# Patient Record
Sex: FEMALE | Race: WHITE | NOT HISPANIC OR LATINO | Employment: FULL TIME | URBAN - METROPOLITAN AREA
[De-identification: names, ages, dates, MRNs, and addresses within clinical notes are randomized per-mention and may not be internally consistent; named-entity substitution may affect disease eponyms.]

---

## 2020-01-19 ENCOUNTER — HOSPITAL ENCOUNTER (INPATIENT)
Facility: HOSPITAL | Age: 70
LOS: 24 days | Discharge: NURSING, REHAB OR LONG-TERM CARE | DRG: 326 | End: 2020-02-12
Attending: SURGERY | Admitting: SURGERY
Payer: COMMERCIAL

## 2020-01-19 DIAGNOSIS — K44.9 DIAPHRAGMATIC HERNIA: ICD-10-CM

## 2020-01-19 DIAGNOSIS — Z01.811 PRE-OP CHEST EXAM: ICD-10-CM

## 2020-01-19 DIAGNOSIS — K44.9 DIAPHRAGMATIC HERNIA WITHOUT OBSTRUCTION AND WITHOUT GANGRENE: ICD-10-CM

## 2020-01-19 DIAGNOSIS — R05.9 COUGH: ICD-10-CM

## 2020-01-19 DIAGNOSIS — D72.829 LEUKOCYTOSIS, UNSPECIFIED TYPE: ICD-10-CM

## 2020-01-19 LAB
ALBUMIN SERPL BCP-MCNC: 2.9 G/DL (ref 3.5–5.2)
ALP SERPL-CCNC: 100 U/L (ref 55–135)
ALT SERPL W/O P-5'-P-CCNC: 50 U/L (ref 10–44)
ANION GAP SERPL CALC-SCNC: 7 MMOL/L (ref 8–16)
AST SERPL-CCNC: 36 U/L (ref 10–40)
BASOPHILS # BLD AUTO: 0.07 K/UL (ref 0–0.2)
BASOPHILS NFR BLD: 0.4 % (ref 0–1.9)
BILIRUB SERPL-MCNC: 0.9 MG/DL (ref 0.1–1)
BUN SERPL-MCNC: 12 MG/DL (ref 8–23)
CALCIUM SERPL-MCNC: 8.8 MG/DL (ref 8.7–10.5)
CHLORIDE SERPL-SCNC: 101 MMOL/L (ref 95–110)
CO2 SERPL-SCNC: 26 MMOL/L (ref 23–29)
CREAT SERPL-MCNC: 0.8 MG/DL (ref 0.5–1.4)
DIFFERENTIAL METHOD: ABNORMAL
EOSINOPHIL # BLD AUTO: 0.3 K/UL (ref 0–0.5)
EOSINOPHIL NFR BLD: 1.7 % (ref 0–8)
ERYTHROCYTE [DISTWIDTH] IN BLOOD BY AUTOMATED COUNT: 13.9 % (ref 11.5–14.5)
EST. GFR  (AFRICAN AMERICAN): >60 ML/MIN/1.73 M^2
EST. GFR  (NON AFRICAN AMERICAN): >60 ML/MIN/1.73 M^2
GLUCOSE SERPL-MCNC: 140 MG/DL (ref 70–110)
HCT VFR BLD AUTO: 37.5 % (ref 37–48.5)
HGB BLD-MCNC: 11.5 G/DL (ref 12–16)
IMM GRANULOCYTES # BLD AUTO: 0.24 K/UL (ref 0–0.04)
IMM GRANULOCYTES NFR BLD AUTO: 1.4 % (ref 0–0.5)
LACTATE SERPL-SCNC: 0.9 MMOL/L (ref 0.5–2.2)
LYMPHOCYTES # BLD AUTO: 1.5 K/UL (ref 1–4.8)
LYMPHOCYTES NFR BLD: 8.9 % (ref 18–48)
MAGNESIUM SERPL-MCNC: 1.7 MG/DL (ref 1.6–2.6)
MCH RBC QN AUTO: 27.6 PG (ref 27–31)
MCHC RBC AUTO-ENTMCNC: 30.7 G/DL (ref 32–36)
MCV RBC AUTO: 90 FL (ref 82–98)
MONOCYTES # BLD AUTO: 1.1 K/UL (ref 0.3–1)
MONOCYTES NFR BLD: 6.7 % (ref 4–15)
NEUTROPHILS # BLD AUTO: 13.5 K/UL (ref 1.8–7.7)
NEUTROPHILS NFR BLD: 80.9 % (ref 38–73)
NRBC BLD-RTO: 0 /100 WBC
PHOSPHATE SERPL-MCNC: 3.7 MG/DL (ref 2.7–4.5)
PLATELET # BLD AUTO: 348 K/UL (ref 150–350)
PMV BLD AUTO: 9.1 FL (ref 9.2–12.9)
POTASSIUM SERPL-SCNC: 4.2 MMOL/L (ref 3.5–5.1)
PROT SERPL-MCNC: 6 G/DL (ref 6–8.4)
RBC # BLD AUTO: 4.16 M/UL (ref 4–5.4)
SODIUM SERPL-SCNC: 134 MMOL/L (ref 136–145)
WBC # BLD AUTO: 16.69 K/UL (ref 3.9–12.7)

## 2020-01-19 PROCEDURE — 25000003 PHARM REV CODE 250: Performed by: STUDENT IN AN ORGANIZED HEALTH CARE EDUCATION/TRAINING PROGRAM

## 2020-01-19 PROCEDURE — 83735 ASSAY OF MAGNESIUM: CPT

## 2020-01-19 PROCEDURE — 83605 ASSAY OF LACTIC ACID: CPT

## 2020-01-19 PROCEDURE — 63600175 PHARM REV CODE 636 W HCPCS: Performed by: STUDENT IN AN ORGANIZED HEALTH CARE EDUCATION/TRAINING PROGRAM

## 2020-01-19 PROCEDURE — 80053 COMPREHEN METABOLIC PANEL: CPT

## 2020-01-19 PROCEDURE — 36415 COLL VENOUS BLD VENIPUNCTURE: CPT

## 2020-01-19 PROCEDURE — 85025 COMPLETE CBC W/AUTO DIFF WBC: CPT

## 2020-01-19 PROCEDURE — 11000001 HC ACUTE MED/SURG PRIVATE ROOM

## 2020-01-19 PROCEDURE — 84100 ASSAY OF PHOSPHORUS: CPT

## 2020-01-19 RX ORDER — OXYCODONE HYDROCHLORIDE 5 MG/1
5 TABLET ORAL EVERY 4 HOURS PRN
Status: DISCONTINUED | OUTPATIENT
Start: 2020-01-19 | End: 2020-01-30

## 2020-01-19 RX ORDER — AMLODIPINE BESYLATE 10 MG/1
10 TABLET ORAL DAILY
Status: DISCONTINUED | OUTPATIENT
Start: 2020-01-19 | End: 2020-01-22

## 2020-01-19 RX ORDER — ONDANSETRON 8 MG/1
8 TABLET, ORALLY DISINTEGRATING ORAL EVERY 8 HOURS PRN
Status: DISCONTINUED | OUTPATIENT
Start: 2020-01-19 | End: 2020-01-29

## 2020-01-19 RX ORDER — LIDOCAINE HYDROCHLORIDE 10 MG/ML
1 INJECTION, SOLUTION EPIDURAL; INFILTRATION; INTRACAUDAL; PERINEURAL ONCE
Status: CANCELLED | OUTPATIENT
Start: 2020-01-19 | End: 2020-01-19

## 2020-01-19 RX ORDER — TALC
6 POWDER (GRAM) TOPICAL NIGHTLY PRN
Status: CANCELLED | OUTPATIENT
Start: 2020-01-19

## 2020-01-19 RX ORDER — ACETAMINOPHEN 325 MG/1
650 TABLET ORAL EVERY 8 HOURS PRN
Status: CANCELLED | OUTPATIENT
Start: 2020-01-19

## 2020-01-19 RX ORDER — OXYCODONE HYDROCHLORIDE 10 MG/1
10 TABLET ORAL EVERY 4 HOURS PRN
Status: DISCONTINUED | OUTPATIENT
Start: 2020-01-19 | End: 2020-01-30

## 2020-01-19 RX ORDER — ACETAMINOPHEN 325 MG/1
650 TABLET ORAL EVERY 4 HOURS PRN
Status: DISCONTINUED | OUTPATIENT
Start: 2020-01-19 | End: 2020-01-20

## 2020-01-19 RX ORDER — TALC
6 POWDER (GRAM) TOPICAL NIGHTLY PRN
Status: DISCONTINUED | OUTPATIENT
Start: 2020-01-19 | End: 2020-02-12 | Stop reason: HOSPADM

## 2020-01-19 RX ORDER — ACETAMINOPHEN 325 MG/1
650 TABLET ORAL EVERY 4 HOURS PRN
Status: CANCELLED | OUTPATIENT
Start: 2020-01-19

## 2020-01-19 RX ORDER — OXYCODONE HYDROCHLORIDE 10 MG/1
10 TABLET ORAL EVERY 4 HOURS PRN
Status: CANCELLED | OUTPATIENT
Start: 2020-01-19

## 2020-01-19 RX ORDER — LIDOCAINE HYDROCHLORIDE 10 MG/ML
1 INJECTION, SOLUTION EPIDURAL; INFILTRATION; INTRACAUDAL; PERINEURAL ONCE
Status: DISCONTINUED | OUTPATIENT
Start: 2020-01-19 | End: 2020-01-21

## 2020-01-19 RX ORDER — OXYCODONE HYDROCHLORIDE 5 MG/1
5 TABLET ORAL EVERY 4 HOURS PRN
Status: CANCELLED | OUTPATIENT
Start: 2020-01-19

## 2020-01-19 RX ORDER — ONDANSETRON 8 MG/1
8 TABLET, ORALLY DISINTEGRATING ORAL EVERY 8 HOURS PRN
Status: CANCELLED | OUTPATIENT
Start: 2020-01-19

## 2020-01-19 RX ORDER — ACETAMINOPHEN 325 MG/1
650 TABLET ORAL EVERY 8 HOURS PRN
Status: DISCONTINUED | OUTPATIENT
Start: 2020-01-19 | End: 2020-01-20

## 2020-01-19 RX ORDER — SODIUM CHLORIDE 0.9 % (FLUSH) 0.9 %
10 SYRINGE (ML) INJECTION
Status: CANCELLED | OUTPATIENT
Start: 2020-01-19

## 2020-01-19 RX ORDER — SODIUM CHLORIDE 0.9 % (FLUSH) 0.9 %
10 SYRINGE (ML) INJECTION
Status: DISCONTINUED | OUTPATIENT
Start: 2020-01-19 | End: 2020-02-12 | Stop reason: HOSPADM

## 2020-01-19 RX ORDER — DEXTROSE MONOHYDRATE, SODIUM CHLORIDE, AND POTASSIUM CHLORIDE 50; 1.49; 9 G/1000ML; G/1000ML; G/1000ML
INJECTION, SOLUTION INTRAVENOUS CONTINUOUS
Status: DISCONTINUED | OUTPATIENT
Start: 2020-01-19 | End: 2020-01-21

## 2020-01-19 RX ADMIN — Medication 6 MG: at 08:01

## 2020-01-19 RX ADMIN — AMLODIPINE BESYLATE 10 MG: 10 TABLET ORAL at 11:01

## 2020-01-19 RX ADMIN — OXYCODONE HYDROCHLORIDE 10 MG: 10 TABLET ORAL at 08:01

## 2020-01-19 RX ADMIN — POTASSIUM CHLORIDE: 2 INJECTION, SOLUTION, CONCENTRATE INTRAVENOUS at 10:01

## 2020-01-20 LAB
ALBUMIN SERPL BCP-MCNC: 2.8 G/DL (ref 3.5–5.2)
ALP SERPL-CCNC: 100 U/L (ref 55–135)
ALT SERPL W/O P-5'-P-CCNC: 47 U/L (ref 10–44)
ANION GAP SERPL CALC-SCNC: 7 MMOL/L (ref 8–16)
AST SERPL-CCNC: 34 U/L (ref 10–40)
BASOPHILS # BLD AUTO: 0.1 K/UL (ref 0–0.2)
BASOPHILS NFR BLD: 0.6 % (ref 0–1.9)
BILIRUB SERPL-MCNC: 0.8 MG/DL (ref 0.1–1)
BUN SERPL-MCNC: 13 MG/DL (ref 8–23)
CALCIUM SERPL-MCNC: 8.7 MG/DL (ref 8.7–10.5)
CHLORIDE SERPL-SCNC: 104 MMOL/L (ref 95–110)
CO2 SERPL-SCNC: 26 MMOL/L (ref 23–29)
CREAT SERPL-MCNC: 0.8 MG/DL (ref 0.5–1.4)
DIFFERENTIAL METHOD: ABNORMAL
EOSINOPHIL # BLD AUTO: 0.3 K/UL (ref 0–0.5)
EOSINOPHIL NFR BLD: 2 % (ref 0–8)
ERYTHROCYTE [DISTWIDTH] IN BLOOD BY AUTOMATED COUNT: 14.1 % (ref 11.5–14.5)
EST. GFR  (AFRICAN AMERICAN): >60 ML/MIN/1.73 M^2
EST. GFR  (NON AFRICAN AMERICAN): >60 ML/MIN/1.73 M^2
GLUCOSE SERPL-MCNC: 138 MG/DL (ref 70–110)
HCT VFR BLD AUTO: 38 % (ref 37–48.5)
HGB BLD-MCNC: 11.4 G/DL (ref 12–16)
IMM GRANULOCYTES # BLD AUTO: 0.28 K/UL (ref 0–0.04)
IMM GRANULOCYTES NFR BLD AUTO: 1.6 % (ref 0–0.5)
LYMPHOCYTES # BLD AUTO: 1.9 K/UL (ref 1–4.8)
LYMPHOCYTES NFR BLD: 11 % (ref 18–48)
MAGNESIUM SERPL-MCNC: 1.8 MG/DL (ref 1.6–2.6)
MCH RBC QN AUTO: 27.3 PG (ref 27–31)
MCHC RBC AUTO-ENTMCNC: 30 G/DL (ref 32–36)
MCV RBC AUTO: 91 FL (ref 82–98)
MONOCYTES # BLD AUTO: 1.4 K/UL (ref 0.3–1)
MONOCYTES NFR BLD: 7.8 % (ref 4–15)
NEUTROPHILS # BLD AUTO: 13.4 K/UL (ref 1.8–7.7)
NEUTROPHILS NFR BLD: 77 % (ref 38–73)
NRBC BLD-RTO: 0 /100 WBC
PHOSPHATE SERPL-MCNC: 3.6 MG/DL (ref 2.7–4.5)
PLATELET # BLD AUTO: 362 K/UL (ref 150–350)
PMV BLD AUTO: 9.2 FL (ref 9.2–12.9)
POTASSIUM SERPL-SCNC: 4.5 MMOL/L (ref 3.5–5.1)
PROT SERPL-MCNC: 6 G/DL (ref 6–8.4)
RBC # BLD AUTO: 4.17 M/UL (ref 4–5.4)
SODIUM SERPL-SCNC: 137 MMOL/L (ref 136–145)
WBC # BLD AUTO: 17.42 K/UL (ref 3.9–12.7)

## 2020-01-20 PROCEDURE — 63600175 PHARM REV CODE 636 W HCPCS: Performed by: STUDENT IN AN ORGANIZED HEALTH CARE EDUCATION/TRAINING PROGRAM

## 2020-01-20 PROCEDURE — 94640 AIRWAY INHALATION TREATMENT: CPT

## 2020-01-20 PROCEDURE — 94761 N-INVAS EAR/PLS OXIMETRY MLT: CPT

## 2020-01-20 PROCEDURE — 94799 UNLISTED PULMONARY SVC/PX: CPT

## 2020-01-20 PROCEDURE — 94664 DEMO&/EVAL PT USE INHALER: CPT

## 2020-01-20 PROCEDURE — 27000221 HC OXYGEN, UP TO 24 HOURS

## 2020-01-20 PROCEDURE — 25000242 PHARM REV CODE 250 ALT 637 W/ HCPCS: Performed by: STUDENT IN AN ORGANIZED HEALTH CARE EDUCATION/TRAINING PROGRAM

## 2020-01-20 PROCEDURE — 25000003 PHARM REV CODE 250: Performed by: STUDENT IN AN ORGANIZED HEALTH CARE EDUCATION/TRAINING PROGRAM

## 2020-01-20 PROCEDURE — 99900035 HC TECH TIME PER 15 MIN (STAT)

## 2020-01-20 PROCEDURE — 84100 ASSAY OF PHOSPHORUS: CPT

## 2020-01-20 PROCEDURE — 97530 THERAPEUTIC ACTIVITIES: CPT

## 2020-01-20 PROCEDURE — 85025 COMPLETE CBC W/AUTO DIFF WBC: CPT

## 2020-01-20 PROCEDURE — 63600175 PHARM REV CODE 636 W HCPCS: Performed by: SURGERY

## 2020-01-20 PROCEDURE — 99233 PR SUBSEQUENT HOSPITAL CARE,LEVL III: ICD-10-PCS | Mod: ,,, | Performed by: SURGERY

## 2020-01-20 PROCEDURE — 11000001 HC ACUTE MED/SURG PRIVATE ROOM

## 2020-01-20 PROCEDURE — 27000646 HC AEROBIKA DEVICE

## 2020-01-20 PROCEDURE — 83735 ASSAY OF MAGNESIUM: CPT

## 2020-01-20 PROCEDURE — 99233 SBSQ HOSP IP/OBS HIGH 50: CPT | Mod: ,,, | Performed by: SURGERY

## 2020-01-20 PROCEDURE — 36415 COLL VENOUS BLD VENIPUNCTURE: CPT

## 2020-01-20 PROCEDURE — 97165 OT EVAL LOW COMPLEX 30 MIN: CPT

## 2020-01-20 PROCEDURE — 80053 COMPREHEN METABOLIC PANEL: CPT

## 2020-01-20 RX ORDER — PANTOPRAZOLE SODIUM 40 MG/1
40 TABLET, DELAYED RELEASE ORAL
Status: DISCONTINUED | OUTPATIENT
Start: 2020-01-20 | End: 2020-01-21 | Stop reason: SDUPTHER

## 2020-01-20 RX ORDER — LEVOFLOXACIN 750 MG/1
750 TABLET ORAL DAILY
Status: DISCONTINUED | OUTPATIENT
Start: 2020-01-20 | End: 2020-01-20

## 2020-01-20 RX ORDER — IPRATROPIUM BROMIDE AND ALBUTEROL SULFATE 2.5; .5 MG/3ML; MG/3ML
3 SOLUTION RESPIRATORY (INHALATION) EVERY 6 HOURS PRN
Status: ACTIVE | OUTPATIENT
Start: 2020-01-20 | End: 2020-01-23

## 2020-01-20 RX ORDER — ACETAMINOPHEN 325 MG/1
650 TABLET ORAL EVERY 8 HOURS PRN
Status: DISCONTINUED | OUTPATIENT
Start: 2020-01-20 | End: 2020-01-24

## 2020-01-20 RX ORDER — HYDROMORPHONE HYDROCHLORIDE 1 MG/ML
0.5 INJECTION, SOLUTION INTRAMUSCULAR; INTRAVENOUS; SUBCUTANEOUS ONCE
Status: COMPLETED | OUTPATIENT
Start: 2020-01-20 | End: 2020-01-20

## 2020-01-20 RX ORDER — IPRATROPIUM BROMIDE AND ALBUTEROL SULFATE 2.5; .5 MG/3ML; MG/3ML
3 SOLUTION RESPIRATORY (INHALATION) EVERY 4 HOURS
Status: DISCONTINUED | OUTPATIENT
Start: 2020-01-20 | End: 2020-02-12 | Stop reason: HOSPADM

## 2020-01-20 RX ORDER — LEVOFLOXACIN 5 MG/ML
750 INJECTION, SOLUTION INTRAVENOUS
Status: DISCONTINUED | OUTPATIENT
Start: 2020-01-21 | End: 2020-01-23

## 2020-01-20 RX ORDER — POLYETHYLENE GLYCOL 3350 17 G/17G
17 POWDER, FOR SOLUTION ORAL DAILY
Status: DISCONTINUED | OUTPATIENT
Start: 2020-01-20 | End: 2020-01-23

## 2020-01-20 RX ORDER — BENZONATATE 100 MG/1
100 CAPSULE ORAL 3 TIMES DAILY
Status: DISCONTINUED | OUTPATIENT
Start: 2020-01-20 | End: 2020-01-22

## 2020-01-20 RX ADMIN — IPRATROPIUM BROMIDE AND ALBUTEROL SULFATE 3 ML: .5; 3 SOLUTION RESPIRATORY (INHALATION) at 11:01

## 2020-01-20 RX ADMIN — PANTOPRAZOLE SODIUM 40 MG: 40 TABLET, DELAYED RELEASE ORAL at 05:01

## 2020-01-20 RX ADMIN — POLYETHYLENE GLYCOL 3350 17 G: 17 POWDER, FOR SOLUTION ORAL at 10:01

## 2020-01-20 RX ADMIN — AMLODIPINE BESYLATE 10 MG: 10 TABLET ORAL at 10:01

## 2020-01-20 RX ADMIN — IPRATROPIUM BROMIDE AND ALBUTEROL SULFATE 3 ML: .5; 3 SOLUTION RESPIRATORY (INHALATION) at 04:01

## 2020-01-20 RX ADMIN — OXYCODONE HYDROCHLORIDE 10 MG: 10 TABLET ORAL at 05:01

## 2020-01-20 RX ADMIN — BENZONATATE 100 MG: 100 CAPSULE ORAL at 09:01

## 2020-01-20 RX ADMIN — ENEMA 1 ENEMA: 19; 7 ENEMA RECTAL at 09:01

## 2020-01-20 RX ADMIN — POTASSIUM CHLORIDE: 2 INJECTION, SOLUTION, CONCENTRATE INTRAVENOUS at 09:01

## 2020-01-20 RX ADMIN — OXYCODONE HYDROCHLORIDE 10 MG: 10 TABLET ORAL at 10:01

## 2020-01-20 RX ADMIN — LEVOFLOXACIN 750 MG: 750 TABLET, FILM COATED ORAL at 10:01

## 2020-01-20 RX ADMIN — IPRATROPIUM BROMIDE AND ALBUTEROL SULFATE 3 ML: .5; 3 SOLUTION RESPIRATORY (INHALATION) at 01:01

## 2020-01-20 RX ADMIN — HYDROMORPHONE HYDROCHLORIDE 0.5 MG: 1 INJECTION, SOLUTION INTRAMUSCULAR; INTRAVENOUS; SUBCUTANEOUS at 12:01

## 2020-01-20 RX ADMIN — PANTOPRAZOLE SODIUM 40 MG: 40 TABLET, DELAYED RELEASE ORAL at 06:01

## 2020-01-20 RX ADMIN — OXYCODONE HYDROCHLORIDE 10 MG: 10 TABLET ORAL at 01:01

## 2020-01-20 RX ADMIN — IPRATROPIUM BROMIDE AND ALBUTEROL SULFATE 3 ML: .5; 3 SOLUTION RESPIRATORY (INHALATION) at 09:01

## 2020-01-20 RX ADMIN — OXYCODONE HYDROCHLORIDE 10 MG: 10 TABLET ORAL at 02:01

## 2020-01-20 RX ADMIN — IPRATROPIUM BROMIDE AND ALBUTEROL SULFATE 3 ML: .5; 3 SOLUTION RESPIRATORY (INHALATION) at 07:01

## 2020-01-20 RX ADMIN — BENZONATATE 100 MG: 100 CAPSULE ORAL at 02:01

## 2020-01-20 RX ADMIN — BENZONATATE 100 MG: 100 CAPSULE ORAL at 10:01

## 2020-01-20 NOTE — H&P
"GENERAL SURGERY  History and Physical      SUBJECTIVE:     HISTORY OF PRESENT ILLNESS:  Marisela Bunn is a 69 y.o. female with PMH CAD, HTN, TIA, GERD presents to the hospital as a direct admission for evaluation of a newly diagnosed diaphragmatic hernia. She initially presented to [facility] ED on 1/18/20 for a 1-month history of a cough, intermittent fevers and dyspnea.  She had been seen by 2 urgent care physicians in the last month for the cough, with 2 different antibiotic courses given without improvement in cough. She reports that on 1/15 after an particularly severe coughing episode she felt a "pop" on left side, with associated severe pain and  Subsequent bruising of the left flank. She reports constant severe pain since that time. She is on daily ASA 81mg,    On ED presentation, patient was hemodynamically stable, H/H 12.4/40.0, breathing on RA. Labs revealed leukocytosis (19.2), lactic acid 2.5 (repeat lactic acid1.6), BMP wnl. CXR revealed left lower lobe pneumonia with possible associated pleural effusion. CT abdomen/pelvis revealed a large left diaphragmatic hernia containing fat  and bowel loops with hernia of intra-abdomnial contents outside the thorax from 7th left ICS. Medium sized HH. CTA revealed large Bochdalek hernia through defect in left hemo-diaphragm, with associated widening of 7th intercostal space. She was started on IV hydration and antibiotics (levofloxacin, zosyn). She reports she has not passed a bowel movement of flatus in 2 days. Intermittent lower quadrant "spasms" while coughing, otherwise no abdominal pain. She reports no other symptoms.       PMH - HLD, HTN, TIA, COPD, asthma, diverticulitis, GERD, IBS, anxiety, depression, OA, fibromyalgia    PSH - laparoscopic cholecystectomy, EMILIANO with BSO, carpal tunnel, right foot surgery, knee replacement, angiography (2007)    Social - Never smoker, denies alcohol.     FH - father with aplastic anemia. Mother with DM2, lung cancer, " "HTN    Home medications - Albuterol 90 mcg prn, flexeril 10mg TID, cetirizine 10mg,  ASA 81mg daily, celebrex 200mg PO daily, zegerid 40g po daily, relafen 750mg BID, spirinolactone 25mg 12.5 ms daily, labetalol 100mg daily, norvasc 10mg daily, protonix 40mg BID, ditropan xl 10mg oral bedtime, prozac 40mg daily, lasix 20mg prn, mobic 15mg daily    REVIEW OF SYSTEMS:  Review of Systems   Constitutional: Negative.    HENT: Positive for congestion.    Eyes: Negative.    Respiratory: Positive for cough and shortness of breath.    Cardiovascular: Negative.  Negative for chest pain.   Gastrointestinal: Positive for abdominal pain (lower quadrant "spasms"), constipation and nausea. Negative for abdominal distention.   Endocrine: Negative.    Genitourinary: Negative.    Musculoskeletal: Negative.    Hematological: Bruises/bleeds easily.   Psychiatric/Behavioral: Negative.    All other systems reviewed and are negative.        OBJECTIVE:       PHYSICAL EXAM:  Physical Exam   Constitutional: She is oriented to person, place, and time.   Obese female lying in bed in NAD, breathing 2L NC   Cardiovascular: Normal rate and intact distal pulses.   Pulmonary/Chest: Effort normal. She has wheezes.   Coarse breath sounds bilaterally, poor effort   Abdominal: Soft. She exhibits no distension. There is no tenderness.   Soft, non-tender, non-distended. Large bruise on left quadrants that extends to left flank   Neurological: She is alert and oriented to person, place, and time.   Skin: Skin is warm and dry.       Labs pending    DIAGNOSTIC STUDIES:  DATE OF EXAM: 01/18/20   TIME OF EXAM:     Exam: CT OF THE ABDOMEN/PELVIS WITH IV CONTRAST     Clinical data: Left-sided rib/abdominal pain, large bruise/hematoma.     Limitations: Lack of oral contrast limits evaluation of the bowel loops.     Findings: Lung bases: Subsegmental atelectasis at the left lung base.     Liver: Unremarkable size and contour. Normal density. No evidence of "   mass. No   evidence of dilated ducts.     Gallbladder: Status post cholecystectomy     Spleen: Grossly unremarkable.     Pancreas/adrenal glands: Grossly unremarkable size, contour and   density.     Kidneys: In anatomic position. Grossly unremarkable renal size,   contour and   density. No renal or ureteral calculi. No evidence of a renal mass or   cyst.   Perinephric space is unremarkable.     Retroperitoneum: No enlarged retroperitoneal lymphadenopathy. The   aorta and IVC   appear unremarkable.     Peritoneal cavity: No evidence of free air or ascites.     Gastrointestinal tract: No obstruction. Mild to moderate large bowel   diverticulosis without evidence of acute diverticulitis.     Appendix: Not visualized.     Pelvis: Status post hysterectomy. Rest of the solid and hollow viscera   grossly   unremarkable.     Osseous structures: No acute or destructive bony process identified.   Multilevel degenerative disc disease in the lumbar spine.     Large left diaphragmatic hernia containing fat and bowel loops with   herniation   of the intra-abdominal content intrathoracic location and outside the   thorax   from the left seventh intercostal space. Medium-sized hiatal hernia.     IMPRESSION:   1. Large left diaphragmatic hernia containing fat and bowel loops with herniation of the intra-abdominal content intrathoracic location and outside the thorax from the left seventh intercostal space. Medium-sized hiatal   hernia.   2. Medium-sized hiatal hernia.   3. No evidence of bowel perforation, pneumoperitoneum or   hemoperitoneum.   4. Mild to moderate large bowel diverticulosis without evidence of   acute   diverticulitis.   5. No evidence of acute fracture.       CTA OF THE CHEST WITH CONTRAST (1/18/20)    Lungs: A moderate sized hiatal hernia is noted. Also, there is a large     Bochdalek hernia through the defect in the left hemidiaphragm.   Herniation of   portions of the distal transverse colon and spleen with  its associated   mesentery   is present in the left lower hemithorax. This hernia also extends   through the   seventh intercostal space with widening of the seventh intercostal   space.     There is volume loss of the left lung. Subsegmental atelectasis in the   lingula   and the superior segment of the left lower lobe is visualized.     Mild centrilobular emphysematous changes in the lung parenchyma. The   airway is   clear.     Soft Tissues: No mediastinal, axillary or supraclavicular adenopathy   is   identified.     Vascular: No filling defect within the pulmonary arteries to the   segmental   branch level. Unremarkable aorta. Grossly unremarkable sized heart.     Bony structures: No acute or destructive abnormality. Mild osteopenia.     Dextroscoliosis of the thoracic spine and multilevel degenerative   changes in the   thoracic spine, acromioclavicular and glenohumeral joint.     Upper Abdomen: Mild fatty infiltration of liver. Post cholecystectomy   status.   The rest of the visualized upper abdomen is unremarkable.     IMPRESSION:     No filling defect within the pulmonary arteries to the segmental   branch level.   No obvious evidence of pulmonary embolism.     There is a moderate sized hiatal hernia.     3. There is a large Bochdalek hernia through the defect in the left   hemidiaphragm. Herniation of the portion of the distal transverse   colon, spleen   with its associated mesentery in the left lower lobe. This hernia also   extends   through the seventh intercostal space with widening of the seventh   intercostal   space.     4. Volume loss of the left lung. Subsegmental atelectasis is present   in the   lingula and the superior segment of the left lower lobe.     5. Mild osteopenia. Degenerative changes in the thoracic spine and   acromioclavicular joint. No acute osseous abnormality.     6. Post cholecystectomy status. Mild fatty infiltration of liver.       ASSESSMENT:     Marisela Bunn is a 69 y.o.  female with PMH COPD (not on home oxygen), HTN, HLD (not on anticoagulation) received as direct admission for large Bochdalek hernia. Patient is symptomatic from hernia with constipation and dyspnea with overlying bruise on left flank. Currently with new oxygen requirement of 2L NC. She will be admitted to the general surgery service, under Dr. Higuera.     PLAN:  - Admit to the general surgery service  - NPO   - mIVF  - CBC, CMP, Mg, Phos, PT, PTT tonight  - Will speak with general surgery staff regarding nature and timing of diaphragmatic hernia repair     -Leela Jaramillo M.D  General Surgery PGY1

## 2020-01-20 NOTE — PLAN OF CARE
Patient lives alone approx 3 hours away in [place]. Independent with ADL's. No discharge needs noted.     01/20/20 1328   Discharge Assessment   Assessment Type Discharge Planning Assessment   Confirmed/corrected address and phone number on facesheet? Yes   Assessment information obtained from? Patient   Expected Length of Stay (days)   (4)   Communicated expected length of stay with patient/caregiver yes   Prior to hospitilization cognitive status: Alert/Oriented   Prior to hospitalization functional status: Independent   Current cognitive status: Alert/Oriented   Current Functional Status: Independent   Facility Arrived From: Home   Lives With alone   Able to Return to Prior Arrangements yes   Is patient able to care for self after discharge? Yes   Who are your caregiver(s) and their phone number(s)?   (Miguel Bunn (Son) [phone])   Patient's perception of discharge disposition home or selfcare   Readmission Within the Last 30 Days no previous admission in last 30 days   Patient currently being followed by outpatient case management? No   Patient currently receives any other outside agency services? No   Equipment Currently Used at Home rollator;bedside commode;wheelchair   Do you have any problems affording any of your prescribed medications? No   Is the patient taking medications as prescribed? yes   Does the patient have transportation home? Yes   Transportation Anticipated family or friend will provide   Does the patient receive services at the [facility]? No   Discharge Plan A Home   Discharge Plan B Home   DME Needed Upon Discharge  none   Patient/Family in Agreement with Plan yes

## 2020-01-20 NOTE — ASSESSMENT & PLAN NOTE
Marisela Bunn is a 69 y.o. female with PMH COPD (not on home oxygen), HTN, HLD (not on anticoagulation) received as direct admission for large diaphragmatic hernia. Patient is symptomatic from hernia with constipation and dyspnea with overlying bruise on left flank. Currently with new oxygen requirement of 2L NC.     - Full liquid diet, Boost  - Miralax daily and enema x1 for constipation  - Levaquin for community-acquired pneumonia in setting of elevated WBC and ongoing cough  - Aggressive pulm toilet with IS, Acapella, DuoNebs, and Tessalon   - PT/OT  - Daily labs  - Plan for OR on Thursday 1/23 for diaphragmatic hernia repair

## 2020-01-20 NOTE — PROGRESS NOTES
"[facility]  General Surgery  Progress Note    Subjective:     History of Present Illness:  Marisela Bunn is a 69 y.o. female with PMH CAD, HTN, TIA, GERD presents to the hospital as a direct admission for evaluation of a newly diagnosed diaphragmatic hernia. She initially presented to [place] ED on 1/18/20 for a 1-month history of a cough, intermittent fevers and dyspnea.  She had been seen by 2 urgent care physicians in the last month for the cough, with 2 different antibiotic courses given without improvement in cough. She reports that on 1/15 after an particularly severe coughing episode she felt a "pop" on left side, with associated severe pain and  Subsequent bruising of the left flank. She reports constant severe pain since that time. She is on daily ASA 81mg,     On ED presentation, patient was hemodynamically stable, H/H 12.4/40.0, breathing on RA. Labs revealed leukocytosis (19.2), lactic acid 2.5 (repeat lactic acid1.6), BMP wnl. CXR revealed left lower lobe pneumonia with possible associated pleural effusion. CT abdomen/pelvis revealed a large left diaphragmatic hernia containing fat  and bowel loops with hernia of intra-abdomnial contents outside the thorax from 7th left ICS. Medium sized HH. CTA revealed large Bochdalek hernia through defect in left hemo-diaphragm, with associated widening of 7th intercostal space. She was started on IV hydration and antibiotics (levofloxacin, zosyn). She reports she has not passed a bowel movement of flatus in 2 days. Intermittent lower quadrant "spasms" while coughing, otherwise no abdominal pain. She reports no other symptoms.    Post-Op Info:  * No surgery found *         Interval History: No acute events overnight. AF and VSS with O2 sats ranging 88-99% on 2L. She continues to have a cough.     Medications:  Continuous Infusions:   dextrose 5 % and 0.9 % NaCl with KCl 20 mEq 125 mL/hr at 01/19/20 6436     Scheduled Meds:   amLODIPine  10 " mg Oral Daily    lidocaine (PF) 10 mg/ml (1%)  1 mL Other Once    pantoprazole  40 mg Oral BID AC     PRN Meds:acetaminophen, albuterol-ipratropium, melatonin, ondansetron, oxyCODONE, oxyCODONE, sodium chloride 0.9%     Review of patient's allergies indicates:   Allergen Reactions    Erythromycin      Stomach pains    Rosuvastatin      Hives, muscle/joint pains    Zolpidem      Confusion      Objective:     Vital Signs (Most Recent):  Temp: 97.7 °F (36.5 °C) (01/20/20 0326)  Pulse: 80 (01/20/20 0413)  Resp: (!) 22 (01/20/20 0413)  BP: (!) 171/82 (01/20/20 0412)  SpO2: 99 % (01/20/20 0413) Vital Signs (24h Range):  Temp:  [96.3 °F (35.7 °C)-98.6 °F (37 °C)] 97.7 °F (36.5 °C)  Pulse:  [63-95] 80  Resp:  [12-22] 22  SpO2:  [88 %-99 %] 99 %  BP: (130-177)/(41-95) 171/82     Weight: 108.4 kg (239 lb)  Body mass index is 45.16 kg/m².    Intake/Output - Last 3 Shifts       01/18 0700 - 01/19 0659 01/19 0700 - 01/20 0659 01/20 0700 - 01/21 0659           Urine Occurrence  1 x           Physical Exam  Constitutional: She is oriented to person, place, and time.   Obese female lying in bed in NAD, breathing 2L NC   Cardiovascular: Normal rate and intact distal pulses.   Pulmonary/Chest: Effort normal. She has wheezes.   Coarse breath sounds bilaterally, poor effort   Abdominal: Soft. She exhibits no distension. There is no tenderness.   Soft, non-tender, non-distended. Large bruise on left quadrants that extends to left flank   Neurological: She is alert and oriented to person, place, and time.   Skin: Skin is warm and dry.     Significant Labs:  CBC:   Recent Labs   Lab 01/20/20  0359   WBC 17.42*   RBC 4.17   HGB 11.4*   HCT 38.0   *   MCV 91   MCH 27.3   MCHC 30.0*     CMP:   Recent Labs   Lab 01/20/20  0359   *   CALCIUM 8.7   ALBUMIN 2.8*   PROT 6.0      K 4.5   CO2 26      BUN 13   CREATININE 0.8   ALKPHOS 100   ALT 47*   AST 34   BILITOT 0.8       Significant Diagnostics:  I have reviewed  all pertinent imaging results/findings within the past 24 hours.    Assessment/Plan:     * Diaphragmatic hernia  Marisela Bunn is a 69 y.o. female with PMH COPD (not on home oxygen), HTN, HLD (not on anticoagulation) received as direct admission for large diaphragmatic hernia. Patient is symptomatic from hernia with constipation and dyspnea with overlying bruise on left flank. Currently with new oxygen requirement of 2L NC.     - Full liquid diet, Boost  - Miralax daily and enema x1 for constipation  - Levaquin for community-acquired pneumonia in setting of elevated WBC and ongoing cough  - Aggressive pulm toilet with IS, Acapella, DuoNebs, and Tessalon   - PT/OT  - Daily labs  - Plan for OR on Thursday 1/23 for diaphragmatic hernia repair        Onesimo Raza MD  General Surgery  [facility]

## 2020-01-20 NOTE — PLAN OF CARE
OT Acute eval complete. Pt agreeable to therapy but eval limited due to SOB and fatigue. Pt is CGA in bed mobility and Min A with no AE to t/f bed<>bedside chair due to SOB and decreased activity tolerance. Pt lives alone in trailer with ramp to enter. Pt is likely to be able to return home with family assistance but will re-evaluate post-surgery scheduled for 1/23/20.      Problem: Occupational Therapy Goal  Goal: Occupational Therapy Goal  Description  Goals to be met by: 2/20/20    Patient will increase functional independence with ADLs by performing:    UE Dressing with Modified [place].  LE Dressing with Modified [place].  Grooming while standing with Modified [place].  Toileting from bedside commode with Modified [place] for hygiene and clothing management.   Toilet transfer to bedside commode with Modified [place].     Outcome: Ongoing, Progressing

## 2020-01-20 NOTE — HPI
"Marisela Bunn is a 69 y.o. female with PMH CAD, HTN, TIA, GERD presents to the hospital as a direct admission for evaluation of a newly diagnosed diaphragmatic hernia. She initially presented to [place] ED on 1/18/20 for a 1-month history of a cough, intermittent fevers and dyspnea.  She had been seen by 2 urgent care physicians in the last month for the cough, with 2 different antibiotic courses given without improvement in cough. She reports that on 1/15 after an particularly severe coughing episode she felt a "pop" on left side, with associated severe pain and  Subsequent bruising of the left flank. She reports constant severe pain since that time. She is on daily ASA 81mg,     On ED presentation, patient was hemodynamically stable, H/H 12.4/40.0, breathing on RA. Labs revealed leukocytosis (19.2), lactic acid 2.5 (repeat lactic acid1.6), BMP wnl. CXR revealed left lower lobe pneumonia with possible associated pleural effusion. CT abdomen/pelvis revealed a large left diaphragmatic hernia containing fat  and bowel loops with hernia of intra-abdomnial contents outside the thorax from 7th left ICS. Medium sized HH. CTA revealed large Bochdalek hernia through defect in left hemo-diaphragm, with associated widening of 7th intercostal space. She was started on IV hydration and antibiotics (levofloxacin, zosyn). She reports she has not passed a bowel movement of flatus in 2 days. Intermittent lower quadrant "spasms" while coughing, otherwise no abdominal pain. She reports no other symptoms.  "

## 2020-01-20 NOTE — SUBJECTIVE & OBJECTIVE
Interval History: No acute events overnight. AF and VSS with O2 sats ranging 88-99% on 2L. She continues to have a cough.     Medications:  Continuous Infusions:   dextrose 5 % and 0.9 % NaCl with KCl 20 mEq 125 mL/hr at 01/19/20 2218     Scheduled Meds:   amLODIPine  10 mg Oral Daily    lidocaine (PF) 10 mg/ml (1%)  1 mL Other Once    pantoprazole  40 mg Oral BID AC     PRN Meds:acetaminophen, albuterol-ipratropium, melatonin, ondansetron, oxyCODONE, oxyCODONE, sodium chloride 0.9%     Review of patient's allergies indicates:   Allergen Reactions    Erythromycin      Stomach pains    Rosuvastatin      Hives, muscle/joint pains    Zolpidem      Confusion      Objective:     Vital Signs (Most Recent):  Temp: 97.7 °F (36.5 °C) (01/20/20 0326)  Pulse: 80 (01/20/20 0413)  Resp: (!) 22 (01/20/20 0413)  BP: (!) 171/82 (01/20/20 0412)  SpO2: 99 % (01/20/20 0413) Vital Signs (24h Range):  Temp:  [96.3 °F (35.7 °C)-98.6 °F (37 °C)] 97.7 °F (36.5 °C)  Pulse:  [63-95] 80  Resp:  [12-22] 22  SpO2:  [88 %-99 %] 99 %  BP: (130-177)/(41-95) 171/82     Weight: 108.4 kg (239 lb)  Body mass index is 45.16 kg/m².    Intake/Output - Last 3 Shifts       01/18 0700 - 01/19 0659 01/19 0700 - 01/20 0659 01/20 0700 - 01/21 0659           Urine Occurrence  1 x           Physical Exam  Constitutional: She is oriented to person, place, and time.   Obese female lying in bed in NAD, breathing 2L NC   Cardiovascular: Normal rate and intact distal pulses.   Pulmonary/Chest: Effort normal. She has wheezes.   Coarse breath sounds bilaterally, poor effort   Abdominal: Soft. She exhibits no distension. There is no tenderness.   Soft, non-tender, non-distended. Large bruise on left quadrants that extends to left flank   Neurological: She is alert and oriented to person, place, and time.   Skin: Skin is warm and dry.     Significant Labs:  CBC:   Recent Labs   Lab 01/20/20  0359   WBC 17.42*   RBC 4.17   HGB 11.4*   HCT 38.0   *   MCV 91    MCH 27.3   MCHC 30.0*     CMP:   Recent Labs   Lab 01/20/20  0359   *   CALCIUM 8.7   ALBUMIN 2.8*   PROT 6.0      K 4.5   CO2 26      BUN 13   CREATININE 0.8   ALKPHOS 100   ALT 47*   AST 34   BILITOT 0.8       Significant Diagnostics:  I have reviewed all pertinent imaging results/findings within the past 24 hours.

## 2020-01-20 NOTE — PLAN OF CARE
01/20/20 1432   Post-Acute Status   Post-Acute Authorization Placement;Other   Other Status No Post-Acute Service Needs     Patient expected to discharge home with no needs when medically ready. SW will continue to follow up.    Laverne Mar LMSW  [facility]   [id number]

## 2020-01-20 NOTE — PT/OT/SLP EVAL
Occupational Therapy   Evaluation    Name: Marisela Bunn  MRN: [id number]  Admitting Diagnosis:  Diaphragmatic hernia      Recommendations:     Discharge Recommendations: home  Discharge Equipment Recommendations:  none  Barriers to discharge:  None    Assessment:     Marisela Bunn is a 69 y.o. female with a medical diagnosis of Diaphragmatic hernia.  She presents with the following Performance deficits affecting function: weakness, impaired endurance, impaired self care skills, impaired functional mobilty, gait instability, impaired balance, decreased lower extremity function, pain, decreased safety awareness.      OT Acute eval complete. Pt agreeable to therapy but eval limited due to SOB and fatigue. Pt is CGA in bed mobility and Min A with no AE to t/f bed<>bedside chair due to SOB and decreased activity tolerance. Pt lives alone in trailer with ramp to enter. Pt is likely to be able to return home with family assistance but will re-evaluate post-surgery scheduled for 1/23/20.    Rehab Prognosis: Good; patient would benefit from acute skilled OT services to address these deficits and reach maximum level of function.       Plan:     Patient to be seen 3 x/week to address the above listed problems via self-care/home management, therapeutic activities, therapeutic exercises  · Plan of Care Expires: 02/20/20  · Plan of Care Reviewed with: patient    Subjective     Chief Complaint: SOB  Patient/Family Comments/goals: to get better    Occupational Profile:  Living Environment: Pt lives alone in trailer with ramp to enter. Walk-in shower with bench  Previous level of function: Mod I  Roles and Routines: drives  Equipment Used at Home:  rollator, bedside commode, wheelchair  Assistance upon Discharge: Family lives nearby and is able to assist up on d/c.    Pain/Comfort:  · Pain Rating 1: 0/10    Patients cultural, spiritual, Rastafarian conflicts given the current situation:      Objective:     Communicated with: RN prior  to session.  Patient found supine with oxygen, peripheral IV upon OT entry to room.    General Precautions: Standard, fall   Orthopedic Precautions:N/A   Braces:       Occupational Performance:    Bed Mobility:    · Patient completed Scooting/Bridging with contact guard assistance  · Patient completed Supine to Sit with contact guard assistance    Functional Mobility/Transfers:  · Patient completed Sit <> Stand Transfer with contact guard assistance  with  no assistive device   · Patient completed Bed <> Chair Transfer using Step Transfer technique with contact guard assistance with no assistive device  · Functional Mobility: OT Acute eval complete. Pt agreeable to therapy but eval limited due to SOB and fatigue. Pt is CGA in bed mobility and Min A with no AE to t/f bed<>bedside chair due to SOB and decreased activity tolerance. Pt lives alone in trailer with ramp to enter. Pt is likely to be able to return home with family assistance but will re-evaluate post-surgery scheduled for 1/23/20.    Activities of Daily Living:  · Min A to kathy gown    Cognitive/Visual Perceptual:  Cognitive/Psychosocial Skills:     -       Oriented to: Person, Place, Time and Situation   -       Safety awareness/insight to disability: intact     Physical Exam:  Upper Extremity Range of Motion:     -       Right Upper Extremity: WFL  -       Left Upper Extremity: WFL  Upper Extremity Strength:    -       Right Upper Extremity: WFL  -       Left Upper Extremity: WFL   Strength:    -       Right Upper Extremity: WFL  -       Left Upper Extremity: WFL    AMPAC 6 Click ADL:  AMPAC Total Score: 21    Treatment & Education:  - OT/POC-  - Importance of mobility to maximize recovery.  - safety w/ functional mobility; hand placement to ensure safe transfers to various surfaces in prep for ADLs  - Reviewed gait sequence and RW management via verbalization and demonstration   - Educated on energy conservation techniques  - encouraged to ambulate  within household environment at least every hour to hour 1/2    Education:    Patient left up in chair with all lines intact, call button in reach and RN notified    GOALS:   Multidisciplinary Problems     Occupational Therapy Goals        Problem: Occupational Therapy Goal    Goal Priority Disciplines Outcome Interventions   Occupational Therapy Goal     OT, PT/OT Ongoing, Progressing    Description:  Goals to be met by: 2/20/20    Patient will increase functional independence with ADLs by performing:    UE Dressing with Modified [place].  LE Dressing with Modified [place].  Grooming while standing with Modified [place].  Toileting from bedside commode with Modified [place] for hygiene and clothing management.   Toilet transfer to bedside commode with Modified [place].                      History:     History reviewed. No pertinent past medical history.    History reviewed. No pertinent surgical history.    Time Tracking:     OT Date of Treatment: 01/20/20  OT Start Time: 1112  OT Stop Time: 1134  OT Total Time (min): 22 min    Billable Minutes:Evaluation 10  Therapeutic Activity 12    Latisha Ledbetter OT  1/20/2020

## 2020-01-20 NOTE — PT/OT/SLP PROGRESS
Physical Therapy   Update    Mraisela Bunn   MRN: [id number]     Attempted to see patient 2x today for PT evaluation. Upon first attempt at 1130, she had just finished working with OT and now sitting up in chair, fatigued after session. Made plan with patient for PT to return at 1300 for assessment and assist back to bed. Upon my return at 1305, patient had just gotten back to bed with nsg, too fatigued to participate in another session. Will schedule patient for PT evaluation on Tuesday, pt verbalized understanding of plan. No billable units today.    Stewart Coon, PT  1/20/2020

## 2020-01-20 NOTE — RESPIRATORY THERAPY
Assisted patient to restroom at approximately 1645. After asking her several times if she would be okay getting back in bed, she assured me that she would.

## 2020-01-21 ENCOUNTER — ANESTHESIA EVENT (OUTPATIENT)
Dept: SURGERY | Facility: HOSPITAL | Age: 70
DRG: 326 | End: 2020-01-21
Payer: COMMERCIAL

## 2020-01-21 ENCOUNTER — ANESTHESIA (OUTPATIENT)
Dept: SURGERY | Facility: HOSPITAL | Age: 70
DRG: 326 | End: 2020-01-21
Payer: COMMERCIAL

## 2020-01-21 PROBLEM — J18.9 PNEUMONIA: Status: ACTIVE | Noted: 2020-01-21

## 2020-01-21 LAB
ABO + RH BLD: NORMAL
ALBUMIN SERPL BCP-MCNC: 2.1 G/DL (ref 3.5–5.2)
ALBUMIN SERPL BCP-MCNC: 2.8 G/DL (ref 3.5–5.2)
ALLENS TEST: ABNORMAL
ALP SERPL-CCNC: 102 U/L (ref 55–135)
ALP SERPL-CCNC: 134 U/L (ref 55–135)
ALT SERPL W/O P-5'-P-CCNC: 54 U/L (ref 10–44)
ALT SERPL W/O P-5'-P-CCNC: 76 U/L (ref 10–44)
ANION GAP SERPL CALC-SCNC: 10 MMOL/L (ref 8–16)
ANION GAP SERPL CALC-SCNC: 9 MMOL/L (ref 8–16)
APTT BLDCRRT: 24.5 SEC (ref 21–32)
AST SERPL-CCNC: 39 U/L (ref 10–40)
AST SERPL-CCNC: 58 U/L (ref 10–40)
BASOPHILS # BLD AUTO: 0.03 K/UL (ref 0–0.2)
BASOPHILS # BLD AUTO: 0.08 K/UL (ref 0–0.2)
BASOPHILS NFR BLD: 0.1 % (ref 0–1.9)
BASOPHILS NFR BLD: 0.4 % (ref 0–1.9)
BILIRUB SERPL-MCNC: 0.9 MG/DL (ref 0.1–1)
BILIRUB SERPL-MCNC: 1.2 MG/DL (ref 0.1–1)
BLD GP AB SCN CELLS X3 SERPL QL: NORMAL
BUN SERPL-MCNC: 12 MG/DL (ref 8–23)
BUN SERPL-MCNC: 13 MG/DL (ref 8–23)
CALCIUM SERPL-MCNC: 7.9 MG/DL (ref 8.7–10.5)
CALCIUM SERPL-MCNC: 8.9 MG/DL (ref 8.7–10.5)
CHLORIDE SERPL-SCNC: 102 MMOL/L (ref 95–110)
CHLORIDE SERPL-SCNC: 103 MMOL/L (ref 95–110)
CO2 SERPL-SCNC: 25 MMOL/L (ref 23–29)
CO2 SERPL-SCNC: 25 MMOL/L (ref 23–29)
CREAT SERPL-MCNC: 0.7 MG/DL (ref 0.5–1.4)
CREAT SERPL-MCNC: 0.8 MG/DL (ref 0.5–1.4)
DELSYS: ABNORMAL
DIFFERENTIAL METHOD: ABNORMAL
DIFFERENTIAL METHOD: ABNORMAL
EOSINOPHIL # BLD AUTO: 0 K/UL (ref 0–0.5)
EOSINOPHIL # BLD AUTO: 0.2 K/UL (ref 0–0.5)
EOSINOPHIL NFR BLD: 0 % (ref 0–8)
EOSINOPHIL NFR BLD: 0.7 % (ref 0–8)
ERYTHROCYTE [DISTWIDTH] IN BLOOD BY AUTOMATED COUNT: 13.9 % (ref 11.5–14.5)
ERYTHROCYTE [DISTWIDTH] IN BLOOD BY AUTOMATED COUNT: 14 % (ref 11.5–14.5)
ERYTHROCYTE [SEDIMENTATION RATE] IN BLOOD BY WESTERGREN METHOD: 16 MM/H
EST. GFR  (AFRICAN AMERICAN): >60 ML/MIN/1.73 M^2
EST. GFR  (AFRICAN AMERICAN): >60 ML/MIN/1.73 M^2
EST. GFR  (NON AFRICAN AMERICAN): >60 ML/MIN/1.73 M^2
EST. GFR  (NON AFRICAN AMERICAN): >60 ML/MIN/1.73 M^2
FIO2: 80
GLUCOSE SERPL-MCNC: 135 MG/DL (ref 70–110)
GLUCOSE SERPL-MCNC: 140 MG/DL (ref 70–110)
GLUCOSE SERPL-MCNC: 144 MG/DL (ref 70–110)
HCO3 UR-SCNC: 25.1 MMOL/L (ref 24–28)
HCO3 UR-SCNC: 25.3 MMOL/L (ref 24–28)
HCT VFR BLD AUTO: 31 % (ref 37–48.5)
HCT VFR BLD AUTO: 37.7 % (ref 37–48.5)
HCT VFR BLD CALC: 29 %PCV (ref 36–54)
HGB BLD-MCNC: 11.1 G/DL (ref 12–16)
HGB BLD-MCNC: 9.4 G/DL (ref 12–16)
IMM GRANULOCYTES # BLD AUTO: 0.29 K/UL (ref 0–0.04)
IMM GRANULOCYTES # BLD AUTO: 0.32 K/UL (ref 0–0.04)
IMM GRANULOCYTES NFR BLD AUTO: 1.4 % (ref 0–0.5)
IMM GRANULOCYTES NFR BLD AUTO: 1.5 % (ref 0–0.5)
INR PPP: 1.1 (ref 0.8–1.2)
LACTATE SERPL-SCNC: 0.9 MMOL/L (ref 0.5–2.2)
LYMPHOCYTES # BLD AUTO: 0.5 K/UL (ref 1–4.8)
LYMPHOCYTES # BLD AUTO: 1.6 K/UL (ref 1–4.8)
LYMPHOCYTES NFR BLD: 2.5 % (ref 18–48)
LYMPHOCYTES NFR BLD: 7.2 % (ref 18–48)
MAGNESIUM SERPL-MCNC: 1.7 MG/DL (ref 1.6–2.6)
MCH RBC QN AUTO: 27.2 PG (ref 27–31)
MCH RBC QN AUTO: 27.9 PG (ref 27–31)
MCHC RBC AUTO-ENTMCNC: 29.4 G/DL (ref 32–36)
MCHC RBC AUTO-ENTMCNC: 30.3 G/DL (ref 32–36)
MCV RBC AUTO: 92 FL (ref 82–98)
MCV RBC AUTO: 92 FL (ref 82–98)
MODE: ABNORMAL
MONOCYTES # BLD AUTO: 0.8 K/UL (ref 0.3–1)
MONOCYTES # BLD AUTO: 1.5 K/UL (ref 0.3–1)
MONOCYTES NFR BLD: 3.9 % (ref 4–15)
MONOCYTES NFR BLD: 6.7 % (ref 4–15)
NEUTROPHILS # BLD AUTO: 18.4 K/UL (ref 1.8–7.7)
NEUTROPHILS # BLD AUTO: 18.6 K/UL (ref 1.8–7.7)
NEUTROPHILS NFR BLD: 83.5 % (ref 38–73)
NEUTROPHILS NFR BLD: 92.1 % (ref 38–73)
NRBC BLD-RTO: 0 /100 WBC
NRBC BLD-RTO: 0 /100 WBC
PCO2 BLDA: 45.1 MMHG (ref 35–45)
PCO2 BLDA: 54.4 MMHG (ref 35–45)
PEEP: 8
PH SMN: 7.28 [PH] (ref 7.35–7.45)
PH SMN: 7.35 [PH] (ref 7.35–7.45)
PHOSPHATE SERPL-MCNC: 3.2 MG/DL (ref 2.7–4.5)
PLATELET # BLD AUTO: 305 K/UL (ref 150–350)
PLATELET # BLD AUTO: 349 K/UL (ref 150–350)
PMV BLD AUTO: 9 FL (ref 9.2–12.9)
PMV BLD AUTO: 9.1 FL (ref 9.2–12.9)
PO2 BLDA: 101 MMHG (ref 80–100)
PO2 BLDA: 92 MMHG (ref 80–100)
POC BE: -2 MMOL/L
POC BE: 0 MMOL/L
POC IONIZED CALCIUM: 1.06 MMOL/L (ref 1.06–1.42)
POC SATURATED O2: 96 % (ref 95–100)
POC SATURATED O2: 97 % (ref 95–100)
POC TCO2: 26 MMOL/L (ref 23–27)
POC TCO2: 27 MMOL/L (ref 23–27)
POCT GLUCOSE: 135 MG/DL (ref 70–110)
POTASSIUM BLD-SCNC: 4.6 MMOL/L (ref 3.5–5.1)
POTASSIUM SERPL-SCNC: 4 MMOL/L (ref 3.5–5.1)
POTASSIUM SERPL-SCNC: 4.7 MMOL/L (ref 3.5–5.1)
PROT SERPL-MCNC: 5.1 G/DL (ref 6–8.4)
PROT SERPL-MCNC: 6.4 G/DL (ref 6–8.4)
PROTHROMBIN TIME: 10.9 SEC (ref 9–12.5)
RBC # BLD AUTO: 3.37 M/UL (ref 4–5.4)
RBC # BLD AUTO: 4.08 M/UL (ref 4–5.4)
SAMPLE: ABNORMAL
SAMPLE: ABNORMAL
SITE: ABNORMAL
SODIUM BLD-SCNC: 136 MMOL/L (ref 136–145)
SODIUM SERPL-SCNC: 137 MMOL/L (ref 136–145)
SODIUM SERPL-SCNC: 137 MMOL/L (ref 136–145)
SP02: 96
VT: 375
WBC # BLD AUTO: 20.19 K/UL (ref 3.9–12.7)
WBC # BLD AUTO: 22.05 K/UL (ref 3.9–12.7)

## 2020-01-21 PROCEDURE — D9220A PRA ANESTHESIA: Mod: CRNA,,, | Performed by: NURSE ANESTHETIST, CERTIFIED REGISTERED

## 2020-01-21 PROCEDURE — 20000000 HC ICU ROOM

## 2020-01-21 PROCEDURE — 37000009 HC ANESTHESIA EA ADD 15 MINS: Performed by: SURGERY

## 2020-01-21 PROCEDURE — 25000242 PHARM REV CODE 250 ALT 637 W/ HCPCS: Performed by: STUDENT IN AN ORGANIZED HEALTH CARE EDUCATION/TRAINING PROGRAM

## 2020-01-21 PROCEDURE — 27200695 HC TUBE, ENDOBRONCHIAL: Performed by: ANESTHESIOLOGY

## 2020-01-21 PROCEDURE — 99900026 HC AIRWAY MAINTENANCE (STAT)

## 2020-01-21 PROCEDURE — 94799 UNLISTED PULMONARY SVC/PX: CPT

## 2020-01-21 PROCEDURE — 99222 1ST HOSP IP/OBS MODERATE 55: CPT | Mod: ,,, | Performed by: INTERNAL MEDICINE

## 2020-01-21 PROCEDURE — 27800505 HC CATH, RADIAL ARTERY KIT: Performed by: ANESTHESIOLOGY

## 2020-01-21 PROCEDURE — C1781 MESH (IMPLANTABLE): HCPCS | Performed by: SURGERY

## 2020-01-21 PROCEDURE — 93010 ELECTROCARDIOGRAM REPORT: CPT | Mod: ,,, | Performed by: INTERNAL MEDICINE

## 2020-01-21 PROCEDURE — A4216 STERILE WATER/SALINE, 10 ML: HCPCS | Performed by: NURSE ANESTHETIST, CERTIFIED REGISTERED

## 2020-01-21 PROCEDURE — 63600175 PHARM REV CODE 636 W HCPCS: Performed by: STUDENT IN AN ORGANIZED HEALTH CARE EDUCATION/TRAINING PROGRAM

## 2020-01-21 PROCEDURE — 85610 PROTHROMBIN TIME: CPT

## 2020-01-21 PROCEDURE — 63600175 PHARM REV CODE 636 W HCPCS: Performed by: NURSE ANESTHETIST, CERTIFIED REGISTERED

## 2020-01-21 PROCEDURE — 84100 ASSAY OF PHOSPHORUS: CPT

## 2020-01-21 PROCEDURE — 83605 ASSAY OF LACTIC ACID: CPT

## 2020-01-21 PROCEDURE — 82800 BLOOD PH: CPT

## 2020-01-21 PROCEDURE — 83735 ASSAY OF MAGNESIUM: CPT

## 2020-01-21 PROCEDURE — 36000706: Performed by: SURGERY

## 2020-01-21 PROCEDURE — 94664 DEMO&/EVAL PT USE INHALER: CPT

## 2020-01-21 PROCEDURE — 25000003 PHARM REV CODE 250: Performed by: STUDENT IN AN ORGANIZED HEALTH CARE EDUCATION/TRAINING PROGRAM

## 2020-01-21 PROCEDURE — 36620 INSERTION CATHETER ARTERY: CPT | Mod: 59,,, | Performed by: ANESTHESIOLOGY

## 2020-01-21 PROCEDURE — 27201423 OPTIME MED/SURG SUP & DEVICES STERILE SUPPLY: Performed by: SURGERY

## 2020-01-21 PROCEDURE — 25000003 PHARM REV CODE 250: Performed by: NURSE ANESTHETIST, CERTIFIED REGISTERED

## 2020-01-21 PROCEDURE — 36000707: Performed by: SURGERY

## 2020-01-21 PROCEDURE — 80053 COMPREHEN METABOLIC PANEL: CPT

## 2020-01-21 PROCEDURE — 27000221 HC OXYGEN, UP TO 24 HOURS

## 2020-01-21 PROCEDURE — 36415 COLL VENOUS BLD VENIPUNCTURE: CPT

## 2020-01-21 PROCEDURE — 27200708 HC INTUBATION/EXCHANGE WAND: Performed by: ANESTHESIOLOGY

## 2020-01-21 PROCEDURE — C1751 CATH, INF, PER/CENT/MIDLINE: HCPCS | Performed by: ANESTHESIOLOGY

## 2020-01-21 PROCEDURE — D9220A PRA ANESTHESIA: Mod: ANES,,, | Performed by: ANESTHESIOLOGY

## 2020-01-21 PROCEDURE — D9220A PRA ANESTHESIA: ICD-10-PCS | Mod: CRNA,,, | Performed by: NURSE ANESTHETIST, CERTIFIED REGISTERED

## 2020-01-21 PROCEDURE — 86850 RBC ANTIBODY SCREEN: CPT

## 2020-01-21 PROCEDURE — 25000003 PHARM REV CODE 250: Performed by: SURGERY

## 2020-01-21 PROCEDURE — 94640 AIRWAY INHALATION TREATMENT: CPT

## 2020-01-21 PROCEDURE — 85730 THROMBOPLASTIN TIME PARTIAL: CPT

## 2020-01-21 PROCEDURE — 27200677 HC TRANSDUCER MONITOR KIT SINGLE: Performed by: ANESTHESIOLOGY

## 2020-01-21 PROCEDURE — 43282 PR LAP, REPAIR PARAESOPHAGEAL HERNIA, INCL FUNDOPLASTY W/ MESH: ICD-10-PCS | Mod: 22,,, | Performed by: SURGERY

## 2020-01-21 PROCEDURE — 80053 COMPREHEN METABOLIC PANEL: CPT | Mod: 91

## 2020-01-21 PROCEDURE — 94761 N-INVAS EAR/PLS OXIMETRY MLT: CPT

## 2020-01-21 PROCEDURE — 99900035 HC TECH TIME PER 15 MIN (STAT)

## 2020-01-21 PROCEDURE — D9220A PRA ANESTHESIA: ICD-10-PCS | Mod: ANES,,, | Performed by: ANESTHESIOLOGY

## 2020-01-21 PROCEDURE — 82803 BLOOD GASES ANY COMBINATION: CPT

## 2020-01-21 PROCEDURE — 94002 VENT MGMT INPAT INIT DAY: CPT

## 2020-01-21 PROCEDURE — 43282 LAP PARAESOPH HER RPR W/MESH: CPT | Mod: 22,,, | Performed by: SURGERY

## 2020-01-21 PROCEDURE — 37000008 HC ANESTHESIA 1ST 15 MINUTES: Performed by: SURGERY

## 2020-01-21 PROCEDURE — 36620 PR INSERT CATH,ART,PERCUT,SHORTTERM: ICD-10-PCS | Mod: 59,,, | Performed by: ANESTHESIOLOGY

## 2020-01-21 PROCEDURE — 85025 COMPLETE CBC W/AUTO DIFF WBC: CPT | Mod: 91

## 2020-01-21 PROCEDURE — 99222 PR INITIAL HOSPITAL CARE,LEVL II: ICD-10-PCS | Mod: ,,, | Performed by: INTERNAL MEDICINE

## 2020-01-21 PROCEDURE — 93010 EKG 12-LEAD: ICD-10-PCS | Mod: ,,, | Performed by: INTERNAL MEDICINE

## 2020-01-21 PROCEDURE — 27100025 HC TUBING, SET FLUID WARMER: Performed by: ANESTHESIOLOGY

## 2020-01-21 PROCEDURE — 37799 UNLISTED PX VASCULAR SURGERY: CPT

## 2020-01-21 PROCEDURE — 93005 ELECTROCARDIOGRAM TRACING: CPT

## 2020-01-21 DEVICE — GRAFT DUAL MESH 15X19: Type: IMPLANTABLE DEVICE | Site: ABDOMEN | Status: FUNCTIONAL

## 2020-01-21 RX ORDER — POTASSIUM CHLORIDE 7.45 MG/ML
80 INJECTION INTRAVENOUS
Status: DISCONTINUED | OUTPATIENT
Start: 2020-01-21 | End: 2020-02-05

## 2020-01-21 RX ORDER — ESMOLOL HYDROCHLORIDE 10 MG/ML
INJECTION INTRAVENOUS
Status: DISCONTINUED | OUTPATIENT
Start: 2020-01-21 | End: 2020-01-21

## 2020-01-21 RX ORDER — FENTANYL CITRATE 50 UG/ML
INJECTION, SOLUTION INTRAMUSCULAR; INTRAVENOUS
Status: DISCONTINUED | OUTPATIENT
Start: 2020-01-21 | End: 2020-01-21

## 2020-01-21 RX ORDER — BACITRACIN 50000 [IU]/1
INJECTION, POWDER, FOR SOLUTION INTRAMUSCULAR
Status: DISCONTINUED | OUTPATIENT
Start: 2020-01-21 | End: 2020-01-21 | Stop reason: HOSPADM

## 2020-01-21 RX ORDER — POTASSIUM CHLORIDE 7.45 MG/ML
40 INJECTION INTRAVENOUS
Status: DISCONTINUED | OUTPATIENT
Start: 2020-01-21 | End: 2020-02-12 | Stop reason: HOSPADM

## 2020-01-21 RX ORDER — PROPOFOL 10 MG/ML
5 INJECTION, EMULSION INTRAVENOUS CONTINUOUS
Status: DISCONTINUED | OUTPATIENT
Start: 2020-01-21 | End: 2020-01-22

## 2020-01-21 RX ORDER — BUPIVACAINE HYDROCHLORIDE 2.5 MG/ML
INJECTION, SOLUTION EPIDURAL; INFILTRATION; INTRACAUDAL
Status: DISCONTINUED | OUTPATIENT
Start: 2020-01-21 | End: 2020-01-21 | Stop reason: HOSPADM

## 2020-01-21 RX ORDER — POTASSIUM CHLORIDE 7.45 MG/ML
60 INJECTION INTRAVENOUS
Status: DISCONTINUED | OUTPATIENT
Start: 2020-01-21 | End: 2020-02-05

## 2020-01-21 RX ORDER — LABETALOL 100 MG/1
100 TABLET, FILM COATED ORAL DAILY
Status: DISCONTINUED | OUTPATIENT
Start: 2020-01-21 | End: 2020-01-22

## 2020-01-21 RX ORDER — GLYCOPYRROLATE 0.2 MG/ML
INJECTION INTRAMUSCULAR; INTRAVENOUS
Status: DISCONTINUED | OUTPATIENT
Start: 2020-01-21 | End: 2020-01-21

## 2020-01-21 RX ORDER — MAGNESIUM SULFATE HEPTAHYDRATE 40 MG/ML
4 INJECTION, SOLUTION INTRAVENOUS
Status: DISCONTINUED | OUTPATIENT
Start: 2020-01-21 | End: 2020-02-05

## 2020-01-21 RX ORDER — EPHEDRINE SULFATE 50 MG/ML
INJECTION, SOLUTION INTRAVENOUS
Status: DISCONTINUED | OUTPATIENT
Start: 2020-01-21 | End: 2020-01-21

## 2020-01-21 RX ORDER — KETAMINE HCL IN 0.9 % NACL 50 MG/5 ML
SYRINGE (ML) INTRAVENOUS
Status: DISCONTINUED | OUTPATIENT
Start: 2020-01-21 | End: 2020-01-21

## 2020-01-21 RX ORDER — ONDANSETRON 2 MG/ML
INJECTION INTRAMUSCULAR; INTRAVENOUS
Status: DISCONTINUED | OUTPATIENT
Start: 2020-01-21 | End: 2020-01-21

## 2020-01-21 RX ORDER — LIDOCAINE HCL/PF 100 MG/5ML
SYRINGE (ML) INTRAVENOUS
Status: DISCONTINUED | OUTPATIENT
Start: 2020-01-21 | End: 2020-01-21

## 2020-01-21 RX ORDER — SODIUM CHLORIDE 0.9 % (FLUSH) 0.9 %
10 SYRINGE (ML) INJECTION
Status: DISCONTINUED | OUTPATIENT
Start: 2020-01-21 | End: 2020-02-05

## 2020-01-21 RX ORDER — ROCURONIUM BROMIDE 10 MG/ML
INJECTION, SOLUTION INTRAVENOUS
Status: DISCONTINUED | OUTPATIENT
Start: 2020-01-21 | End: 2020-01-21

## 2020-01-21 RX ORDER — VECURONIUM BROMIDE FOR INJECTION 1 MG/ML
INJECTION, POWDER, LYOPHILIZED, FOR SOLUTION INTRAVENOUS
Status: DISCONTINUED | OUTPATIENT
Start: 2020-01-21 | End: 2020-01-21

## 2020-01-21 RX ORDER — ENOXAPARIN SODIUM 100 MG/ML
40 INJECTION SUBCUTANEOUS 2 TIMES DAILY
Status: DISCONTINUED | OUTPATIENT
Start: 2020-01-21 | End: 2020-01-29

## 2020-01-21 RX ORDER — MAGNESIUM SULFATE HEPTAHYDRATE 40 MG/ML
2 INJECTION, SOLUTION INTRAVENOUS
Status: DISCONTINUED | OUTPATIENT
Start: 2020-01-21 | End: 2020-02-05

## 2020-01-21 RX ORDER — GLUCAGON 1 MG
1 KIT INJECTION
Status: DISCONTINUED | OUTPATIENT
Start: 2020-01-21 | End: 2020-02-12 | Stop reason: HOSPADM

## 2020-01-21 RX ORDER — MIDAZOLAM HYDROCHLORIDE 1 MG/ML
INJECTION, SOLUTION INTRAMUSCULAR; INTRAVENOUS
Status: DISCONTINUED | OUTPATIENT
Start: 2020-01-21 | End: 2020-01-21

## 2020-01-21 RX ORDER — PANTOPRAZOLE SODIUM 40 MG/10ML
40 INJECTION, POWDER, LYOPHILIZED, FOR SOLUTION INTRAVENOUS DAILY
Status: DISCONTINUED | OUTPATIENT
Start: 2020-01-22 | End: 2020-01-27

## 2020-01-21 RX ORDER — SODIUM CHLORIDE, SODIUM LACTATE, POTASSIUM CHLORIDE, CALCIUM CHLORIDE 600; 310; 30; 20 MG/100ML; MG/100ML; MG/100ML; MG/100ML
INJECTION, SOLUTION INTRAVENOUS CONTINUOUS
Status: DISCONTINUED | OUTPATIENT
Start: 2020-01-21 | End: 2020-01-23

## 2020-01-21 RX ORDER — DEXMEDETOMIDINE HYDROCHLORIDE 100 UG/ML
INJECTION, SOLUTION INTRAVENOUS
Status: DISCONTINUED | OUTPATIENT
Start: 2020-01-21 | End: 2020-01-21

## 2020-01-21 RX ORDER — EPINEPHRINE 1 MG/ML
INJECTION, SOLUTION INTRACARDIAC; INTRAMUSCULAR; INTRAVENOUS; SUBCUTANEOUS
Status: DISCONTINUED | OUTPATIENT
Start: 2020-01-21 | End: 2020-01-21

## 2020-01-21 RX ORDER — DEXAMETHASONE SODIUM PHOSPHATE 4 MG/ML
INJECTION, SOLUTION INTRA-ARTICULAR; INTRALESIONAL; INTRAMUSCULAR; INTRAVENOUS; SOFT TISSUE
Status: DISCONTINUED | OUTPATIENT
Start: 2020-01-21 | End: 2020-01-21

## 2020-01-21 RX ORDER — ACETAMINOPHEN 10 MG/ML
INJECTION, SOLUTION INTRAVENOUS
Status: DISCONTINUED | OUTPATIENT
Start: 2020-01-21 | End: 2020-01-21

## 2020-01-21 RX ORDER — PHENYLEPHRINE HYDROCHLORIDE 10 MG/ML
INJECTION INTRAVENOUS
Status: DISCONTINUED | OUTPATIENT
Start: 2020-01-21 | End: 2020-01-21

## 2020-01-21 RX ORDER — LIDOCAINE HYDROCHLORIDE AND EPINEPHRINE 10; 10 MG/ML; UG/ML
INJECTION, SOLUTION INFILTRATION; PERINEURAL
Status: DISCONTINUED | OUTPATIENT
Start: 2020-01-21 | End: 2020-01-21

## 2020-01-21 RX ORDER — PROPOFOL 10 MG/ML
VIAL (ML) INTRAVENOUS
Status: DISCONTINUED | OUTPATIENT
Start: 2020-01-21 | End: 2020-01-21

## 2020-01-21 RX ORDER — SUCCINYLCHOLINE CHLORIDE 20 MG/ML
INJECTION INTRAMUSCULAR; INTRAVENOUS
Status: DISCONTINUED | OUTPATIENT
Start: 2020-01-21 | End: 2020-01-21

## 2020-01-21 RX ORDER — INSULIN ASPART 100 [IU]/ML
0-5 INJECTION, SOLUTION INTRAVENOUS; SUBCUTANEOUS EVERY 6 HOURS PRN
Status: DISCONTINUED | OUTPATIENT
Start: 2020-01-21 | End: 2020-02-12 | Stop reason: HOSPADM

## 2020-01-21 RX ORDER — FENTANYL CITRATE-0.9 % NACL/PF 10 MCG/ML
25 PLASTIC BAG, INJECTION (ML) INTRAVENOUS CONTINUOUS
Status: DISCONTINUED | OUTPATIENT
Start: 2020-01-21 | End: 2020-01-22

## 2020-01-21 RX ADMIN — SODIUM CHLORIDE, SODIUM GLUCONATE, SODIUM ACETATE, POTASSIUM CHLORIDE, MAGNESIUM CHLORIDE, SODIUM PHOSPHATE, DIBASIC, AND POTASSIUM PHOSPHATE: .53; .5; .37; .037; .03; .012; .00082 INJECTION, SOLUTION INTRAVENOUS at 01:01

## 2020-01-21 RX ADMIN — EPHEDRINE SULFATE 10 MG: 50 INJECTION, SOLUTION INTRAMUSCULAR; INTRAVENOUS; SUBCUTANEOUS at 01:01

## 2020-01-21 RX ADMIN — ESMOLOL HYDROCHLORIDE 10 MG: 10 INJECTION INTRAVENOUS at 12:01

## 2020-01-21 RX ADMIN — DEXAMETHASONE SODIUM PHOSPHATE 8 MG: 4 INJECTION, SOLUTION INTRAMUSCULAR; INTRAVENOUS at 03:01

## 2020-01-21 RX ADMIN — EPINEPHRINE 5 MCG: 1 INJECTION, SOLUTION INTRAMUSCULAR; SUBCUTANEOUS at 01:01

## 2020-01-21 RX ADMIN — EPINEPHRINE 10 MCG: 1 INJECTION, SOLUTION INTRAMUSCULAR; SUBCUTANEOUS at 01:01

## 2020-01-21 RX ADMIN — SUCCINYLCHOLINE CHLORIDE 120 MG: 20 INJECTION, SOLUTION INTRAMUSCULAR; INTRAVENOUS at 12:01

## 2020-01-21 RX ADMIN — PROPOFOL 30 MG: 10 INJECTION, EMULSION INTRAVENOUS at 12:01

## 2020-01-21 RX ADMIN — VECURONIUM BROMIDE FOR INJECTION 4 MG: 1 INJECTION, POWDER, LYOPHILIZED, FOR SOLUTION INTRAVENOUS at 04:01

## 2020-01-21 RX ADMIN — PHENYLEPHRINE HYDROCHLORIDE 200 MCG: 10 INJECTION INTRAVENOUS at 12:01

## 2020-01-21 RX ADMIN — VECURONIUM BROMIDE FOR INJECTION 2 MG: 1 INJECTION, POWDER, LYOPHILIZED, FOR SOLUTION INTRAVENOUS at 05:01

## 2020-01-21 RX ADMIN — EPHEDRINE SULFATE 10 MG: 50 INJECTION, SOLUTION INTRAMUSCULAR; INTRAVENOUS; SUBCUTANEOUS at 02:01

## 2020-01-21 RX ADMIN — ROCURONIUM BROMIDE 20 MG: 10 INJECTION, SOLUTION INTRAVENOUS at 03:01

## 2020-01-21 RX ADMIN — FENTANYL CITRATE 50 MCG: 50 INJECTION, SOLUTION INTRAMUSCULAR; INTRAVENOUS at 01:01

## 2020-01-21 RX ADMIN — Medication 10 MG: at 03:01

## 2020-01-21 RX ADMIN — IPRATROPIUM BROMIDE AND ALBUTEROL SULFATE 3 ML: .5; 3 SOLUTION RESPIRATORY (INHALATION) at 07:01

## 2020-01-21 RX ADMIN — PANTOPRAZOLE SODIUM 40 MG: 40 TABLET, DELAYED RELEASE ORAL at 06:01

## 2020-01-21 RX ADMIN — Medication 10 MG: at 04:01

## 2020-01-21 RX ADMIN — PROPOFOL 25 MCG/KG/MIN: 10 INJECTION, EMULSION INTRAVENOUS at 10:01

## 2020-01-21 RX ADMIN — ROCURONIUM BROMIDE 20 MG: 10 INJECTION, SOLUTION INTRAVENOUS at 01:01

## 2020-01-21 RX ADMIN — SODIUM CHLORIDE, SODIUM GLUCONATE, SODIUM ACETATE, POTASSIUM CHLORIDE, MAGNESIUM CHLORIDE, SODIUM PHOSPHATE, DIBASIC, AND POTASSIUM PHOSPHATE: .53; .5; .37; .037; .03; .012; .00082 INJECTION, SOLUTION INTRAVENOUS at 12:01

## 2020-01-21 RX ADMIN — Medication 25 MCG/HR: at 06:01

## 2020-01-21 RX ADMIN — SODIUM CHLORIDE 0.4 MCG/KG/MIN: 9 INJECTION, SOLUTION INTRAVENOUS at 02:01

## 2020-01-21 RX ADMIN — AMLODIPINE BESYLATE 10 MG: 10 TABLET ORAL at 10:01

## 2020-01-21 RX ADMIN — EPHEDRINE SULFATE 25 MG: 50 INJECTION, SOLUTION INTRAMUSCULAR; INTRAVENOUS; SUBCUTANEOUS at 01:01

## 2020-01-21 RX ADMIN — ENOXAPARIN SODIUM 40 MG: 100 INJECTION SUBCUTANEOUS at 10:01

## 2020-01-21 RX ADMIN — PROPOFOL 50 MCG/KG/MIN: 10 INJECTION, EMULSION INTRAVENOUS at 05:01

## 2020-01-21 RX ADMIN — ROCURONIUM BROMIDE 20 MG: 10 INJECTION, SOLUTION INTRAVENOUS at 02:01

## 2020-01-21 RX ADMIN — LEVOFLOXACIN 750 MG: 750 INJECTION, SOLUTION INTRAVENOUS at 10:01

## 2020-01-21 RX ADMIN — EPHEDRINE SULFATE 5 MG: 50 INJECTION, SOLUTION INTRAMUSCULAR; INTRAVENOUS; SUBCUTANEOUS at 12:01

## 2020-01-21 RX ADMIN — DEXTROSE 2 G: 50 INJECTION, SOLUTION INTRAVENOUS at 12:01

## 2020-01-21 RX ADMIN — Medication 10 MG: at 12:01

## 2020-01-21 RX ADMIN — EPHEDRINE SULFATE 10 MG: 50 INJECTION, SOLUTION INTRAMUSCULAR; INTRAVENOUS; SUBCUTANEOUS at 12:01

## 2020-01-21 RX ADMIN — PHENYLEPHRINE HYDROCHLORIDE 400 MCG: 10 INJECTION INTRAVENOUS at 01:01

## 2020-01-21 RX ADMIN — OXYCODONE HYDROCHLORIDE 10 MG: 10 TABLET ORAL at 02:01

## 2020-01-21 RX ADMIN — GLYCOPYRROLATE 0.2 MCG: 0.2 INJECTION, SOLUTION INTRAMUSCULAR; INTRAVENOUS at 12:01

## 2020-01-21 RX ADMIN — ROCURONIUM BROMIDE 10 MG: 10 INJECTION, SOLUTION INTRAVENOUS at 03:01

## 2020-01-21 RX ADMIN — IPRATROPIUM BROMIDE AND ALBUTEROL SULFATE 3 ML: .5; 3 SOLUTION RESPIRATORY (INHALATION) at 11:01

## 2020-01-21 RX ADMIN — POLYETHYLENE GLYCOL 3350 17 G: 17 POWDER, FOR SOLUTION ORAL at 10:01

## 2020-01-21 RX ADMIN — DEXMEDETOMIDINE HYDROCHLORIDE 0.8 MCG/KG/HR: 100 INJECTION, SOLUTION, CONCENTRATE INTRAVENOUS at 12:01

## 2020-01-21 RX ADMIN — PHENYLEPHRINE HYDROCHLORIDE 200 MCG: 10 INJECTION INTRAVENOUS at 02:01

## 2020-01-21 RX ADMIN — ACETAMINOPHEN 1000 MG: 10 INJECTION, SOLUTION INTRAVENOUS at 04:01

## 2020-01-21 RX ADMIN — PROPOFOL 50 MG: 10 INJECTION, EMULSION INTRAVENOUS at 05:01

## 2020-01-21 RX ADMIN — Medication 20 MG: at 05:01

## 2020-01-21 RX ADMIN — BENZONATATE 100 MG: 100 CAPSULE ORAL at 10:01

## 2020-01-21 RX ADMIN — ONDANSETRON 4 MG: 2 INJECTION INTRAMUSCULAR; INTRAVENOUS at 04:01

## 2020-01-21 RX ADMIN — LABETALOL HYDROCHLORIDE 100 MG: 100 TABLET, FILM COATED ORAL at 10:01

## 2020-01-21 RX ADMIN — IPRATROPIUM BROMIDE AND ALBUTEROL SULFATE 3 ML: .5; 3 SOLUTION RESPIRATORY (INHALATION) at 09:01

## 2020-01-21 RX ADMIN — PHENYLEPHRINE HYDROCHLORIDE 200 MCG: 10 INJECTION INTRAVENOUS at 01:01

## 2020-01-21 RX ADMIN — DEXMEDETOMIDINE HYDROCHLORIDE 108 MCG: 100 INJECTION, SOLUTION, CONCENTRATE INTRAVENOUS at 12:01

## 2020-01-21 RX ADMIN — VECURONIUM BROMIDE FOR INJECTION 1 MG: 1 INJECTION, POWDER, LYOPHILIZED, FOR SOLUTION INTRAVENOUS at 05:01

## 2020-01-21 RX ADMIN — ROCURONIUM BROMIDE 50 MG: 10 INJECTION, SOLUTION INTRAVENOUS at 12:01

## 2020-01-21 RX ADMIN — ENOXAPARIN SODIUM 40 MG: 100 INJECTION SUBCUTANEOUS at 09:01

## 2020-01-21 RX ADMIN — VECURONIUM BROMIDE FOR INJECTION 3 MG: 1 INJECTION, POWDER, LYOPHILIZED, FOR SOLUTION INTRAVENOUS at 03:01

## 2020-01-21 RX ADMIN — MIDAZOLAM HYDROCHLORIDE 2 MG: 1 INJECTION, SOLUTION INTRAMUSCULAR; INTRAVENOUS at 12:01

## 2020-01-21 RX ADMIN — EPHEDRINE SULFATE 5 MG: 50 INJECTION, SOLUTION INTRAMUSCULAR; INTRAVENOUS; SUBCUTANEOUS at 01:01

## 2020-01-21 RX ADMIN — LIDOCAINE HYDROCHLORIDE 20 MG: 20 INJECTION, SOLUTION INTRAVENOUS at 12:01

## 2020-01-21 RX ADMIN — ROCURONIUM BROMIDE 30 MG: 10 INJECTION, SOLUTION INTRAVENOUS at 01:01

## 2020-01-21 RX ADMIN — SODIUM CHLORIDE, SODIUM LACTATE, POTASSIUM CHLORIDE, AND CALCIUM CHLORIDE: .6; .31; .03; .02 INJECTION, SOLUTION INTRAVENOUS at 06:01

## 2020-01-21 RX ADMIN — DEXTROSE 2 G: 50 INJECTION, SOLUTION INTRAVENOUS at 04:01

## 2020-01-21 RX ADMIN — EPHEDRINE SULFATE 10 MG: 50 INJECTION, SOLUTION INTRAMUSCULAR; INTRAVENOUS; SUBCUTANEOUS at 05:01

## 2020-01-21 RX ADMIN — IPRATROPIUM BROMIDE AND ALBUTEROL SULFATE 3 ML: .5; 3 SOLUTION RESPIRATORY (INHALATION) at 04:01

## 2020-01-21 NOTE — RESPIRATORY THERAPY
Rapid Response Respiratory Therapy Proactive Rounding Note      Time of visit: 1140    Code Status: Full Code   : 1950  Age: 69 y.o.  Weight:   Wt Readings from Last 1 Encounters:   20 108.4 kg (239 lb)     Sex: female  Race: White   Bed: 525/525 B:   MRN: [id number]    SITUATION     Evaluated patient for: called to bedside per Rapid RN stating patient was sating in the 70's per bedside nurses.    BACKGROUND     Patient has no past medical history on file.    ASSESSMENT/INTERVENTIONS     Upon arrival in room patient was on a NRB connected to bubble humidifier on 10L. Correctly placed the patient on NRB on 15L, upon entering the patient's sats were 80's. Once on the NRB 15L, patient sat's increased to 92%. Patient was on her way to the OR for a hernia repair. Anesthesia at bedside to take patient to OR. Patient had expiratory wheezes. Gave patient her scheduled breathing treatment to help with SOB and wheezing.    Pulse: 112  Respiratory rate: 26 Temperature: Temp: 97.3 °F (36.3 °C) BP: BP: 135/74 SpO2: 80%  Level of Consciousness: Level of Consciousness (AVPU): alert  Respiratory Effort: Respiratory Effort: Moderate, Labored Expansion/Accessory Muscle Usage: Expansion/Accessory Muscles/Retractions: abdominal muscle use, accessory muscle use  All Lung Field Breath Sounds: All Lung Fields Breath Sounds: Anterior:, Lateral:, coarse, wheezes, expiratory  Mobility at time of assessment: General Mobility: generalized weakness  O2 Device/Concentration: NRB/ 10L  Most recent blood gas: No results for input(s): PH, PCO2, PO2, HCO3, POCSATURATED, BE in the last 72 hours.  NIPPV: No Surgical airway: No  ETCO2 monitored:    Ambu at bedside: Ambu bag with the patient?: Yes, Adult Ambu    Current Respiratory Care Orders:   20 0800  Incentive spirometry Every 4 hours (5 of 22 released)    Release    20 0737   20 1200  ACAPELLA TREATMENT Q4H Every 4 hours (10 of 39 released)    Release     01/20/20 0810   01/20/20 1200  Inhalation Treatment Q4H Every 4 hours (10 of 39 released)    Release    01/20/20 0810   01/20/20 0000  Pulse Oximetry Q4H Every 4 hours (13 of 42 released)    Release    01/19/20 2022 01/19/20 2252  Oxygen Continuous Continuous     References: Oxygen Titration Protocol   Question Answer Comment   Device type: Low flow    Device: Nasal Cannula (1- 5 Liters)    LPM: 2    Titrate O2 per Oxygen Titration Protocol: Yes    To maintain SpO2 goal of: >= 90%    Notify MD of: Inability to achieve desired SpO2; Sudden change in patient status and requires 20% increase in FiO2; Patient requires >60% FiO2        01/19/20 2252   Unscheduled  Inhalation Treatment Q6H PRN Every 6 hours PRN (0 of 42837 released)    Release    01/20/20 0003       01/20/20 0815  albuterol-ipratropium 2.5 mg-0.5 mg/3 mL nebulizer solution 3 mL (albuterol-ipratropium (DUO-NEB) 0.5 - 3 mg (2.5 mg base)/ 3 mL nebulizer panel)    Discontinue    -- Dispensed NEBULIZATION Every 4 hours 01/20/20 0810   01/20/20 0102  albuterol-ipratropium 2.5 mg-0.5 mg/3 mL nebulizer solution 3 mL (albuterol-ipratropium (DUO-NEB) 0.5 - 3 mg (2.5 mg base)/ 3 mL nebulizer panel)    Discontinue    01/23 0101 Verified NEBULIZATION Every 6 hours PRN 01/20/20 0003       RECOMMENDATIONS     We recommend: after patient's breathing treatment was complete, patient place back on 15L NRB on O2 tank ready to be transferred to OR.      ESCALATION      Physician Escalation (Yes/No) Yes Care discussed with: Dr. Alanis  Discussed plan of care primary RT,Rhiannon    FOLLOW-UP     Please call back the Rapid Response RT, Ama Villarreal, RRT at x [phone] for any questions or concerns.

## 2020-01-21 NOTE — BRIEF OP NOTE
[facility]  Surgery Department  Brief Op Note    SUMMARY     Date of Procedure: 1/21/2020     Procedure: Procedure(s) (LRB):  REPAIR, HERNIA, DIAPHRAGMATIC, Laparoscopic (Left)     Surgeon(s) and Role:     * Lucho Garcia Jr., MD - Primary     * Hugh Alanis MD - Resident - Assisting        Pre-Operative Diagnosis: Diaphragmatic hernia [K44.9]    Post-Operative Diagnosis: Post-Op Diagnosis Codes:     * Diaphragmatic hernia [K44.9]    Anesthesia: General    Technical Procedures Used: Laparoscopic diaphragmatic hernia repair with goretex mesh    Description of the Findings of the Procedure: Large left diaphragmatic hernia containing colon and small bowel    Significant Surgical Tasks Conducted by the Assistant(s), if Applicable: N/a    Complications: No    Estimated Blood Loss (EBL): 100 mL           Implants:   Implant Name Type Inv. Item Serial No.  Lot No. LRB No. Used   GRAFT DUAL MESH 15X19 - [id number]  GRAFT DUAL MESH 15X19  [employer] [id number] Left 1       Specimens:   Specimen (12h ago, onward)    None                  Condition: Stable    Disposition: ICU - intubated and hemodynamically stable.    Attestation: I was present and scrubbed for the entire procedure.

## 2020-01-21 NOTE — TREATMENT PLAN
GENERAL SURGERY TREATMENT PLAN    Patient with worsening respiratory status and apparent enlargement of hernia on CXR  Decision made to proceed urgently with repair    Discussed details/risks of procedure with patient including need for mesh placement, possible bowel resection, possible thoracotomy, and possible prolonged time on ventilator post-operatively, amongst other risks    She acknowledged understanding and agrees to proceed      Hugh Alanis M.D.  [facility] PGY4

## 2020-01-21 NOTE — ANESTHESIA PROCEDURE NOTES
Intubation  Performed by: Guerrero Gann MD  Authorized by: Giulia Grover MD     Intubation:     Induction:  Intravenous    Intubated:  Preinduction-awake intubation    Mask Ventilation:  Easy with oral airway    Attempts:  1    Attempted By:  Resident anesthesiologist    Method of Intubation:  Video laryngoscopy and fiberoptic    Blade:  Mack 3    Laryngeal View Grade: Grade I - full view of chords      Difficult Airway Encountered?: No      Complications:  None    Airway Device:  Oral endotracheal tube    Airway Device Size:  39F    Style/Cuff Inflation:  Cuffed    Inflation Amount (mL):  10    Tube secured:  26    Secured at:  The lips    Placement Verified By:  Capnometry and Fiber optic visualization    Complicating Factors:  None    Findings Post-Intubation:  BS equal bilateral

## 2020-01-21 NOTE — ASSESSMENT & PLAN NOTE
She has been having progressive cough productive of brown sputum, shortness of breath, and fevers. She is afebrile on admission however her WBC continues to trend up. On CT chest, there is no definitive evidence of pneumonia.    Plan:  -Continue Levaquin for now  -Order placed for sputum culture

## 2020-01-21 NOTE — NURSING
DOS nurse @ bedside stating that the pt had sats <80 on 5L NC.  Charge nurse @ bedside.  Dr. Alanis and anesthesiology resident @ bedside to consent for surgery.  Rapid Response team @ bedside.  Pt placed on 15L nonrebreather mask; sats >90. Resp tx given by rapids RT. Pt transported to OR with portable cardiac monitor on to assess O2 sat. Will continue to monitor upon return.

## 2020-01-21 NOTE — HPI
"Marisela Bunn is a 69 y.o. female PMH CAD, HTN, TIA, GERD presents to the hospital as a direct admission for evaluation of a newly diagnosed diaphragmatic hernia. She initially presented to [place] ED on 1/18/20 for a 1-month history of a cough, intermittent fevers and dyspnea.  She had been seen by 2 urgent care physicians in the last month for the cough, with 2 different antibiotic courses given without improvement in cough. She reports that on 1/15 after an particularly severe coughing episode she felt a "pop" on left side, with associated severe pain and  Subsequent bruising of the left flank.     On ED presentation, patient was hemodynamically stable, H/H 12.4/40.0, breathing on RA. Labs revealed leukocytosis (19.2), lactic acid 2.5 (repeat lactic acid1.6), BMP wnl. CXR revealed left lower lobe pneumonia with possible associated pleural effusion. CT abdomen/pelvis revealed a large left diaphragmatic hernia containing fat  and bowel loops with hernia of intra-abdomnial contents outside the thorax from 7th left ICS. Medium sized HH. CTA revealed large Bochdalek hernia through defect in left hemo-diaphragm, with associated widening of 7th intercostal space. She was started on IV hydration and antibiotics (levofloxacin, zosyn). She reports she has not passed a bowel movement of flatus in 2 days. Intermittent lower quadrant "spasms" while coughing, otherwise no abdominal pain.     Pt started experiencing respiratory distress early on 1/21 and was taken urgently to the OR for diaphragmatic hernia repair. Laparoscopic diaphragmatic hernia repair with goretex mesh performed.     Pt arrived in SICU intubated, sedated, and mechanically ventilated. Pt on 0.5 of ludwig on arrival. Pt received no blood products during procedure, adequate urine output during case, 3700 ml IVF. Per anesthesia pt was an easy intubation.   "

## 2020-01-21 NOTE — ANESTHESIA PROCEDURE NOTES
Arterial    Diagnosis: Diaphragmatic hernia    Patient location during procedure: done in OR  Procedure start time: 1/21/2020 12:17 PM  Timeout: 1/21/2020 12:15 PM  Procedure end time: 1/21/2020 12:20 PM    Staffing  Authorizing Provider: Giulia Grover MD  Performing Provider: Desmond Verde MD    Anesthesiologist was present at the time of the procedure.    Preanesthetic Checklist  Completed: patient identified, site marked, surgical consent, pre-op evaluation, timeout performed, IV checked, risks and benefits discussed, monitors and equipment checked and anesthesia consent givenArterial  Skin Prep: chlorhexidine gluconate  Local Infiltration: lidocaine  Orientation: left  Location: radial  Catheter Size: 22 G  Catheter placement by Ultrasound guidance. Heme positive aspiration all ports.  Vessel Caliber: small, patent  Needle advanced into vessel with real time Ultrasound guidance.Insertion Attempts: 2  Assessment  Dressing: secured with tape and tegaderm  Patient: Tolerated well

## 2020-01-21 NOTE — ASSESSMENT & PLAN NOTE
Marisela Bunn is a 69 y.o. female with PMH COPD (not on home oxygen), HTN, HLD (not on anticoagulation) received as direct admission for large diaphragmatic hernia. Patient is symptomatic from hernia with constipation and dyspnea with overlying bruise on left flank. Currently with new oxygen requirement of 2L NC.     - Full liquid diet, Boost  - Miralax daily   - Levaquin for community-acquired pneumonia in setting of elevated WBC and ongoing cough  - Aggressive pulm toilet with IS, Ren Christiansen, and Tessalon   - Pulm consult today for any recs, pre-op eval   - PT/OT  - Daily labs  - Plan for OR on Thursday 1/23 for diaphragmatic hernia repair pending respiratory status    DISPO: pending surgery later this week, not ready for d/c

## 2020-01-21 NOTE — PT/OT/SLP PROGRESS
Physical Therapy      Patient Name:  Marisela Bunn   MRN:  [id number]    Attempted to see patient x 2.  Initial attempt, nursing requested hold secondary to need for breathing treatment.  On second attempt, initiated PT eval but patient with greatly increased SOB during movement towards sitting edge of bed and unable to progress further secondary to respiratory distress.  Nursing aware and will follow-up with patient tomorrow.    Reviewed patient's chart later in day and patient transferred to ICU secondary to respiratory distress and possibly underwent emergent procedure.  As a result, will discontinue PT orders.  Please reorder if a new PT need arises.      Wilson Johnson, PT

## 2020-01-21 NOTE — CONSULTS
[facility]  Pulmonology  Consult Note    Patient Name: Marisela Bunn  MRN: [id number]  Admission Date: 1/19/2020  Hospital Length of Stay: 2 days  Code Status: Full Code  Attending Physician: Lucho Garcia Jr.,*  Primary Care Provider: Primary Doctor No   Principal Problem: Diaphragmatic hernia    Inpatient consult to Pulmonology  Consult performed by: Javi Mccullough MD  Consult ordered by: Sanjeev Murcia MD        Subjective:     HPI:  Ms. Bunn is a 68 yo female with past medical history of CAD, HTN, TIA, GERD, acute asthma and acute bronchitis who presents as transfer on 1/19 from [facility] for diaphragmatic hernia. She originally presented to that hospital on 1/18 after having 1 month history of progressively worsening shortness of breath, fever, and cough. She has a chronic cough associated with seasonal allergies and postnasal drip however she says it got worse and is now productive of brown sputum. On 1/15 prior to presentation she felt a pop on her left side and developed severe pain and bruising over her left flank. Prior to today she was having constipation and decreased flatus but has since had an episode of diarrhea. She says she has been on antibiotics since December for suspected acute bronchitis. She denies history of COPD or history of smoking.     On presentation, she was afebrile, hemodynamically stable. Labs are significant for leukocytosis(19.2), elevated lactic acid 2.5, normal BMP. CXR obtained at OSH showed left lower lobe abnormality/possible infiltrate. CT A/P and CTA obtained show large Bochdalek hernia through defect in left hemidiaphragm containing fat and bowel loops. The herniation extends outside the thorax into the 7th left intercostal space. At the outside hospital she received antibiotics(levaquin and zosyn) and IV hydration [place] [facility] [place]. Pulmonology is being consulted for optimization prior to  surgery. Patient is currently on day 2 of [facility] for treatment of pneumonia and receiving scheduled duonebs, IS, and acapella.     History reviewed. No pertinent past medical history.    History reviewed. No pertinent surgical history.    Review of patient's allergies indicates:   Allergen Reactions    Erythromycin      Stomach pains    Rosuvastatin      Hives, muscle/joint pains    Zolpidem      Confusion        Family History     None        Tobacco Use    Smoking status: Not on file   Substance and Sexual Activity    Alcohol use: Not on file    Drug use: Not on file    Sexual activity: Not on file         Review of Systems   Constitutional: Positive for activity change, chills, fatigue and fever.   HENT: Negative for rhinorrhea and sore throat.    Respiratory: Positive for cough and shortness of breath. Negative for apnea, choking and wheezing.    Cardiovascular: Positive for chest pain. Negative for palpitations and leg swelling.   Gastrointestinal: Negative for abdominal distention, abdominal pain, constipation, diarrhea, nausea and vomiting.   Genitourinary: Negative for difficulty urinating, frequency, hematuria and urgency.   Musculoskeletal: Negative for arthralgias and myalgias.   Skin: Negative for color change and pallor.   Neurological: Negative for dizziness and numbness.   Psychiatric/Behavioral: Negative for agitation and confusion.     Objective:     Vital Signs (Most Recent):  Temp: 97.3 °F (36.3 °C) (01/21/20 0800)  Pulse: 110 (01/21/20 1145)  Resp: (!) 24 (01/21/20 1145)  BP: 135/74 (01/21/20 0800)  SpO2: (!) 94 % (01/21/20 1145) Vital Signs (24h Range):  Temp:  [96 °F (35.6 °C)-99 °F (37.2 °C)] 97.3 °F (36.3 °C)  Pulse:  [] 110  Resp:  [13-24] 24  SpO2:  [88 %-95 %] 94 %  BP: (111-135)/(59-74) 135/74     Weight: 108.4 kg (239 lb)  Body mass index is 45.16 kg/m².      Intake/Output Summary (Last 24 hours) at 1/21/2020 1151  Last data filed at 1/21/2020 0500  Gross per 24 hour    Intake 1355 ml   Output --   Net 1355 ml       Physical Exam   Constitutional: She is oriented to person, place, and time. She appears well-developed and well-nourished. She appears distressed.   Uncomfortable appearing female    HENT:   Head: Normocephalic and atraumatic.   Eyes: Pupils are equal, round, and reactive to light. No scleral icterus.   Neck: Normal range of motion. Neck supple.   Cardiovascular: Normal rate, regular rhythm and normal heart sounds. Exam reveals no gallop and no friction rub.   No murmur heard.  Pulmonary/Chest: She is in respiratory distress. She has no wheezes. She has no rales.   Mild respiratory distress on 5L NC  Decreased breath sounds on left side. No rales or rhonchi heard.   Abdominal: Soft. Bowel sounds are normal. She exhibits no distension. There is no tenderness.   Musculoskeletal: She exhibits no edema.   Neurological: She is alert and oriented to person, place, and time.   Skin: Skin is warm and dry.     Significant Labs:    CBC/Anemia Profile:  Recent Labs   Lab 01/19/20 2220 01/20/20  0359 01/21/20  0433   WBC 16.69* 17.42* 22.05*   HGB 11.5* 11.4* 11.1*   HCT 37.5 38.0 37.7    362* 349   MCV 90 91 92   RDW 13.9 14.1 13.9        Chemistries:  Recent Labs   Lab 01/19/20 2220 01/20/20  0359 01/21/20  0433   * 137 137   K 4.2 4.5 4.0    104 102   CO2 26 26 25   BUN 12 13 12   CREATININE 0.8 0.8 0.7   CALCIUM 8.8 8.7 8.9   ALBUMIN 2.9* 2.8* 2.8*   PROT 6.0 6.0 6.4   BILITOT 0.9 0.8 1.2*   ALKPHOS 100 100 134   ALT 50* 47* 76*   AST 36 34 58*   MG 1.7 1.8 1.7   PHOS 3.7 3.6 3.2       All pertinent labs within the past 24 hours have been reviewed.    Significant Imaging:   I have reviewed and interpreted all pertinent imaging results/findings within the past 24 hours.    Assessment/Plan:     * Diaphragmatic hernia  70 yo female with pmh of GERD, CAD, TIA presents with 1 month history of cough, dyspnea, fevers. She experienced a popping sensation on 1/15  followed by severe pain and bruising over her left side. CT chest shows evidence of large Bochdalek hernia containing loops of bowel herniating outside of thorax. She has severe associated atelectasis and suspected pneumonia. Since admission unsurprisingly her O2 requirements have been increasing and she is now satting in the upper 80s on 5L NC and her WBC count has been uptrending. Pulmonology is consulted for optimization prior to surgical intervention. Unfortunately her problem is mostly mechanical and improvement of her left lung function will only improve with hernia repair.    Recommendations:  -Patient scheduled to go to OR today  -Continue Duonbebs, IS, acapella  -Continue Levaquin for suspected pneumonia      Pneumonia  She has been having progressive cough productive of brown sputum, shortness of breath, and fevers. She is afebrile on admission however her WBC continues to trend up. On CT chest, there is no definitive evidence of pneumonia.    Plan:  -Continue Levaquin for now  -Order placed for sputum culture          Thank you for your consult. I will follow-up with patient. Please contact us if you have any additional questions.     Javi Mccullough MD  Pulmonology  [facility]

## 2020-01-21 NOTE — ASSESSMENT & PLAN NOTE
Marisela Bunn is a 69 y.o. female with PMH COPD (not on home oxygen), HTN, HLD (not on anticoagulation) received as direct admission for large diaphragmatic hernia. Patient is symptomatic from hernia with constipation and dyspnea with overlying bruise on left flank. She underwent lap diaphragmatic hernia repair with mesh on 1/21/20. She was transferred to the ICU postop.     - Intubated, wean to extubate per SICU protocol   - Sedated with propofol gtt and fentanyl gtt  - Patient is on home Lasix, consider this in addition to fluids for low UOP  - Levaquin for community-acquired pneumonia in setting of elevated WBC and ongoing cough  - Aggressive pulm toilet with IS, Acapella, DuoNeángel, and Tessalon when extubated  - Pulm consult, appreciate recs  - PT/OT when able  - Daily labs

## 2020-01-21 NOTE — HPI
Ms. Bunn is a 70 yo female with past medical history of CAD, HTN, TIA, GERD, acute asthma and acute bronchitis who presents as transfer on 1/19 from [facility] for diaphragmatic hernia. She originally presented to that hospital on 1/18 after having 1 month history of progressively worsening shortness of breath, fever, and cough. She has a chronic cough associated with seasonal allergies and postnasal drip however she says it got worse and is now productive of brown sputum. On 1/15 prior to presentation she felt a pop on her left side and developed severe pain and bruising over her left flank. Prior to today she was having constipation and decreased flatus but has since had an episode of diarrhea. She says she has been on antibiotics since December for suspected acute bronchitis. She denies history of COPD or history of smoking.     On presentation, she was afebrile, hemodynamically stable. Labs are significant for leukocytosis(19.2), elevated lactic acid 2.5, normal BMP. CXR obtained at OSH showed left lower lobe abnormality/possible infiltrate. CT A/P and CTA obtained show large Bochdalek hernia through defect in left hemidiaphragm containing fat and bowel loops. The herniation extends outside the thorax into the 7th left intercostal space. At the outside hospital she received antibiotics(levaquin and zosyn) and IV hydration before being transferred to [facility] [place] for surgical intervention. Pulmonology is being consulted for optimization prior to surgery. Patient is currently on day 2 of Levaquin for treatment of pneumonia and receiving scheduled duonebs, IS, and acapella.

## 2020-01-21 NOTE — SUBJECTIVE & OBJECTIVE
Interval History: No acute events overnight.   AF and VSS with O2 sats ranging 88-99% on 5L. She continues to have a cough.  WBC 22 today   + sputum    Medications:  Continuous Infusions:    Scheduled Meds:   albuterol-ipratropium  3 mL Nebulization Q4H    amLODIPine  10 mg Oral Daily    benzonatate  100 mg Oral TID    levoFLOXacin  750 mg Intravenous Q24H    pantoprazole  40 mg Oral BID AC    polyethylene glycol  17 g Oral Daily     PRN Meds:acetaminophen, albuterol-ipratropium, melatonin, ondansetron, oxyCODONE, oxyCODONE, sodium chloride 0.9%     Review of patient's allergies indicates:   Allergen Reactions    Erythromycin      Stomach pains    Rosuvastatin      Hives, muscle/joint pains    Zolpidem      Confusion      Objective:     Vital Signs (Most Recent):  Temp: 99 °F (37.2 °C) (01/20/20 2343)  Pulse: 105 (01/21/20 0440)  Resp: 19 (01/21/20 0440)  BP: 135/66 (01/20/20 2343)  SpO2: 95 % (01/21/20 0440) Vital Signs (24h Range):  Temp:  [96 °F (35.6 °C)-99 °F (37.2 °C)] 99 °F (37.2 °C)  Pulse:  [] 105  Resp:  [13-24] 19  SpO2:  [88 %-95 %] 95 %  BP: (111-135)/(59-66) 135/66     Weight: 108.4 kg (239 lb)  Body mass index is 45.16 kg/m².    Intake/Output - Last 3 Shifts       01/19 0700 - 01/20 0659 01/20 0700 - 01/21 0659 01/21 0700 - 01/22 0659    I.V. (mL/kg)  1355 (12.5)     Total Intake(mL/kg)  1355 (12.5)     Net  +1355            Urine Occurrence 1 x 4 x           Physical Exam    Constitutional: She is oriented to person, place, and time.   Obese female lying in bed in NAD, breathing 2L NC   Cardiovascular: Normal rate and intact distal pulses.   Pulmonary/Chest: Effort normal. She has wheezes.   Coarse breath sounds bilaterally, poor effort   Abdominal: Soft. She exhibits no distension. There is no tenderness.   Soft, non-tender, non-distended. Large bruise on left quadrants that extends to left flank   Neurological: She is alert and oriented to person, place, and time.   Skin: Skin is  warm and dry.     Significant Labs:  CBC:   Recent Labs   Lab 01/21/20  0433   WBC 22.05*   RBC 4.08   HGB 11.1*   HCT 37.7      MCV 92   MCH 27.2   MCHC 29.4*     CMP:   Recent Labs   Lab 01/21/20  0433   *   CALCIUM 8.9   ALBUMIN 2.8*   PROT 6.4      K 4.0   CO2 25      BUN 12   CREATININE 0.7   ALKPHOS 134   ALT 76*   AST 58*   BILITOT 1.2*       Significant Diagnostics:  I have reviewed all pertinent imaging results/findings within the past 24 hours.

## 2020-01-21 NOTE — PROGRESS NOTES
"RAPID RESPONSE NURSE PROACTIVE ROUNDING NOTE     Time of Visit: 1135    Admit Date: 2020  LOS: 2  Code Status: Full Code   Date of Visit: 2020  : 1950  Age: 69 y.o.  Sex: female  Race: White  Bed: 525/525 B:   MRN: [id number]  Was the patient discharged from an ICU this admission? no   Was the patient discharged from a PACU within last 24 hours?  no  Did the patient receive conscious sedation/general anesthesia in last 24 hours?  no  Was the patient in the ED within the past 24 hours?  no  Was the patient started on NIPPV within the past 24 hours?  no  Attending Physician: Lucho Garcia Jr.,*  Primary Service: Networked reference to record PCT     ASSESSMENT     Diagnosis: Diaphragmatic hernia    Abnormal Vital Signs: /74   Pulse 110   Temp 97.3 °F (36.3 °C)   Resp (!) 24   Ht 5' 1" (1.549 m)   Wt 108.4 kg (239 lb)   SpO2 (!) 94%   Breastfeeding? No   BMI 45.16 kg/m²      Clinical Issues: Respiratory    Patient  has no past medical history on file.    Called by pt's RN for Sp02 in 70s. Upon arrival to room, pt AAO, tachypneic, c /o SOB and with accessory muscle usage. Expiratory wheezing on auscultation. Placed on 15L non-rebreather and Sp02 increasing to low 90s. Respiratory therapy administering albuterol treatment. Dr. GODFREY Alanis with general surgery and anesthesia MD at bedside consenting pt for diaphragmatic hernia repair that is to happen now. Vital signs have stabilized, WOB decreased and pt states she feels better. [facility] RN at bedside to transport pt to surgery with anesthesia MD. Pt placed on portable monitor, portable 02 and ambu with pt.      INTERVENTIONS/ RECOMMENDATIONS     Continue with plan for diaphragmatic hernia repair. Supportive 02 until intubation in OR.     Discussed plan of care with Ian PEÑA.    PHYSICIAN ESCALATION     Yes/No  yes    Orders received and case discussed with Dr. GODFREY Alanis.    Disposition:To OR for surgery    FOLLOW-UP     Call back the Rapid " Response Nurse, Chula Castaneda RN at [phone] for additional questions or concerns.

## 2020-01-21 NOTE — SUBJECTIVE & OBJECTIVE
History reviewed. No pertinent past medical history.    History reviewed. No pertinent surgical history.    Review of patient's allergies indicates:   Allergen Reactions    Erythromycin      Stomach pains    Rosuvastatin      Hives, muscle/joint pains    Zolpidem      Confusion        Family History     None        Tobacco Use    Smoking status: Not on file   Substance and Sexual Activity    Alcohol use: Not on file    Drug use: Not on file    Sexual activity: Not on file         Review of Systems   Constitutional: Positive for activity change, chills, fatigue and fever.   HENT: Negative for rhinorrhea and sore throat.    Respiratory: Positive for cough and shortness of breath. Negative for apnea, choking and wheezing.    Cardiovascular: Positive for chest pain. Negative for palpitations and leg swelling.   Gastrointestinal: Negative for abdominal distention, abdominal pain, constipation, diarrhea, nausea and vomiting.   Genitourinary: Negative for difficulty urinating, frequency, hematuria and urgency.   Musculoskeletal: Negative for arthralgias and myalgias.   Skin: Negative for color change and pallor.   Neurological: Negative for dizziness and numbness.   Psychiatric/Behavioral: Negative for agitation and confusion.     Objective:     Vital Signs (Most Recent):  Temp: 97.3 °F (36.3 °C) (01/21/20 0800)  Pulse: 110 (01/21/20 1145)  Resp: (!) 24 (01/21/20 1145)  BP: 135/74 (01/21/20 0800)  SpO2: (!) 94 % (01/21/20 1145) Vital Signs (24h Range):  Temp:  [96 °F (35.6 °C)-99 °F (37.2 °C)] 97.3 °F (36.3 °C)  Pulse:  [] 110  Resp:  [13-24] 24  SpO2:  [88 %-95 %] 94 %  BP: (111-135)/(59-74) 135/74     Weight: 108.4 kg (239 lb)  Body mass index is 45.16 kg/m².      Intake/Output Summary (Last 24 hours) at 1/21/2020 1151  Last data filed at 1/21/2020 0500  Gross per 24 hour   Intake 1355 ml   Output --   Net 1355 ml       Physical Exam   Constitutional: She is oriented to person, place, and time. She appears  well-developed and well-nourished. She appears distressed.   Uncomfortable appearing female    HENT:   Head: Normocephalic and atraumatic.   Eyes: Pupils are equal, round, and reactive to light. No scleral icterus.   Neck: Normal range of motion. Neck supple.   Cardiovascular: Normal rate, regular rhythm and normal heart sounds. Exam reveals no gallop and no friction rub.   No murmur heard.  Pulmonary/Chest: She is in respiratory distress. She has no wheezes. She has no rales.   Mild respiratory distress on 5L NC  Decreased breath sounds on left side. No rales or rhonchi heard.   Abdominal: Soft. Bowel sounds are normal. She exhibits no distension. There is no tenderness.   Musculoskeletal: She exhibits no edema.   Neurological: She is alert and oriented to person, place, and time.   Skin: Skin is warm and dry.     Significant Labs:    CBC/Anemia Profile:  Recent Labs   Lab 01/19/20 2220 01/20/20  0359 01/21/20  0433   WBC 16.69* 17.42* 22.05*   HGB 11.5* 11.4* 11.1*   HCT 37.5 38.0 37.7    362* 349   MCV 90 91 92   RDW 13.9 14.1 13.9        Chemistries:  Recent Labs   Lab 01/19/20 2220 01/20/20  0359 01/21/20  0433   * 137 137   K 4.2 4.5 4.0    104 102   CO2 26 26 25   BUN 12 13 12   CREATININE 0.8 0.8 0.7   CALCIUM 8.8 8.7 8.9   ALBUMIN 2.9* 2.8* 2.8*   PROT 6.0 6.0 6.4   BILITOT 0.9 0.8 1.2*   ALKPHOS 100 100 134   ALT 50* 47* 76*   AST 36 34 58*   MG 1.7 1.8 1.7   PHOS 3.7 3.6 3.2       All pertinent labs within the past 24 hours have been reviewed.    Significant Imaging:   I have reviewed and interpreted all pertinent imaging results/findings within the past 24 hours.

## 2020-01-21 NOTE — ASSESSMENT & PLAN NOTE
68 yo female with pmh of GERD, CAD, TIA presents with 1 month history of cough, dyspnea, fevers. She experienced a popping sensation on 1/15 followed by severe pain and bruising over her left side. CT chest shows evidence of large Bochdalek hernia containing loops of bowel herniating outside of thorax. She has severe associated atelectasis and suspected pneumonia. Since admission unsurprisingly her O2 requirements have been increasing and she is now satting in the upper 80s on 5L NC and her WBC count has been uptrending. Pulmonology is consulted for optimization prior to surgical intervention. Unfortunately her problem is mostly mechanical and improvement of her left lung function will only improve with hernia repair.    Recommendations:  -Patient scheduled to go to OR today  -Continue JARROD Marie acapella  -Continue Levaquin for suspected pneumonia

## 2020-01-21 NOTE — ANESTHESIA PREPROCEDURE EVALUATION
[facility]  Anesthesia Pre-Operative Evaluation         Patient Name: Marisela Bunn  YOB: 1950  MRN: [id number]    SUBJECTIVE:     Pre-operative evaluation for Procedure(s) (LRB):  REPAIR, HERNIA, DIAPHRAGMATIC, Laparoscopic (Left)     01/21/2020    Marisela Bunn is a 69 y.o. female w/ a significant PMHx of CAD, COPD, HTN, TIA, GERD presented with large diaphragmatic hernia. Patient with SOB. This morning with decompensated respiratory status, on NRB-sats in 90s.     Patient now presents for the above procedure(s).      LDA:   20 G PIV LT AC    Prev airway: None documented    Drips: None documented.      Patient Active Problem List   Diagnosis    Diaphragmatic hernia    Cough    Leukocytosis    Pneumonia       Review of patient's allergies indicates:   Allergen Reactions    Erythromycin      Stomach pains    Rosuvastatin      Hives, muscle/joint pains    Zolpidem      Confusion        Current Inpatient Medications:   albuterol-ipratropium  3 mL Nebulization Q4H    amLODIPine  10 mg Oral Daily    benzonatate  100 mg Oral TID    enoxaparin  40 mg Subcutaneous BID    labetalol  100 mg Oral Daily    levoFLOXacin  750 mg Intravenous Q24H    pantoprazole  40 mg Oral BID AC    polyethylene glycol  17 g Oral Daily       No current facility-administered medications on file prior to encounter.      No current outpatient medications on file prior to encounter.       History reviewed. No pertinent surgical history.    Social History     Socioeconomic History    Marital status:      Spouse name: Not on file    Number of children: Not on file    Years of education: Not on file    Highest education level: Not on file   Occupational History    Not on file   Social Needs    Financial resource strain: Not on file    Food insecurity:     Worry: Not on file     Inability: Not on file    Transportation needs:     Medical: Not on file     Non-medical: Not on file   Tobacco  Use    Smoking status: Not on file   Substance and Sexual Activity    Alcohol use: Not on file    Drug use: Not on file    Sexual activity: Not on file   Lifestyle    Physical activity:     Days per week: Not on file     Minutes per session: Not on file    Stress: Not on file   Relationships    Social connections:     Talks on phone: Not on file     Gets together: Not on file     Attends [employer] service: Not on file     Active member of club or organization: Not on file     Attends meetings of clubs or organizations: Not on file     Relationship status: Not on file   Other Topics Concern    Not on file   Social History Narrative    Not on file       OBJECTIVE:     Vital Signs Range (Last 24H):  Temp:  [35.6 °C (96 °F)-37.2 °C (99 °F)]   Pulse:  []   Resp:  [13-26]   BP: (111-135)/(59-74)   SpO2:  [80 %-95 %]       Significant Labs:  Lab Results   Component Value Date    WBC 22.05 (H) 01/21/2020    HGB 11.1 (L) 01/21/2020    HCT 37.7 01/21/2020     01/21/2020    ALT 76 (H) 01/21/2020    AST 58 (H) 01/21/2020     01/21/2020    K 4.0 01/21/2020     01/21/2020    CREATININE 0.7 01/21/2020    BUN 12 01/21/2020    CO2 25 01/21/2020       Diagnostic Studies:   CT abdomen/pelvis revealed a large left diaphragmatic hernia containing fat  and bowel loops with hernia of intra-abdomnial contents outside the thorax from 7th left ICS. Medium sized HH. CTA revealed large Bochdalek hernia through defect in left hemo-diaphragm, with associated widening of 7th intercostal space    EKG:   Results for orders placed or performed during the hospital encounter of 01/19/20   EKG 12-lead    Collection Time: 01/21/20  7:50 AM    Narrative    Test Reason : Z01.811,    Vent. Rate : 103 BPM     Atrial Rate : 103 BPM     P-R Int : 128 ms          QRS Dur : 078 ms      QT Int : 338 ms       P-R-T Axes : 065 038 156 degrees     QTc Int : 442 ms    Sinus tachycardia  Nonspecific ST and T wave  abnormality  Abnormal ECG  No previous ECGs available    Referred By: PROVIDER NOTINSYSTEM           Confirmed By:          ASSESSMENT/PLAN:         Anesthesia Evaluation    I have reviewed the Patient Summary Reports.     I have reviewed the Nursing Notes.   I have reviewed the Medications.     Review of Systems  Anesthesia Hx:  No problems with previous Anesthesia  History of prior surgery of interest to airway management or planning: Denies Family Hx of Anesthesia complications.   Denies Personal Hx of Anesthesia complications.   EENT/Dental:EENT/Dental Normal   Cardiovascular:   Denies Pacemaker. Hypertension  Denies MI. CAD    Denies CABG/stent.  Denies Dysrhythmias.      ECG has been reviewed.    Pulmonary:   Pneumonia COPD, moderate    Renal/:  Renal/ Normal     Hepatic/GI:   Hiatal Hernia, GERD, poorly controlled Transdiaphragmatic intercostal hernia with colon in subq tissue   Musculoskeletal:  Musculoskeletal Normal    Neurological:   TIA, Seizures    Endocrine:  Endocrine Normal Denies Diabetes.    Psych:  Psychiatric Normal           Physical Exam  General:  Morbid Obesity    Airway/Jaw/Neck:  Airway Findings: Mouth Opening: Normal Tongue: Normal  General Airway Assessment: Adult  Mallampati: II  TM Distance: Normal, at least 6 cm         Dental:  Dental Findings: Upper Dentures   Chest/Lungs:  Chest/Lungs Findings: Tachypnea, Decreased Breathe Sounds Left  moderate respiratory distress     Heart/Vascular:  Heart Findings: Normal    Abdomen:  Abdomen Findings:  Soft      Skin:  Very large area of ecchymoses from left armpit all the way to left hip   Mental Status:  Mental Status Findings:  Cooperative         Anesthesia Plan  Type of Anesthesia, risks & benefits discussed:  Anesthesia Type:  general  Patient's Preference:   Intra-op Monitoring Plan: arterial line and standard ASA monitors  Intra-op Monitoring Plan Comments:   Post Op Pain Control Plan: per primary service following discharge from  PACU  Post Op Pain Control Plan Comments:   Induction:   IV  Beta Blocker:  Patient is on a Beta-Blocker and has received one dose within the past 24 hours (No further documentation required).       Informed Consent: Patient understands risks and agrees with Anesthesia plan.  Questions answered. Anesthesia consent signed with patient.  ASA Score: 4  emergent   Day of Surgery Review of History & Physical:    H&P update referred to the surgeon.     Anesthesia Plan Notes: Pt with colon occupying most of left hemithorax. Herniation of colon through 7th ICS. Tracheal deviation to the right. Will place preinduction arterial line, perform awake FBO, DL ETT for left lung isolation, consider single lumen ETT and exchange catheter for DL ETT if poor view. Mack and FB tower at bedside. Pt on 10L NRB at 90%. Also aspiration risk        Ready For Surgery From Anesthesia Perspective.

## 2020-01-22 LAB
ALBUMIN SERPL BCP-MCNC: 1.9 G/DL (ref 3.5–5.2)
ALLENS TEST: ABNORMAL
ALLENS TEST: ABNORMAL
ALP SERPL-CCNC: 88 U/L (ref 55–135)
ALT SERPL W/O P-5'-P-CCNC: 42 U/L (ref 10–44)
ANION GAP SERPL CALC-SCNC: 7 MMOL/L (ref 8–16)
ANION GAP SERPL CALC-SCNC: 8 MMOL/L (ref 8–16)
ASCENDING AORTA: 2.72 CM
AST SERPL-CCNC: 32 U/L (ref 10–40)
AV INDEX (PROSTH): 1
AV MEAN GRADIENT: 5 MMHG
AV PEAK GRADIENT: 10 MMHG
AV VALVE AREA: 3.12 CM2
AV VELOCITY RATIO: 0.88
BASOPHILS # BLD AUTO: 0.01 K/UL (ref 0–0.2)
BASOPHILS # BLD AUTO: 0.02 K/UL (ref 0–0.2)
BASOPHILS NFR BLD: 0.1 % (ref 0–1.9)
BASOPHILS NFR BLD: 0.1 % (ref 0–1.9)
BILIRUB SERPL-MCNC: 0.7 MG/DL (ref 0.1–1)
BSA FOR ECHO PROCEDURE: 2.16 M2
BUN SERPL-MCNC: 14 MG/DL (ref 8–23)
BUN SERPL-MCNC: 14 MG/DL (ref 8–23)
CALCIUM SERPL-MCNC: 7.9 MG/DL (ref 8.7–10.5)
CALCIUM SERPL-MCNC: 8 MG/DL (ref 8.7–10.5)
CHLORIDE SERPL-SCNC: 105 MMOL/L (ref 95–110)
CHLORIDE SERPL-SCNC: 106 MMOL/L (ref 95–110)
CO2 SERPL-SCNC: 24 MMOL/L (ref 23–29)
CO2 SERPL-SCNC: 26 MMOL/L (ref 23–29)
CREAT SERPL-MCNC: 0.7 MG/DL (ref 0.5–1.4)
CREAT SERPL-MCNC: 0.7 MG/DL (ref 0.5–1.4)
CV ECHO LV RWT: 0.33 CM
DELSYS: ABNORMAL
DELSYS: ABNORMAL
DIFFERENTIAL METHOD: ABNORMAL
DIFFERENTIAL METHOD: ABNORMAL
DOP CALC AO PEAK VEL: 1.62 M/S
DOP CALC AO VTI: 32.67 CM
DOP CALC LVOT AREA: 3.1 CM2
DOP CALC LVOT DIAMETER: 1.99 CM
DOP CALC LVOT PEAK VEL: 1.42 M/S
DOP CALC LVOT STROKE VOLUME: 102.03 CM3
DOP CALCLVOT PEAK VEL VTI: 32.82 CM
E WAVE DECELERATION TIME: 222.21 MSEC
E/A RATIO: 0.68
E/E' RATIO: 12.13 M/S
ECHO LV POSTERIOR WALL: 0.8 CM (ref 0.6–1.1)
EOSINOPHIL # BLD AUTO: 0 K/UL (ref 0–0.5)
EOSINOPHIL # BLD AUTO: 0 K/UL (ref 0–0.5)
EOSINOPHIL NFR BLD: 0 % (ref 0–8)
EOSINOPHIL NFR BLD: 0 % (ref 0–8)
ERYTHROCYTE [DISTWIDTH] IN BLOOD BY AUTOMATED COUNT: 13.8 % (ref 11.5–14.5)
ERYTHROCYTE [DISTWIDTH] IN BLOOD BY AUTOMATED COUNT: 13.9 % (ref 11.5–14.5)
ERYTHROCYTE [SEDIMENTATION RATE] IN BLOOD BY WESTERGREN METHOD: 16 MM/H
EST. GFR  (AFRICAN AMERICAN): >60 ML/MIN/1.73 M^2
EST. GFR  (AFRICAN AMERICAN): >60 ML/MIN/1.73 M^2
EST. GFR  (NON AFRICAN AMERICAN): >60 ML/MIN/1.73 M^2
EST. GFR  (NON AFRICAN AMERICAN): >60 ML/MIN/1.73 M^2
FIO2: 40
FIO2: 60
FRACTIONAL SHORTENING: 36 % (ref 28–44)
GLUCOSE SERPL-MCNC: 110 MG/DL (ref 70–110)
GLUCOSE SERPL-MCNC: 143 MG/DL (ref 70–110)
HCO3 UR-SCNC: 26.3 MMOL/L (ref 24–28)
HCO3 UR-SCNC: 29.1 MMOL/L (ref 24–28)
HCT VFR BLD AUTO: 27.5 % (ref 37–48.5)
HCT VFR BLD AUTO: 29.2 % (ref 37–48.5)
HGB BLD-MCNC: 8.4 G/DL (ref 12–16)
HGB BLD-MCNC: 8.7 G/DL (ref 12–16)
IMM GRANULOCYTES # BLD AUTO: 0.13 K/UL (ref 0–0.04)
IMM GRANULOCYTES # BLD AUTO: 0.15 K/UL (ref 0–0.04)
IMM GRANULOCYTES NFR BLD AUTO: 0.9 % (ref 0–0.5)
IMM GRANULOCYTES NFR BLD AUTO: 1.2 % (ref 0–0.5)
INTERVENTRICULAR SEPTUM: 0.71 CM (ref 0.6–1.1)
LA MAJOR: 4.92 CM
LA MINOR: 4.7 CM
LA WIDTH: 4.02 CM
LEFT ATRIUM SIZE: 4.3 CM
LEFT ATRIUM VOLUME INDEX: 34.7 ML/M2
LEFT ATRIUM VOLUME: 70.64 CM3
LEFT INTERNAL DIMENSION IN SYSTOLE: 3.13 CM (ref 2.1–4)
LEFT VENTRICLE DIASTOLIC VOLUME INDEX: 55.04 ML/M2
LEFT VENTRICLE DIASTOLIC VOLUME: 112.16 ML
LEFT VENTRICLE MASS INDEX: 60 G/M2
LEFT VENTRICLE SYSTOLIC VOLUME INDEX: 19.1 ML/M2
LEFT VENTRICLE SYSTOLIC VOLUME: 38.96 ML
LEFT VENTRICULAR INTERNAL DIMENSION IN DIASTOLE: 4.89 CM (ref 3.5–6)
LEFT VENTRICULAR MASS: 121.42 G
LV LATERAL E/E' RATIO: 10.11 M/S
LV SEPTAL E/E' RATIO: 15.17 M/S
LYMPHOCYTES # BLD AUTO: 0.6 K/UL (ref 1–4.8)
LYMPHOCYTES # BLD AUTO: 0.7 K/UL (ref 1–4.8)
LYMPHOCYTES NFR BLD: 4.2 % (ref 18–48)
LYMPHOCYTES NFR BLD: 5.3 % (ref 18–48)
MAGNESIUM SERPL-MCNC: 1.9 MG/DL (ref 1.6–2.6)
MCH RBC QN AUTO: 27.4 PG (ref 27–31)
MCH RBC QN AUTO: 27.8 PG (ref 27–31)
MCHC RBC AUTO-ENTMCNC: 29.8 G/DL (ref 32–36)
MCHC RBC AUTO-ENTMCNC: 30.5 G/DL (ref 32–36)
MCV RBC AUTO: 91 FL (ref 82–98)
MCV RBC AUTO: 92 FL (ref 82–98)
MIN VOL: 8
MODE: ABNORMAL
MODE: ABNORMAL
MONOCYTES # BLD AUTO: 0.7 K/UL (ref 0.3–1)
MONOCYTES # BLD AUTO: 0.9 K/UL (ref 0.3–1)
MONOCYTES NFR BLD: 4.6 % (ref 4–15)
MONOCYTES NFR BLD: 7.3 % (ref 4–15)
MV PEAK A VEL: 1.33 M/S
MV PEAK E VEL: 0.91 M/S
NEUTROPHILS # BLD AUTO: 11.1 K/UL (ref 1.8–7.7)
NEUTROPHILS # BLD AUTO: 13.4 K/UL (ref 1.8–7.7)
NEUTROPHILS NFR BLD: 86.1 % (ref 38–73)
NEUTROPHILS NFR BLD: 90.2 % (ref 38–73)
NRBC BLD-RTO: 0 /100 WBC
NRBC BLD-RTO: 0 /100 WBC
PCO2 BLDA: 36.9 MMHG (ref 35–45)
PCO2 BLDA: 42.9 MMHG (ref 35–45)
PEEP: 8
PEEP: 8
PH SMN: 7.39 [PH] (ref 7.35–7.45)
PH SMN: 7.5 [PH] (ref 7.35–7.45)
PHOSPHATE SERPL-MCNC: 2.8 MG/DL (ref 2.7–4.5)
PIP: 23
PLATELET # BLD AUTO: 244 K/UL (ref 150–350)
PLATELET # BLD AUTO: 254 K/UL (ref 150–350)
PMV BLD AUTO: 8.9 FL (ref 9.2–12.9)
PMV BLD AUTO: 9.3 FL (ref 9.2–12.9)
PO2 BLDA: 111 MMHG (ref 80–100)
PO2 BLDA: 85 MMHG (ref 80–100)
POC BE: 1 MMOL/L
POC BE: 6 MMOL/L
POC SATURATED O2: 96 % (ref 95–100)
POC SATURATED O2: 99 % (ref 95–100)
POC TCO2: 28 MMOL/L (ref 23–27)
POC TCO2: 30 MMOL/L (ref 23–27)
POCT GLUCOSE: 108 MG/DL (ref 70–110)
POCT GLUCOSE: 113 MG/DL (ref 70–110)
POCT GLUCOSE: 120 MG/DL (ref 70–110)
POCT GLUCOSE: 123 MG/DL (ref 70–110)
POTASSIUM SERPL-SCNC: 3.9 MMOL/L (ref 3.5–5.1)
POTASSIUM SERPL-SCNC: 4.2 MMOL/L (ref 3.5–5.1)
PROCALCITONIN SERPL IA-MCNC: 0.07 NG/ML
PROT SERPL-MCNC: 4.9 G/DL (ref 6–8.4)
PS: 5
PULM VEIN S/D RATIO: 1.45
PV PEAK D VEL: 0.44 M/S
PV PEAK S VEL: 0.64 M/S
RA MAJOR: 4.63 CM
RA PRESSURE: 3 MMHG
RA WIDTH: 3.3 CM
RBC # BLD AUTO: 3.02 M/UL (ref 4–5.4)
RBC # BLD AUTO: 3.18 M/UL (ref 4–5.4)
RIGHT VENTRICULAR END-DIASTOLIC DIMENSION: 1.74 CM
SAMPLE: ABNORMAL
SAMPLE: ABNORMAL
SINUS: 3.14 CM
SITE: ABNORMAL
SITE: ABNORMAL
SODIUM SERPL-SCNC: 137 MMOL/L (ref 136–145)
SODIUM SERPL-SCNC: 139 MMOL/L (ref 136–145)
SP02: 96
STJ: 2.24 CM
TDI LATERAL: 0.09 M/S
TDI SEPTAL: 0.06 M/S
TDI: 0.08 M/S
TRICUSPID ANNULAR PLANE SYSTOLIC EXCURSION: 2.65 CM
WBC # BLD AUTO: 12.92 K/UL (ref 3.9–12.7)
WBC # BLD AUTO: 14.87 K/UL (ref 3.9–12.7)

## 2020-01-22 PROCEDURE — 63600175 PHARM REV CODE 636 W HCPCS: Performed by: STUDENT IN AN ORGANIZED HEALTH CARE EDUCATION/TRAINING PROGRAM

## 2020-01-22 PROCEDURE — 94002 VENT MGMT INPAT INIT DAY: CPT

## 2020-01-22 PROCEDURE — 94761 N-INVAS EAR/PLS OXIMETRY MLT: CPT

## 2020-01-22 PROCEDURE — 99900026 HC AIRWAY MAINTENANCE (STAT)

## 2020-01-22 PROCEDURE — 82803 BLOOD GASES ANY COMBINATION: CPT

## 2020-01-22 PROCEDURE — 80048 BASIC METABOLIC PNL TOTAL CA: CPT

## 2020-01-22 PROCEDURE — 63600175 PHARM REV CODE 636 W HCPCS: Performed by: SURGERY

## 2020-01-22 PROCEDURE — 27000221 HC OXYGEN, UP TO 24 HOURS

## 2020-01-22 PROCEDURE — 87205 SMEAR GRAM STAIN: CPT

## 2020-01-22 PROCEDURE — 83735 ASSAY OF MAGNESIUM: CPT

## 2020-01-22 PROCEDURE — 80053 COMPREHEN METABOLIC PANEL: CPT

## 2020-01-22 PROCEDURE — 87186 SC STD MICRODIL/AGAR DIL: CPT

## 2020-01-22 PROCEDURE — 84145 PROCALCITONIN (PCT): CPT

## 2020-01-22 PROCEDURE — 25000003 PHARM REV CODE 250: Performed by: SURGERY

## 2020-01-22 PROCEDURE — 20000000 HC ICU ROOM

## 2020-01-22 PROCEDURE — C9113 INJ PANTOPRAZOLE SODIUM, VIA: HCPCS | Performed by: STUDENT IN AN ORGANIZED HEALTH CARE EDUCATION/TRAINING PROGRAM

## 2020-01-22 PROCEDURE — 87070 CULTURE OTHR SPECIMN AEROBIC: CPT

## 2020-01-22 PROCEDURE — 99291 PR CRITICAL CARE, E/M 30-74 MINUTES: ICD-10-PCS | Mod: ,,, | Performed by: ANESTHESIOLOGY

## 2020-01-22 PROCEDURE — 99900035 HC TECH TIME PER 15 MIN (STAT)

## 2020-01-22 PROCEDURE — 85025 COMPLETE CBC W/AUTO DIFF WBC: CPT

## 2020-01-22 PROCEDURE — 63600175 PHARM REV CODE 636 W HCPCS

## 2020-01-22 PROCEDURE — 87106 FUNGI IDENTIFICATION YEAST: CPT

## 2020-01-22 PROCEDURE — 84100 ASSAY OF PHOSPHORUS: CPT

## 2020-01-22 PROCEDURE — 25000003 PHARM REV CODE 250: Performed by: STUDENT IN AN ORGANIZED HEALTH CARE EDUCATION/TRAINING PROGRAM

## 2020-01-22 PROCEDURE — 27200966 HC CLOSED SUCTION SYSTEM

## 2020-01-22 PROCEDURE — 25000242 PHARM REV CODE 250 ALT 637 W/ HCPCS: Performed by: STUDENT IN AN ORGANIZED HEALTH CARE EDUCATION/TRAINING PROGRAM

## 2020-01-22 PROCEDURE — 99291 CRITICAL CARE FIRST HOUR: CPT | Mod: ,,, | Performed by: ANESTHESIOLOGY

## 2020-01-22 PROCEDURE — 94640 AIRWAY INHALATION TREATMENT: CPT

## 2020-01-22 PROCEDURE — 37799 UNLISTED PX VASCULAR SURGERY: CPT

## 2020-01-22 PROCEDURE — 87077 CULTURE AEROBIC IDENTIFY: CPT

## 2020-01-22 RX ORDER — LABETALOL HYDROCHLORIDE 5 MG/ML
10 INJECTION, SOLUTION INTRAVENOUS EVERY 4 HOURS PRN
Status: DISCONTINUED | OUTPATIENT
Start: 2020-01-22 | End: 2020-01-22 | Stop reason: ALTCHOICE

## 2020-01-22 RX ORDER — HYDRALAZINE HYDROCHLORIDE 20 MG/ML
INJECTION INTRAMUSCULAR; INTRAVENOUS
Status: COMPLETED
Start: 2020-01-22 | End: 2020-01-22

## 2020-01-22 RX ORDER — HYDRALAZINE HYDROCHLORIDE 20 MG/ML
10 INJECTION INTRAMUSCULAR; INTRAVENOUS EVERY 4 HOURS PRN
Status: DISCONTINUED | OUTPATIENT
Start: 2020-01-22 | End: 2020-02-12 | Stop reason: HOSPADM

## 2020-01-22 RX ORDER — FUROSEMIDE 10 MG/ML
20 INJECTION INTRAMUSCULAR; INTRAVENOUS ONCE
Status: COMPLETED | OUTPATIENT
Start: 2020-01-22 | End: 2020-01-22

## 2020-01-22 RX ORDER — FENTANYL CITRATE-0.9 % NACL/PF 10 MCG/ML
25 PLASTIC BAG, INJECTION (ML) INTRAVENOUS CONTINUOUS
Status: DISCONTINUED | OUTPATIENT
Start: 2020-01-22 | End: 2020-01-23

## 2020-01-22 RX ORDER — LABETALOL HCL 20 MG/4 ML
10 SYRINGE (ML) INTRAVENOUS EVERY 4 HOURS PRN
Status: DISCONTINUED | OUTPATIENT
Start: 2020-01-22 | End: 2020-02-12 | Stop reason: HOSPADM

## 2020-01-22 RX ORDER — PROPOFOL 10 MG/ML
5 INJECTION, EMULSION INTRAVENOUS CONTINUOUS
Status: DISCONTINUED | OUTPATIENT
Start: 2020-01-22 | End: 2020-01-24

## 2020-01-22 RX ORDER — CHLORHEXIDINE GLUCONATE ORAL RINSE 1.2 MG/ML
15 SOLUTION DENTAL 2 TIMES DAILY
Status: DISCONTINUED | OUTPATIENT
Start: 2020-01-22 | End: 2020-01-24

## 2020-01-22 RX ORDER — CARBOXYMETHYLCELLULOSE SODIUM 10 MG/ML
2 GEL OPHTHALMIC NIGHTLY
Status: DISCONTINUED | OUTPATIENT
Start: 2020-01-22 | End: 2020-01-24

## 2020-01-22 RX ADMIN — HYDRALAZINE HYDROCHLORIDE 10 MG: 20 INJECTION INTRAMUSCULAR; INTRAVENOUS at 06:01

## 2020-01-22 RX ADMIN — LEVOFLOXACIN 750 MG: 750 INJECTION, SOLUTION INTRAVENOUS at 09:01

## 2020-01-22 RX ADMIN — IPRATROPIUM BROMIDE AND ALBUTEROL SULFATE 3 ML: .5; 3 SOLUTION RESPIRATORY (INHALATION) at 11:01

## 2020-01-22 RX ADMIN — ENOXAPARIN SODIUM 40 MG: 100 INJECTION SUBCUTANEOUS at 09:01

## 2020-01-22 RX ADMIN — CARBOXYMETHYLCELLULOSE SODIUM 2 DROP: 10 GEL OPHTHALMIC at 08:01

## 2020-01-22 RX ADMIN — IPRATROPIUM BROMIDE AND ALBUTEROL SULFATE 3 ML: .5; 3 SOLUTION RESPIRATORY (INHALATION) at 03:01

## 2020-01-22 RX ADMIN — CHLORHEXIDINE GLUCONATE 0.12% ORAL RINSE 15 ML: 1.2 LIQUID ORAL at 08:01

## 2020-01-22 RX ADMIN — POTASSIUM CHLORIDE 40 MEQ: 7.46 INJECTION, SOLUTION INTRAVENOUS at 07:01

## 2020-01-22 RX ADMIN — PROPOFOL 5 MCG/KG/MIN: 10 INJECTION, EMULSION INTRAVENOUS at 10:01

## 2020-01-22 RX ADMIN — FENTANYL CITRATE 150 MCG/HR: 50 INJECTION, SOLUTION INTRAMUSCULAR; INTRAVENOUS at 08:01

## 2020-01-22 RX ADMIN — PROPOFOL 25 MCG/KG/MIN: 10 INJECTION, EMULSION INTRAVENOUS at 06:01

## 2020-01-22 RX ADMIN — SODIUM CHLORIDE, SODIUM LACTATE, POTASSIUM CHLORIDE, AND CALCIUM CHLORIDE 500 ML: .6; .31; .03; .02 INJECTION, SOLUTION INTRAVENOUS at 06:01

## 2020-01-22 RX ADMIN — IPRATROPIUM BROMIDE AND ALBUTEROL SULFATE 3 ML: .5; 3 SOLUTION RESPIRATORY (INHALATION) at 07:01

## 2020-01-22 RX ADMIN — FUROSEMIDE 20 MG: 10 INJECTION, SOLUTION INTRAVENOUS at 12:01

## 2020-01-22 RX ADMIN — SODIUM CHLORIDE, SODIUM LACTATE, POTASSIUM CHLORIDE, AND CALCIUM CHLORIDE: .6; .31; .03; .02 INJECTION, SOLUTION INTRAVENOUS at 09:01

## 2020-01-22 RX ADMIN — PROPOFOL 5 MCG/KG/MIN: 10 INJECTION, EMULSION INTRAVENOUS at 05:01

## 2020-01-22 RX ADMIN — ENOXAPARIN SODIUM 40 MG: 100 INJECTION SUBCUTANEOUS at 08:01

## 2020-01-22 RX ADMIN — PANTOPRAZOLE SODIUM 40 MG: 40 INJECTION, POWDER, FOR SOLUTION INTRAVENOUS at 09:01

## 2020-01-22 RX ADMIN — CHLORHEXIDINE GLUCONATE 0.12% ORAL RINSE 15 ML: 1.2 LIQUID ORAL at 12:01

## 2020-01-22 RX ADMIN — SODIUM CHLORIDE, SODIUM LACTATE, POTASSIUM CHLORIDE, AND CALCIUM CHLORIDE: .6; .31; .03; .02 INJECTION, SOLUTION INTRAVENOUS at 06:01

## 2020-01-22 RX ADMIN — PROPOFOL 50 MCG/KG/MIN: 10 INJECTION, EMULSION INTRAVENOUS at 07:01

## 2020-01-22 RX ADMIN — PROPOFOL 50 MCG/KG/MIN: 10 INJECTION, EMULSION INTRAVENOUS at 10:01

## 2020-01-22 NOTE — PT/OT/SLP DISCHARGE
Occupational Therapy Discharge Summary    Marisela Bunn  MRN: [id number]   Principal Problem: Diaphragmatic hernia      Patient Discharged from acute Occupational Therapy on 1/21/20.      Assessment:      Patient transferred to lower level of care secondary to s/p surgery for diaphragmatic hernia repair 1/21    Objective:     GOALS:   Multidisciplinary Problems     Occupational Therapy Goals        Problem: Occupational Therapy Goal    Goal Priority Disciplines Outcome Interventions   Occupational Therapy Goal     OT, PT/OT Ongoing, Progressing    Description:  Goals to be met by: 2/20/20    Patient will increase functional independence with ADLs by performing:    UE Dressing with Modified Lee.  LE Dressing with Modified Lee.  Grooming while standing with Modified Lee.  Toileting from bedside commode with Modified Lee for hygiene and clothing management.   Toilet transfer to bedside commode with Modified Lee.                      Reasons for Discontinuation of Therapy Services  Transfer to alternate level of care. and new orders needed to resume tx      Plan:     Patient Discharged to: ICU post-op    JAROCHO Cabrera  1/22/2020

## 2020-01-22 NOTE — PROGRESS NOTES
[facility]  Critical Care - Surgery  Progress Note    Patient Name: Marisela Bunn  MRN: [id number]  Admission Date: 1/19/2020  Hospital Length of Stay: 2 days  Code Status: Full Code  Attending Provider: Lucho Garcia Jr.,*  Primary Care Provider: Primary Doctor No   Principal Problem: Diaphragmatic hernia    Subjective:     Hospital/ICU Course:  1/21: Pt arrives in SICU intubated, sedated, ventilated, on 0.5 ludwig.     Follow-up For: Procedure(s) (LRB):  REPAIR, HERNIA, DIAPHRAGMATIC, Laparoscopic (Left)    Post-Operative Day: Day of Surgery     Past Medical History:   Diagnosis Date    CAD (coronary artery disease)     Diaphragmatic hernia     GERD (gastroesophageal reflux disease)     Hypertension     TIA (transient ischemic attack)        History reviewed. No pertinent surgical history.    Review of patient's allergies indicates:   Allergen Reactions    Erythromycin      Stomach pains    Rosuvastatin      Hives, muscle/joint pains    Zolpidem      Confusion        Family History     None        Tobacco Use    Smoking status: Not on file   Substance and Sexual Activity    Alcohol use: Not on file    Drug use: Not on file    Sexual activity: Not on file      Review of Systems   Unable to perform ROS: Intubated     Objective:     Vital Signs (Most Recent):  Temp: 97.3 °F (36.3 °C) (01/21/20 0800)  Pulse: 65 (01/21/20 1815)  Resp: 16 (01/21/20 1815)  BP: 135/74 (01/21/20 0800)  SpO2: 100 % (01/21/20 1815) Vital Signs (24h Range):  Temp:  [96.5 °F (35.8 °C)-99 °F (37.2 °C)] 97.3 °F (36.3 °C)  Pulse:  [] 65  Resp:  [13-26] 16  SpO2:  [80 %-100 %] 100 %  BP: (117-135)/(59-74) 135/74     Weight: 108.4 kg (239 lb)  Body mass index is 45.16 kg/m².      Intake/Output Summary (Last 24 hours) at 1/21/2020 1818  Last data filed at 1/21/2020 1600  Gross per 24 hour   Intake 3655 ml   Output 310 ml   Net 3345 ml       Physical Exam   Constitutional: She appears well-developed and  well-nourished.   HENT:   Head: Normocephalic and atraumatic.   Nose: Nose normal.   Eyes: Pupils are equal, round, and reactive to light. Conjunctivae are normal.   Neck: Neck supple.   Cardiovascular: Normal rate, regular rhythm and intact distal pulses.   Pulmonary/Chest:   Intubated    Abdominal:   Obese  Laparoscopic wounds, dressing c/d/i   Genitourinary:   Genitourinary Comments: Hernandez in place   Neurological:   Sedated   Skin: Skin is warm and dry. Capillary refill takes less than 2 seconds.       Vents:  Vent Mode: A/C (01/21/20 1815)  Ventilator Initiated: Yes (01/21/20 1759)  Set Rate: 16 bmp (01/21/20 1815)  Vt Set: 375 mL (01/21/20 1815)  PEEP/CPAP: 8 cmH20 (01/21/20 1815)  Oxygen Concentration (%): 100 (01/21/20 1815)  Peak Airway Pressure: 28 cmH2O (01/21/20 1815)  Plateau Pressure: 0 cmH20 (01/21/20 1815)  Total Ve: 6.08 mL (01/21/20 1815)  F/VT Ratio<105 (RSBI): (!) (P) 42.33 (01/21/20 1815)    Lines/Drains/Airways     Drain                 Drain/Device  01/21/20 1643 collapsible closed device less than 1 day         Urethral Catheter 01/21/20 1250 Straight-tip 16 Fr. less than 1 day          Airway                 Airway - Non-Surgical 01/21/20 1818 less than 1 day          Arterial Line                 Arterial Line 01/21/20 1217 Left Radial less than 1 day          Peripheral Intravenous Line                 Peripheral IV - Single Lumen 01/21/20 1159 20 G Left Antecubital less than 1 day         Peripheral IV - Single Lumen 01/21/20 1207 20 G Right Hand less than 1 day         Peripheral IV - Single Lumen 01/21/20 1227 16 G Left Hand less than 1 day                Significant Labs:    CBC/Anemia Profile:  Recent Labs   Lab 01/19/20  2220 01/20/20  0359 01/21/20  0433   WBC 16.69* 17.42* 22.05*   HGB 11.5* 11.4* 11.1*   HCT 37.5 38.0 37.7    362* 349   MCV 90 91 92   RDW 13.9 14.1 13.9        Chemistries:  Recent Labs   Lab 01/19/20  2220 01/20/20  0359 01/21/20  0433   * 137 137   K  4.2 4.5 4.0    104 102   CO2 26 26 25   BUN 12 13 12   CREATININE 0.8 0.8 0.7   CALCIUM 8.8 8.7 8.9   ALBUMIN 2.9* 2.8* 2.8*   PROT 6.0 6.0 6.4   BILITOT 0.9 0.8 1.2*   ALKPHOS 100 100 134   ALT 50* 47* 76*   AST 36 34 58*   MG 1.7 1.8 1.7   PHOS 3.7 3.6 3.2       Lactic Acid:   Recent Labs   Lab 01/19/20  2220   LACTATE 0.9       Significant Imaging: I have reviewed all pertinent imaging results/findings within the past 24 hours.    Assessment/Plan:     * Diaphragmatic hernia  Neuro:  Sedation: Propofol   Pain control: Tylenol, fentanyl, oxy    Resp:  Intubated  Possible pre-op pneumonia  Sputum cultures sent  Nebs prn    CV:  HDS  0.5 ludwig, wean as tolerated to off  Resume home meds when able    Heme/ID:  H/H   Leukocytosis, trend Wbc  On levofloxacin for possible pneumonia     Renal:  Hernandez in place  Strict I/Os  BUN Cr  IVF @ 125cc/hr    FEN/GI:  NPO  Replace lytes PRN    Endo:  SSI    PPX:  Protonix  Lovenox    Dispo: Continue ICU care          Critical care was time spent personally by me on the following activities: development of treatment plan with patient or surrogate and bedside caregivers, discussions with consultants, evaluation of patient's response to treatment, examination of patient, ordering and performing treatments and interventions, ordering and review of laboratory studies, ordering and review of radiographic studies, pulse oximetry, re-evaluation of patient's condition.  This critical care time did not overlap with that of any other provider or involve time for any procedures.     Desmond Rock MD  Critical Care - Surgery  [facility]

## 2020-01-22 NOTE — SUBJECTIVE & OBJECTIVE
Follow-up For: Procedure(s) (LRB):  REPAIR, HERNIA, DIAPHRAGMATIC, Laparoscopic (Left)    Post-Operative Day: Day of Surgery     Past Medical History:   Diagnosis Date    CAD (coronary artery disease)     Diaphragmatic hernia     GERD (gastroesophageal reflux disease)     Hypertension     TIA (transient ischemic attack)        History reviewed. No pertinent surgical history.    Review of patient's allergies indicates:   Allergen Reactions    Erythromycin      Stomach pains    Rosuvastatin      Hives, muscle/joint pains    Zolpidem      Confusion        Family History     None        Tobacco Use    Smoking status: Not on file   Substance and Sexual Activity    Alcohol use: Not on file    Drug use: Not on file    Sexual activity: Not on file      Review of Systems   Unable to perform ROS: Intubated     Objective:     Vital Signs (Most Recent):  Temp: 97.3 °F (36.3 °C) (01/21/20 0800)  Pulse: 65 (01/21/20 1815)  Resp: 16 (01/21/20 1815)  BP: 135/74 (01/21/20 0800)  SpO2: 100 % (01/21/20 1815) Vital Signs (24h Range):  Temp:  [96.5 °F (35.8 °C)-99 °F (37.2 °C)] 97.3 °F (36.3 °C)  Pulse:  [] 65  Resp:  [13-26] 16  SpO2:  [80 %-100 %] 100 %  BP: (117-135)/(59-74) 135/74     Weight: 108.4 kg (239 lb)  Body mass index is 45.16 kg/m².      Intake/Output Summary (Last 24 hours) at 1/21/2020 1818  Last data filed at 1/21/2020 1600  Gross per 24 hour   Intake 3655 ml   Output 310 ml   Net 3345 ml       Physical Exam   Constitutional: She appears well-developed and well-nourished.   HENT:   Head: Normocephalic and atraumatic.   Nose: Nose normal.   Eyes: Pupils are equal, round, and reactive to light. Conjunctivae are normal.   Neck: Neck supple.   Cardiovascular: Normal rate, regular rhythm and intact distal pulses.   Pulmonary/Chest:   Intubated    Abdominal:   Obese  Laparoscopic wounds, dressing c/d/i   Genitourinary:   Genitourinary Comments: Hernandez in place   Neurological:   Sedated   Skin: Skin is warm  and dry. Capillary refill takes less than 2 seconds.       Vents:  Vent Mode: A/C (01/21/20 1815)  Ventilator Initiated: Yes (01/21/20 1759)  Set Rate: 16 bmp (01/21/20 1815)  Vt Set: 375 mL (01/21/20 1815)  PEEP/CPAP: 8 cmH20 (01/21/20 1815)  Oxygen Concentration (%): 100 (01/21/20 1815)  Peak Airway Pressure: 28 cmH2O (01/21/20 1815)  Plateau Pressure: 0 cmH20 (01/21/20 1815)  Total Ve: 6.08 mL (01/21/20 1815)  F/VT Ratio<105 (RSBI): (!) (P) 42.33 (01/21/20 1815)    Lines/Drains/Airways     Drain                 Drain/Device  01/21/20 1643 collapsible closed device less than 1 day         Urethral Catheter 01/21/20 1250 Straight-tip 16 Fr. less than 1 day          Airway                 Airway - Non-Surgical 01/21/20 1818 less than 1 day          Arterial Line                 Arterial Line 01/21/20 1217 Left Radial less than 1 day          Peripheral Intravenous Line                 Peripheral IV - Single Lumen 01/21/20 1159 20 G Left Antecubital less than 1 day         Peripheral IV - Single Lumen 01/21/20 1207 20 G Right Hand less than 1 day         Peripheral IV - Single Lumen 01/21/20 1227 16 G Left Hand less than 1 day                Significant Labs:    CBC/Anemia Profile:  Recent Labs   Lab 01/19/20 2220 01/20/20 0359 01/21/20  0433   WBC 16.69* 17.42* 22.05*   HGB 11.5* 11.4* 11.1*   HCT 37.5 38.0 37.7    362* 349   MCV 90 91 92   RDW 13.9 14.1 13.9        Chemistries:  Recent Labs   Lab 01/19/20 2220 01/20/20 0359 01/21/20  0433   * 137 137   K 4.2 4.5 4.0    104 102   CO2 26 26 25   BUN 12 13 12   CREATININE 0.8 0.8 0.7   CALCIUM 8.8 8.7 8.9   ALBUMIN 2.9* 2.8* 2.8*   PROT 6.0 6.0 6.4   BILITOT 0.9 0.8 1.2*   ALKPHOS 100 100 134   ALT 50* 47* 76*   AST 36 34 58*   MG 1.7 1.8 1.7   PHOS 3.7 3.6 3.2       Lactic Acid:   Recent Labs   Lab 01/19/20  2220   LACTATE 0.9       Significant Imaging: I have reviewed all pertinent imaging results/findings within the past 24 hours.

## 2020-01-22 NOTE — PLAN OF CARE
01/22/20 1259   Discharge Reassessment   Assessment Type Discharge Planning Reassessment   Provided patient/caregiver education on the expected discharge date and the discharge plan Yes   Discharge Plan A   (patient's discharge disposition dependent  on patient's prognosis)   Post-Acute Status   Discharge Delays None known at this time       Anastasiia Wagner MPH, RN, CM  Ext. [phone]

## 2020-01-22 NOTE — ASSESSMENT & PLAN NOTE
Neuro:  Sedation: Propofol   Pain control: Tylenol, fentanyl, oxy    Resp:  Intubated  Possible pre-op pneumonia  Sputum cultures sent  Nebs prn    CV:  HDS  0.5 ludwig, wean as tolerated to off  Resume home meds when able    Heme/ID:  H/H   Leukocytosis, trend Wbc  On levofloxacin for possible pneumonia     Renal:  Hernandez in place  Strict I/Os  BUN Cr  IVF @ 125cc/hr    FEN/GI:  NPO   Replace lytes PRN    Endo:  SSI    PPX:  Protonix  Lovenox    Dispo: Continue ICU care

## 2020-01-22 NOTE — PROGRESS NOTES
"[facility]  General Surgery  Progress Note    Subjective:     History of Present Illness:  Marisela Bunn is a 69 y.o. female with PMH CAD, HTN, TIA, GERD presents to the hospital as a direct admission for evaluation of a newly diagnosed diaphragmatic hernia. She initially presented to [facility] ED on 1/18/20 for a 1-month history of a cough, intermittent fevers and dyspnea.  She had been seen by 2 urgent care physicians in the last month for the cough, with 2 different antibiotic courses given without improvement in cough. She reports that on 1/15 after an particularly severe coughing episode she felt a "pop" on left side, with associated severe pain and  Subsequent bruising of the left flank. She reports constant severe pain since that time. She is on daily ASA 81mg,     On ED presentation, patient was hemodynamically stable, H/H 12.4/40.0, breathing on RA. Labs revealed leukocytosis (19.2), lactic acid 2.5 (repeat lactic acid1.6), BMP wnl. CXR revealed left lower lobe pneumonia with possible associated pleural effusion. CT abdomen/pelvis revealed a large left diaphragmatic hernia containing fat  and bowel loops with hernia of intra-abdomnial contents outside the thorax from 7th left ICS. Medium sized HH. CTA revealed large Bochdalek hernia through defect in left hemo-diaphragm, with associated widening of 7th intercostal space. She was started on IV hydration and antibiotics (levofloxacin, zosyn). She reports she has not passed a bowel movement of flatus in 2 days. Intermittent lower quadrant "spasms" while coughing, otherwise no abdominal pain. She reports no other symptoms.    Post-Op Info:  Procedure(s) (LRB):  REPAIR, HERNIA, DIAPHRAGMATIC, Laparoscopic (Left)   1 Day Post-Op     Interval History: She underwent lap diaphragmatic hernia repair with mesh yesterday. She was kept intubated and transferred to the ICU postop. She was on 50% FiO2 this AM and has propofol for sedation. "     Medications:  Continuous Infusions:   fentanyl 75 mcg/hr (01/22/20 1000)    lactated ringers 150 mL/hr at 01/22/20 1000    propofol 25 mcg/kg/min (01/22/20 1000)     Scheduled Meds:   albuterol-ipratropium  3 mL Nebulization Q4H    amLODIPine  10 mg Oral Daily    benzonatate  100 mg Oral TID    enoxaparin  40 mg Subcutaneous BID    labetalol  100 mg Oral Daily    levoFLOXacin  750 mg Intravenous Q24H    pantoprazole  40 mg Intravenous Daily    polyethylene glycol  17 g Oral Daily     PRN Meds:acetaminophen, albuterol-ipratropium, calcium gluconate IVPB, calcium gluconate IVPB, calcium gluconate IVPB, Dextrose 10% Bolus, glucagon (human recombinant), insulin aspart U-100, magnesium sulfate IVPB, magnesium sulfate IVPB, melatonin, ondansetron, oxyCODONE, oxyCODONE, potassium chloride in water **AND** potassium chloride in water **AND** potassium chloride in water, sodium chloride 0.9%, sodium chloride 0.9%, sodium phosphate IVPB, sodium phosphate IVPB, sodium phosphate IVPB     Review of patient's allergies indicates:   Allergen Reactions    Erythromycin      Stomach pains    Rosuvastatin      Hives, muscle/joint pains    Zolpidem      Confusion      Objective:     Vital Signs (Most Recent):  Temp: 98 °F (36.7 °C) (01/22/20 0700)  Pulse: 71 (01/22/20 1000)  Resp: 17 (01/22/20 1000)  BP: (!) 108/55 (01/22/20 1000)  SpO2: 95 % (01/22/20 1000) Vital Signs (24h Range):  Temp:  [97.7 °F (36.5 °C)-98 °F (36.7 °C)] 98 °F (36.7 °C)  Pulse:  [] 71  Resp:  [16-40] 17  SpO2:  [80 %-100 %] 95 %  BP: (106-125)/(53-62) 108/55  Arterial Line BP: (100-150)/(44-72) 119/47     Weight: 108.4 kg (239 lb)  Body mass index is 45.16 kg/m².    Intake/Output - Last 3 Shifts       01/20 0700 - 01/21 0659 01/21 0700 - 01/22 0659 01/22 0700 - 01/23 0659    I.V. (mL/kg) 1355 (12.5) 5457 (50.3)     IV Piggyback  500     Total Intake(mL/kg) 1355 (12.5) 5957 (55)     Urine (mL/kg/hr)  778 (0.3) 320 (0.9)    Other  336 115     Blood  100     Total Output  1214 435    Net +1355 +4743 -435           Urine Occurrence 4 x            Physical Exam   Constitutional: She appears well-developed and well-nourished. No distress.   HENT:   Head: Normocephalic and atraumatic.   Mouth/Throat: Oropharynx is clear and moist.   ET tube  In place   Eyes: Conjunctivae and EOM are normal. Right eye exhibits no discharge. Left eye exhibits no discharge.   Neck: Normal range of motion.   Cardiovascular: Normal rate and regular rhythm.   Pulmonary/Chest:   Intubated  Vent Mode: A/C  Oxygen Concentration (%):  () 50  Resp Rate Total:  (16 br/min-44 br/min) 18 br/min  Vt Set:  (375 mL-420 mL) 375 mL  PEEP/CPAP:  (3 cmH20-8 cmH20) 8 cmH20  Mean Airway Pressure:  (7.8 vrQ90-41 cmH20) 11 cmH20   Abdominal: Soft. She exhibits no distension.   Musculoskeletal: Normal range of motion. She exhibits no deformity.   Neurological:   Sedated   Skin: Skin is warm and dry.   L flank ecchymosis, improving   Psychiatric: She has a normal mood and affect. Her behavior is normal.         Significant Labs:  CBC:   Recent Labs   Lab 01/22/20  0919   WBC 12.92*   RBC 3.02*   HGB 8.4*   HCT 27.5*      MCV 91   MCH 27.8   MCHC 30.5*     CMP:   Recent Labs   Lab 01/22/20  0321   *   CALCIUM 7.9*   ALBUMIN 1.9*   PROT 4.9*      K 4.2   CO2 24      BUN 14   CREATININE 0.7   ALKPHOS 88   ALT 42   AST 32   BILITOT 0.7       Significant Diagnostics:  I have reviewed all pertinent imaging results/findings within the past 24 hours.    Assessment/Plan:     * Diaphragmatic hernia  Marisela Bunn is a 69 y.o. female with PMH COPD (not on home oxygen), HTN, HLD (not on anticoagulation) received as direct admission for large diaphragmatic hernia. Patient is symptomatic from hernia with constipation and dyspnea with overlying bruise on left flank. She underwent lap diaphragmatic hernia repair with mesh on 1/21/20. She was transferred to the ICU postop.     -  Intubated, wean to extubate per SICU protocol   - Sedated with propofol gtt and fentanyl gtt  - Patient is on home Lasix, consider this in addition to fluids for low UOP  - Levaquin for community-acquired pneumonia in setting of elevated WBC and ongoing cough  - Aggressive pulm toilet with IS, Acapella, DuoNebs, and Tessalon when extubated  - Pulm consult, appreciate recs  - PT/OT when able  - Daily labs      Pneumonia  See principle         Onesimo Raza MD  General Surgery  [facility]

## 2020-01-22 NOTE — ASSESSMENT & PLAN NOTE
Neuro:  Sedation: Propofol   Pain control: Tylenol, fentanyl, oxy    Resp:  Intubated, will continue to monitor compliance and extubate when appropriate   Possible pre-op pneumonia  Sputum cultures sent, NGTD  Nebs prn    CV:  HDS  No pressors    Heme/ID:  H/H   Leukocytosis, trend WBC, improving  On levofloxacin for possible pneumonia   F/u pro calcitonin     Renal:  Hernandez in place  Strict I/Os  BUN Cr  IVF @ 150cc/hr  20 of lasix    FEN/GI:  NPO   Replace lytes PRN    Endo:  SSI    PPX:  Protonix  Lovenox    Dispo: Continue ICU care

## 2020-01-22 NOTE — ANESTHESIA POSTPROCEDURE EVALUATION
Anesthesia Post Evaluation    Patient: Marisela Bunn    Procedure(s) Performed: Procedure(s) (LRB):  REPAIR, HERNIA, DIAPHRAGMATIC, Laparoscopic (Left)    Final Anesthesia Type: general    Patient location during evaluation: ICU  Patient participation: No - Unable to Participate, Intubation  Level of consciousness: awake and alert and sedated  Post-procedure vital signs: reviewed and stable  Pain management: adequate  Airway patency: patent    PONV status at discharge: No PONV  Anesthetic complications: no      Cardiovascular status: blood pressure returned to baseline and stable  Respiratory status: unassisted  Hydration status: euvolemic  Follow-up not needed.          Vitals Value Taken Time   /55 1/22/2020 10:03 AM   Temp 36.7 °C (98 °F) 1/22/2020  7:00 AM   Pulse 66 1/22/2020 10:24 AM   Resp 16 1/22/2020 10:24 AM   SpO2 96 % 1/22/2020 10:24 AM   Vitals shown include unvalidated device data.      No case tracking events are documented in the log.      Pain/Alex Score: Pain Rating Prior to Med Admin: 7 (1/22/2020  9:00 AM)

## 2020-01-22 NOTE — OP NOTE
DATE OF PROCEDURE: 01/21/2020   SERVICE: General Surgery.   Surgeon(s) and Role:     * Lucho Garcia Jr., MD - Primary     * Hugh Alanis MD - Resident - Assisting  PREOPERATIVE DIAGNOSES: Incarcerated Diaphragmatic Hernia  POSTOPERATIVE DIAGNOSES:Same  PROCEDURE: Laparoscopic reduction and repair of diaphragmatic hernia.  Reduction taking a significant amount of time requiring Mod 22.  Chest drain placement. (Case time 4 hours)  ANESTHESIA: General endotracheal and local.   DESCRIPTION OF PROCEDURE:  Patient was taken to the operating room and placed under general anesthesia.  At this time we proceeded with port placement starting with a 12 mm port approximately 10 cm from xiphoid 5 cm to left of midline.  This was placed under direct visualization gaining intra-abdominal access without difficulty. At this time pneumoperitoneum was obtained.  Further ports were then placed including a 12 mm port approximately 10 cm from the xiphoid just to the right of midline and a midclavicular subcostal right-sided 5 mm port. We then placed the patient in steep reverse Trendelenburg and noted a significant amount of incarcerated colon and small bowel in the left chest.  We began reduction and noted the need for further ports during this reduction and repair.  There was a 5 mm port placed in the chest above the diaphragm for help with reduction and a 5 mm port placed just under the rib in the anterior axillary line on the left side as well. These were placed during the case. Once we began reduction we noted there was a significant amount of small bowel that was incarcerated into the chest we also noted that the colon and small bowel had herniated laterally through the ribs.  Upon some work we were able to get all the small bowel and colon reduced without difficulty.  All of the bowel appeared viable.  Once it was reduced we did notice a high-riding splenic flexure which again made repair of the defect difficult as  it was noted to be very posterior.  At this time we proceeded to inspect the area and felt that the defect was very large and we would be unable to close this primarily.  The defect itself measured approximately 10 x 15 cm.  We felt the best option would be to use a [place]-Rajendra patch to close the defect. A 15 x 19 piece of [place] mesh was placed in abdominal cavity and cut to size.  We then use 0 V lock running suture to circumferentially sewed the mesh to the diaphragmatic defect. We did note that posteriorly the diaphragm was somewhat thin in nature and we attempted to get good bites of thicker diaphragm more posteriorly.  We proceeded to laparoscopically so this mesh into place circumferentially and completed this using 4 V lock sutures. Once the mesh was in place we inspected this area the defect was obviously closed with the mesh and appeared in good condition. Of note secondary to the loss space from the small bowel going through the ribs laterally and the space above the diaphragm we elected to place a 15 Romanian Brandyn drain in the chest.  We did this by removing the 5 mm port that was in this space and placing the drain in this space to this incision. The drain was sewn in with 2 0 nylon suture. This was done prior to completion of the mesh placement.  Once the mesh was in place we proceeded with removal of the ports.  This was done under direct visualization without difficulty. Once the ports were removed we closed the skin of all 4 port sites with 4 Monocryl suture in subcuticular fashion.  Mastisol and Steri-Strips were then placed. We kept the patient intubated and transfer the patient to the ICU for further care.  COMPLICATIONS: None.   SPONGE COUNT: Correct.   BLOOD LOSS:50 mL.   FLUIDS: Per Anesthesia.   BLOOD GIVEN: None.   DRAINS: None.   SPECIMENS: None  CONDITION OF THE PATIENT: Good.   I was present for the entire procedure.

## 2020-01-22 NOTE — H&P
"[facility]  Critical Care - Surgery  History & Physical    Patient Name: Marisela Bunn  MRN: [id number]  Admission Date: 1/19/2020  Code Status: Full Code  Attending Physician: Lucho Garcia Jr.,*   Primary Care Provider: Primary Doctor No   Principal Problem: Diaphragmatic hernia    Subjective:     HPI:  Marisela Bunn is a 69 y.o. female PMH CAD, HTN, TIA, GERD presents to the hospital as a direct admission for evaluation of a newly diagnosed diaphragmatic hernia. She initially presented to [place] ED on 1/18/20 for a 1-month history of a cough, intermittent fevers and dyspnea.  She had been seen by 2 urgent care physicians in the last month for the cough, with 2 different antibiotic courses given without improvement in cough. She reports that on 1/15 after an particularly severe coughing episode she felt a "pop" on left side, with associated severe pain and  Subsequent bruising of the left flank.     On ED presentation, patient was hemodynamically stable, H/H 12.4/40.0, breathing on RA. Labs revealed leukocytosis (19.2), lactic acid 2.5 (repeat lactic acid1.6), BMP wnl. CXR revealed left lower lobe pneumonia with possible associated pleural effusion. CT abdomen/pelvis revealed a large left diaphragmatic hernia containing fat  and bowel loops with hernia of intra-abdomnial contents outside the thorax from 7th left ICS. Medium sized HH. CTA revealed large Bochdalek hernia through defect in left hemo-diaphragm, with associated widening of 7th intercostal space. She was started on IV hydration and antibiotics (levofloxacin, zosyn). She reports she has not passed a bowel movement of flatus in 2 days. Intermittent lower quadrant "spasms" while coughing, otherwise no abdominal pain.     Pt started experiencing respiratory distress early on 1/21 and was taken urgently to the OR for diaphragmatic hernia repair. Laparoscopic diaphragmatic hernia repair with goretex mesh performed.     Pt arrived " in SICU intubated, sedated, and mechanically ventilated. Pt on 0.5 of ludwig on arrival. Pt received no blood products during procedure, adequate urine output during case, 3700 ml IVF. Per anesthesia pt was an easy intubation.     Hospital/ICU Course:  1/21: Pt arrives in SICU intubated, sedated, ventilated, on 0.5 ludwig.     Follow-up For: Procedure(s) (LRB):  REPAIR, HERNIA, DIAPHRAGMATIC, Laparoscopic (Left)    Post-Operative Day: Day of Surgery     Past Medical History:   Diagnosis Date    CAD (coronary artery disease)     Diaphragmatic hernia     GERD (gastroesophageal reflux disease)     Hypertension     TIA (transient ischemic attack)        History reviewed. No pertinent surgical history.    Review of patient's allergies indicates:   Allergen Reactions    Erythromycin      Stomach pains    Rosuvastatin      Hives, muscle/joint pains    Zolpidem      Confusion        Family History     None        Tobacco Use    Smoking status: Not on file   Substance and Sexual Activity    Alcohol use: Not on file    Drug use: Not on file    Sexual activity: Not on file      Review of Systems   Unable to perform ROS: Intubated     Objective:     Vital Signs (Most Recent):  Temp: 97.9 °F (36.6 °C) (01/22/20 0300)  Pulse: 73 (01/22/20 0615)  Resp: 20 (01/22/20 0615)  BP: (!) 125/58 (01/22/20 0600)  SpO2: 95 % (01/22/20 0615) Vital Signs (24h Range):  Temp:  [97.3 °F (36.3 °C)-97.9 °F (36.6 °C)] 97.9 °F (36.6 °C)  Pulse:  [] 73  Resp:  [16-40] 20  SpO2:  [80 %-100 %] 95 %  BP: (106-135)/(53-74) 125/58  Arterial Line BP: (100-140)/(46-72) 112/46     Weight: 108.4 kg (239 lb)  Body mass index is 45.16 kg/m².      Intake/Output Summary (Last 24 hours) at 1/22/2020 0720  Last data filed at 1/22/2020 0626  Gross per 24 hour   Intake 5957 ml   Output 1214 ml   Net 4743 ml       Physical Exam   Constitutional: She appears well-developed and well-nourished.   HENT:   Head: Normocephalic and atraumatic.   Nose: Nose normal.    Eyes: Pupils are equal, round, and reactive to light. Conjunctivae are normal.   Neck: Neck supple.   Cardiovascular: Normal rate, regular rhythm and intact distal pulses.   Pulmonary/Chest:   Intubated    Abdominal:   Obese  Laparoscopic wounds, dressing c/d/i   Genitourinary:   Genitourinary Comments: Hernandez in place   Neurological:   Sedated   Skin: Skin is warm and dry. Capillary refill takes less than 2 seconds.       Vents:  Vent Mode: A/C (01/22/20 0527)  Ventilator Initiated: Yes (01/21/20 1759)  Set Rate: 16 bmp (01/22/20 0527)  Vt Set: 375 mL (01/22/20 0527)  PEEP/CPAP: 8 cmH20 (01/22/20 0527)  Oxygen Concentration (%): 50 (01/22/20 0615)  Peak Airway Pressure: 29 cmH2O (01/22/20 0527)  Plateau Pressure: 22 cmH20 (01/22/20 0527)  Total Ve: 9.1 mL (01/22/20 0527)  F/VT Ratio<105 (RSBI): 766.67 (01/22/20 0527)    Lines/Drains/Airways     Drain                 Drain/Device  01/21/20 1643 collapsible closed device less than 1 day         Urethral Catheter 01/21/20 1250 Straight-tip 16 Fr. less than 1 day          Airway                 Airway - Non-Surgical 01/21/20 1818 less than 1 day          Arterial Line                 Arterial Line 01/21/20 1217 Left Radial less than 1 day          Peripheral Intravenous Line                 Peripheral IV - Single Lumen 01/21/20 1159 20 G Left Antecubital less than 1 day         Peripheral IV - Single Lumen 01/21/20 1207 20 G Right Hand less than 1 day         Peripheral IV - Single Lumen 01/21/20 1227 16 G Left Hand less than 1 day                Significant Labs:    CBC/Anemia Profile:  Recent Labs   Lab 01/21/20  0433 01/21/20  1459 01/21/20  1802 01/22/20  0321   WBC 22.05*  --  20.19* 14.87*   HGB 11.1*  --  9.4* 8.7*   HCT 37.7 29* 31.0* 29.2*     --  305 254   MCV 92  --  92 92   RDW 13.9  --  14.0 13.8        Chemistries:  Recent Labs   Lab 01/21/20  0433 01/21/20  1802 01/22/20  0321    137 137   K 4.0 4.7 4.2    103 105   CO2 25 25 24   BUN  12 13 14   CREATININE 0.7 0.8 0.7   CALCIUM 8.9 7.9* 7.9*   ALBUMIN 2.8* 2.1* 1.9*   PROT 6.4 5.1* 4.9*   BILITOT 1.2* 0.9 0.7   ALKPHOS 134 102 88   ALT 76* 54* 42   AST 58* 39 32   MG 1.7  --  1.9   PHOS 3.2  --  2.8       Lactic Acid:   Recent Labs   Lab 01/21/20  1802   LACTATE 0.9       Significant Imaging: I have reviewed all pertinent imaging results/findings within the past 24 hours.    Assessment/Plan:     * Diaphragmatic hernia  Neuro:  Sedation: Propofol   Pain control: Tylenol, fentanyl, oxy    Resp:  Intubated  Possible pre-op pneumonia  Sputum cultures sent  Nebs prn    CV:  HDS  0.5 ludwig, wean as tolerated to off  Resume home meds when able    Heme/ID:  H/H   Leukocytosis, trend Wbc  On levofloxacin for possible pneumonia     Renal:  Hernandez in place  Strict I/Os  BUN Cr  IVF @ 125cc/hr    FEN/GI:  NPO   Replace lytes PRN    Endo:  SSI    PPX:  Protonix  Lovenox    Dispo: Continue ICU care            Critical care was time spent personally by me on the following activities: development of treatment plan with patient or surrogate and bedside caregivers, discussions with consultants, evaluation of patient's response to treatment, examination of patient, ordering and performing treatments and interventions, ordering and review of laboratory studies, ordering and review of radiographic studies, pulse oximetry, re-evaluation of patient's condition.  This critical care time did not overlap with that of any other provider or involve time for any procedures.     Desmond Rock MD  Critical Care - Surgery  [facility]

## 2020-01-22 NOTE — SUBJECTIVE & OBJECTIVE
Interval History: She underwent lap diaphragmatic hernia repair with mesh yesterday. She was kept intubated and transferred to the ICU postop. She was on 50% FiO2 this AM and has propofol for sedation.     Medications:  Continuous Infusions:   fentanyl 75 mcg/hr (01/22/20 1000)    lactated ringers 150 mL/hr at 01/22/20 1000    propofol 25 mcg/kg/min (01/22/20 1000)     Scheduled Meds:   albuterol-ipratropium  3 mL Nebulization Q4H    amLODIPine  10 mg Oral Daily    benzonatate  100 mg Oral TID    enoxaparin  40 mg Subcutaneous BID    labetalol  100 mg Oral Daily    levoFLOXacin  750 mg Intravenous Q24H    pantoprazole  40 mg Intravenous Daily    polyethylene glycol  17 g Oral Daily     PRN Meds:acetaminophen, albuterol-ipratropium, calcium gluconate IVPB, calcium gluconate IVPB, calcium gluconate IVPB, Dextrose 10% Bolus, glucagon (human recombinant), insulin aspart U-100, magnesium sulfate IVPB, magnesium sulfate IVPB, melatonin, ondansetron, oxyCODONE, oxyCODONE, potassium chloride in water **AND** potassium chloride in water **AND** potassium chloride in water, sodium chloride 0.9%, sodium chloride 0.9%, sodium phosphate IVPB, sodium phosphate IVPB, sodium phosphate IVPB     Review of patient's allergies indicates:   Allergen Reactions    Erythromycin      Stomach pains    Rosuvastatin      Hives, muscle/joint pains    Zolpidem      Confusion      Objective:     Vital Signs (Most Recent):  Temp: 98 °F (36.7 °C) (01/22/20 0700)  Pulse: 71 (01/22/20 1000)  Resp: 17 (01/22/20 1000)  BP: (!) 108/55 (01/22/20 1000)  SpO2: 95 % (01/22/20 1000) Vital Signs (24h Range):  Temp:  [97.7 °F (36.5 °C)-98 °F (36.7 °C)] 98 °F (36.7 °C)  Pulse:  [] 71  Resp:  [16-40] 17  SpO2:  [80 %-100 %] 95 %  BP: (106-125)/(53-62) 108/55  Arterial Line BP: (100-150)/(44-72) 119/47     Weight: 108.4 kg (239 lb)  Body mass index is 45.16 kg/m².    Intake/Output - Last 3 Shifts       01/20 0700 - 01/21 0659 01/21 0700 -  01/22 0659 01/22 0700 - 01/23 0659    I.V. (mL/kg) 1355 (12.5) 5457 (50.3)     IV Piggyback  500     Total Intake(mL/kg) 1355 (12.5) 5957 (55)     Urine (mL/kg/hr)  778 (0.3) 320 (0.9)    Other  336 115    Blood  100     Total Output  1214 435    Net +1355 +4743 -435           Urine Occurrence 4 x            Physical Exam   Constitutional: She appears well-developed and well-nourished. No distress.   HENT:   Head: Normocephalic and atraumatic.   Mouth/Throat: Oropharynx is clear and moist.   ET tube  In place   Eyes: Conjunctivae and EOM are normal. Right eye exhibits no discharge. Left eye exhibits no discharge.   Neck: Normal range of motion.   Cardiovascular: Normal rate and regular rhythm.   Pulmonary/Chest:   Intubated  Vent Mode: A/C  Oxygen Concentration (%):  () 50  Resp Rate Total:  (16 br/min-44 br/min) 18 br/min  Vt Set:  (375 mL-420 mL) 375 mL  PEEP/CPAP:  (3 cmH20-8 cmH20) 8 cmH20  Mean Airway Pressure:  (7.8 iwV92-52 cmH20) 11 cmH20   Abdominal: Soft. She exhibits no distension.   Musculoskeletal: Normal range of motion. She exhibits no deformity.   Neurological:   Sedated   Skin: Skin is warm and dry.   L flank ecchymosis, improving   Psychiatric: She has a normal mood and affect. Her behavior is normal.         Significant Labs:  CBC:   Recent Labs   Lab 01/22/20  0919   WBC 12.92*   RBC 3.02*   HGB 8.4*   HCT 27.5*      MCV 91   MCH 27.8   MCHC 30.5*     CMP:   Recent Labs   Lab 01/22/20  0321   *   CALCIUM 7.9*   ALBUMIN 1.9*   PROT 4.9*      K 4.2   CO2 24      BUN 14   CREATININE 0.7   ALKPHOS 88   ALT 42   AST 32   BILITOT 0.7       Significant Diagnostics:  I have reviewed all pertinent imaging results/findings within the past 24 hours.

## 2020-01-22 NOTE — SUBJECTIVE & OBJECTIVE
Follow-up For: Procedure(s) (LRB):  REPAIR, HERNIA, DIAPHRAGMATIC, Laparoscopic (Left)    Post-Operative Day: Day of Surgery     Past Medical History:   Diagnosis Date    CAD (coronary artery disease)     Diaphragmatic hernia     GERD (gastroesophageal reflux disease)     Hypertension     TIA (transient ischemic attack)        History reviewed. No pertinent surgical history.    Review of patient's allergies indicates:   Allergen Reactions    Erythromycin      Stomach pains    Rosuvastatin      Hives, muscle/joint pains    Zolpidem      Confusion        Family History     None        Tobacco Use    Smoking status: Not on file   Substance and Sexual Activity    Alcohol use: Not on file    Drug use: Not on file    Sexual activity: Not on file      Review of Systems   Unable to perform ROS: Intubated     Objective:     Vital Signs (Most Recent):  Temp: 97.9 °F (36.6 °C) (01/22/20 0300)  Pulse: 73 (01/22/20 0615)  Resp: 20 (01/22/20 0615)  BP: (!) 125/58 (01/22/20 0600)  SpO2: 95 % (01/22/20 0615) Vital Signs (24h Range):  Temp:  [97.3 °F (36.3 °C)-97.9 °F (36.6 °C)] 97.9 °F (36.6 °C)  Pulse:  [] 73  Resp:  [16-40] 20  SpO2:  [80 %-100 %] 95 %  BP: (106-135)/(53-74) 125/58  Arterial Line BP: (100-140)/(46-72) 112/46     Weight: 108.4 kg (239 lb)  Body mass index is 45.16 kg/m².      Intake/Output Summary (Last 24 hours) at 1/22/2020 0720  Last data filed at 1/22/2020 0626  Gross per 24 hour   Intake 5957 ml   Output 1214 ml   Net 4743 ml       Physical Exam   Constitutional: She appears well-developed and well-nourished.   HENT:   Head: Normocephalic and atraumatic.   Nose: Nose normal.   Eyes: Pupils are equal, round, and reactive to light. Conjunctivae are normal.   Neck: Neck supple.   Cardiovascular: Normal rate, regular rhythm and intact distal pulses.   Pulmonary/Chest:   Intubated    Abdominal:   Obese  Laparoscopic wounds, dressing c/d/i   Genitourinary:   Genitourinary Comments: Hernandez in place    Neurological:   Sedated   Skin: Skin is warm and dry. Capillary refill takes less than 2 seconds.       Vents:  Vent Mode: A/C (01/22/20 0527)  Ventilator Initiated: Yes (01/21/20 1759)  Set Rate: 16 bmp (01/22/20 0527)  Vt Set: 375 mL (01/22/20 0527)  PEEP/CPAP: 8 cmH20 (01/22/20 0527)  Oxygen Concentration (%): 50 (01/22/20 0615)  Peak Airway Pressure: 29 cmH2O (01/22/20 0527)  Plateau Pressure: 22 cmH20 (01/22/20 0527)  Total Ve: 9.1 mL (01/22/20 0527)  F/VT Ratio<105 (RSBI): 766.67 (01/22/20 0527)    Lines/Drains/Airways     Drain                 Drain/Device  01/21/20 1643 collapsible closed device less than 1 day         Urethral Catheter 01/21/20 1250 Straight-tip 16 Fr. less than 1 day          Airway                 Airway - Non-Surgical 01/21/20 1818 less than 1 day          Arterial Line                 Arterial Line 01/21/20 1217 Left Radial less than 1 day          Peripheral Intravenous Line                 Peripheral IV - Single Lumen 01/21/20 1159 20 G Left Antecubital less than 1 day         Peripheral IV - Single Lumen 01/21/20 1207 20 G Right Hand less than 1 day         Peripheral IV - Single Lumen 01/21/20 1227 16 G Left Hand less than 1 day                Significant Labs:    CBC/Anemia Profile:  Recent Labs   Lab 01/21/20  0433 01/21/20  1459 01/21/20 1802 01/22/20  0321   WBC 22.05*  --  20.19* 14.87*   HGB 11.1*  --  9.4* 8.7*   HCT 37.7 29* 31.0* 29.2*     --  305 254   MCV 92  --  92 92   RDW 13.9  --  14.0 13.8        Chemistries:  Recent Labs   Lab 01/21/20  0433 01/21/20 1802 01/22/20  0321    137 137   K 4.0 4.7 4.2    103 105   CO2 25 25 24   BUN 12 13 14   CREATININE 0.7 0.8 0.7   CALCIUM 8.9 7.9* 7.9*   ALBUMIN 2.8* 2.1* 1.9*   PROT 6.4 5.1* 4.9*   BILITOT 1.2* 0.9 0.7   ALKPHOS 134 102 88   ALT 76* 54* 42   AST 58* 39 32   MG 1.7  --  1.9   PHOS 3.2  --  2.8       Lactic Acid:   Recent Labs   Lab 01/21/20  1802   LACTATE 0.9       Significant Imaging: I have  reviewed all pertinent imaging results/findings within the past 24 hours.

## 2020-01-22 NOTE — TRANSFER OF CARE
"Anesthesia Transfer of Care Note    Patient: Marisela Bunn    Procedure(s) Performed: Procedure(s) (LRB):  REPAIR, HERNIA, DIAPHRAGMATIC, Laparoscopic (Left)    Patient location: ICU    Anesthesia Type: general    Transport from OR: Continuous ECG monitoring in transport. Continuous SpO2 monitoring in transport. Continuos invasive BP monitoring in transport. Upon arrival to PACU/ICU, patient attached to ventilator and auscultated to confirm bilateral breath sounds and adequate TV    Post pain: adequate analgesia    Post assessment: no apparent anesthetic complications and tolerated procedure well    Post vital signs: stable    Level of consciousness: sedated    Nausea/Vomiting: no nausea/vomiting    Complications: none    Transfer of care protocol was followed      Last vitals:   Visit Vitals  /74   Pulse 65   Temp 36.3 °C (97.3 °F)   Resp 16   Ht 5' 1" (1.549 m)   Wt 108.4 kg (239 lb)   SpO2 100%   Breastfeeding? No   BMI 45.16 kg/m²     "

## 2020-01-22 NOTE — SUBJECTIVE & OBJECTIVE
Follow-up For: Procedure(s) (LRB):  REPAIR, HERNIA, DIAPHRAGMATIC, Laparoscopic (Left)    Post-Operative Day: 1     Past Medical History:   Diagnosis Date    CAD (coronary artery disease)     Diaphragmatic hernia     GERD (gastroesophageal reflux disease)     Hypertension     TIA (transient ischemic attack)        History reviewed. No pertinent surgical history.    Review of patient's allergies indicates:   Allergen Reactions    Erythromycin      Stomach pains    Rosuvastatin      Hives, muscle/joint pains    Zolpidem      Confusion        Family History     None        Tobacco Use    Smoking status: Not on file   Substance and Sexual Activity    Alcohol use: Not on file    Drug use: Not on file    Sexual activity: Not on file      Review of Systems   Unable to perform ROS: Intubated     Objective:     Vital Signs (Most Recent):  Temp: 97.9 °F (36.6 °C) (01/22/20 0300)  Pulse: 73 (01/22/20 0615)  Resp: 20 (01/22/20 0615)  BP: (!) 125/58 (01/22/20 0600)  SpO2: 95 % (01/22/20 0615) Vital Signs (24h Range):  Temp:  [97.3 °F (36.3 °C)-97.9 °F (36.6 °C)] 97.9 °F (36.6 °C)  Pulse:  [] 73  Resp:  [16-40] 20  SpO2:  [80 %-100 %] 95 %  BP: (106-135)/(53-74) 125/58  Arterial Line BP: (100-140)/(46-72) 112/46     Weight: 108.4 kg (239 lb)  Body mass index is 45.16 kg/m².      Intake/Output Summary (Last 24 hours) at 1/22/2020 0724  Last data filed at 1/22/2020 0626  Gross per 24 hour   Intake 5957 ml   Output 1214 ml   Net 4743 ml       Physical Exam   Constitutional: She appears well-developed and well-nourished.   HENT:   Head: Normocephalic and atraumatic.   Nose: Nose normal.   Eyes: Pupils are equal, round, and reactive to light. Conjunctivae are normal.   Neck: Neck supple.   Cardiovascular: Normal rate, regular rhythm and intact distal pulses.   Pulmonary/Chest:   Intubated    Abdominal:   Obese  Laparoscopic wounds, dressing c/d/i   Genitourinary:   Genitourinary Comments: Hernandez in place    Neurological:   Sedated   Skin: Skin is warm and dry. Capillary refill takes less than 2 seconds.       Vents:  Vent Mode: A/C (01/22/20 0527)  Ventilator Initiated: Yes (01/21/20 1759)  Set Rate: 16 bmp (01/22/20 0527)  Vt Set: 375 mL (01/22/20 0527)  PEEP/CPAP: 8 cmH20 (01/22/20 0527)  Oxygen Concentration (%): 50 (01/22/20 0615)  Peak Airway Pressure: 29 cmH2O (01/22/20 0527)  Plateau Pressure: 22 cmH20 (01/22/20 0527)  Total Ve: 9.1 mL (01/22/20 0527)  F/VT Ratio<105 (RSBI): 766.67 (01/22/20 0527)    Lines/Drains/Airways     Drain                 Drain/Device  01/21/20 1643 collapsible closed device less than 1 day         Urethral Catheter 01/21/20 1250 Straight-tip 16 Fr. less than 1 day          Airway                 Airway - Non-Surgical 01/21/20 1818 less than 1 day          Arterial Line                 Arterial Line 01/21/20 1217 Left Radial less than 1 day          Peripheral Intravenous Line                 Peripheral IV - Single Lumen 01/21/20 1159 20 G Left Antecubital less than 1 day         Peripheral IV - Single Lumen 01/21/20 1207 20 G Right Hand less than 1 day         Peripheral IV - Single Lumen 01/21/20 1227 16 G Left Hand less than 1 day                Significant Labs:    CBC/Anemia Profile:  Recent Labs   Lab 01/21/20  0433 01/21/20  1459 01/21/20 1802 01/22/20  0321   WBC 22.05*  --  20.19* 14.87*   HGB 11.1*  --  9.4* 8.7*   HCT 37.7 29* 31.0* 29.2*     --  305 254   MCV 92  --  92 92   RDW 13.9  --  14.0 13.8        Chemistries:  Recent Labs   Lab 01/21/20  0433 01/21/20 1802 01/22/20  0321    137 137   K 4.0 4.7 4.2    103 105   CO2 25 25 24   BUN 12 13 14   CREATININE 0.7 0.8 0.7   CALCIUM 8.9 7.9* 7.9*   ALBUMIN 2.8* 2.1* 1.9*   PROT 6.4 5.1* 4.9*   BILITOT 1.2* 0.9 0.7   ALKPHOS 134 102 88   ALT 76* 54* 42   AST 58* 39 32   MG 1.7  --  1.9   PHOS 3.2  --  2.8       Lactic Acid:   Recent Labs   Lab 01/21/20  1802   LACTATE 0.9       Significant Imaging: I have  reviewed all pertinent imaging results/findings within the past 24 hours.

## 2020-01-22 NOTE — ANESTHESIA PROCEDURE NOTES
Intubation  Performed by: Shivam Gifford CRNA  Authorized by: Giulia Grover MD     Intubation:     Induction:  Intravenous    Intubated:  Postinduction    Mask Ventilation:  N/a    Attempts:  1    Attempted By:  Staff anesthesiologist    Blade:  Mack 4    Laryngeal View Grade: Grade I - full view of chords      Difficult Airway Encountered?: No      Complications:  None    Airway Device:  Oral endotracheal tube    Airway Device Size:  8.0    Style/Cuff Inflation:  Cuffed (inflated to minimal occlusive pressure)    Inflation Amount (mL):  8    Tube secured:  21    Placement Verified By:  Capnometry and Revisualization with laryngoscopy    Complicating Factors:  None    Findings Post-Intubation:  BS equal bilateral and atraumatic/condition of teeth unchanged

## 2020-01-22 NOTE — PROGRESS NOTES
[facility]  Critical Care - Surgery  Progress Note    Patient Name: Marisela Bunn  MRN: [id number]  Admission Date: 1/19/2020  Hospital Length of Stay: 3 days  Code Status: Full Code  Attending Provider: Lucho Garcia Jr.,*  Primary Care Provider: Primary Doctor No   Principal Problem: Diaphragmatic hernia    Subjective:     Hospital/ICU Course:  1/21: Pt arrives in SICU intubated, sedated, ventilated, on 0.5 ludwig.     Follow-up For: Procedure(s) (LRB):  REPAIR, HERNIA, DIAPHRAGMATIC, Laparoscopic (Left)    Post-Operative Day: 1     Past Medical History:   Diagnosis Date    CAD (coronary artery disease)     Diaphragmatic hernia     GERD (gastroesophageal reflux disease)     Hypertension     TIA (transient ischemic attack)        History reviewed. No pertinent surgical history.    Review of patient's allergies indicates:   Allergen Reactions    Erythromycin      Stomach pains    Rosuvastatin      Hives, muscle/joint pains    Zolpidem      Confusion        Family History     None        Tobacco Use    Smoking status: Not on file   Substance and Sexual Activity    Alcohol use: Not on file    Drug use: Not on file    Sexual activity: Not on file      Review of Systems   Unable to perform ROS: Intubated     Objective:     Vital Signs (Most Recent):  Temp: 97.9 °F (36.6 °C) (01/22/20 0300)  Pulse: 73 (01/22/20 0615)  Resp: 20 (01/22/20 0615)  BP: (!) 125/58 (01/22/20 0600)  SpO2: 95 % (01/22/20 0615) Vital Signs (24h Range):  Temp:  [97.3 °F (36.3 °C)-97.9 °F (36.6 °C)] 97.9 °F (36.6 °C)  Pulse:  [] 73  Resp:  [16-40] 20  SpO2:  [80 %-100 %] 95 %  BP: (106-135)/(53-74) 125/58  Arterial Line BP: (100-140)/(46-72) 112/46     Weight: 108.4 kg (239 lb)  Body mass index is 45.16 kg/m².      Intake/Output Summary (Last 24 hours) at 1/22/2020 0724  Last data filed at 1/22/2020 0626  Gross per 24 hour   Intake 5957 ml   Output 1214 ml   Net 4743 ml       Physical Exam   Constitutional: She  appears well-developed and well-nourished.   HENT:   Head: Normocephalic and atraumatic.   Nose: Nose normal.   Eyes: Pupils are equal, round, and reactive to light. Conjunctivae are normal.   Neck: Neck supple.   Cardiovascular: Normal rate, regular rhythm and intact distal pulses.   Pulmonary/Chest:   Intubated    Abdominal:   Obese  Laparoscopic wounds, dressing c/d/i   Genitourinary:   Genitourinary Comments: Hernandez in place   Neurological:   Sedated   Skin: Skin is warm and dry. Capillary refill takes less than 2 seconds.       Vents:  Vent Mode: A/C (01/22/20 0527)  Ventilator Initiated: Yes (01/21/20 1759)  Set Rate: 16 bmp (01/22/20 0527)  Vt Set: 375 mL (01/22/20 0527)  PEEP/CPAP: 8 cmH20 (01/22/20 0527)  Oxygen Concentration (%): 50 (01/22/20 0615)  Peak Airway Pressure: 29 cmH2O (01/22/20 0527)  Plateau Pressure: 22 cmH20 (01/22/20 0527)  Total Ve: 9.1 mL (01/22/20 0527)  F/VT Ratio<105 (RSBI): 766.67 (01/22/20 0527)    Lines/Drains/Airways     Drain                 Drain/Device  01/21/20 1643 collapsible closed device less than 1 day         Urethral Catheter 01/21/20 1250 Straight-tip 16 Fr. less than 1 day          Airway                 Airway - Non-Surgical 01/21/20 1818 less than 1 day          Arterial Line                 Arterial Line 01/21/20 1217 Left Radial less than 1 day          Peripheral Intravenous Line                 Peripheral IV - Single Lumen 01/21/20 1159 20 G Left Antecubital less than 1 day         Peripheral IV - Single Lumen 01/21/20 1207 20 G Right Hand less than 1 day         Peripheral IV - Single Lumen 01/21/20 1227 16 G Left Hand less than 1 day                Significant Labs:    CBC/Anemia Profile:  Recent Labs   Lab 01/21/20  0433 01/21/20  1459 01/21/20  1802 01/22/20  0321   WBC 22.05*  --  20.19* 14.87*   HGB 11.1*  --  9.4* 8.7*   HCT 37.7 29* 31.0* 29.2*     --  305 254   MCV 92  --  92 92   RDW 13.9  --  14.0 13.8        Chemistries:  Recent Labs   Lab  01/21/20  0433 01/21/20  1802 01/22/20  0321    137 137   K 4.0 4.7 4.2    103 105   CO2 25 25 24   BUN 12 13 14   CREATININE 0.7 0.8 0.7   CALCIUM 8.9 7.9* 7.9*   ALBUMIN 2.8* 2.1* 1.9*   PROT 6.4 5.1* 4.9*   BILITOT 1.2* 0.9 0.7   ALKPHOS 134 102 88   ALT 76* 54* 42   AST 58* 39 32   MG 1.7  --  1.9   PHOS 3.2  --  2.8       Lactic Acid:   Recent Labs   Lab 01/21/20  1802   LACTATE 0.9       Significant Imaging: I have reviewed all pertinent imaging results/findings within the past 24 hours.    Assessment/Plan:     * Diaphragmatic hernia  Neuro:  Sedation: Propofol   Pain control: Tylenol, fentanyl, oxy    Resp:  Intubated, will continue to monitor compliance and extubate when appropriate   Possible pre-op pneumonia  Sputum cultures sent, NGTD  Nebs prn    CV:  HDS  No pressors    Heme/ID:  H/H   Leukocytosis, trend WBC, improving  On levofloxacin for possible pneumonia   F/u pro calcitonin     Renal:  Hernandez in place  Strict I/Os  BUN Cr  IVF @ 150cc/hr  20 of lasix    FEN/GI:  NPO   Replace lytes PRN    Endo:  SSI    PPX:  Protonix  Lovenox    Dispo: Continue ICU care      Critical care was time spent personally by me on the following activities: development of treatment plan with patient or surrogate and bedside caregivers, discussions with consultants, evaluation of patient's response to treatment, examination of patient, ordering and performing treatments and interventions, ordering and review of laboratory studies, ordering and review of radiographic studies, pulse oximetry, re-evaluation of patient's condition.  This critical care time did not overlap with that of any other provider or involve time for any procedures.     Desmond Rock MD  Critical Care - Surgery  [facility]

## 2020-01-22 NOTE — PROGRESS NOTES
Notified MD of pt's low urine output after increased rate of MIVF. Also notified MD of delbert output of 120's this hr. TORBV 500 cc LR bolus. Will continue to monitor closely.

## 2020-01-23 LAB
ALBUMIN SERPL BCP-MCNC: 1.9 G/DL (ref 3.5–5.2)
ALLENS TEST: ABNORMAL
ALP SERPL-CCNC: 81 U/L (ref 55–135)
ALT SERPL W/O P-5'-P-CCNC: 31 U/L (ref 10–44)
ANION GAP SERPL CALC-SCNC: 7 MMOL/L (ref 8–16)
AST SERPL-CCNC: 23 U/L (ref 10–40)
BASOPHILS # BLD AUTO: 0.02 K/UL (ref 0–0.2)
BASOPHILS NFR BLD: 0.1 % (ref 0–1.9)
BILIRUB SERPL-MCNC: 0.8 MG/DL (ref 0.1–1)
BUN SERPL-MCNC: 13 MG/DL (ref 8–23)
CALCIUM SERPL-MCNC: 8.3 MG/DL (ref 8.7–10.5)
CHLORIDE SERPL-SCNC: 103 MMOL/L (ref 95–110)
CO2 SERPL-SCNC: 27 MMOL/L (ref 23–29)
CREAT SERPL-MCNC: 0.7 MG/DL (ref 0.5–1.4)
DIFFERENTIAL METHOD: ABNORMAL
EOSINOPHIL # BLD AUTO: 0.1 K/UL (ref 0–0.5)
EOSINOPHIL NFR BLD: 0.4 % (ref 0–8)
ERYTHROCYTE [DISTWIDTH] IN BLOOD BY AUTOMATED COUNT: 14.2 % (ref 11.5–14.5)
EST. GFR  (AFRICAN AMERICAN): >60 ML/MIN/1.73 M^2
EST. GFR  (NON AFRICAN AMERICAN): >60 ML/MIN/1.73 M^2
GLUCOSE SERPL-MCNC: 109 MG/DL (ref 70–110)
HCO3 UR-SCNC: 25.8 MMOL/L (ref 24–28)
HCT VFR BLD AUTO: 27.7 % (ref 37–48.5)
HGB BLD-MCNC: 8.4 G/DL (ref 12–16)
IMM GRANULOCYTES # BLD AUTO: 0.22 K/UL (ref 0–0.04)
IMM GRANULOCYTES NFR BLD AUTO: 1.6 % (ref 0–0.5)
LYMPHOCYTES # BLD AUTO: 1.5 K/UL (ref 1–4.8)
LYMPHOCYTES NFR BLD: 11.1 % (ref 18–48)
MAGNESIUM SERPL-MCNC: 1.8 MG/DL (ref 1.6–2.6)
MCH RBC QN AUTO: 27.3 PG (ref 27–31)
MCHC RBC AUTO-ENTMCNC: 30.3 G/DL (ref 32–36)
MCV RBC AUTO: 90 FL (ref 82–98)
MONOCYTES # BLD AUTO: 1.1 K/UL (ref 0.3–1)
MONOCYTES NFR BLD: 8 % (ref 4–15)
NEUTROPHILS # BLD AUTO: 11 K/UL (ref 1.8–7.7)
NEUTROPHILS NFR BLD: 78.8 % (ref 38–73)
NRBC BLD-RTO: 0 /100 WBC
PCO2 BLDA: 40 MMHG (ref 35–45)
PH SMN: 7.42 [PH] (ref 7.35–7.45)
PHOSPHATE SERPL-MCNC: 2.8 MG/DL (ref 2.7–4.5)
PLATELET # BLD AUTO: 306 K/UL (ref 150–350)
PMV BLD AUTO: 9.2 FL (ref 9.2–12.9)
PO2 BLDA: 69 MMHG (ref 80–100)
POC BE: 1 MMOL/L
POC SATURATED O2: 94 % (ref 95–100)
POC TCO2: 27 MMOL/L (ref 23–27)
POCT GLUCOSE: 102 MG/DL (ref 70–110)
POCT GLUCOSE: 78 MG/DL (ref 70–110)
POCT GLUCOSE: 91 MG/DL (ref 70–110)
POCT GLUCOSE: 97 MG/DL (ref 70–110)
POTASSIUM SERPL-SCNC: 4.4 MMOL/L (ref 3.5–5.1)
PROT SERPL-MCNC: 4.9 G/DL (ref 6–8.4)
RBC # BLD AUTO: 3.08 M/UL (ref 4–5.4)
SAMPLE: ABNORMAL
SITE: ABNORMAL
SODIUM SERPL-SCNC: 137 MMOL/L (ref 136–145)
WBC # BLD AUTO: 13.91 K/UL (ref 3.9–12.7)

## 2020-01-23 PROCEDURE — 99900026 HC AIRWAY MAINTENANCE (STAT)

## 2020-01-23 PROCEDURE — 99291 CRITICAL CARE FIRST HOUR: CPT | Mod: ,,, | Performed by: ANESTHESIOLOGY

## 2020-01-23 PROCEDURE — 94761 N-INVAS EAR/PLS OXIMETRY MLT: CPT

## 2020-01-23 PROCEDURE — 63600175 PHARM REV CODE 636 W HCPCS: Performed by: STUDENT IN AN ORGANIZED HEALTH CARE EDUCATION/TRAINING PROGRAM

## 2020-01-23 PROCEDURE — 25000003 PHARM REV CODE 250: Performed by: STUDENT IN AN ORGANIZED HEALTH CARE EDUCATION/TRAINING PROGRAM

## 2020-01-23 PROCEDURE — 25000242 PHARM REV CODE 250 ALT 637 W/ HCPCS: Performed by: STUDENT IN AN ORGANIZED HEALTH CARE EDUCATION/TRAINING PROGRAM

## 2020-01-23 PROCEDURE — 20000000 HC ICU ROOM

## 2020-01-23 PROCEDURE — 84100 ASSAY OF PHOSPHORUS: CPT

## 2020-01-23 PROCEDURE — 94640 AIRWAY INHALATION TREATMENT: CPT

## 2020-01-23 PROCEDURE — 82803 BLOOD GASES ANY COMBINATION: CPT

## 2020-01-23 PROCEDURE — 63600175 PHARM REV CODE 636 W HCPCS: Performed by: SURGERY

## 2020-01-23 PROCEDURE — 99900035 HC TECH TIME PER 15 MIN (STAT)

## 2020-01-23 PROCEDURE — 85025 COMPLETE CBC W/AUTO DIFF WBC: CPT

## 2020-01-23 PROCEDURE — 99499 UNLISTED E&M SERVICE: CPT | Mod: ,,, | Performed by: SURGERY

## 2020-01-23 PROCEDURE — 80053 COMPREHEN METABOLIC PANEL: CPT

## 2020-01-23 PROCEDURE — C9113 INJ PANTOPRAZOLE SODIUM, VIA: HCPCS | Performed by: STUDENT IN AN ORGANIZED HEALTH CARE EDUCATION/TRAINING PROGRAM

## 2020-01-23 PROCEDURE — 94003 VENT MGMT INPAT SUBQ DAY: CPT

## 2020-01-23 PROCEDURE — 37799 UNLISTED PX VASCULAR SURGERY: CPT

## 2020-01-23 PROCEDURE — 99291 PR CRITICAL CARE, E/M 30-74 MINUTES: ICD-10-PCS | Mod: ,,, | Performed by: ANESTHESIOLOGY

## 2020-01-23 PROCEDURE — 99499 NO LOS: ICD-10-PCS | Mod: ,,, | Performed by: SURGERY

## 2020-01-23 PROCEDURE — 83735 ASSAY OF MAGNESIUM: CPT

## 2020-01-23 PROCEDURE — 27000221 HC OXYGEN, UP TO 24 HOURS

## 2020-01-23 PROCEDURE — 25000003 PHARM REV CODE 250: Performed by: SURGERY

## 2020-01-23 RX ORDER — FENTANYL CITRATE-0.9 % NACL/PF 10 MCG/ML
PLASTIC BAG, INJECTION (ML) INTRAVENOUS CONTINUOUS
Status: DISCONTINUED | OUTPATIENT
Start: 2020-01-23 | End: 2020-01-24

## 2020-01-23 RX ORDER — FENTANYL CITRATE 50 UG/ML
25 INJECTION, SOLUTION INTRAMUSCULAR; INTRAVENOUS
Status: DISCONTINUED | OUTPATIENT
Start: 2020-01-23 | End: 2020-01-23

## 2020-01-23 RX ORDER — DEXMEDETOMIDINE HYDROCHLORIDE 4 UG/ML
0.2 INJECTION, SOLUTION INTRAVENOUS CONTINUOUS
Status: DISCONTINUED | OUTPATIENT
Start: 2020-01-23 | End: 2020-01-26

## 2020-01-23 RX ORDER — FUROSEMIDE 10 MG/ML
20 INJECTION INTRAMUSCULAR; INTRAVENOUS ONCE
Status: COMPLETED | OUTPATIENT
Start: 2020-01-23 | End: 2020-01-23

## 2020-01-23 RX ADMIN — IPRATROPIUM BROMIDE AND ALBUTEROL SULFATE 3 ML: .5; 3 SOLUTION RESPIRATORY (INHALATION) at 11:01

## 2020-01-23 RX ADMIN — CHLORHEXIDINE GLUCONATE 0.12% ORAL RINSE 15 ML: 1.2 LIQUID ORAL at 09:01

## 2020-01-23 RX ADMIN — CHLORHEXIDINE GLUCONATE 0.12% ORAL RINSE 15 ML: 1.2 LIQUID ORAL at 08:01

## 2020-01-23 RX ADMIN — IPRATROPIUM BROMIDE AND ALBUTEROL SULFATE 3 ML: .5; 3 SOLUTION RESPIRATORY (INHALATION) at 03:01

## 2020-01-23 RX ADMIN — FUROSEMIDE 20 MG: 10 INJECTION, SOLUTION INTRAVENOUS at 05:01

## 2020-01-23 RX ADMIN — ENOXAPARIN SODIUM 40 MG: 100 INJECTION SUBCUTANEOUS at 08:01

## 2020-01-23 RX ADMIN — DEXMEDETOMIDINE HYDROCHLORIDE 0.2 MCG/KG/HR: 4 INJECTION, SOLUTION INTRAVENOUS at 02:01

## 2020-01-23 RX ADMIN — MAGNESIUM SULFATE IN WATER 2 G: 40 INJECTION, SOLUTION INTRAVENOUS at 05:01

## 2020-01-23 RX ADMIN — CARBOXYMETHYLCELLULOSE SODIUM 2 DROP: 10 GEL OPHTHALMIC at 08:01

## 2020-01-23 RX ADMIN — PROPOFOL 15 MCG/KG/MIN: 10 INJECTION, EMULSION INTRAVENOUS at 09:01

## 2020-01-23 RX ADMIN — Medication: at 05:01

## 2020-01-23 RX ADMIN — PROPOFOL 5 MCG/KG/MIN: 10 INJECTION, EMULSION INTRAVENOUS at 08:01

## 2020-01-23 RX ADMIN — PROPOFOL 5 MCG/KG/MIN: 10 INJECTION, EMULSION INTRAVENOUS at 02:01

## 2020-01-23 RX ADMIN — IPRATROPIUM BROMIDE AND ALBUTEROL SULFATE 3 ML: .5; 3 SOLUTION RESPIRATORY (INHALATION) at 07:01

## 2020-01-23 RX ADMIN — FUROSEMIDE 20 MG: 10 INJECTION, SOLUTION INTRAVENOUS at 09:01

## 2020-01-23 RX ADMIN — ENOXAPARIN SODIUM 40 MG: 100 INJECTION SUBCUTANEOUS at 09:01

## 2020-01-23 RX ADMIN — PANTOPRAZOLE SODIUM 40 MG: 40 INJECTION, POWDER, FOR SOLUTION INTRAVENOUS at 09:01

## 2020-01-23 RX ADMIN — IPRATROPIUM BROMIDE AND ALBUTEROL SULFATE 3 ML: .5; 3 SOLUTION RESPIRATORY (INHALATION) at 08:01

## 2020-01-23 NOTE — ASSESSMENT & PLAN NOTE
Marisela Bunn is a 69 y.o. female with PMH COPD (not on home oxygen), HTN, HLD (not on anticoagulation) received as direct admission for large diaphragmatic hernia. Patient is symptomatic from hernia with constipation and dyspnea with overlying bruise on left flank. She underwent lap diaphragmatic hernia repair with mesh on 1/21/20. She was transferred to the ICU postop.     - Intubated, wean to extubate per SICU protocol, if she doesn't pass SBT she would likely benefit from some spontaneous or PAV during the day   - consider changing sedation to Precedex as tolerated  - Patient is on home Lasix, consider this in addition to fluids for low UOP  - Levaquin for community-acquired pneumonia in setting of elevated WBC and ongoing cough  - Aggressive pulm toilet with JARROD, Ren Christiansen, and Tessalon when extubated  - Pulm consult, appreciate recs  - PT/OT when able  - Daily labs    DISPO: stable, still intubated, doing well otherwise. Not ready for d/c

## 2020-01-23 NOTE — PROGRESS NOTES
"[facility]  General Surgery  Progress Note    Subjective:     History of Present Illness:  Marisela Bunn is a 69 y.o. female with PMH CAD, HTN, TIA, GERD presents to the hospital as a direct admission for evaluation of a newly diagnosed diaphragmatic hernia. She initially presented to [facility] ED on 1/18/20 for a 1-month history of a cough, intermittent fevers and dyspnea.  She had been seen by 2 urgent care physicians in the last month for the cough, with 2 different antibiotic courses given without improvement in cough. She reports that on 1/15 after an particularly severe coughing episode she felt a "pop" on left side, with associated severe pain and  Subsequent bruising of the left flank. She reports constant severe pain since that time. She is on daily ASA 81mg,     On ED presentation, patient was hemodynamically stable, H/H 12.4/40.0, breathing on RA. Labs revealed leukocytosis (19.2), lactic acid 2.5 (repeat lactic acid1.6), BMP wnl. CXR revealed left lower lobe pneumonia with possible associated pleural effusion. CT abdomen/pelvis revealed a large left diaphragmatic hernia containing fat  and bowel loops with hernia of intra-abdomnial contents outside the thorax from 7th left ICS. Medium sized HH. CTA revealed large Bochdalek hernia through defect in left hemo-diaphragm, with associated widening of 7th intercostal space. She was started on IV hydration and antibiotics (levofloxacin, zosyn). She reports she has not passed a bowel movement of flatus in 2 days. Intermittent lower quadrant "spasms" while coughing, otherwise no abdominal pain. She reports no other symptoms.    Post-Op Info:  Procedure(s) (LRB):  REPAIR, HERNIA, DIAPHRAGMATIC, Laparoscopic (Left)   2 Days Post-Op     Interval History:   POD 2. AFVSS. Remains intubated.   WBC 13.9  610cc serous fluid output from DREW    Medications:  Continuous Infusions:   fentanyl 150 mcg/hr (01/23/20 0501)    lactated ringers 150 mL/hr at " 01/23/20 0501    propofol 50 mcg/kg/min (01/23/20 0501)     Scheduled Meds:   albuterol-ipratropium  3 mL Nebulization Q4H    carboxymethylcellulose sodium  2 drop Both Eyes Nightly    chlorhexidine  15 mL Mouth/Throat BID    enoxaparin  40 mg Subcutaneous BID    levoFLOXacin  750 mg Intravenous Q24H    pantoprazole  40 mg Intravenous Daily    polyethylene glycol  17 g Oral Daily     PRN Meds:acetaminophen, calcium gluconate IVPB, calcium gluconate IVPB, calcium gluconate IVPB, Dextrose 10% Bolus, glucagon (human recombinant), hydrALAZINE, insulin aspart U-100, labetalol, magnesium sulfate IVPB, magnesium sulfate IVPB, melatonin, ondansetron, oxyCODONE, oxyCODONE, potassium chloride in water **AND** potassium chloride in water **AND** potassium chloride in water, sodium chloride 0.9%, sodium chloride 0.9%, sodium phosphate IVPB, sodium phosphate IVPB, sodium phosphate IVPB     Review of patient's allergies indicates:   Allergen Reactions    Erythromycin      Stomach pains    Rosuvastatin      Hives, muscle/joint pains    Zolpidem      Confusion      Objective:     Vital Signs (Most Recent):  Temp: 98.4 °F (36.9 °C) (01/22/20 2300)  Pulse: 80 (01/23/20 0527)  Resp: 20 (01/23/20 0527)  BP: (!) 118/56 (01/23/20 0500)  SpO2: (!) 94 % (01/23/20 0527) Vital Signs (24h Range):  Temp:  [97.9 °F (36.6 °C)-99 °F (37.2 °C)] 98.4 °F (36.9 °C)  Pulse:  [66-94] 80  Resp:  [12-29] 20  SpO2:  [92 %-100 %] 94 %  BP: (102-176)/(55-85) 118/56  Arterial Line BP: (107-173)/(44-62) 116/48     Weight: 108.4 kg (239 lb)  Body mass index is 45.16 kg/m².    Intake/Output - Last 3 Shifts       01/21 0700 - 01/22 0659 01/22 0700 - 01/23 0659    I.V. (mL/kg) 5457 (50.3) 2026 (18.7)    IV Piggyback 500     Total Intake(mL/kg) 5957 (55) 2026 (18.7)    Urine (mL/kg/hr) 778 (0.3) 2210 (0.8)    Other 336 660    Blood 100     Total Output 1214 2870    Net +7371 -018                Physical Exam   Constitutional: She appears well-developed  and well-nourished. No distress.   HENT:   Head: Normocephalic and atraumatic.   Mouth/Throat: Oropharynx is clear and moist.   ET tube  In place   Eyes: Conjunctivae and EOM are normal. Right eye exhibits no discharge. Left eye exhibits no discharge.   Neck: Normal range of motion.   Cardiovascular: Normal rate and regular rhythm.   Pulmonary/Chest:   Intubated  Vent Mode: A/C  Oxygen Concentration (%):  [30-50] 30  Resp Rate Total:  [14 br/min-26 br/min] 18 br/min  Vt Set:  [375 mL] 375 mL  PEEP/CPAP:  [8 cmH20] 8 cmH20  Pressure Support:  [5 cmH20] 5 cmH20  Mean Airway Pressure:  [9.4 rgQ17-27 cmH20] 12 cmH20    Abdominal: Soft. She exhibits no distension.   Musculoskeletal: Normal range of motion. She exhibits no deformity.   Neurological:   Sedated   Skin: Skin is warm and dry.   L flank ecchymosis, improving   Psychiatric: She has a normal mood and affect. Her behavior is normal.         Significant Labs:  CBC:   Recent Labs   Lab 01/23/20  0307   WBC 13.91*   RBC 3.08*   HGB 8.4*   HCT 27.7*      MCV 90   MCH 27.3   MCHC 30.3*     CMP:   Recent Labs   Lab 01/23/20  0307      CALCIUM 8.3*   ALBUMIN 1.9*   PROT 4.9*      K 4.4   CO2 27      BUN 13   CREATININE 0.7   ALKPHOS 81   ALT 31   AST 23   BILITOT 0.8       Significant Diagnostics:  I have reviewed all pertinent imaging results/findings within the past 24 hours.    Assessment/Plan:     * Diaphragmatic hernia  Marisela Bunn is a 69 y.o. female with PMH COPD (not on home oxygen), HTN, HLD (not on anticoagulation) received as direct admission for large diaphragmatic hernia. Patient is symptomatic from hernia with constipation and dyspnea with overlying bruise on left flank. She underwent lap diaphragmatic hernia repair with mesh on 1/21/20. She was transferred to the ICU postop.     - Intubated, wean to extubate per SICU protocol, if she doesn't pass SBT she would likely benefit from some spontaneous or PAV during the day   -  consider changing sedation to Precedex as tolerated  - Patient is on home Lasix, consider this in addition to fluids for low UOP  - Levaquin for community-acquired pneumonia in setting of elevated WBC and ongoing cough  - Aggressive pulm toilet with IS, Елена, Ren, and Tessalon when extubated  - Pulm consult, appreciate recs  - PT/OT when able  - Daily labs    DISPO: stable, still intubated, doing well otherwise. Not ready for d/c     Pneumonia  See principle         Sanjeev Murcia MD  General Surgery  [facility]

## 2020-01-23 NOTE — SUBJECTIVE & OBJECTIVE
Subjective: NAEON. Some coughing.     Follow-up For: Procedure(s) (LRB):  REPAIR, HERNIA, DIAPHRAGMATIC, Laparoscopic (Left)    Post-Operative Day: 2     Past Medical History:   Diagnosis Date    CAD (coronary artery disease)     Diaphragmatic hernia     GERD (gastroesophageal reflux disease)     Hypertension     TIA (transient ischemic attack)        Past Surgical History:   Procedure Laterality Date    REPAIR OF DIAPHRAGMATIC HERNIA Left 1/21/2020    Procedure: REPAIR, HERNIA, DIAPHRAGMATIC, Laparoscopic;  Surgeon: Lucho Garcia Jr., MD;  Location: [facility] OR [address] [place] [address];  Service: General;  Laterality: Left;       Review of patient's allergies indicates:   Allergen Reactions    Erythromycin      Stomach pains    Rosuvastatin      Hives, muscle/joint pains    Zolpidem      Confusion        Family History     None        Tobacco Use    Smoking status: Not on file   Substance and Sexual Activity    Alcohol use: Not on file    Drug use: Not on file    Sexual activity: Not on file      Review of Systems   Unable to perform ROS: Intubated     Objective:     Vital Signs (Most Recent):  Temp: 98.4 °F (36.9 °C) (01/22/20 2300)  Pulse: 80 (01/23/20 0600)  Resp: (!) 24 (01/23/20 0600)  BP: 120/60 (01/23/20 0600)  SpO2: (!) 94 % (01/23/20 0600) Vital Signs (24h Range):  Temp:  [97.9 °F (36.6 °C)-99 °F (37.2 °C)] 98.4 °F (36.9 °C)  Pulse:  [66-94] 80  Resp:  [12-29] 24  SpO2:  [92 %-100 %] 94 %  BP: (102-176)/(55-85) 120/60  Arterial Line BP: (110-173)/(45-62) 124/50     Weight: 108.4 kg (239 lb)  Body mass index is 45.16 kg/m².      Intake/Output Summary (Last 24 hours) at 1/23/2020 0713  Last data filed at 1/23/2020 0600  Gross per 24 hour   Intake 4297 ml   Output 2835 ml   Net 1462 ml       Physical Exam   Constitutional: She appears well-developed and well-nourished.   HENT:   Head: Normocephalic and atraumatic.   Nose: Nose normal.   Eyes: Pupils are equal, round, and reactive to light. Conjunctivae are  normal.   Neck: Neck supple.   Cardiovascular: Normal rate, regular rhythm and intact distal pulses.   Pulmonary/Chest:   Intubated    Abdominal:   Obese  Laparoscopic wounds, dressing c/d/i   Genitourinary:   Genitourinary Comments: Hernandez in place   Neurological:   Sedated   Skin: Skin is warm and dry. Capillary refill takes less than 2 seconds.       Vents:  Vent Mode: A/C (01/23/20 0527)  Ventilator Initiated: Yes (01/21/20 1759)  Set Rate: 16 bmp (01/23/20 0527)  Vt Set: 375 mL (01/23/20 0527)  Pressure Support: 5 cmH20 (01/22/20 1430)  PEEP/CPAP: 8 cmH20 (01/23/20 0527)  Oxygen Concentration (%): 30 (01/23/20 0600)  Peak Airway Pressure: 20 cmH2O (01/23/20 0527)  Plateau Pressure: 23 cmH20 (01/23/20 0527)  Total Ve: 6.66 mL (01/23/20 0527)  F/VT Ratio<105 (RSBI): (!) 60.06 (01/23/20 0527)    Lines/Drains/Airways     Drain                 Drain/Device  01/21/20 1643 collapsible closed device 1 day         Urethral Catheter 01/21/20 1250 Straight-tip 16 Fr. 1 day          Airway                 Airway - Non-Surgical 01/21/20 1818 1 day          Arterial Line                 Arterial Line 01/21/20 1217 Left Radial 1 day          Peripheral Intravenous Line                 Peripheral IV - Single Lumen 01/21/20 1159 20 G Left Antecubital 1 day         Peripheral IV - Single Lumen 01/21/20 1207 20 G Right Hand 1 day         Peripheral IV - Single Lumen 01/21/20 1227 16 G Left Hand 1 day                Significant Labs:    CBC/Anemia Profile:  Recent Labs   Lab 01/22/20  0321 01/22/20  0919 01/23/20  0307   WBC 14.87* 12.92* 13.91*   HGB 8.7* 8.4* 8.4*   HCT 29.2* 27.5* 27.7*    244 306   MCV 92 91 90   RDW 13.8 13.9 14.2        Chemistries:  Recent Labs   Lab 01/21/20  1802 01/22/20  0321 01/22/20  1618 01/23/20  0307    137 139 137   K 4.7 4.2 3.9 4.4    105 106 103   CO2 25 24 26 27   BUN 13 14 14 13   CREATININE 0.8 0.7 0.7 0.7   CALCIUM 7.9* 7.9* 8.0* 8.3*   ALBUMIN 2.1* 1.9*  --  1.9*   PROT  5.1* 4.9*  --  4.9*   BILITOT 0.9 0.7  --  0.8   ALKPHOS 102 88  --  81   ALT 54* 42  --  31   AST 39 32  --  23   MG  --  1.9  --  1.8   PHOS  --  2.8  --  2.8       Lactic Acid:   Recent Labs   Lab 01/21/20  1802   LACTATE 0.9       Significant Imaging: I have reviewed all pertinent imaging results/findings within the past 24 hours.

## 2020-01-23 NOTE — ASSESSMENT & PLAN NOTE
Neuro:  Sedation: Propofol, precedex for possible extubation   Pain control: Tylenol, fentanyl, oxy    Resp:  Intubated, will continue to monitor compliance and extubate when appropriate   Possible pre-op pneumonia  Sputum cultures sent, NGTD  Nebs prn    CV:  HDS  No pressors    Heme/ID:  H/H   Leukocytosis, trend WBC, improving  D/c levofloxacin for possible pneumonia   Pro calcitonin WNL     Renal:  Hernandez in place  Strict I/Os  BUN Cr  MIVF d/c  20 of lasixm repeat    FEN/GI:  NPO   Replace lytes PRN  No BM yet    Endo:  SSI    PPX:  Protonix  Lovenox    Dispo: Continue ICU care

## 2020-01-23 NOTE — SUBJECTIVE & OBJECTIVE
Interval History:   POD 2. AFVSS. Remains intubated.   WBC 13.9  610cc serous fluid output from DREW    Medications:  Continuous Infusions:   fentanyl 150 mcg/hr (01/23/20 0501)    lactated ringers 150 mL/hr at 01/23/20 0501    propofol 50 mcg/kg/min (01/23/20 0501)     Scheduled Meds:   albuterol-ipratropium  3 mL Nebulization Q4H    carboxymethylcellulose sodium  2 drop Both Eyes Nightly    chlorhexidine  15 mL Mouth/Throat BID    enoxaparin  40 mg Subcutaneous BID    levoFLOXacin  750 mg Intravenous Q24H    pantoprazole  40 mg Intravenous Daily    polyethylene glycol  17 g Oral Daily     PRN Meds:acetaminophen, calcium gluconate IVPB, calcium gluconate IVPB, calcium gluconate IVPB, Dextrose 10% Bolus, glucagon (human recombinant), hydrALAZINE, insulin aspart U-100, labetalol, magnesium sulfate IVPB, magnesium sulfate IVPB, melatonin, ondansetron, oxyCODONE, oxyCODONE, potassium chloride in water **AND** potassium chloride in water **AND** potassium chloride in water, sodium chloride 0.9%, sodium chloride 0.9%, sodium phosphate IVPB, sodium phosphate IVPB, sodium phosphate IVPB     Review of patient's allergies indicates:   Allergen Reactions    Erythromycin      Stomach pains    Rosuvastatin      Hives, muscle/joint pains    Zolpidem      Confusion      Objective:     Vital Signs (Most Recent):  Temp: 98.4 °F (36.9 °C) (01/22/20 2300)  Pulse: 80 (01/23/20 0527)  Resp: 20 (01/23/20 0527)  BP: (!) 118/56 (01/23/20 0500)  SpO2: (!) 94 % (01/23/20 0527) Vital Signs (24h Range):  Temp:  [97.9 °F (36.6 °C)-99 °F (37.2 °C)] 98.4 °F (36.9 °C)  Pulse:  [66-94] 80  Resp:  [12-29] 20  SpO2:  [92 %-100 %] 94 %  BP: (102-176)/(55-85) 118/56  Arterial Line BP: (107-173)/(44-62) 116/48     Weight: 108.4 kg (239 lb)  Body mass index is 45.16 kg/m².    Intake/Output - Last 3 Shifts       01/21 0700 - 01/22 0659 01/22 0700 - 01/23 0659    I.V. (mL/kg) 5457 (50.3) 2026 (18.7)    IV Piggyback 500     Total Intake(mL/kg)  5957 (55) 2026 (18.7)    Urine (mL/kg/hr) 778 (0.3) 2210 (0.8)    Other 336 660    Blood 100     Total Output 1214 2870    Net +4743 -844                Physical Exam   Constitutional: She appears well-developed and well-nourished. No distress.   HENT:   Head: Normocephalic and atraumatic.   Mouth/Throat: Oropharynx is clear and moist.   ET tube  In place   Eyes: Conjunctivae and EOM are normal. Right eye exhibits no discharge. Left eye exhibits no discharge.   Neck: Normal range of motion.   Cardiovascular: Normal rate and regular rhythm.   Pulmonary/Chest:   Intubated  Vent Mode: A/C  Oxygen Concentration (%):  () 50  Resp Rate Total:  (16 br/min-44 br/min) 18 br/min  Vt Set:  (375 mL-420 mL) 375 mL  PEEP/CPAP:  (3 cmH20-8 cmH20) 8 cmH20  Mean Airway Pressure:  (7.8 xgP59-62 cmH20) 11 cmH20   Abdominal: Soft. She exhibits no distension.   Musculoskeletal: Normal range of motion. She exhibits no deformity.   Neurological:   Sedated   Skin: Skin is warm and dry.   L flank ecchymosis, improving   Psychiatric: She has a normal mood and affect. Her behavior is normal.         Significant Labs:  CBC:   Recent Labs   Lab 01/23/20  0307   WBC 13.91*   RBC 3.08*   HGB 8.4*   HCT 27.7*      MCV 90   MCH 27.3   MCHC 30.3*     CMP:   Recent Labs   Lab 01/23/20  0307      CALCIUM 8.3*   ALBUMIN 1.9*   PROT 4.9*      K 4.4   CO2 27      BUN 13   CREATININE 0.7   ALKPHOS 81   ALT 31   AST 23   BILITOT 0.8       Significant Diagnostics:  I have reviewed all pertinent imaging results/findings within the past 24 hours.

## 2020-01-23 NOTE — PLAN OF CARE
"      SICU PLAN OF CARE NOTE    Dx: Diaphragmatic hernia    Shift Events: Patient passed SBT. Remains intubated. Will reassess tomorrow. Patient motioned to left side and stated she felt a pop. SICU resident notified. Dr. Lees notified. No change in SpO2 or other vital signs. Breath sounds present on left side. Chest xray ordered for AM. Plan to keep patient sedated on ventilator overnight. Plan of care reviewed with daughter in law and patient.     Goals of Care: Extubate.    Neuro: Sedated, Arouses to Voice, Follows Commands and Moves All Extremities    Vital Signs: BP (!) 176/72 (BP Location: Right arm, Patient Position: Lying)   Pulse 77   Temp 98.2 °F (36.8 °C) (Oral)   Resp 18   Ht 5' 1" (1.549 m)   Wt 108.4 kg (239 lb)   SpO2 96%   Breastfeeding? No   BMI 45.16 kg/m²     Respiratory: Ventilator    Diet: NPO    Gtts: Propfol, Fentanyl and MIVF    Urine Output: Urinary Catheter 1480 cc/shift    Drains: DREW Drain, total output 305 cc / shift            "

## 2020-01-23 NOTE — PROGRESS NOTES
[facility]  Critical Care - Surgery  Progress Note    Patient Name: Marisela Bunn  MRN: [id number]  Admission Date: 1/19/2020  Hospital Length of Stay: 4 days  Code Status: Full Code  Attending Provider: Lucho Garcia Jr.,*  Primary Care Provider: Primary Doctor No   Principal Problem: Diaphragmatic hernia    Subjective:     Hospital/ICU Course:  1/21: Pt arrives in SICU intubated, sedated, ventilated, on 0.5 ludwig.     Subjective: NAEON. Some coughing.     Follow-up For: Procedure(s) (LRB):  REPAIR, HERNIA, DIAPHRAGMATIC, Laparoscopic (Left)    Post-Operative Day: 2     Past Medical History:   Diagnosis Date    CAD (coronary artery disease)     Diaphragmatic hernia     GERD (gastroesophageal reflux disease)     Hypertension     TIA (transient ischemic attack)        Past Surgical History:   Procedure Laterality Date    REPAIR OF DIAPHRAGMATIC HERNIA Left 1/21/2020    Procedure: REPAIR, HERNIA, DIAPHRAGMATIC, Laparoscopic;  Surgeon: Lucho Garcia Jr., MD;  Location: [facility] OR [address] [place] [address];  Service: General;  Laterality: Left;       Review of patient's allergies indicates:   Allergen Reactions    Erythromycin      Stomach pains    Rosuvastatin      Hives, muscle/joint pains    Zolpidem      Confusion        Family History     None        Tobacco Use    Smoking status: Not on file   Substance and Sexual Activity    Alcohol use: Not on file    Drug use: Not on file    Sexual activity: Not on file      Review of Systems   Unable to perform ROS: Intubated     Objective:     Vital Signs (Most Recent):  Temp: 98.4 °F (36.9 °C) (01/22/20 2300)  Pulse: 80 (01/23/20 0600)  Resp: (!) 24 (01/23/20 0600)  BP: 120/60 (01/23/20 0600)  SpO2: (!) 94 % (01/23/20 0600) Vital Signs (24h Range):  Temp:  [97.9 °F (36.6 °C)-99 °F (37.2 °C)] 98.4 °F (36.9 °C)  Pulse:  [66-94] 80  Resp:  [12-29] 24  SpO2:  [92 %-100 %] 94 %  BP: (102-176)/(55-85) 120/60  Arterial Line BP: (110-173)/(45-62) 124/50      Weight: 108.4 kg (239 lb)  Body mass index is 45.16 kg/m².      Intake/Output Summary (Last 24 hours) at 1/23/2020 0713  Last data filed at 1/23/2020 0600  Gross per 24 hour   Intake 4297 ml   Output 2835 ml   Net 1462 ml       Physical Exam   Constitutional: She appears well-developed and well-nourished.   HENT:   Head: Normocephalic and atraumatic.   Nose: Nose normal.   Eyes: Pupils are equal, round, and reactive to light. Conjunctivae are normal.   Neck: Neck supple.   Cardiovascular: Normal rate, regular rhythm and intact distal pulses.   Pulmonary/Chest:   Intubated    Abdominal:   Obese  Laparoscopic wounds, dressing c/d/i   Genitourinary:   Genitourinary Comments: Hernandez in place   Neurological:   Sedated   Skin: Skin is warm and dry. Capillary refill takes less than 2 seconds.       Vents:  Vent Mode: A/C (01/23/20 0527)  Ventilator Initiated: Yes (01/21/20 1759)  Set Rate: 16 bmp (01/23/20 0527)  Vt Set: 375 mL (01/23/20 0527)  Pressure Support: 5 cmH20 (01/22/20 1430)  PEEP/CPAP: 8 cmH20 (01/23/20 0527)  Oxygen Concentration (%): 30 (01/23/20 0600)  Peak Airway Pressure: 20 cmH2O (01/23/20 0527)  Plateau Pressure: 23 cmH20 (01/23/20 0527)  Total Ve: 6.66 mL (01/23/20 0527)  F/VT Ratio<105 (RSBI): (!) 60.06 (01/23/20 0527)    Lines/Drains/Airways     Drain                 Drain/Device  01/21/20 1643 collapsible closed device 1 day         Urethral Catheter 01/21/20 1250 Straight-tip 16 Fr. 1 day          Airway                 Airway - Non-Surgical 01/21/20 1818 1 day          Arterial Line                 Arterial Line 01/21/20 1217 Left Radial 1 day          Peripheral Intravenous Line                 Peripheral IV - Single Lumen 01/21/20 1159 20 G Left Antecubital 1 day         Peripheral IV - Single Lumen 01/21/20 1207 20 G Right Hand 1 day         Peripheral IV - Single Lumen 01/21/20 1227 16 G Left Hand 1 day                Significant Labs:    CBC/Anemia Profile:  Recent Labs   Lab 01/22/20  6356  01/22/20  0919 01/23/20  0307   WBC 14.87* 12.92* 13.91*   HGB 8.7* 8.4* 8.4*   HCT 29.2* 27.5* 27.7*    244 306   MCV 92 91 90   RDW 13.8 13.9 14.2        Chemistries:  Recent Labs   Lab 01/21/20  1802 01/22/20  0321 01/22/20  1618 01/23/20  0307    137 139 137   K 4.7 4.2 3.9 4.4    105 106 103   CO2 25 24 26 27   BUN 13 14 14 13   CREATININE 0.8 0.7 0.7 0.7   CALCIUM 7.9* 7.9* 8.0* 8.3*   ALBUMIN 2.1* 1.9*  --  1.9*   PROT 5.1* 4.9*  --  4.9*   BILITOT 0.9 0.7  --  0.8   ALKPHOS 102 88  --  81   ALT 54* 42  --  31   AST 39 32  --  23   MG  --  1.9  --  1.8   PHOS  --  2.8  --  2.8       Lactic Acid:   Recent Labs   Lab 01/21/20  1802   LACTATE 0.9       Significant Imaging: I have reviewed all pertinent imaging results/findings within the past 24 hours.    Assessment/Plan:     * Diaphragmatic hernia  Neuro:  Sedation: Propofol, precedex for possible extubation   Pain control: Tylenol, fentanyl, oxy    Resp:  Intubated, will continue to monitor compliance and extubate when appropriate   Possible pre-op pneumonia  Sputum cultures sent, NGTD  Nebs prn    CV:  HDS  No pressors    Heme/ID:  H/H   Leukocytosis, trend WBC, improving  D/c levofloxacin for possible pneumonia   Pro calcitonin WNL     Renal:  Hernandez in place  Strict I/Os  BUN Cr  MIVF d/c  20 of lasixm repeat    FEN/GI:  NPO   Replace lytes PRN  No BM yet    Endo:  SSI    PPX:  Protonix  Lovenox    Dispo: Continue ICU care         Critical care was time spent personally by me on the following activities: development of treatment plan with patient or surrogate and bedside caregivers, discussions with consultants, evaluation of patient's response to treatment, examination of patient, ordering and performing treatments and interventions, ordering and review of laboratory studies, ordering and review of radiographic studies, pulse oximetry, re-evaluation of patient's condition.  This critical care time did not overlap with that of any other  provider or involve time for any procedures.     Desmond Rock MD  Critical Care - Surgery  [facility]

## 2020-01-24 LAB
ALLENS TEST: ABNORMAL
ANION GAP SERPL CALC-SCNC: 8 MMOL/L (ref 8–16)
BACTERIA SPEC AEROBE CULT: ABNORMAL
BASOPHILS # BLD AUTO: 0.05 K/UL (ref 0–0.2)
BASOPHILS NFR BLD: 0.4 % (ref 0–1.9)
BUN SERPL-MCNC: 17 MG/DL (ref 8–23)
CALCIUM SERPL-MCNC: 8.1 MG/DL (ref 8.7–10.5)
CHLORIDE SERPL-SCNC: 102 MMOL/L (ref 95–110)
CO2 SERPL-SCNC: 28 MMOL/L (ref 23–29)
CREAT SERPL-MCNC: 0.8 MG/DL (ref 0.5–1.4)
DELSYS: ABNORMAL
DIFFERENTIAL METHOD: ABNORMAL
EOSINOPHIL # BLD AUTO: 0.6 K/UL (ref 0–0.5)
EOSINOPHIL NFR BLD: 4.8 % (ref 0–8)
ERYTHROCYTE [DISTWIDTH] IN BLOOD BY AUTOMATED COUNT: 14.6 % (ref 11.5–14.5)
ERYTHROCYTE [SEDIMENTATION RATE] IN BLOOD BY WESTERGREN METHOD: 16 MM/H
EST. GFR  (AFRICAN AMERICAN): >60 ML/MIN/1.73 M^2
EST. GFR  (NON AFRICAN AMERICAN): >60 ML/MIN/1.73 M^2
FIO2: 30
GLUCOSE SERPL-MCNC: 98 MG/DL (ref 70–110)
GRAM STN SPEC: ABNORMAL
GRAM STN SPEC: ABNORMAL
HCO3 UR-SCNC: 29.4 MMOL/L (ref 24–28)
HCT VFR BLD AUTO: 27.3 % (ref 37–48.5)
HGB BLD-MCNC: 8.3 G/DL (ref 12–16)
IMM GRANULOCYTES # BLD AUTO: 0.51 K/UL (ref 0–0.04)
IMM GRANULOCYTES NFR BLD AUTO: 4.4 % (ref 0–0.5)
LYMPHOCYTES # BLD AUTO: 1.9 K/UL (ref 1–4.8)
LYMPHOCYTES NFR BLD: 16 % (ref 18–48)
MAGNESIUM SERPL-MCNC: 1.9 MG/DL (ref 1.6–2.6)
MCH RBC QN AUTO: 27.1 PG (ref 27–31)
MCHC RBC AUTO-ENTMCNC: 30.4 G/DL (ref 32–36)
MCV RBC AUTO: 89 FL (ref 82–98)
MIN VOL: 7.5
MODE: ABNORMAL
MONOCYTES # BLD AUTO: 1 K/UL (ref 0.3–1)
MONOCYTES NFR BLD: 8.9 % (ref 4–15)
NEUTROPHILS # BLD AUTO: 7.6 K/UL (ref 1.8–7.7)
NEUTROPHILS NFR BLD: 65.5 % (ref 38–73)
NRBC BLD-RTO: 0 /100 WBC
PCO2 BLDA: 40.4 MMHG (ref 35–45)
PEEP: 8
PH SMN: 7.47 [PH] (ref 7.35–7.45)
PHOSPHATE SERPL-MCNC: 3.9 MG/DL (ref 2.7–4.5)
PIP: 20
PLATELET # BLD AUTO: 302 K/UL (ref 150–350)
PMV BLD AUTO: 9 FL (ref 9.2–12.9)
PO2 BLDA: 88 MMHG (ref 80–100)
POC BE: 6 MMOL/L
POC SATURATED O2: 97 % (ref 95–100)
POC TCO2: 31 MMOL/L (ref 23–27)
POCT GLUCOSE: 104 MG/DL (ref 70–110)
POCT GLUCOSE: 107 MG/DL (ref 70–110)
POCT GLUCOSE: 125 MG/DL (ref 70–110)
POCT GLUCOSE: 92 MG/DL (ref 70–110)
POCT GLUCOSE: 94 MG/DL (ref 70–110)
POTASSIUM SERPL-SCNC: 4 MMOL/L (ref 3.5–5.1)
RBC # BLD AUTO: 3.06 M/UL (ref 4–5.4)
SAMPLE: ABNORMAL
SITE: ABNORMAL
SODIUM SERPL-SCNC: 138 MMOL/L (ref 136–145)
SP02: 94
VT: 375
WBC # BLD AUTO: 11.63 K/UL (ref 3.9–12.7)

## 2020-01-24 PROCEDURE — 99900035 HC TECH TIME PER 15 MIN (STAT)

## 2020-01-24 PROCEDURE — 94010 BREATHING CAPACITY TEST: CPT

## 2020-01-24 PROCEDURE — 80048 BASIC METABOLIC PNL TOTAL CA: CPT

## 2020-01-24 PROCEDURE — 25000003 PHARM REV CODE 250: Performed by: STUDENT IN AN ORGANIZED HEALTH CARE EDUCATION/TRAINING PROGRAM

## 2020-01-24 PROCEDURE — 63600175 PHARM REV CODE 636 W HCPCS: Performed by: SURGERY

## 2020-01-24 PROCEDURE — 94761 N-INVAS EAR/PLS OXIMETRY MLT: CPT

## 2020-01-24 PROCEDURE — 20000000 HC ICU ROOM

## 2020-01-24 PROCEDURE — 63600175 PHARM REV CODE 636 W HCPCS: Performed by: STUDENT IN AN ORGANIZED HEALTH CARE EDUCATION/TRAINING PROGRAM

## 2020-01-24 PROCEDURE — 94640 AIRWAY INHALATION TREATMENT: CPT

## 2020-01-24 PROCEDURE — 25000242 PHARM REV CODE 250 ALT 637 W/ HCPCS: Performed by: STUDENT IN AN ORGANIZED HEALTH CARE EDUCATION/TRAINING PROGRAM

## 2020-01-24 PROCEDURE — 85025 COMPLETE CBC W/AUTO DIFF WBC: CPT

## 2020-01-24 PROCEDURE — 36415 COLL VENOUS BLD VENIPUNCTURE: CPT

## 2020-01-24 PROCEDURE — 99291 CRITICAL CARE FIRST HOUR: CPT | Mod: ,,, | Performed by: ANESTHESIOLOGY

## 2020-01-24 PROCEDURE — 94003 VENT MGMT INPAT SUBQ DAY: CPT

## 2020-01-24 PROCEDURE — 99291 PR CRITICAL CARE, E/M 30-74 MINUTES: ICD-10-PCS | Mod: ,,, | Performed by: ANESTHESIOLOGY

## 2020-01-24 PROCEDURE — 99900017 HC EXTUBATION W/PARAMETERS (STAT)

## 2020-01-24 PROCEDURE — 25000003 PHARM REV CODE 250

## 2020-01-24 PROCEDURE — 94150 VITAL CAPACITY TEST: CPT

## 2020-01-24 PROCEDURE — 97535 SELF CARE MNGMENT TRAINING: CPT

## 2020-01-24 PROCEDURE — 99900026 HC AIRWAY MAINTENANCE (STAT)

## 2020-01-24 PROCEDURE — 25000242 PHARM REV CODE 250 ALT 637 W/ HCPCS: Performed by: SURGERY

## 2020-01-24 PROCEDURE — 92610 EVALUATE SWALLOWING FUNCTION: CPT

## 2020-01-24 PROCEDURE — 83735 ASSAY OF MAGNESIUM: CPT

## 2020-01-24 PROCEDURE — 82803 BLOOD GASES ANY COMBINATION: CPT

## 2020-01-24 PROCEDURE — 84100 ASSAY OF PHOSPHORUS: CPT

## 2020-01-24 PROCEDURE — 27000221 HC OXYGEN, UP TO 24 HOURS

## 2020-01-24 PROCEDURE — 37799 UNLISTED PX VASCULAR SURGERY: CPT

## 2020-01-24 PROCEDURE — C9113 INJ PANTOPRAZOLE SODIUM, VIA: HCPCS | Performed by: STUDENT IN AN ORGANIZED HEALTH CARE EDUCATION/TRAINING PROGRAM

## 2020-01-24 RX ORDER — FUROSEMIDE 10 MG/ML
20 INJECTION INTRAMUSCULAR; INTRAVENOUS ONCE
Status: COMPLETED | OUTPATIENT
Start: 2020-01-24 | End: 2020-01-24

## 2020-01-24 RX ORDER — LIDOCAINE HYDROCHLORIDE 10 MG/ML
INJECTION, SOLUTION EPIDURAL; INFILTRATION; INTRACAUDAL; PERINEURAL
Status: COMPLETED
Start: 2020-01-24 | End: 2020-01-24

## 2020-01-24 RX ORDER — CODEINE SULFATE 15 MG/1
15 TABLET ORAL EVERY 4 HOURS PRN
Status: DISCONTINUED | OUTPATIENT
Start: 2020-01-24 | End: 2020-01-24

## 2020-01-24 RX ORDER — ACETAMINOPHEN AND CODEINE PHOSPHATE 120; 12 MG/5ML; MG/5ML
5 SOLUTION ORAL EVERY 4 HOURS PRN
Status: DISCONTINUED | OUTPATIENT
Start: 2020-01-24 | End: 2020-01-29

## 2020-01-24 RX ORDER — BENZONATATE 100 MG/1
100 CAPSULE ORAL EVERY 8 HOURS
Status: DISCONTINUED | OUTPATIENT
Start: 2020-01-24 | End: 2020-02-12 | Stop reason: HOSPADM

## 2020-01-24 RX ORDER — ACETAMINOPHEN 500 MG
500 TABLET ORAL EVERY 8 HOURS PRN
Status: DISCONTINUED | OUTPATIENT
Start: 2020-01-24 | End: 2020-01-29

## 2020-01-24 RX ORDER — BENZONATATE 100 MG/1
100 CAPSULE ORAL 3 TIMES DAILY PRN
Status: DISCONTINUED | OUTPATIENT
Start: 2020-01-24 | End: 2020-01-24

## 2020-01-24 RX ORDER — LIDOCAINE HYDROCHLORIDE 10 MG/ML
1 INJECTION INFILTRATION; PERINEURAL ONCE
Status: COMPLETED | OUTPATIENT
Start: 2020-01-24 | End: 2020-01-24

## 2020-01-24 RX ORDER — FUROSEMIDE 10 MG/ML
20 INJECTION INTRAMUSCULAR; INTRAVENOUS 2 TIMES DAILY
Status: DISCONTINUED | OUTPATIENT
Start: 2020-01-24 | End: 2020-01-26

## 2020-01-24 RX ORDER — GUAIFENESIN/DEXTROMETHORPHAN 100-10MG/5
5 SYRUP ORAL EVERY 6 HOURS
Status: DISCONTINUED | OUTPATIENT
Start: 2020-01-24 | End: 2020-02-12 | Stop reason: HOSPADM

## 2020-01-24 RX ADMIN — CHLORHEXIDINE GLUCONATE 0.12% ORAL RINSE 15 ML: 1.2 LIQUID ORAL at 08:01

## 2020-01-24 RX ADMIN — BENZONATATE 100 MG: 100 CAPSULE ORAL at 09:01

## 2020-01-24 RX ADMIN — IPRATROPIUM BROMIDE AND ALBUTEROL SULFATE 3 ML: .5; 3 SOLUTION RESPIRATORY (INHALATION) at 08:01

## 2020-01-24 RX ADMIN — ACETAMINOPHEN 650 MG: 325 TABLET ORAL at 04:01

## 2020-01-24 RX ADMIN — IPRATROPIUM BROMIDE AND ALBUTEROL SULFATE 3 ML: .5; 3 SOLUTION RESPIRATORY (INHALATION) at 03:01

## 2020-01-24 RX ADMIN — FUROSEMIDE 20 MG: 10 INJECTION, SOLUTION INTRAMUSCULAR; INTRAVENOUS at 11:01

## 2020-01-24 RX ADMIN — ENOXAPARIN SODIUM 40 MG: 100 INJECTION SUBCUTANEOUS at 08:01

## 2020-01-24 RX ADMIN — LIDOCAINE HYDROCHLORIDE 1 ML: 10 INJECTION INFILTRATION; PERINEURAL at 02:01

## 2020-01-24 RX ADMIN — RACEPINEPHRINE HYDROCHLORIDE 0.5 ML: 11.25 SOLUTION RESPIRATORY (INHALATION) at 12:01

## 2020-01-24 RX ADMIN — GUAIFENESIN AND DEXTROMETHORPHAN 5 ML: 100; 10 SYRUP ORAL at 05:01

## 2020-01-24 RX ADMIN — FUROSEMIDE 20 MG: 10 INJECTION, SOLUTION INTRAMUSCULAR; INTRAVENOUS at 05:01

## 2020-01-24 RX ADMIN — BENZONATATE 100 MG: 100 CAPSULE ORAL at 01:01

## 2020-01-24 RX ADMIN — ENOXAPARIN SODIUM 40 MG: 100 INJECTION SUBCUTANEOUS at 09:01

## 2020-01-24 RX ADMIN — IPRATROPIUM BROMIDE AND ALBUTEROL SULFATE 3 ML: .5; 3 SOLUTION RESPIRATORY (INHALATION) at 07:01

## 2020-01-24 RX ADMIN — IPRATROPIUM BROMIDE AND ALBUTEROL SULFATE 3 ML: .5; 3 SOLUTION RESPIRATORY (INHALATION) at 11:01

## 2020-01-24 RX ADMIN — OXYCODONE HYDROCHLORIDE 10 MG: 10 TABLET ORAL at 09:01

## 2020-01-24 RX ADMIN — PROPOFOL 35 MCG/KG/MIN: 10 INJECTION, EMULSION INTRAVENOUS at 02:01

## 2020-01-24 RX ADMIN — GUAIFENESIN AND DEXTROMETHORPHAN 5 ML: 100; 10 SYRUP ORAL at 11:01

## 2020-01-24 RX ADMIN — LIDOCAINE HYDROCHLORIDE 1 ML: 10 INJECTION, SOLUTION EPIDURAL; INFILTRATION; INTRACAUDAL; PERINEURAL at 02:01

## 2020-01-24 RX ADMIN — RACEPINEPHRINE HYDROCHLORIDE 0.5 ML: 11.25 SOLUTION RESPIRATORY (INHALATION) at 04:01

## 2020-01-24 RX ADMIN — DEXMEDETOMIDINE HYDROCHLORIDE 0.8 MCG/KG/HR: 4 INJECTION, SOLUTION INTRAVENOUS at 06:01

## 2020-01-24 RX ADMIN — DEXMEDETOMIDINE HYDROCHLORIDE 0.6 MCG/KG/HR: 4 INJECTION, SOLUTION INTRAVENOUS at 10:01

## 2020-01-24 RX ADMIN — PANTOPRAZOLE SODIUM 40 MG: 40 INJECTION, POWDER, FOR SOLUTION INTRAVENOUS at 08:01

## 2020-01-24 NOTE — PLAN OF CARE
Pt intubated/sedated lightly.  Arouses easily and follows all commands.  DREW drainage as noted and UOP adequate.  Labwork and glucose monitored and trended.  No family at bedside to discuss plan of care.

## 2020-01-24 NOTE — ASSESSMENT & PLAN NOTE
Marisela Bunn is a 69 y.o. female with PMH COPD (not on home oxygen), HTN, HLD (not on anticoagulation) received as direct admission for large diaphragmatic hernia. Patient is symptomatic from hernia with constipation and dyspnea with overlying bruise on left flank. She underwent lap diaphragmatic hernia repair with mesh on 1/21/20. She was transferred to the ICU postop.   Remains intubated on minimal vent settings, leukocytosis improved and negative proCal.    - Intubated, wean to extubate today, if she doesn't pass SBT she would likely benefit from some spontaneous or PAV during the day   - Precedex  - Patient is on home Lasix, consider this in addition to fluids for low UOP  - Levaquin stopped   - Aggressive pulm toilet with IS, Ren Christiansen, and Tessalon when extubated  - Pulm consult, appreciate recs  - PT/OT when able  - Daily labs    DISPO: stable, still intubated, doing well otherwise. Not ready for d/c

## 2020-01-24 NOTE — PLAN OF CARE
"      SICU PLAN OF CARE NOTE    Dx: Diaphragmatic hernia    Vital Signs: BP (!) 114/52   Pulse 69   Temp 98.4 °F (36.9 °C) (Oral)   Resp (!) 23   Ht 5' 1" (1.549 m)   Wt 118.4 kg (261 lb 0.4 oz)   SpO2 95%   Breastfeeding? No   BMI 49.32 kg/m²     Neuro: AAO x4, Follows Commands and Moves All Extremities    Respiratory: 8L HFNC    Diet: Pureed with Nectar Thick Liquids    Gtts: Fentanyl and Precedex Both weaned OFF    Urine Output: Urinary Catheter 1745 cc/shift    Drains:   DREW drain removed this afternoon by MD.    Skin: CDI. Steri-strips present to abd incisions. Scattered bruising noted to L side/flank/perineum as well as lower abd (2/2 lovenox injections).    Shift Events:   Patient extubated this am to 8L HFNC; O2 Sat maintained > 93%. No c/o SOB, however, patient did have 2 episodes of expiratory wheezing and inspiratory stridor, relief provided with Racephinephrine nebs.    Patient with decrease urine output this am. Lasix resumed and outpt improved.     Voice whispered/hoarse. ST consulted; diet advanced to Pureed/nectar thick liquids.    POC reviewed with patient and questions answered. Will Continue to monitor.    "

## 2020-01-24 NOTE — PROGRESS NOTES
[facility]  Critical Care - Surgery  Progress Note    Patient Name: Marisela Bunn  MRN: [id number]  Admission Date: 1/19/2020  Hospital Length of Stay: 5 days  Code Status: Full Code  Attending Provider: Lucho Garcia Jr.,*  Primary Care Provider: Primary Doctor No   Principal Problem: Diaphragmatic hernia    Subjective:     Hospital/ICU Course:  No notes on file    Subjective: NAEON. SBT trial yesterday. Transitioned from propofol to precedex. Extubate today.     Follow-up For: Procedure(s) (LRB):  REPAIR, HERNIA, DIAPHRAGMATIC, Laparoscopic (Left)    Post-Operative Day: 3     Past Medical History:   Diagnosis Date    CAD (coronary artery disease)     Diaphragmatic hernia     GERD (gastroesophageal reflux disease)     Hypertension     TIA (transient ischemic attack)        Past Surgical History:   Procedure Laterality Date    REPAIR OF DIAPHRAGMATIC HERNIA Left 1/21/2020    Procedure: REPAIR, HERNIA, DIAPHRAGMATIC, Laparoscopic;  Surgeon: Lucho Garcia Jr., MD;  Location: [facility] OR [address] [place] [address];  Service: General;  Laterality: Left;       Review of patient's allergies indicates:   Allergen Reactions    Erythromycin      Stomach pains    Rosuvastatin      Hives, muscle/joint pains    Zolpidem      Confusion        Family History     None        Tobacco Use    Smoking status: Not on file   Substance and Sexual Activity    Alcohol use: Not on file    Drug use: Not on file    Sexual activity: Not on file      Review of Systems   Unable to perform ROS: Intubated     Objective:     Vital Signs (Most Recent):  Temp: 99 °F (37.2 °C) (01/24/20 0300)  Pulse: 76 (01/24/20 0600)  Resp: 19 (01/24/20 0600)  BP: (!) 115/59 (01/24/20 0600)  SpO2: (!) 94 % (01/24/20 0600) Vital Signs (24h Range):  Temp:  [97.5 °F (36.4 °C)-99 °F (37.2 °C)] 99 °F (37.2 °C)  Pulse:  [] 76  Resp:  [18-41] 19  SpO2:  [91 %-100 %] 94 %  BP: (104-130)/(51-62) 115/59  Arterial Line BP: ()/(45-61) 127/61      Weight: 118.4 kg (261 lb 0.4 oz)  Body mass index is 49.32 kg/m².      Intake/Output Summary (Last 24 hours) at 1/24/2020 0634  Last data filed at 1/24/2020 0600  Gross per 24 hour   Intake 773.48 ml   Output 2885 ml   Net -2111.52 ml       Physical Exam   Constitutional: She appears well-developed and well-nourished.   HENT:   Head: Normocephalic and atraumatic.   Nose: Nose normal.   Eyes: Pupils are equal, round, and reactive to light. Conjunctivae are normal.   Neck: Neck supple.   Cardiovascular: Normal rate, regular rhythm and intact distal pulses.   Pulmonary/Chest:   Intubated    Abdominal:   Obese  Laparoscopic wounds, dressing c/d/i   Genitourinary:   Genitourinary Comments: Hernandez in place   Neurological:   Sedated   Skin: Skin is warm and dry. Capillary refill takes less than 2 seconds.       Vents:  Vent Mode: A/C (01/24/20 0508)  Ventilator Initiated: Yes (01/21/20 1759)  Set Rate: 16 bmp (01/24/20 0508)  Vt Set: 375 mL (01/24/20 0508)  Pressure Support: 5 cmH20 (01/23/20 1728)  PEEP/CPAP: 8 cmH20 (01/24/20 0508)  Oxygen Concentration (%): 30 (01/24/20 0600)  Peak Airway Pressure: 17 cmH2O (01/24/20 0508)  Plateau Pressure: 18 cmH20 (01/24/20 0508)  Total Ve: 6.78 mL (01/24/20 0508)  F/VT Ratio<105 (RSBI): (!) 34.93 (01/24/20 0508)    Lines/Drains/Airways     Drain                 Drain/Device  01/21/20 1643 collapsible closed device 2 days         Urethral Catheter 01/21/20 1250 Straight-tip 16 Fr. 2 days          Airway                 Airway - Non-Surgical 01/21/20 1818 2 days          Arterial Line                 Arterial Line 01/21/20 1217 Left Radial 2 days          Peripheral Intravenous Line                 Peripheral IV - Single Lumen 01/21/20 1159 20 G Left Antecubital 2 days         Peripheral IV - Single Lumen 01/21/20 1207 20 G Right Hand 2 days         Peripheral IV - Single Lumen 01/21/20 1227 16 G Left Hand 2 days                Significant Labs:    CBC/Anemia Profile:  Recent Labs    Lab 01/22/20  0919 01/23/20  0307 01/24/20  0313   WBC 12.92* 13.91* 11.63   HGB 8.4* 8.4* 8.3*   HCT 27.5* 27.7* 27.3*    306 302   MCV 91 90 89   RDW 13.9 14.2 14.6*        Chemistries:  Recent Labs   Lab 01/22/20  1618 01/23/20  0307 01/24/20  0313    137 138   K 3.9 4.4 4.0    103 102   CO2 26 27 28   BUN 14 13 17   CREATININE 0.7 0.7 0.8   CALCIUM 8.0* 8.3* 8.1*   ALBUMIN  --  1.9*  --    PROT  --  4.9*  --    BILITOT  --  0.8  --    ALKPHOS  --  81  --    ALT  --  31  --    AST  --  23  --    MG  --  1.8 1.9   PHOS  --  2.8 3.9       Lactic Acid:   No results for input(s): LACTATE in the last 48 hours.    Significant Imaging: I have reviewed all pertinent imaging results/findings within the past 24 hours.    Assessment/Plan:     * Diaphragmatic hernia  Neuro:  Sedation: Wean Precedex to off   Pain control: Tylenol, fentanyl, oxy    Resp:  Extubated during rounds  Tesslon, dextromethorphan, codeine to suppress cough     Sputum cultures sent, NGTD  Nebs prn    CV:  HDS  No pressors    Heme/ID:  H/H   Leukocytosis, trend WBC, improving  D/c levofloxacin  Pro calcitonin WNL     Renal:  Hernandez in place, possibly remove later in day  Strict I/Os  BUN Cr  MIVF d/c  Lasix 20 BID    FEN/GI:  NPO   Swallow study  Replace lytes PRN    Endo:  SSI    PPX:  Protonix  Lovenox    Dispo: Continue ICU care         Critical care was time spent personally by me on the following activities: development of treatment plan with patient or surrogate and bedside caregivers, discussions with consultants, evaluation of patient's response to treatment, examination of patient, ordering and performing treatments and interventions, ordering and review of laboratory studies, ordering and review of radiographic studies, pulse oximetry, re-evaluation of patient's condition.  This critical care time did not overlap with that of any other provider or involve time for any procedures.     Desmond Rock MD  Critical Care -  Surgery  [facility]

## 2020-01-24 NOTE — PLAN OF CARE
"      SICU PLAN OF CARE NOTE    Dx: Diaphragmatic hernia    Shift Events: SBT performed. Patient remains intubated to prevent coughing that could cause trauma to recent hernia repair. Will reassess possible extubation tomorrow. VC + 30% peep 5. Additional lasix 20mg administered for low UOP. Plan of care reviewed with patient and son.    Goals of Care: Extubate when possible.    Neuro: Arouses to Voice, Follows Commands and Moves All Extremities    Vital Signs: /62   Pulse 79   Temp 98.7 °F (37.1 °C) (Oral)   Resp (!) 24   Ht 5' 1" (1.549 m)   Wt 120 kg (264 lb 8.8 oz)   SpO2 (!) 92%   Breastfeeding? No   BMI 49.99 kg/m²     Respiratory: Ventilator    Diet: NPO    Gtts: Fentanyl and Precedex    Urine Output: Urinary catheter 1365 cc/shift    Drains: DREW Drain, total output 220 cc / shift     Labs/Accuchecks: Accuchecks Q6H           "

## 2020-01-24 NOTE — SUBJECTIVE & OBJECTIVE
Subjective: NAEON. SBT trial yesterday. Transitioned from propofol to precedex. Extubate today.     Follow-up For: Procedure(s) (LRB):  REPAIR, HERNIA, DIAPHRAGMATIC, Laparoscopic (Left)    Post-Operative Day: 3     Past Medical History:   Diagnosis Date    CAD (coronary artery disease)     Diaphragmatic hernia     GERD (gastroesophageal reflux disease)     Hypertension     TIA (transient ischemic attack)        Past Surgical History:   Procedure Laterality Date    REPAIR OF DIAPHRAGMATIC HERNIA Left 1/21/2020    Procedure: REPAIR, HERNIA, DIAPHRAGMATIC, Laparoscopic;  Surgeon: Lucho Garcia Jr., MD;  Location: [facility] OR [address] [place] [address];  Service: General;  Laterality: Left;       Review of patient's allergies indicates:   Allergen Reactions    Erythromycin      Stomach pains    Rosuvastatin      Hives, muscle/joint pains    Zolpidem      Confusion        Family History     None        Tobacco Use    Smoking status: Not on file   Substance and Sexual Activity    Alcohol use: Not on file    Drug use: Not on file    Sexual activity: Not on file      Review of Systems   Unable to perform ROS: Intubated     Objective:     Vital Signs (Most Recent):  Temp: 99 °F (37.2 °C) (01/24/20 0300)  Pulse: 76 (01/24/20 0600)  Resp: 19 (01/24/20 0600)  BP: (!) 115/59 (01/24/20 0600)  SpO2: (!) 94 % (01/24/20 0600) Vital Signs (24h Range):  Temp:  [97.5 °F (36.4 °C)-99 °F (37.2 °C)] 99 °F (37.2 °C)  Pulse:  [] 76  Resp:  [18-41] 19  SpO2:  [91 %-100 %] 94 %  BP: (104-130)/(51-62) 115/59  Arterial Line BP: ()/(45-61) 127/61     Weight: 118.4 kg (261 lb 0.4 oz)  Body mass index is 49.32 kg/m².      Intake/Output Summary (Last 24 hours) at 1/24/2020 0634  Last data filed at 1/24/2020 0600  Gross per 24 hour   Intake 773.48 ml   Output 2885 ml   Net -2111.52 ml       Physical Exam   Constitutional: She appears well-developed and well-nourished.   HENT:   Head: Normocephalic and atraumatic.   Nose: Nose normal.    Eyes: Pupils are equal, round, and reactive to light. Conjunctivae are normal.   Neck: Neck supple.   Cardiovascular: Normal rate, regular rhythm and intact distal pulses.   Pulmonary/Chest:   Intubated    Abdominal:   Obese  Laparoscopic wounds, dressing c/d/i   Genitourinary:   Genitourinary Comments: Hernandez in place   Neurological:   Sedated   Skin: Skin is warm and dry. Capillary refill takes less than 2 seconds.       Vents:  Vent Mode: A/C (01/24/20 0508)  Ventilator Initiated: Yes (01/21/20 1759)  Set Rate: 16 bmp (01/24/20 0508)  Vt Set: 375 mL (01/24/20 0508)  Pressure Support: 5 cmH20 (01/23/20 1728)  PEEP/CPAP: 8 cmH20 (01/24/20 0508)  Oxygen Concentration (%): 30 (01/24/20 0600)  Peak Airway Pressure: 17 cmH2O (01/24/20 0508)  Plateau Pressure: 18 cmH20 (01/24/20 0508)  Total Ve: 6.78 mL (01/24/20 0508)  F/VT Ratio<105 (RSBI): (!) 34.93 (01/24/20 0508)    Lines/Drains/Airways     Drain                 Drain/Device  01/21/20 1643 collapsible closed device 2 days         Urethral Catheter 01/21/20 1250 Straight-tip 16 Fr. 2 days          Airway                 Airway - Non-Surgical 01/21/20 1818 2 days          Arterial Line                 Arterial Line 01/21/20 1217 Left Radial 2 days          Peripheral Intravenous Line                 Peripheral IV - Single Lumen 01/21/20 1159 20 G Left Antecubital 2 days         Peripheral IV - Single Lumen 01/21/20 1207 20 G Right Hand 2 days         Peripheral IV - Single Lumen 01/21/20 1227 16 G Left Hand 2 days                Significant Labs:    CBC/Anemia Profile:  Recent Labs   Lab 01/22/20  0919 01/23/20  0307 01/24/20  0313   WBC 12.92* 13.91* 11.63   HGB 8.4* 8.4* 8.3*   HCT 27.5* 27.7* 27.3*    306 302   MCV 91 90 89   RDW 13.9 14.2 14.6*        Chemistries:  Recent Labs   Lab 01/22/20  1618 01/23/20  0307 01/24/20  0313    137 138   K 3.9 4.4 4.0    103 102   CO2 26 27 28   BUN 14 13 17   CREATININE 0.7 0.7 0.8   CALCIUM 8.0* 8.3* 8.1*    ALBUMIN  --  1.9*  --    PROT  --  4.9*  --    BILITOT  --  0.8  --    ALKPHOS  --  81  --    ALT  --  31  --    AST  --  23  --    MG  --  1.8 1.9   PHOS  --  2.8 3.9       Lactic Acid:   No results for input(s): LACTATE in the last 48 hours.    Significant Imaging: I have reviewed all pertinent imaging results/findings within the past 24 hours.

## 2020-01-24 NOTE — PLAN OF CARE
Problem: SLP Goal  Goal: SLP Goal  Description  Speech Language Pathology Goals  Goals expected to be met by 1/31:  1. Pt will tolerate pureed diet and nectar thick liquids without s/s of aspiration.   2. Pt will participate in ongoing swallowing assessment to determine if safe for diet advancement and/or need for MBSS.    Outcome: Ongoing, Progressing    Bedside swallow evaluation completed. Pt presenting with dysphonia concerning for ability to achieve adequate airway closure during the swallow.  Appears safe with pureed consistencies and nectar thick liquids.  Likely needs MBSS on 1/27.  May also need ENT consult if vocal function does not improve.  CHINO Kang, CCC-SLP  Speech Language Pathologist  [phone]  1/24/2020

## 2020-01-24 NOTE — PROGRESS NOTES
"[facility]  General Surgery  Progress Note    Subjective:     History of Present Illness:  Marisela Bunn is a 69 y.o. female with PMH CAD, HTN, TIA, GERD presents to the hospital as a direct admission for evaluation of a newly diagnosed diaphragmatic hernia. She initially presented to [place] ED on 1/18/20 for a 1-month history of a cough, intermittent fevers and dyspnea.  She had been seen by 2 urgent care physicians in the last month for the cough, with 2 different antibiotic courses given without improvement in cough. She reports that on 1/15 after an particularly severe coughing episode she felt a "pop" on left side, with associated severe pain and  Subsequent bruising of the left flank. She reports constant severe pain since that time. She is on daily ASA 81mg,     On ED presentation, patient was hemodynamically stable, H/H 12.4/40.0, breathing on RA. Labs revealed leukocytosis (19.2), lactic acid 2.5 (repeat lactic acid1.6), BMP wnl. CXR revealed left lower lobe pneumonia with possible associated pleural effusion. CT abdomen/pelvis revealed a large left diaphragmatic hernia containing fat  and bowel loops with hernia of intra-abdomnial contents outside the thorax from 7th left ICS. Medium sized HH. CTA revealed large Bochdalek hernia through defect in left hemo-diaphragm, with associated widening of 7th intercostal space. She was started on IV hydration and antibiotics (levofloxacin, zosyn). She reports she has not passed a bowel movement of flatus in 2 days. Intermittent lower quadrant "spasms" while coughing, otherwise no abdominal pain. She reports no other symptoms.    Post-Op Info:  Procedure(s) (LRB):  REPAIR, HERNIA, DIAPHRAGMATIC, Laparoscopic (Left)   3 Days Post-Op     Interval History:   POD 3. AFVSS. Remains intubated.   WBC 11.6  345cc serous fluid output from DREW    Medications:  Continuous Infusions:   dexmedetomidine (PRECEDEX) infusion 0.8 mcg/kg/hr (01/24/20 0600)    " fentanyl 12.5 mL/hr at 01/24/20 0600    propofol Stopped (01/24/20 0600)     Scheduled Meds:   albuterol-ipratropium  3 mL Nebulization Q4H    carboxymethylcellulose sodium  2 drop Both Eyes Nightly    chlorhexidine  15 mL Mouth/Throat BID    enoxaparin  40 mg Subcutaneous BID    pantoprazole  40 mg Intravenous Daily     PRN Meds:acetaminophen, calcium gluconate IVPB, calcium gluconate IVPB, calcium gluconate IVPB, Dextrose 10% Bolus, glucagon (human recombinant), hydrALAZINE, insulin aspart U-100, labetalol, magnesium sulfate IVPB, magnesium sulfate IVPB, melatonin, ondansetron, oxyCODONE, oxyCODONE, potassium chloride in water **AND** potassium chloride in water **AND** potassium chloride in water, sodium chloride 0.9%, sodium chloride 0.9%, sodium phosphate IVPB, sodium phosphate IVPB, sodium phosphate IVPB     Review of patient's allergies indicates:   Allergen Reactions    Erythromycin      Stomach pains    Rosuvastatin      Hives, muscle/joint pains    Zolpidem      Confusion      Objective:     Vital Signs (Most Recent):  Temp: 99 °F (37.2 °C) (01/24/20 0300)  Pulse: 64 (01/24/20 0743)  Resp: 17 (01/24/20 0743)  BP: (!) 115/59 (01/24/20 0600)  SpO2: (!) 92 % (01/24/20 0743) Vital Signs (24h Range):  Temp:  [97.5 °F (36.4 °C)-99 °F (37.2 °C)] 99 °F (37.2 °C)  Pulse:  [] 64  Resp:  [17-41] 17  SpO2:  [91 %-100 %] 92 %  BP: (104-130)/(51-62) 115/59  Arterial Line BP: ()/(46-61) 127/61     Weight: 118.4 kg (261 lb 0.4 oz)  Body mass index is 49.32 kg/m².    Intake/Output - Last 3 Shifts       01/22 0700 - 01/23 0659 01/23 0700 - 01/24 0659 01/24 0700 - 01/25 0659    I.V. (mL/kg) 4297 (39.6) 773.5 (6.5)     IV Piggyback       Total Intake(mL/kg) 4297 (39.6) 773.5 (6.5)     Urine (mL/kg/hr) 2270 (0.9) 2545 (0.9)     Other 660 385     Blood       Total Output 2930 2930     Net +1367 -2156.5                  Physical Exam   Constitutional: She appears well-developed and well-nourished. No  distress.   HENT:   Head: Normocephalic and atraumatic.   Mouth/Throat: Oropharynx is clear and moist.   ET tube  In place   Eyes: Conjunctivae and EOM are normal. Right eye exhibits no discharge. Left eye exhibits no discharge.   Neck: Normal range of motion.   Cardiovascular: Normal rate and regular rhythm.   Pulmonary/Chest:   Intubated  Vent Mode: A/C  Oxygen Concentration (%):  [30] 30  Resp Rate Total:  [13 br/min-38 br/min] 20 br/min  Vt Set:  [375 mL] 375 mL  PEEP/CPAP:  [8 cmH20] 8 cmH20  Pressure Support:  [5 cmH20] 5 cmH20  Mean Airway Pressure:  [9.4 wpQ31-70 cmH20] 10 cmH20    Abdominal: Soft. She exhibits no distension.   Musculoskeletal: Normal range of motion. She exhibits no deformity.   Neurological:   Sedated   Skin: Skin is warm and dry.   L flank ecchymosis, improving   Psychiatric: She has a normal mood and affect. Her behavior is normal.         Significant Labs:  CBC:   Recent Labs   Lab 01/24/20  0313   WBC 11.63   RBC 3.06*   HGB 8.3*   HCT 27.3*      MCV 89   MCH 27.1   MCHC 30.4*     CMP:   Recent Labs   Lab 01/23/20  0307 01/24/20  0313    98   CALCIUM 8.3* 8.1*   ALBUMIN 1.9*  --    PROT 4.9*  --     138   K 4.4 4.0   CO2 27 28    102   BUN 13 17   CREATININE 0.7 0.8   ALKPHOS 81  --    ALT 31  --    AST 23  --    BILITOT 0.8  --        Significant Diagnostics:  I have reviewed all pertinent imaging results/findings within the past 24 hours.    Assessment/Plan:     * Diaphragmatic hernia  Marisela Bunn is a 69 y.o. female with PMH COPD (not on home oxygen), HTN, HLD (not on anticoagulation) received as direct admission for large diaphragmatic hernia. Patient is symptomatic from hernia with constipation and dyspnea with overlying bruise on left flank. She underwent lap diaphragmatic hernia repair with mesh on 1/21/20. She was transferred to the ICU postop.   Remains intubated on minimal vent settings, leukocytosis improved and negative proCal.    - Intubated,  wean to extubate today, if she doesn't pass SBT she would likely benefit from some spontaneous or PAV during the day   - Precedex  - Patient is on home Lasix, consider this in addition to fluids for low UOP  - Levaquin stopped   - Aggressive pulm toilet with IS, Acapella, DuoNebs, and Tessalon when extubated  - Pulm consult, appreciate recs  - PT/OT when able  - Daily labs    DISPO: stable, still intubated, doing well otherwise. Not ready for d/c     Pneumonia  See principle         Sanjeev Murcia MD  General Surgery  [facility]

## 2020-01-24 NOTE — SUBJECTIVE & OBJECTIVE
Interval History:   POD 3. AFVSS. Remains intubated.   WBC 11.6  345cc serous fluid output from DREW    Medications:  Continuous Infusions:   dexmedetomidine (PRECEDEX) infusion 0.8 mcg/kg/hr (01/24/20 0600)    fentanyl 12.5 mL/hr at 01/24/20 0600    propofol Stopped (01/24/20 0600)     Scheduled Meds:   albuterol-ipratropium  3 mL Nebulization Q4H    carboxymethylcellulose sodium  2 drop Both Eyes Nightly    chlorhexidine  15 mL Mouth/Throat BID    enoxaparin  40 mg Subcutaneous BID    pantoprazole  40 mg Intravenous Daily     PRN Meds:acetaminophen, calcium gluconate IVPB, calcium gluconate IVPB, calcium gluconate IVPB, Dextrose 10% Bolus, glucagon (human recombinant), hydrALAZINE, insulin aspart U-100, labetalol, magnesium sulfate IVPB, magnesium sulfate IVPB, melatonin, ondansetron, oxyCODONE, oxyCODONE, potassium chloride in water **AND** potassium chloride in water **AND** potassium chloride in water, sodium chloride 0.9%, sodium chloride 0.9%, sodium phosphate IVPB, sodium phosphate IVPB, sodium phosphate IVPB     Review of patient's allergies indicates:   Allergen Reactions    Erythromycin      Stomach pains    Rosuvastatin      Hives, muscle/joint pains    Zolpidem      Confusion      Objective:     Vital Signs (Most Recent):  Temp: 99 °F (37.2 °C) (01/24/20 0300)  Pulse: 64 (01/24/20 0743)  Resp: 17 (01/24/20 0743)  BP: (!) 115/59 (01/24/20 0600)  SpO2: (!) 92 % (01/24/20 0743) Vital Signs (24h Range):  Temp:  [97.5 °F (36.4 °C)-99 °F (37.2 °C)] 99 °F (37.2 °C)  Pulse:  [] 64  Resp:  [17-41] 17  SpO2:  [91 %-100 %] 92 %  BP: (104-130)/(51-62) 115/59  Arterial Line BP: ()/(46-61) 127/61     Weight: 118.4 kg (261 lb 0.4 oz)  Body mass index is 49.32 kg/m².    Intake/Output - Last 3 Shifts       01/22 0700 - 01/23 0659 01/23 0700 - 01/24 0659 01/24 0700 - 01/25 0659    I.V. (mL/kg) 4297 (39.6) 773.5 (6.5)     IV Piggyback       Total Intake(mL/kg) 4297 (39.6) 773.5 (6.5)     Urine  (mL/kg/hr) 2270 (0.9) 2545 (0.9)     Other 660 385     Blood       Total Output 2930 2930     Net +1367 -2156.5                  Physical Exam   Constitutional: She appears well-developed and well-nourished. No distress.   HENT:   Head: Normocephalic and atraumatic.   Mouth/Throat: Oropharynx is clear and moist.   ET tube  In place   Eyes: Conjunctivae and EOM are normal. Right eye exhibits no discharge. Left eye exhibits no discharge.   Neck: Normal range of motion.   Cardiovascular: Normal rate and regular rhythm.   Pulmonary/Chest:   Intubated  Vent Mode: A/C  Oxygen Concentration (%):  () 50  Resp Rate Total:  (16 br/min-44 br/min) 18 br/min  Vt Set:  (375 mL-420 mL) 375 mL  PEEP/CPAP:  (3 cmH20-8 cmH20) 8 cmH20  Mean Airway Pressure:  (7.8 uqV14-24 cmH20) 11 cmH20   Abdominal: Soft. She exhibits no distension.   Musculoskeletal: Normal range of motion. She exhibits no deformity.   Neurological:   Sedated   Skin: Skin is warm and dry.   L flank ecchymosis, improving   Psychiatric: She has a normal mood and affect. Her behavior is normal.         Significant Labs:  CBC:   Recent Labs   Lab 01/24/20  0313   WBC 11.63   RBC 3.06*   HGB 8.3*   HCT 27.3*      MCV 89   MCH 27.1   MCHC 30.4*     CMP:   Recent Labs   Lab 01/23/20  0307 01/24/20  0313    98   CALCIUM 8.3* 8.1*   ALBUMIN 1.9*  --    PROT 4.9*  --     138   K 4.4 4.0   CO2 27 28    102   BUN 13 17   CREATININE 0.7 0.8   ALKPHOS 81  --    ALT 31  --    AST 23  --    BILITOT 0.8  --        Significant Diagnostics:  I have reviewed all pertinent imaging results/findings within the past 24 hours.

## 2020-01-24 NOTE — PT/OT/SLP EVAL
Speech Language Pathology Evaluation  Bedside Swallow    Patient Name:  Marisela Bunn   MRN:  [id number]  Admitting Diagnosis: Diaphragmatic hernia    Recommendations:                 General Recommendations:  ENT evaluation, Dysphagia therapy and Modified barium swallow study  Diet recommendations:  Puree, Nectar Thick   Aspiration Precautions: 1 bite/sip at a time, Alternating bites/sips, Assistance with meals and Assistance with thickening liquids, Avoid talking while eating, HOB to 90 degrees, Meds crushed in puree, Meds whole buried in puree, Monitor for s/s of aspiration, Remain upright 30 minutes post meal, Small bites/sips and Strict aspiration precautions   General Precautions: Standard, aspiration, fall, pureed diet, nectar thick, respiratory  Communication strategies:  go to room if call light pushed    History:     Past Medical History:   Diagnosis Date    CAD (coronary artery disease)     Diaphragmatic hernia     GERD (gastroesophageal reflux disease)     Hypertension     TIA (transient ischemic attack)        Past Surgical History:   Procedure Laterality Date    REPAIR OF DIAPHRAGMATIC HERNIA Left 1/21/2020    Procedure: REPAIR, HERNIA, DIAPHRAGMATIC, Laparoscopic;  Surgeon: Lucho Garcia Jr., MD;  Location: [facility] OR [address] [place] [address];  Service: General;  Laterality: Left;    REPAIR OF DIAPHRAGMATIC HERNIA Left 1/23/2020    Procedure: REPAIR, HERNIA, DIAPHRAGMATIC;  Surgeon: Lucho Garcia Jr., MD;  Location: [facility] OR [address] [place] [address];  Service: General;  Laterality: Left;     HPI:  Marisela Bunn is a 69 y.o. female PMH CAD, HTN, TIA, GERD presents to the hospital as a direct admission for evaluation of a newly diagnosed diaphragmatic hernia. She initially presented to [place] ED on 1/18/20 for a 1-month history of a cough, intermittent fevers and dyspnea.  She had been seen by 2 urgent care physicians in the last month for the cough, with 2 different antibiotic courses given without improvement in  "cough. She reports that on 1/15 after an particularly severe coughing episode she felt a "pop" on left side, with associated severe pain and  Subsequent bruising of the left flank.     On ED presentation, patient was hemodynamically stable, H/H 12.4/40.0, breathing on RA. Labs revealed leukocytosis (19.2), lactic acid 2.5 (repeat lactic acid1.6), BMP wnl. CXR revealed left lower lobe pneumonia with possible associated pleural effusion. CT abdomen/pelvis revealed a large left diaphragmatic hernia containing fat  and bowel loops with hernia of intra-abdomnial contents outside the thorax from 7th left ICS. Medium sized HH. CTA revealed large Bochdalek hernia through defect in left hemo-diaphragm, with associated widening of 7th intercostal space. She was started on IV hydration and antibiotics (levofloxacin, zosyn). She reports she has not passed a bowel movement of flatus in 2 days. Intermittent lower quadrant "spasms" while coughing, otherwise no abdominal pain.      Pt started experiencing respiratory distress early on 1/21 and was taken urgently to the OR for diaphragmatic hernia repair. Laparoscopic diaphragmatic hernia repair with goretex mesh performed.      Pt arrived in SICU intubated, sedated, and mechanically ventilated. Pt on 0.5 of ludwig on arrival. Pt received no blood products during procedure, adequate urine output during case, 3700 ml IVF. Per anesthesia pt was an easy intubation.     Prior Intubation HX:  1/21-1/24 @ 0915    Modified Barium Swallow: none on file    Chest X-Rays: 1/24/20:   COMPARISON:  01/23/2020    FINDINGS:  Endotracheal tube remains in stable position.  Cardiomediastinal silhouette is unchanged.  There are decreased lung volumes bilaterally.  No significant change in cardiopulmonary status compared to prior examination.  No evidence of pneumothorax.      Impression     No significant detrimental interval change compared to 01/23/2020.     Prior diet: currently NPO; regular/thins " "PTA    Subjective     "I do aspirate." pt stated after SLP discussed concerns and risks of aspiration. Pt indicated her aspiration occurs due to reflux related to hernia.    Pain/Comfort:  · Pain Rating 1: 0/10    Objective:     Oral Musculature Evaluation  · Oral Musculature: WFL  · Dentition: (lower dentition intact; upper dentures at home)  · Secretion Management: adequate  · Mucosal Quality: adequate  · Mandibular Strength and Mobility: WFL  · Oral Labial Strength and Mobility: WFL  · Lingual Strength and Mobility: WFL, other (see comments)  · Velar Elevation: WFL  · Buccal Strength and Mobility: WFL  · Volitional Cough: strong, though vocal cord dysfunction suspected by quality of cough  · Volitional Swallow: elicited after delay; adequate elevation and excursion  · Voice Prior to PO Intake: hoarse; breathy; vocal cord dysfunction suspected    Bedside Swallow Eval:   Consistencies Assessed:  · Thin liquids ice x 2, 1/2 tsp x 1, full tsp x 1, cup sip x 1, straw sip x 1  · Nectar thick liquids tsp x 1, cup sip x 1, straw sip x 3  · Puree 1/2 tsp x 1, full tsp x 3  · Solids < 1/4 cracker x 1     Oral Phase:   · WFL    Pharyngeal Phase:   · throat clearing observed with ice chips, cup sip water, large cyclical straw sip of nectar thick x 1, and solid    Compensatory Strategies  · None    Treatment: Pt seen for bedside swallow evaluation this afternoon. Pt s/p extubation at 0915 this morning (intubated since 1/21).  Pt presenting with dysphonia c/b significant hoarseness, breathiness, and stridor-like sounds during inspiration.  SLP suspecting vocal cord dysfunction.  Education provided to pt regarding role of SLP, reason for swallowing evaluation, dysphagia s/p 3-day intubation, vocal cord function as it relates to airway closure/protection, suspected vocal cord dysfunction, modified diet recommendations, ongoing assessment of swallowing safety, monitoring PO tolerance, likely need for MBSS on Monday to r/o " aspiration and determine safest PO diet, and likely need for ENT consult if vocal function does not improve.  Pt demonstrated understanding, but will likely benefit from ongoing education and reinforcement.  SLP discussed assessment and recommendations with nurse and MD. White board updated.     Assessment:     Marisela Bunn is a 69 y.o. female with an SLP diagnosis of Dysphagia and dysphonia s/p intubation.      Goals:   Multidisciplinary Problems     SLP Goals        Problem: SLP Goal    Goal Priority Disciplines Outcome   SLP Goal     SLP Ongoing, Progressing   Description:  Speech Language Pathology Goals  Goals expected to be met by 1/31:  1. Pt will tolerate pureed diet and nectar thick liquids without s/s of aspiration.   2. Pt will participate in ongoing swallowing assessment to determine if safe for diet advancement and/or need for MBSS.                     Plan:     · Patient to be seen:  5 x/week   · Plan of Care expires:  02/23/20  · Plan of Care reviewed with:  patient   · SLP Follow-Up:  Yes       Discharge recommendations:  (tbd)     Time Tracking:     SLP Treatment Date:   01/24/20  Speech Start Time:  1448  Speech Stop Time:  1520     Speech Total Time (min):  32 min    Billable Minutes: Eval Swallow and Oral Function 22 and Seld Care/Home Management Training 10    CHINO Kang, CCC-SLP  01/24/2020     CHINO Kang, CCC-SLP  Speech Language Pathologist  [phone]  1/24/2020

## 2020-01-24 NOTE — CARE UPDATE
Extubated patient per MD order with team at bedside. Patient tolerated well and placed on NC. Will continue to monitor.

## 2020-01-24 NOTE — ASSESSMENT & PLAN NOTE
Neuro:  Sedation: Wean Precedex to off   Pain control: Tylenol, fentanyl, oxy    Resp:  Extubated during rounds  Tesslon, dextromethorphan, codeine to suppress cough     Sputum cultures sent, NGTD  Nebs prn    CV:  HDS  No pressors    Heme/ID:  H/H   Leukocytosis, trend WBC, improving  D/c levofloxacin  Pro calcitonin WNL     Renal:  Hernandez in place, possibly remove later in day  Strict I/Os  BUN Cr  MIVF d/c  Lasix 20 BID    FEN/GI:  NPO   Swallow study  Replace lytes PRN    Endo:  SSI    PPX:  Protonix  Lovenox    Dispo: Continue ICU care

## 2020-01-25 LAB
ANION GAP SERPL CALC-SCNC: 8 MMOL/L (ref 8–16)
ANISOCYTOSIS BLD QL SMEAR: SLIGHT
BASOPHILS # BLD AUTO: ABNORMAL K/UL (ref 0–0.2)
BASOPHILS NFR BLD: 0 % (ref 0–1.9)
BUN SERPL-MCNC: 17 MG/DL (ref 8–23)
CALCIUM SERPL-MCNC: 8.7 MG/DL (ref 8.7–10.5)
CHLORIDE SERPL-SCNC: 98 MMOL/L (ref 95–110)
CO2 SERPL-SCNC: 31 MMOL/L (ref 23–29)
CREAT SERPL-MCNC: 0.7 MG/DL (ref 0.5–1.4)
DIFFERENTIAL METHOD: ABNORMAL
EOSINOPHIL # BLD AUTO: ABNORMAL K/UL (ref 0–0.5)
EOSINOPHIL NFR BLD: 7 % (ref 0–8)
ERYTHROCYTE [DISTWIDTH] IN BLOOD BY AUTOMATED COUNT: 14.2 % (ref 11.5–14.5)
EST. GFR  (AFRICAN AMERICAN): >60 ML/MIN/1.73 M^2
EST. GFR  (NON AFRICAN AMERICAN): >60 ML/MIN/1.73 M^2
GLUCOSE SERPL-MCNC: 102 MG/DL (ref 70–110)
HCT VFR BLD AUTO: 30.5 % (ref 37–48.5)
HGB BLD-MCNC: 9.4 G/DL (ref 12–16)
HYPOCHROMIA BLD QL SMEAR: ABNORMAL
IMM GRANULOCYTES # BLD AUTO: ABNORMAL K/UL (ref 0–0.04)
IMM GRANULOCYTES NFR BLD AUTO: ABNORMAL % (ref 0–0.5)
LYMPHOCYTES # BLD AUTO: ABNORMAL K/UL (ref 1–4.8)
LYMPHOCYTES NFR BLD: 15 % (ref 18–48)
MAGNESIUM SERPL-MCNC: 1.8 MG/DL (ref 1.6–2.6)
MCH RBC QN AUTO: 27.6 PG (ref 27–31)
MCHC RBC AUTO-ENTMCNC: 30.8 G/DL (ref 32–36)
MCV RBC AUTO: 90 FL (ref 82–98)
METAMYELOCYTES NFR BLD MANUAL: 3 %
MONOCYTES # BLD AUTO: ABNORMAL K/UL (ref 0.3–1)
MONOCYTES NFR BLD: 4 % (ref 4–15)
NEUTROPHILS NFR BLD: 71 % (ref 38–73)
NRBC BLD-RTO: 0 /100 WBC
PHOSPHATE SERPL-MCNC: 3.7 MG/DL (ref 2.7–4.5)
PLATELET # BLD AUTO: 310 K/UL (ref 150–350)
PLATELET BLD QL SMEAR: ABNORMAL
PMV BLD AUTO: 8.9 FL (ref 9.2–12.9)
POCT GLUCOSE: 107 MG/DL (ref 70–110)
POCT GLUCOSE: 165 MG/DL (ref 70–110)
POCT GLUCOSE: 99 MG/DL (ref 70–110)
POLYCHROMASIA BLD QL SMEAR: ABNORMAL
POTASSIUM SERPL-SCNC: 4.1 MMOL/L (ref 3.5–5.1)
RBC # BLD AUTO: 3.4 M/UL (ref 4–5.4)
SODIUM SERPL-SCNC: 137 MMOL/L (ref 136–145)
WBC # BLD AUTO: 15.9 K/UL (ref 3.9–12.7)

## 2020-01-25 PROCEDURE — 99232 SBSQ HOSP IP/OBS MODERATE 35: CPT | Mod: ,,, | Performed by: ANESTHESIOLOGY

## 2020-01-25 PROCEDURE — 63600175 PHARM REV CODE 636 W HCPCS: Performed by: STUDENT IN AN ORGANIZED HEALTH CARE EDUCATION/TRAINING PROGRAM

## 2020-01-25 PROCEDURE — 85007 BL SMEAR W/DIFF WBC COUNT: CPT

## 2020-01-25 PROCEDURE — 94664 DEMO&/EVAL PT USE INHALER: CPT

## 2020-01-25 PROCEDURE — 27000221 HC OXYGEN, UP TO 24 HOURS

## 2020-01-25 PROCEDURE — 94761 N-INVAS EAR/PLS OXIMETRY MLT: CPT

## 2020-01-25 PROCEDURE — 80048 BASIC METABOLIC PNL TOTAL CA: CPT

## 2020-01-25 PROCEDURE — 25000003 PHARM REV CODE 250: Performed by: STUDENT IN AN ORGANIZED HEALTH CARE EDUCATION/TRAINING PROGRAM

## 2020-01-25 PROCEDURE — C9113 INJ PANTOPRAZOLE SODIUM, VIA: HCPCS | Performed by: STUDENT IN AN ORGANIZED HEALTH CARE EDUCATION/TRAINING PROGRAM

## 2020-01-25 PROCEDURE — 84100 ASSAY OF PHOSPHORUS: CPT

## 2020-01-25 PROCEDURE — 99232 PR SUBSEQUENT HOSPITAL CARE,LEVL II: ICD-10-PCS | Mod: ,,, | Performed by: ANESTHESIOLOGY

## 2020-01-25 PROCEDURE — 27000646 HC AEROBIKA DEVICE

## 2020-01-25 PROCEDURE — 85027 COMPLETE CBC AUTOMATED: CPT

## 2020-01-25 PROCEDURE — 83735 ASSAY OF MAGNESIUM: CPT

## 2020-01-25 PROCEDURE — 94640 AIRWAY INHALATION TREATMENT: CPT

## 2020-01-25 PROCEDURE — 36415 COLL VENOUS BLD VENIPUNCTURE: CPT

## 2020-01-25 PROCEDURE — 99900035 HC TECH TIME PER 15 MIN (STAT)

## 2020-01-25 PROCEDURE — 25000242 PHARM REV CODE 250 ALT 637 W/ HCPCS: Performed by: STUDENT IN AN ORGANIZED HEALTH CARE EDUCATION/TRAINING PROGRAM

## 2020-01-25 PROCEDURE — 20000000 HC ICU ROOM

## 2020-01-25 RX ORDER — IPRATROPIUM BROMIDE AND ALBUTEROL SULFATE 2.5; .5 MG/3ML; MG/3ML
3 SOLUTION RESPIRATORY (INHALATION) ONCE
Status: COMPLETED | OUTPATIENT
Start: 2020-01-25 | End: 2020-01-25

## 2020-01-25 RX ADMIN — ENOXAPARIN SODIUM 40 MG: 100 INJECTION SUBCUTANEOUS at 09:01

## 2020-01-25 RX ADMIN — ACETAMINOPHEN AND CODEINE PHOSPHATE 5 ML: 120; 12 SOLUTION ORAL at 02:01

## 2020-01-25 RX ADMIN — GUAIFENESIN AND DEXTROMETHORPHAN 5 ML: 100; 10 SYRUP ORAL at 05:01

## 2020-01-25 RX ADMIN — OXYCODONE HYDROCHLORIDE 10 MG: 10 TABLET ORAL at 03:01

## 2020-01-25 RX ADMIN — ACETAMINOPHEN 500 MG: 500 TABLET ORAL at 08:01

## 2020-01-25 RX ADMIN — BENZONATATE 100 MG: 100 CAPSULE ORAL at 09:01

## 2020-01-25 RX ADMIN — ACETAMINOPHEN AND CODEINE PHOSPHATE 5 ML: 120; 12 SOLUTION ORAL at 09:01

## 2020-01-25 RX ADMIN — GUAIFENESIN AND DEXTROMETHORPHAN 5 ML: 100; 10 SYRUP ORAL at 01:01

## 2020-01-25 RX ADMIN — MAGNESIUM SULFATE IN WATER 2 G: 40 INJECTION, SOLUTION INTRAVENOUS at 05:01

## 2020-01-25 RX ADMIN — IPRATROPIUM BROMIDE AND ALBUTEROL SULFATE 3 ML: .5; 3 SOLUTION RESPIRATORY (INHALATION) at 08:01

## 2020-01-25 RX ADMIN — IPRATROPIUM BROMIDE AND ALBUTEROL SULFATE 3 ML: .5; 3 SOLUTION RESPIRATORY (INHALATION) at 03:01

## 2020-01-25 RX ADMIN — PANTOPRAZOLE SODIUM 40 MG: 40 INJECTION, POWDER, FOR SOLUTION INTRAVENOUS at 08:01

## 2020-01-25 RX ADMIN — ACETAMINOPHEN AND CODEINE PHOSPHATE 5 ML: 120; 12 SOLUTION ORAL at 10:01

## 2020-01-25 RX ADMIN — IPRATROPIUM BROMIDE AND ALBUTEROL SULFATE 3 ML: .5; 3 SOLUTION RESPIRATORY (INHALATION) at 07:01

## 2020-01-25 RX ADMIN — ENOXAPARIN SODIUM 40 MG: 100 INJECTION SUBCUTANEOUS at 08:01

## 2020-01-25 RX ADMIN — Medication 6 MG: at 10:01

## 2020-01-25 RX ADMIN — GUAIFENESIN AND DEXTROMETHORPHAN 5 ML: 100; 10 SYRUP ORAL at 12:01

## 2020-01-25 RX ADMIN — IPRATROPIUM BROMIDE AND ALBUTEROL SULFATE 3 ML: .5; 3 SOLUTION RESPIRATORY (INHALATION) at 06:01

## 2020-01-25 RX ADMIN — FUROSEMIDE 20 MG: 10 INJECTION, SOLUTION INTRAMUSCULAR; INTRAVENOUS at 08:01

## 2020-01-25 RX ADMIN — IPRATROPIUM BROMIDE AND ALBUTEROL SULFATE 3 ML: .5; 3 SOLUTION RESPIRATORY (INHALATION) at 04:01

## 2020-01-25 RX ADMIN — BENZONATATE 100 MG: 100 CAPSULE ORAL at 01:01

## 2020-01-25 RX ADMIN — IPRATROPIUM BROMIDE AND ALBUTEROL SULFATE 3 ML: .5; 3 SOLUTION RESPIRATORY (INHALATION) at 01:01

## 2020-01-25 RX ADMIN — BENZONATATE 100 MG: 100 CAPSULE ORAL at 05:01

## 2020-01-25 RX ADMIN — FUROSEMIDE 20 MG: 10 INJECTION, SOLUTION INTRAMUSCULAR; INTRAVENOUS at 05:01

## 2020-01-25 NOTE — ASSESSMENT & PLAN NOTE
Marisela Bunn is a 69 y.o. female with PMH COPD (not on home oxygen), HTN, HLD (not on anticoagulation) received as direct admission for large diaphragmatic hernia. Patient is symptomatic from hernia with constipation and dyspnea with overlying bruise on left flank. She underwent lap diaphragmatic hernia repair with mesh on 1/21/20. She was transferred to the ICU postop.   Doing ok    - Needs aggressive respiratory tuning   - limit coughing spells as able  - Patient is on home Lasix, consider this in addition to fluids for low UOP  - Levaquin stopped   - Aggressive pulm toilet with IS, Acapella, DuoNebs, and Tessalon when extubated  - Pulm consult, appreciate recs  - PT/OT when able  - Daily labs    DISPO: SICU care for today in light of increased leukocytosis and O2 requirements. Not ready for d/c

## 2020-01-25 NOTE — SUBJECTIVE & OBJECTIVE
Interval History:   Extubated yesterday. Drain removed by us  Doing better  WBC 15 today   Low sats, increased O2 requirements    Medications:  Continuous Infusions:   dexmedetomidine (PRECEDEX) infusion Stopped (01/24/20 1700)     Scheduled Meds:   albuterol-ipratropium  3 mL Nebulization Q4H    benzonatate  100 mg Oral Q8H    dextromethorphan-guaifenesin  mg/5 ml  5 mL Oral Q6H    enoxaparin  40 mg Subcutaneous BID    furosemide  20 mg Intravenous BID    pantoprazole  40 mg Intravenous Daily     PRN Meds:acetaminophen, acetaminophen-codeine, calcium gluconate IVPB, calcium gluconate IVPB, calcium gluconate IVPB, Dextrose 10% Bolus, glucagon (human recombinant), hydrALAZINE, insulin aspart U-100, labetalol, magnesium sulfate IVPB, magnesium sulfate IVPB, melatonin, ondansetron, oxyCODONE, oxyCODONE, potassium chloride in water **AND** potassium chloride in water **AND** potassium chloride in water, sodium chloride 0.9%, sodium chloride 0.9%, sodium phosphate IVPB, sodium phosphate IVPB, sodium phosphate IVPB     Review of patient's allergies indicates:   Allergen Reactions    Erythromycin      Stomach pains    Rosuvastatin      Hives, muscle/joint pains    Zolpidem      Confusion      Objective:     Vital Signs (Most Recent):  Temp: 98.5 °F (36.9 °C) (01/25/20 0300)  Pulse: 102 (01/25/20 0600)  Resp: (!) 27 (01/25/20 0600)  BP: (!) 144/63 (01/25/20 0600)  SpO2: (!) 92 % (01/25/20 0600) Vital Signs (24h Range):  Temp:  [98.4 °F (36.9 °C)-99.1 °F (37.3 °C)] 98.5 °F (36.9 °C)  Pulse:  [] 102  Resp:  [15-46] 27  SpO2:  [91 %-100 %] 92 %  BP: ()/() 144/63  Arterial Line BP: (101-125)/(54-64) 123/64     Weight: 118.4 kg (261 lb 0.4 oz)  Body mass index is 49.32 kg/m².    Intake/Output - Last 3 Shifts       01/23 0700 - 01/24 0659 01/24 0700 - 01/25 0659    P.O.  720    I.V. (mL/kg) 773.5 (6.5) 262.5 (2.2)    IV Piggyback  300    Total Intake(mL/kg) 773.5 (6.5) 1282.5 (10.8)    Urine  (mL/kg/hr) 2545 (0.9) 2785 (1)    Other 385 105    Stool  0    Total Output 2930 2890    Net -2156.5 -1607.5          Stool Occurrence  0 x          Physical Exam   Constitutional: She appears well-developed and well-nourished. No distress.   HENT:   Head: Normocephalic and atraumatic.   Mouth/Throat: Oropharynx is clear and moist.   Eyes: Conjunctivae and EOM are normal. Right eye exhibits no discharge. Left eye exhibits no discharge.   Neck: Normal range of motion.   Cardiovascular: Normal rate and regular rhythm.   Pulmonary/Chest:   In 8 L NC  Sats 91%   Abdominal: Soft. She exhibits no distension.   Musculoskeletal: Normal range of motion. She exhibits no deformity.   Neurological:   A&O, NAD   Skin: Skin is warm and dry.   L flank ecchymosis, improving   Psychiatric: She has a normal mood and affect. Her behavior is normal.         Significant Labs:  CBC:   Recent Labs   Lab 01/25/20  0352   WBC 15.90*   RBC 3.40*   HGB 9.4*   HCT 30.5*      MCV 90   MCH 27.6   MCHC 30.8*     CMP:   Recent Labs   Lab 01/23/20  0307  01/25/20  0352      < > 102   CALCIUM 8.3*   < > 8.7   ALBUMIN 1.9*  --   --    PROT 4.9*  --   --       < > 137   K 4.4   < > 4.1   CO2 27   < > 31*      < > 98   BUN 13   < > 17   CREATININE 0.7   < > 0.7   ALKPHOS 81  --   --    ALT 31  --   --    AST 23  --   --    BILITOT 0.8  --   --     < > = values in this interval not displayed.       Significant Diagnostics:  I have reviewed all pertinent imaging results/findings within the past 24 hours.

## 2020-01-25 NOTE — PLAN OF CARE
Pt remains extubated and tolerating 8L HFNC without difficulty.  Alert and oriented with minimal c/o pain.  Continues with strong, productive cough, but minimized using pharmacy modalities.  Adequate UOP noted.  VSS, NAD noted.  Plan of care reviewed with pt, questions encouraged and addressed.  Understanding verbalized.

## 2020-01-25 NOTE — SUBJECTIVE & OBJECTIVE
Subjective: [employer] O2 requirement in early morning.     Follow-up For: Procedure(s) (LRB):  REPAIR, HERNIA, DIAPHRAGMATIC, Laparoscopic (Left)    Post-Operative Day: 4     Past Medical History:   Diagnosis Date    CAD (coronary artery disease)     Diaphragmatic hernia     GERD (gastroesophageal reflux disease)     Hypertension     TIA (transient ischemic attack)        Past Surgical History:   Procedure Laterality Date    REPAIR OF DIAPHRAGMATIC HERNIA Left 1/21/2020    Procedure: REPAIR, HERNIA, DIAPHRAGMATIC, Laparoscopic;  Surgeon: Lucho Garcia Jr., MD;  Location: [facility] OR [address] [place] [address];  Service: General;  Laterality: Left;    REPAIR OF DIAPHRAGMATIC HERNIA Left 1/23/2020    Procedure: REPAIR, HERNIA, DIAPHRAGMATIC;  Surgeon: Lucho Garcia Jr., MD;  Location: [facility] OR [address] [place] [address];  Service: General;  Laterality: Left;       Review of patient's allergies indicates:   Allergen Reactions    Erythromycin      Stomach pains    Rosuvastatin      Hives, muscle/joint pains    Zolpidem      Confusion        Family History     None        Tobacco Use    Smoking status: Not on file   Substance and Sexual Activity    Alcohol use: Not on file    Drug use: Not on file    Sexual activity: Not on file      Review of Systems   Unable to perform ROS: Intubated     Objective:     Vital Signs (Most Recent):  Temp: 98.5 °F (36.9 °C) (01/25/20 0300)  Pulse: 102 (01/25/20 0600)  Resp: (!) 27 (01/25/20 0600)  BP: (!) 144/63 (01/25/20 0600)  SpO2: (!) 92 % (01/25/20 0600) Vital Signs (24h Range):  Temp:  [98.4 °F (36.9 °C)-99.1 °F (37.3 °C)] 98.5 °F (36.9 °C)  Pulse:  [] 102  Resp:  [15-46] 27  SpO2:  [91 %-100 %] 92 %  BP: ()/() 144/63  Arterial Line BP: (102-125)/(54-64) 123/64     Weight: 116.4 kg (256 lb 9.9 oz)  Body mass index is 48.49 kg/m².      Intake/Output Summary (Last 24 hours) at 1/25/2020 0735  Last data filed at 1/25/2020 0600  Gross per 24 hour   Intake 1282.5 ml   Output 2870 ml    Net -1587.5 ml       Physical Exam   Constitutional: She appears well-developed and well-nourished.   HENT:   Head: Normocephalic and atraumatic.   Nose: Nose normal.   Eyes: Pupils are equal, round, and reactive to light. Conjunctivae are normal.   Neck: Neck supple.   Cardiovascular: Normal rate, regular rhythm and intact distal pulses.   Pulmonary/Chest:   Intubated    Abdominal:   Obese  Laparoscopic wounds, dressing c/d/i   Genitourinary:   Genitourinary Comments: Hernandez in place   Skin: Skin is warm and dry. Capillary refill takes less than 2 seconds.       Vents:  Vent Mode: Spont (01/24/20 0915)  Ventilator Initiated: Yes (01/21/20 1759)  Set Rate: 16 bmp (01/24/20 0802)  Vt Set: 375 mL (01/24/20 0802)  Pressure Support: 8 cmH20 (01/24/20 0915)  PEEP/CPAP: 5 cmH20 (01/24/20 0915)  Oxygen Concentration (%): 40 (01/24/20 0900)  Peak Airway Pressure: 13 cmH2O (01/24/20 0915)  Plateau Pressure: 18 cmH20 (01/24/20 0915)  Total Ve: 9.49 mL (01/24/20 0915)  Negative Inspiratory Force (cm H2O): -30 (01/24/20 0854)  F/VT Ratio<105 (RSBI): (!) 31.77 (01/24/20 0915)    Lines/Drains/Airways     Drain                 Urethral Catheter 01/21/20 1250 Straight-tip 16 Fr. 3 days          Peripheral Intravenous Line                 Peripheral IV - Single Lumen 01/21/20 1159 20 G Left Antecubital 3 days         Peripheral IV - Single Lumen 01/21/20 1227 16 G Left Hand 3 days                Significant Labs:    CBC/Anemia Profile:  Recent Labs   Lab 01/24/20 0313 01/25/20  0352   WBC 11.63 15.90*   HGB 8.3* 9.4*   HCT 27.3* 30.5*    310   MCV 89 90   RDW 14.6* 14.2        Chemistries:  Recent Labs   Lab 01/24/20  0313 01/25/20  0352    137   K 4.0 4.1    98   CO2 28 31*   BUN 17 17   CREATININE 0.8 0.7   CALCIUM 8.1* 8.7   MG 1.9 1.8   PHOS 3.9 3.7       Lactic Acid:   No results for input(s): LACTATE in the last 48 hours.    Significant Imaging: I have reviewed all pertinent imaging results/findings within  the past 24 hours.

## 2020-01-25 NOTE — PROGRESS NOTES
"[facility]  General Surgery  Progress Note    Subjective:     History of Present Illness:  Marisela Bunn is a 69 y.o. female with PMH CAD, HTN, TIA, GERD presents to the hospital as a direct admission for evaluation of a newly diagnosed diaphragmatic hernia. She initially presented to [place] ED on 1/18/20 for a 1-month history of a cough, intermittent fevers and dyspnea.  She had been seen by 2 urgent care physicians in the last month for the cough, with 2 different antibiotic courses given without improvement in cough. She reports that on 1/15 after an particularly severe coughing episode she felt a "pop" on left side, with associated severe pain and  Subsequent bruising of the left flank. She reports constant severe pain since that time. She is on daily ASA 81mg,     On ED presentation, patient was hemodynamically stable, H/H 12.4/40.0, breathing on RA. Labs revealed leukocytosis (19.2), lactic acid 2.5 (repeat lactic acid1.6), BMP wnl. CXR revealed left lower lobe pneumonia with possible associated pleural effusion. CT abdomen/pelvis revealed a large left diaphragmatic hernia containing fat  and bowel loops with hernia of intra-abdomnial contents outside the thorax from 7th left ICS. Medium sized HH. CTA revealed large Bochdalek hernia through defect in left hemo-diaphragm, with associated widening of 7th intercostal space. She was started on IV hydration and antibiotics (levofloxacin, zosyn). She reports she has not passed a bowel movement of flatus in 2 days. Intermittent lower quadrant "spasms" while coughing, otherwise no abdominal pain. She reports no other symptoms.    Post-Op Info:  Procedure(s) (LRB):  REPAIR, HERNIA, DIAPHRAGMATIC, Laparoscopic (Left)   4 Days Post-Op     Interval History:   Extubated yesterday. Drain removed by us  Doing better  WBC 15 today   Low sats, increased O2 requirements    Medications:  Continuous Infusions:   dexmedetomidine (PRECEDEX) infusion " Stopped (01/24/20 1700)     Scheduled Meds:   albuterol-ipratropium  3 mL Nebulization Q4H    benzonatate  100 mg Oral Q8H    dextromethorphan-guaifenesin  mg/5 ml  5 mL Oral Q6H    enoxaparin  40 mg Subcutaneous BID    furosemide  20 mg Intravenous BID    pantoprazole  40 mg Intravenous Daily     PRN Meds:acetaminophen, acetaminophen-codeine, calcium gluconate IVPB, calcium gluconate IVPB, calcium gluconate IVPB, Dextrose 10% Bolus, glucagon (human recombinant), hydrALAZINE, insulin aspart U-100, labetalol, magnesium sulfate IVPB, magnesium sulfate IVPB, melatonin, ondansetron, oxyCODONE, oxyCODONE, potassium chloride in water **AND** potassium chloride in water **AND** potassium chloride in water, sodium chloride 0.9%, sodium chloride 0.9%, sodium phosphate IVPB, sodium phosphate IVPB, sodium phosphate IVPB     Review of patient's allergies indicates:   Allergen Reactions    Erythromycin      Stomach pains    Rosuvastatin      Hives, muscle/joint pains    Zolpidem      Confusion      Objective:     Vital Signs (Most Recent):  Temp: 98.5 °F (36.9 °C) (01/25/20 0300)  Pulse: 102 (01/25/20 0600)  Resp: (!) 27 (01/25/20 0600)  BP: (!) 144/63 (01/25/20 0600)  SpO2: (!) 92 % (01/25/20 0600) Vital Signs (24h Range):  Temp:  [98.4 °F (36.9 °C)-99.1 °F (37.3 °C)] 98.5 °F (36.9 °C)  Pulse:  [] 102  Resp:  [15-46] 27  SpO2:  [91 %-100 %] 92 %  BP: ()/() 144/63  Arterial Line BP: (101-125)/(54-64) 123/64     Weight: 118.4 kg (261 lb 0.4 oz)  Body mass index is 49.32 kg/m².    Intake/Output - Last 3 Shifts       01/23 0700 - 01/24 0659 01/24 0700 - 01/25 0659    P.O.  720    I.V. (mL/kg) 773.5 (6.5) 262.5 (2.2)    IV Piggyback  300    Total Intake(mL/kg) 773.5 (6.5) 1282.5 (10.8)    Urine (mL/kg/hr) 2545 (0.9) 2785 (1)    Other 385 105    Stool  0    Total Output 2930 2890    Net -2156.5 -1607.5          Stool Occurrence  0 x          Physical Exam   Constitutional: She appears well-developed  and well-nourished. No distress.   HENT:   Head: Normocephalic and atraumatic.   Mouth/Throat: Oropharynx is clear and moist.   Eyes: Conjunctivae and EOM are normal. Right eye exhibits no discharge. Left eye exhibits no discharge.   Neck: Normal range of motion.   Cardiovascular: Normal rate and regular rhythm.   Pulmonary/Chest:   In 8 L NC  Sats 91%   Abdominal: Soft. She exhibits no distension.   Musculoskeletal: Normal range of motion. She exhibits no deformity.   Neurological:   A&O, NAD   Skin: Skin is warm and dry.   L flank ecchymosis, improving   Psychiatric: She has a normal mood and affect. Her behavior is normal.         Significant Labs:  CBC:   Recent Labs   Lab 01/25/20  0352   WBC 15.90*   RBC 3.40*   HGB 9.4*   HCT 30.5*      MCV 90   MCH 27.6   MCHC 30.8*     CMP:   Recent Labs   Lab 01/23/20  0307  01/25/20  0352      < > 102   CALCIUM 8.3*   < > 8.7   ALBUMIN 1.9*  --   --    PROT 4.9*  --   --       < > 137   K 4.4   < > 4.1   CO2 27   < > 31*      < > 98   BUN 13   < > 17   CREATININE 0.7   < > 0.7   ALKPHOS 81  --   --    ALT 31  --   --    AST 23  --   --    BILITOT 0.8  --   --     < > = values in this interval not displayed.       Significant Diagnostics:  I have reviewed all pertinent imaging results/findings within the past 24 hours.    Assessment/Plan:     * Diaphragmatic hernia  Marisela Bunn is a 69 y.o. female with PMH COPD (not on home oxygen), HTN, HLD (not on anticoagulation) received as direct admission for large diaphragmatic hernia. Patient is symptomatic from hernia with constipation and dyspnea with overlying bruise on left flank. She underwent lap diaphragmatic hernia repair with mesh on 1/21/20. She was transferred to the ICU postop.   Doing ok    - Needs aggressive respiratory tuning   - limit coughing spells as able  - Patient is on home Lasix, consider this in addition to fluids for low UOP  - Levaquin stopped   - Aggressive pulm toilet with IS,  Ren Christiansen, and Tessalon when extubated  - Pulm consult, appreciate recs  - PT/OT when able  - Daily labs    DISPO: SICU care for today in light of increased leukocytosis and O2 requirements. Not ready for d/c     Pneumonia  See principle         Sanjeev Murcia MD  General Surgery  [facility] [address]

## 2020-01-25 NOTE — ASSESSMENT & PLAN NOTE
Neuro:  Sedation: None  Pain control: Tylenol, fentanyl, oxy    Resp:  Extubated   Tesslon, dextromethorphan, codeine to suppress cough     Sputum cultures sent, MRSA and candida   Nebs prn  Acapella, IS    CV:  HDS  No pressors    Heme/ID:  H/H   Leukocytosis, trend WBC, increased      Renal:  Hernandez in place, possibly remove later in day  Strict I/Os  BUN Cr  MIVF d/c  Lasix 20 BID    FEN/GI:  Dysphagia diet  Replace lytes PRN    Endo:  SSI    PPX:  Protonix  Lovenox    Dispo: Continue ICU care

## 2020-01-25 NOTE — PROGRESS NOTES
[facility]  Critical Care - Surgery  Progress Note    Patient Name: Marisela Bunn  MRN: [id number]  Admission Date: 1/19/2020  Hospital Length of Stay: 6 days  Code Status: Full Code  Attending Provider: Lucho Garcia Jr.,*  Primary Care Provider: Primary Doctor No   Principal Problem: Diaphragmatic hernia    Subjective:     Hospital/ICU Course:  No notes on file    Subjective: [employer] O2 requirement in early morning.     Follow-up For: Procedure(s) (LRB):  REPAIR, HERNIA, DIAPHRAGMATIC, Laparoscopic (Left)    Post-Operative Day: 4     Past Medical History:   Diagnosis Date    CAD (coronary artery disease)     Diaphragmatic hernia     GERD (gastroesophageal reflux disease)     Hypertension     TIA (transient ischemic attack)        Past Surgical History:   Procedure Laterality Date    REPAIR OF DIAPHRAGMATIC HERNIA Left 1/21/2020    Procedure: REPAIR, HERNIA, DIAPHRAGMATIC, Laparoscopic;  Surgeon: Lucho Garcia Jr., MD;  Location: [facility] OR [address] [place] [address];  Service: General;  Laterality: Left;    REPAIR OF DIAPHRAGMATIC HERNIA Left 1/23/2020    Procedure: REPAIR, HERNIA, DIAPHRAGMATIC;  Surgeon: Lucho Garcia Jr., MD;  Location: [facility] OR [address] [place] [address];  Service: General;  Laterality: Left;       Review of patient's allergies indicates:   Allergen Reactions    Erythromycin      Stomach pains    Rosuvastatin      Hives, muscle/joint pains    Zolpidem      Confusion        Family History     None        Tobacco Use    Smoking status: Not on file   Substance and Sexual Activity    Alcohol use: Not on file    Drug use: Not on file    Sexual activity: Not on file      Review of Systems   Unable to perform ROS: Intubated     Objective:     Vital Signs (Most Recent):  Temp: 98.5 °F (36.9 °C) (01/25/20 0300)  Pulse: 102 (01/25/20 0600)  Resp: (!) 27 (01/25/20 0600)  BP: (!) 144/63 (01/25/20 0600)  SpO2: (!) 92 % (01/25/20 0600) Vital Signs (24h Range):  Temp:  [98.4 °F (36.9 °C)-99.1 °F  (37.3 °C)] 98.5 °F (36.9 °C)  Pulse:  [] 102  Resp:  [15-46] 27  SpO2:  [91 %-100 %] 92 %  BP: ()/() 144/63  Arterial Line BP: (102-125)/(54-64) 123/64     Weight: 116.4 kg (256 lb 9.9 oz)  Body mass index is 48.49 kg/m².      Intake/Output Summary (Last 24 hours) at 1/25/2020 0735  Last data filed at 1/25/2020 0600  Gross per 24 hour   Intake 1282.5 ml   Output 2870 ml   Net -1587.5 ml       Physical Exam   Constitutional: She appears well-developed and well-nourished.   HENT:   Head: Normocephalic and atraumatic.   Nose: Nose normal.   Eyes: Pupils are equal, round, and reactive to light. Conjunctivae are normal.   Neck: Neck supple.   Cardiovascular: Normal rate, regular rhythm and intact distal pulses.   Pulmonary/Chest:   Intubated    Abdominal:   Obese  Laparoscopic wounds, dressing c/d/i   Genitourinary:   Genitourinary Comments: Hernandez in place   Skin: Skin is warm and dry. Capillary refill takes less than 2 seconds.       Vents:  Vent Mode: Spont (01/24/20 0915)  Ventilator Initiated: Yes (01/21/20 1759)  Set Rate: 16 bmp (01/24/20 0802)  Vt Set: 375 mL (01/24/20 0802)  Pressure Support: 8 cmH20 (01/24/20 0915)  PEEP/CPAP: 5 cmH20 (01/24/20 0915)  Oxygen Concentration (%): 40 (01/24/20 0900)  Peak Airway Pressure: 13 cmH2O (01/24/20 0915)  Plateau Pressure: 18 cmH20 (01/24/20 0915)  Total Ve: 9.49 mL (01/24/20 0915)  Negative Inspiratory Force (cm H2O): -30 (01/24/20 0854)  F/VT Ratio<105 (RSBI): (!) 31.77 (01/24/20 0915)    Lines/Drains/Airways     Drain                 Urethral Catheter 01/21/20 1250 Straight-tip 16 Fr. 3 days          Peripheral Intravenous Line                 Peripheral IV - Single Lumen 01/21/20 1159 20 G Left Antecubital 3 days         Peripheral IV - Single Lumen 01/21/20 1227 16 G Left Hand 3 days                Significant Labs:    CBC/Anemia Profile:  Recent Labs   Lab 01/24/20  0313 01/25/20  0352   WBC 11.63 15.90*   HGB 8.3* 9.4*   HCT 27.3* 30.5*     310   MCV 89 90   RDW 14.6* 14.2        Chemistries:  Recent Labs   Lab 01/24/20  0313 01/25/20  0352    137   K 4.0 4.1    98   CO2 28 31*   BUN 17 17   CREATININE 0.8 0.7   CALCIUM 8.1* 8.7   MG 1.9 1.8   PHOS 3.9 3.7       Lactic Acid:   No results for input(s): LACTATE in the last 48 hours.    Significant Imaging: I have reviewed all pertinent imaging results/findings within the past 24 hours.    Assessment/Plan:     * Diaphragmatic hernia  Neuro:  Sedation: None  Pain control: Tylenol, fentanyl, oxy    Resp:  Extubated   Tesslon, dextromethorphan, codeine to suppress cough     Sputum cultures sent, MRSA and candida   Nebs prn  Acapella, IS    CV:  HDS  No pressors    Heme/ID:  H/H   Leukocytosis, trend WBC, increased      Renal:  Hernandez in place, possibly remove later in day  Strict I/Os  BUN Cr  MIVF d/c  Lasix 20 BID    FEN/GI:  Dysphagia diet  Replace lytes PRN    Endo:  SSI    PPX:  Protonix  Lovenox    Dispo: Continue ICU care             Critical care was time spent personally by me on the following activities: development of treatment plan with patient or surrogate and bedside caregivers, discussions with consultants, evaluation of patient's response to treatment, examination of patient, ordering and performing treatments and interventions, ordering and review of laboratory studies, ordering and review of radiographic studies, pulse oximetry, re-evaluation of patient's condition.  This critical care time did not overlap with that of any other provider or involve time for any procedures.     Desmond Rock MD  Critical Care - Surgery  [facility]

## 2020-01-26 LAB
ANION GAP SERPL CALC-SCNC: 8 MMOL/L (ref 8–16)
ANISOCYTOSIS BLD QL SMEAR: SLIGHT
BASOPHILS NFR BLD: 0 % (ref 0–1.9)
BUN SERPL-MCNC: 13 MG/DL (ref 8–23)
CALCIUM SERPL-MCNC: 8.5 MG/DL (ref 8.7–10.5)
CHLORIDE SERPL-SCNC: 95 MMOL/L (ref 95–110)
CO2 SERPL-SCNC: 32 MMOL/L (ref 23–29)
CREAT SERPL-MCNC: 0.7 MG/DL (ref 0.5–1.4)
DIFFERENTIAL METHOD: ABNORMAL
EOSINOPHIL NFR BLD: 2 % (ref 0–8)
ERYTHROCYTE [DISTWIDTH] IN BLOOD BY AUTOMATED COUNT: 14.2 % (ref 11.5–14.5)
EST. GFR  (AFRICAN AMERICAN): >60 ML/MIN/1.73 M^2
EST. GFR  (NON AFRICAN AMERICAN): >60 ML/MIN/1.73 M^2
GLUCOSE SERPL-MCNC: 125 MG/DL (ref 70–110)
HCT VFR BLD AUTO: 30.1 % (ref 37–48.5)
HGB BLD-MCNC: 9 G/DL (ref 12–16)
HYPOCHROMIA BLD QL SMEAR: ABNORMAL
IMM GRANULOCYTES # BLD AUTO: ABNORMAL K/UL (ref 0–0.04)
IMM GRANULOCYTES NFR BLD AUTO: ABNORMAL % (ref 0–0.5)
LYMPHOCYTES NFR BLD: 8 % (ref 18–48)
MAGNESIUM SERPL-MCNC: 1.9 MG/DL (ref 1.6–2.6)
MCH RBC QN AUTO: 27.3 PG (ref 27–31)
MCHC RBC AUTO-ENTMCNC: 29.9 G/DL (ref 32–36)
MCV RBC AUTO: 91 FL (ref 82–98)
METAMYELOCYTES NFR BLD MANUAL: 1 %
MONOCYTES NFR BLD: 11 % (ref 4–15)
MYELOCYTES NFR BLD MANUAL: 2 %
NEUTROPHILS NFR BLD: 75 % (ref 38–73)
NEUTS BAND NFR BLD MANUAL: 1 %
NRBC BLD-RTO: 0 /100 WBC
PHOSPHATE SERPL-MCNC: 3.4 MG/DL (ref 2.7–4.5)
PLATELET # BLD AUTO: 308 K/UL (ref 150–350)
PLATELET BLD QL SMEAR: ABNORMAL
PMV BLD AUTO: 9.1 FL (ref 9.2–12.9)
POCT GLUCOSE: 105 MG/DL (ref 70–110)
POCT GLUCOSE: 120 MG/DL (ref 70–110)
POCT GLUCOSE: 123 MG/DL (ref 70–110)
POCT GLUCOSE: 123 MG/DL (ref 70–110)
POIKILOCYTOSIS BLD QL SMEAR: SLIGHT
POLYCHROMASIA BLD QL SMEAR: ABNORMAL
POTASSIUM SERPL-SCNC: 4 MMOL/L (ref 3.5–5.1)
RBC # BLD AUTO: 3.3 M/UL (ref 4–5.4)
SODIUM SERPL-SCNC: 135 MMOL/L (ref 136–145)
WBC # BLD AUTO: 18.03 K/UL (ref 3.9–12.7)

## 2020-01-26 PROCEDURE — 94667 MNPJ CHEST WALL 1ST: CPT

## 2020-01-26 PROCEDURE — 99232 PR SUBSEQUENT HOSPITAL CARE,LEVL II: ICD-10-PCS | Mod: ,,, | Performed by: ANESTHESIOLOGY

## 2020-01-26 PROCEDURE — 84100 ASSAY OF PHOSPHORUS: CPT

## 2020-01-26 PROCEDURE — 27000221 HC OXYGEN, UP TO 24 HOURS

## 2020-01-26 PROCEDURE — 94668 MNPJ CHEST WALL SBSQ: CPT

## 2020-01-26 PROCEDURE — 25000003 PHARM REV CODE 250: Performed by: STUDENT IN AN ORGANIZED HEALTH CARE EDUCATION/TRAINING PROGRAM

## 2020-01-26 PROCEDURE — 97168 OT RE-EVAL EST PLAN CARE: CPT

## 2020-01-26 PROCEDURE — C9113 INJ PANTOPRAZOLE SODIUM, VIA: HCPCS | Performed by: STUDENT IN AN ORGANIZED HEALTH CARE EDUCATION/TRAINING PROGRAM

## 2020-01-26 PROCEDURE — 25000242 PHARM REV CODE 250 ALT 637 W/ HCPCS: Performed by: STUDENT IN AN ORGANIZED HEALTH CARE EDUCATION/TRAINING PROGRAM

## 2020-01-26 PROCEDURE — 85007 BL SMEAR W/DIFF WBC COUNT: CPT

## 2020-01-26 PROCEDURE — 97535 SELF CARE MNGMENT TRAINING: CPT

## 2020-01-26 PROCEDURE — 83735 ASSAY OF MAGNESIUM: CPT

## 2020-01-26 PROCEDURE — 99900026 HC AIRWAY MAINTENANCE (STAT)

## 2020-01-26 PROCEDURE — 94761 N-INVAS EAR/PLS OXIMETRY MLT: CPT

## 2020-01-26 PROCEDURE — 97116 GAIT TRAINING THERAPY: CPT

## 2020-01-26 PROCEDURE — 63600175 PHARM REV CODE 636 W HCPCS: Performed by: STUDENT IN AN ORGANIZED HEALTH CARE EDUCATION/TRAINING PROGRAM

## 2020-01-26 PROCEDURE — 99232 SBSQ HOSP IP/OBS MODERATE 35: CPT | Mod: ,,, | Performed by: ANESTHESIOLOGY

## 2020-01-26 PROCEDURE — 20000000 HC ICU ROOM

## 2020-01-26 PROCEDURE — 94640 AIRWAY INHALATION TREATMENT: CPT

## 2020-01-26 PROCEDURE — 92526 ORAL FUNCTION THERAPY: CPT

## 2020-01-26 PROCEDURE — 80048 BASIC METABOLIC PNL TOTAL CA: CPT

## 2020-01-26 PROCEDURE — 85027 COMPLETE CBC AUTOMATED: CPT

## 2020-01-26 PROCEDURE — 97161 PT EVAL LOW COMPLEX 20 MIN: CPT

## 2020-01-26 RX ORDER — SULFAMETHOXAZOLE AND TRIMETHOPRIM 800; 160 MG/1; MG/1
1 TABLET ORAL 2 TIMES DAILY
Status: COMPLETED | OUTPATIENT
Start: 2020-01-26 | End: 2020-02-01

## 2020-01-26 RX ORDER — FUROSEMIDE 10 MG/ML
20 INJECTION INTRAMUSCULAR; INTRAVENOUS 2 TIMES DAILY
Status: DISCONTINUED | OUTPATIENT
Start: 2020-01-26 | End: 2020-01-28

## 2020-01-26 RX ORDER — FUROSEMIDE 10 MG/ML
40 INJECTION INTRAMUSCULAR; INTRAVENOUS ONCE
Status: COMPLETED | OUTPATIENT
Start: 2020-01-26 | End: 2020-01-26

## 2020-01-26 RX ORDER — FLUCONAZOLE 200 MG/1
200 TABLET ORAL DAILY
Status: DISCONTINUED | OUTPATIENT
Start: 2020-01-26 | End: 2020-01-27

## 2020-01-26 RX ORDER — ACETYLCYSTEINE 100 MG/ML
4 SOLUTION ORAL; RESPIRATORY (INHALATION) 3 TIMES DAILY
Status: DISCONTINUED | OUTPATIENT
Start: 2020-01-26 | End: 2020-01-26

## 2020-01-26 RX ORDER — LEVOFLOXACIN 5 MG/ML
750 INJECTION, SOLUTION INTRAVENOUS
Status: DISCONTINUED | OUTPATIENT
Start: 2020-01-26 | End: 2020-01-27

## 2020-01-26 RX ORDER — ACETYLCYSTEINE 100 MG/ML
4 SOLUTION ORAL; RESPIRATORY (INHALATION) 3 TIMES DAILY
Status: DISCONTINUED | OUTPATIENT
Start: 2020-01-26 | End: 2020-02-08

## 2020-01-26 RX ADMIN — IPRATROPIUM BROMIDE AND ALBUTEROL SULFATE 3 ML: .5; 3 SOLUTION RESPIRATORY (INHALATION) at 08:01

## 2020-01-26 RX ADMIN — ACETAMINOPHEN AND CODEINE PHOSPHATE 5 ML: 120; 12 SOLUTION ORAL at 12:01

## 2020-01-26 RX ADMIN — ACETYLCYSTEINE 4 ML: 100 SOLUTION ORAL; RESPIRATORY (INHALATION) at 03:01

## 2020-01-26 RX ADMIN — LEVOFLOXACIN 750 MG: 750 INJECTION, SOLUTION INTRAVENOUS at 06:01

## 2020-01-26 RX ADMIN — RACEPINEPHRINE HYDROCHLORIDE 0.5 ML: 11.25 SOLUTION RESPIRATORY (INHALATION) at 12:01

## 2020-01-26 RX ADMIN — SULFAMETHOXAZOLE AND TRIMETHOPRIM 1 TABLET: 800; 160 TABLET ORAL at 06:01

## 2020-01-26 RX ADMIN — GUAIFENESIN AND DEXTROMETHORPHAN 5 ML: 100; 10 SYRUP ORAL at 06:01

## 2020-01-26 RX ADMIN — PANTOPRAZOLE SODIUM 40 MG: 40 INJECTION, POWDER, FOR SOLUTION INTRAVENOUS at 08:01

## 2020-01-26 RX ADMIN — OXYCODONE HYDROCHLORIDE 5 MG: 5 TABLET ORAL at 09:01

## 2020-01-26 RX ADMIN — IPRATROPIUM BROMIDE AND ALBUTEROL SULFATE 3 ML: .5; 3 SOLUTION RESPIRATORY (INHALATION) at 03:01

## 2020-01-26 RX ADMIN — IPRATROPIUM BROMIDE AND ALBUTEROL SULFATE 3 ML: .5; 3 SOLUTION RESPIRATORY (INHALATION) at 11:01

## 2020-01-26 RX ADMIN — IPRATROPIUM BROMIDE AND ALBUTEROL SULFATE 3 ML: .5; 3 SOLUTION RESPIRATORY (INHALATION) at 12:01

## 2020-01-26 RX ADMIN — BENZONATATE 100 MG: 100 CAPSULE ORAL at 09:01

## 2020-01-26 RX ADMIN — ACETAMINOPHEN AND CODEINE PHOSPHATE 5 ML: 120; 12 SOLUTION ORAL at 08:01

## 2020-01-26 RX ADMIN — FLUCONAZOLE 200 MG: 200 TABLET ORAL at 12:01

## 2020-01-26 RX ADMIN — IPRATROPIUM BROMIDE AND ALBUTEROL SULFATE 3 ML: .5; 3 SOLUTION RESPIRATORY (INHALATION) at 07:01

## 2020-01-26 RX ADMIN — FUROSEMIDE 20 MG: 10 INJECTION, SOLUTION INTRAMUSCULAR; INTRAVENOUS at 06:01

## 2020-01-26 RX ADMIN — ACETYLCYSTEINE 4 ML: 100 SOLUTION ORAL; RESPIRATORY (INHALATION) at 07:01

## 2020-01-26 RX ADMIN — ENOXAPARIN SODIUM 40 MG: 100 INJECTION SUBCUTANEOUS at 09:01

## 2020-01-26 RX ADMIN — ONDANSETRON 8 MG: 8 TABLET, ORALLY DISINTEGRATING ORAL at 02:01

## 2020-01-26 RX ADMIN — GUAIFENESIN AND DEXTROMETHORPHAN 5 ML: 100; 10 SYRUP ORAL at 03:01

## 2020-01-26 RX ADMIN — FUROSEMIDE 40 MG: 10 INJECTION, SOLUTION INTRAMUSCULAR; INTRAVENOUS at 08:01

## 2020-01-26 RX ADMIN — ENOXAPARIN SODIUM 40 MG: 100 INJECTION SUBCUTANEOUS at 08:01

## 2020-01-26 RX ADMIN — SULFAMETHOXAZOLE AND TRIMETHOPRIM 1 TABLET: 800; 160 TABLET ORAL at 09:01

## 2020-01-26 RX ADMIN — BENZONATATE 100 MG: 100 CAPSULE ORAL at 06:01

## 2020-01-26 RX ADMIN — GUAIFENESIN AND DEXTROMETHORPHAN 5 ML: 100; 10 SYRUP ORAL at 12:01

## 2020-01-26 RX ADMIN — BENZONATATE 100 MG: 100 CAPSULE ORAL at 03:01

## 2020-01-26 RX ADMIN — GUAIFENESIN AND DEXTROMETHORPHAN 5 ML: 100; 10 SYRUP ORAL at 11:01

## 2020-01-26 NOTE — PLAN OF CARE
Problem: Occupational Therapy Goal  Goal: Occupational Therapy Goal  Description  Goals to be met by: 2/9/20    Patient will increase functional independence with ADLs by performing:    UE Dressing with SBA.  LE Dressing with  SBA .  Grooming while standing with SBA  Toileting from bedside commode withSBA for hygiene and clothing management.   Toilet transfer to bedside commode with SBA       Outcome: Ongoing, Progressing   OT re-eval completed, and above goals established. Olga Cerda, LOTR  1/26/2020

## 2020-01-26 NOTE — PLAN OF CARE
Problem: SLP Goal  Goal: SLP Goal  Description  Speech Language Pathology Goals  Goals expected to be met by 1/31:  1. Pt will tolerate pureed diet and nectar thick liquids without s/s of aspiration.   2. Pt will participate in ongoing swallowing assessment to determine if safe for diet advancement and/or need for MBSS.    Outcome: Ongoing, Progressing  Patient seen for ongoing swallowing assessment. Recommend: Mechanical soft diet, thin liquids, no straws, close monitoring, and strict aspiration precautions. ST will continue to follow.   MAURICE Colón., CCC-SLP  Pager: [phone]  01/26/2020

## 2020-01-26 NOTE — SUBJECTIVE & OBJECTIVE
Interval History/Significant Events: Pts oxygen requirements increased from 8 to 10L NC overnight. Pt did not tolerate comfort flow. Respiratory cultures showed MRSA and candida.     Follow-up For: Procedure(s) (LRB):  REPAIR, HERNIA, DIAPHRAGMATIC, Laparoscopic (Left)    Post-Operative Day: 5 Days Post-Op    Objective:     Vital Signs (Most Recent):  Temp: 97.8 °F (36.6 °C) (01/26/20 0300)  Pulse: 103 (01/26/20 1000)  Resp: 18 (01/26/20 1000)  BP: 104/65 (01/26/20 0900)  SpO2: (!) 91 % (01/26/20 1000) Vital Signs (24h Range):  Temp:  [97.8 °F (36.6 °C)-99.1 °F (37.3 °C)] 97.8 °F (36.6 °C)  Pulse:  [] 103  Resp:  [17-94] 18  SpO2:  [87 %-98 %] 91 %  BP: ()/(55-71) 104/65     Weight: 116.4 kg (256 lb 9.9 oz)  Body mass index is 48.49 kg/m².      Intake/Output Summary (Last 24 hours) at 1/26/2020 1128  Last data filed at 1/26/2020 0745  Gross per 24 hour   Intake 870 ml   Output 2730 ml   Net -1860 ml       Physical Exam   Constitutional: She appears well-developed and well-nourished.   HENT:   Head: Normocephalic and atraumatic.   Nose: Nose normal.   Eyes: Pupils are equal, round, and reactive to light. Conjunctivae are normal.   Neck: Neck supple.   Cardiovascular: Normal rate, regular rhythm and intact distal pulses.   Pulmonary/Chest:   Extubated to NC   Abdominal:   Obese  Laparoscopic wounds, dressing c/d/i   Skin: Skin is warm and dry. Capillary refill takes less than 2 seconds.       Vents:  Vent Mode: Spont (01/24/20 0915)  Ventilator Initiated: Yes (01/21/20 1759)  Set Rate: 16 bmp (01/24/20 0802)  Vt Set: 375 mL (01/24/20 0802)  Pressure Support: 8 cmH20 (01/24/20 0915)  PEEP/CPAP: 5 cmH20 (01/24/20 0915)  Oxygen Concentration (%): 40 (01/24/20 0900)  Peak Airway Pressure: 13 cmH2O (01/24/20 0915)  Plateau Pressure: 18 cmH20 (01/24/20 0915)  Total Ve: 9.49 mL (01/24/20 0915)  Negative Inspiratory Force (cm H2O): -30 (01/24/20 0854)  F/VT Ratio<105 (RSBI): (!) 31.77 (01/24/20  0915)    Lines/Drains/Airways     Drain            Female External Urinary Catheter 01/25/20 1 day          Peripheral Intravenous Line                 Peripheral IV - Single Lumen 01/25/20 1130 18 G Right Wrist less than 1 day         Peripheral IV - Single Lumen 01/25/20 1130 20 G Right Hand less than 1 day                Significant Labs:    CBC/Anemia Profile:  Recent Labs   Lab 01/25/20  0352 01/26/20  0408   WBC 15.90* 18.03*   HGB 9.4* 9.0*   HCT 30.5* 30.1*    308   MCV 90 91   RDW 14.2 14.2        Chemistries:  Recent Labs   Lab 01/25/20  0352 01/26/20  0408    135*   K 4.1 4.0   CL 98 95   CO2 31* 32*   BUN 17 13   CREATININE 0.7 0.7   CALCIUM 8.7 8.5*   MG 1.8 1.9   PHOS 3.7 3.4

## 2020-01-26 NOTE — PROGRESS NOTES
[facility]  Critical Care - Surgery  Progress Note    Patient Name: Marisela Bunn  MRN: [id number]  Admission Date: 1/19/2020  Hospital Length of Stay: 7 days  Code Status: Full Code  Attending Provider: Lucho Garcia Jr.,*  Primary Care Provider: Primary Doctor No   Principal Problem: Diaphragmatic hernia    Subjective:     Interval History/Significant Events: Pts oxygen requirements increased from 8 to 10L NC overnight. Pt did not tolerate comfort flow. Respiratory cultures showed MRSA and candida.     Follow-up For: Procedure(s) (LRB):  REPAIR, HERNIA, DIAPHRAGMATIC, Laparoscopic (Left)    Post-Operative Day: 5 Days Post-Op    Objective:     Vital Signs (Most Recent):  Temp: 97.8 °F (36.6 °C) (01/26/20 0300)  Pulse: 103 (01/26/20 1000)  Resp: 18 (01/26/20 1000)  BP: 104/65 (01/26/20 0900)  SpO2: (!) 91 % (01/26/20 1000) Vital Signs (24h Range):  Temp:  [97.8 °F (36.6 °C)-99.1 °F (37.3 °C)] 97.8 °F (36.6 °C)  Pulse:  [] 103  Resp:  [17-94] 18  SpO2:  [87 %-98 %] 91 %  BP: ()/(55-71) 104/65     Weight: 116.4 kg (256 lb 9.9 oz)  Body mass index is 48.49 kg/m².      Intake/Output Summary (Last 24 hours) at 1/26/2020 1128  Last data filed at 1/26/2020 0745  Gross per 24 hour   Intake 870 ml   Output 2730 ml   Net -1860 ml       Physical Exam   Constitutional: She appears well-developed and well-nourished.   HENT:   Head: Normocephalic and atraumatic.   Nose: Nose normal.   Eyes: Pupils are equal, round, and reactive to light. Conjunctivae are normal.   Neck: Neck supple.   Cardiovascular: Normal rate, regular rhythm and intact distal pulses.   Pulmonary/Chest:   Extubated to NC   Abdominal:   Obese  Laparoscopic wounds, dressing c/d/i   Skin: Skin is warm and dry. Capillary refill takes less than 2 seconds.       Vents:  Vent Mode: Spont (01/24/20 0915)  Ventilator Initiated: Yes (01/21/20 1759)  Set Rate: 16 bmp (01/24/20 0802)  Vt Set: 375 mL (01/24/20 0802)  Pressure Support: 8  cmH20 (01/24/20 0915)  PEEP/CPAP: 5 cmH20 (01/24/20 0915)  Oxygen Concentration (%): 40 (01/24/20 0900)  Peak Airway Pressure: 13 cmH2O (01/24/20 0915)  Plateau Pressure: 18 cmH20 (01/24/20 0915)  Total Ve: 9.49 mL (01/24/20 0915)  Negative Inspiratory Force (cm H2O): -30 (01/24/20 0854)  F/VT Ratio<105 (RSBI): (!) 31.77 (01/24/20 0915)    Lines/Drains/Airways     Drain            Female External Urinary Catheter 01/25/20 1 day          Peripheral Intravenous Line                 Peripheral IV - Single Lumen 01/25/20 1130 18 G Right Wrist less than 1 day         Peripheral IV - Single Lumen 01/25/20 1130 20 G Right Hand less than 1 day                Significant Labs:    CBC/Anemia Profile:  Recent Labs   Lab 01/25/20  0352 01/26/20  0408   WBC 15.90* 18.03*   HGB 9.4* 9.0*   HCT 30.5* 30.1*    308   MCV 90 91   RDW 14.2 14.2        Chemistries:  Recent Labs   Lab 01/25/20  0352 01/26/20  0408    135*   K 4.1 4.0   CL 98 95   CO2 31* 32*   BUN 17 13   CREATININE 0.7 0.7   CALCIUM 8.7 8.5*   MG 1.8 1.9   PHOS 3.7 3.4           Assessment/Plan:     * Diaphragmatic hernia  Neuro:  Sedation: None  Pain control: Tylenol, fentanyl, oxy    Resp:  Extubated   Tesslon, dextromethorphan, codeine to suppress cough     Sputum cultures sent, MRSA and candida   Nebs prn  Acapella, IS  Continue to monitor oxygen requirements  Respiratory cultures showed MRSA and candida. Levofloxacin and bactrim restarted per surgery.   Fluconazole started for candida.       CV:  HDS  No pressors    Heme/ID:  H/H   Leukocytosis, trend WBC, increased      Renal:  Hernandez in place, possibly remove later in day  Strict I/Os  BUN Cr  MIVF d/c  Lasix 20 BID    FEN/GI:  Dysphagia diet  Replace lytes PRN    Endo:  SSI    PPX:  Protonix  Lovenox    Dispo: Continue ICU care           Critical Care Daily Checklist:    A: Awake: RASS Goal/Actual Goal:    Actual: Jane Agitation Sedation Scale (RASS): Alert and calm   B: Spontaneous Breathing  Trial Performed? Spon. Breathing Trial Initiated?: Initiated (01/24/20 0802)   C: SAT & SBT Coordinated?  NC                      D: Delirium: CAM-ICU Overall CAM-ICU: Negative   E: Early Mobility Performed? Yes   F: Feeding Goal:    Status:     Current Diet Order   Procedures    Diet Dysphagia Pureed (IDDSI Level 4) [facility]; Isolation Tray - Regular [place]; Nectar Thick     Order Specific Question:   Indicate patient location for additional diet options:     Answer:   [facility]     Order Specific Question:   Tray type:     Answer:   Isolation Tray - Regular [place]     Order Specific Question:   Fluid consistency:     Answer:   Nectar Thick      AS: Analgesia/Sedation stable   T: Thromboembolic Prophylaxis SCDs, enoxaparin   H: HOB > 300 Yes   U: Stress Ulcer Prophylaxis (if needed) PPI   G: Glucose Control stable   B: Bowel Function Stool Occurrence: 0   I: Indwelling Catheter (Lines & Hernandez) Necessity    D: De-escalation of Antimicrobials/Pharmacotherapies Will discuss levofloxacin + bactrim with gen surg.    Plan for the day/ETD Continue ICU care    Code Status:  Family/Goals of Care: Full Code              Critical care was time spent personally by me on the following activities: development of treatment plan with patient or surrogate and bedside caregivers, discussions with consultants, evaluation of patient's response to treatment, examination of patient, ordering and performing treatments and interventions, ordering and review of laboratory studies, ordering and review of radiographic studies, pulse oximetry, re-evaluation of patient's condition.  This critical care time did not overlap with that of any other provider or involve time for any procedures.     Jordan Douglas MD  Critical Care - Surgery  [facility]

## 2020-01-26 NOTE — PT/OT/SLP EVAL
Physical Therapy Evaluation and treatment    Patient Name:  Marisela Bunn   MRN:  [id number]    Recommendations:     Discharge Recommendations:  nursing facility, skilled   Discharge Equipment Recommendations: (will determine DME needs closer to discharge)   Barriers to discharge: Decreased caregiver support family may not be able to assist at current functional level.     Assessment:     Marisela Bunn is a 69 y.o. female admitted with a medical diagnosis of Diaphragmatic hernia.  She presents with the following impairments/functional limitations:  impaired functional mobilty, decreased lower extremity function, impaired balance, impaired endurance, gait instability  Pt efren treatment well but decreased tolerance to activities decreased functional mobility. Pt will benefit from skilled PT 4x/wk to progress physically. Pt will need SNF placement when medically stable to maximize rehab potential. Pt is s/p diaphragmatic hernia repair 1/21/20.    Rehab Prognosis: Good; patient would benefit from acute skilled PT services to address these deficits and reach maximum level of function.    Recent Surgery: Procedure(s) (LRB):  REPAIR, HERNIA, DIAPHRAGMATIC, Laparoscopic (Left) 5 Days Post-Op    Plan:     During this hospitalization, patient to be seen 4 x/week to address the identified rehab impairments via gait training, therapeutic activities, therapeutic exercises and progress toward the following goals:    · Plan of Care Expires:  02/25/20    Subjective     Chief Complaint: pt c/o being tired and SOB with treatment.   Patient/Family Comments/goals:  To get better and go home.   Pain/Comfort:  · Pain Rating 1: 3/10(L flank)  · Pain Rating Post-Intervention 1: 3/10    Patients cultural, spiritual, Yazdanism conflicts given the current situation: no    Living Environment:  Pt lives alone in mobile home with ramp entrance.  Prior to admission, patients level of function was modified Independent using rollator prior to admit.    Equipment used at home: rollator, bedside commode, wheelchair, bath bench.  DME owned (not currently used): none.  Upon discharge, patient will have assistance from family .    Objective:     Communicated with nurse prior to session.  Patient found up in chair with telemetry, pulse ox (continuous), blood pressure cuff, oxygen, PureWick, peripheral IV  upon PT entry to room.    General Precautions: Standard, fall, contact   Orthopedic Precautions:    Braces:       Exams:  · Cognitive Exam:  Patient is oriented to Person, Place, Time and Situation  · RLE ROM: WFL  · RLE Strength: WFL  · LLE ROM: WFL  · LLE Strength: WFL    Functional Mobility:  · Transfers:  Pt needed verbal cues for hand placement and sequencing for functional mobility.    · Sit to Stand:  minimum assistance with hand-held assist from chair and moderate assistance from toilet  ·   · Gait: pt received gait training ~ 14 ft with moderate assistance. pt had O2 intact. pt had to sit on toilet to perform grooming with OT.       Therapeutic Activities and Exercises:   pt and family received verbal instructions in PT POC and all verbally expressed understanding of such.     AM-PAC 6 CLICK MOBILITY  Total Score:12     Patient left up in chair with all lines intact, call button in reach and son and daughter in law present.    GOALS:   Multidisciplinary Problems     Physical Therapy Goals        Problem: Physical Therapy Goal    Goal Priority Disciplines Outcome Goal Variances Interventions   Physical Therapy Goal     PT, PT/OT Ongoing, Progressing     Description:  Goals to be met by:20    Patient will increase functional independence with mobility by performin. Supine to sit with Contact Guard Assistance -not met  2. Sit to stand transfer with Contact Guard Assistance -not met  3. Gait  x 100 feet with Contact Guard Assistance using Rolling Walker. -not met  4. Lower extremity exercise program x15 reps  with supervision -not met                       History:     Past Medical History:   Diagnosis Date    CAD (coronary artery disease)     Diaphragmatic hernia     GERD (gastroesophageal reflux disease)     Hypertension     TIA (transient ischemic attack)        Past Surgical History:   Procedure Laterality Date    REPAIR OF DIAPHRAGMATIC HERNIA Left 1/21/2020    Procedure: REPAIR, HERNIA, DIAPHRAGMATIC, Laparoscopic;  Surgeon: Lucho Garcia Jr., MD;  Location: [address];  Service: General;  Laterality: Left;    REPAIR OF DIAPHRAGMATIC HERNIA Left 1/23/2020    Procedure: REPAIR, HERNIA, DIAPHRAGMATIC;  Surgeon: Lucho Garcia Jr., MD;  Location: [facility] OR [address] [place] [address];  Service: General;  Laterality: Left;       Time Tracking:     PT Received On: 01/26/20  PT Start Time: 0918     PT Stop Time: 0942  PT Total Time (min): 24 min     Billable Minutes: Evaluation 10 min and Gait Training 14 min      Jacinda Smith, PT  01/26/2020

## 2020-01-26 NOTE — PT/OT/SLP RE-EVAL
Occupational Therapy   Re-evaluation    Name: Marisela Bunn  MRN: [id number]  Admitting Diagnosis:  Diaphragmatic hernia 5 Days Post-Op    Recommendations:     Discharge Recommendations: nursing facility, skilled  Discharge Equipment Recommendations:  (TBD)  Barriers to discharge:  None    Assessment:     Marisela Bunn is a 69 y.o. female with a medical diagnosis of Diaphragmatic hernia.  She presents s/p diaphragmatic hernia repair 1/21 with SOB.  Performance deficits affecting function are weakness, impaired self care skills, impaired functional mobilty, gait instability, impaired balance, impaired cardiopulmonary response to activity.      Rehab Prognosis:  Good; patient would benefit from acute skilled OT services to address these deficits and reach maximum level of function.       Plan:     Patient to be seen 4 x/week to address the above listed problems via self-care/home management, therapeutic activities, therapeutic exercises  · Plan of Care Expires: 02/25/20  · Plan of Care Reviewed with: patient, family    Subjective     Chief Complaint: SOB  Patient/Family stated goals: to get better and go home  Communicated with: RN prior to session.  Pain/Comfort:  · Pain Rating 1: 3/10  · Location - Side 1: Left  · Location - Orientation 1: generalized  · Location 1: flank  · Pain Addressed 1: Reposition, Distraction  · Pain Rating Post-Intervention 1: 3/10    Objective:     Communicated with: RN prior to session.  Patient found up in chair with: blood pressure cuff, pulse ox (continuous), telemetry, peripheral IV, oxygen, PureWick(HFNC) upon OT entry to room.    General Precautions: Standard, fall, contact   Orthopedic Precautions:N/A   Braces:       Occupational Performance:    Bed Mobility:    · NT    Functional Mobility/Transfers:  · Patient completed Sit <> Stand Transfer with minimum assistance  with  no assistive device from b/s chair   · Patient completed Toilet Transfer Step Transfer and sit<>stand technique  with moderate assistance with  no AD  · Functional Mobility: Moderate A to/from bathroom    Activities of Daily Living:  · Feeding:  set-up A    · Grooming: minimum assistance seated at sink; A with task 2* increased SOB with activity  · Upper Body Dressing: moderate assistance    · Lower Body Dressing: maximal assistance      Cognitive/Visual Perceptual:  Oriented to: Person, Place, Time and Situation  Follows Commands/attention: Follows multistep  commands  Communication: clear/fluent  Memory:  No Deficits noted  Safety awareness/insight to disability: intact  Coping skills/emotional control: Appropriate to situation    Physical Exam:  Postural examination/scapula alignment:    -       No postural abnormalities identified  Sensation:    -       Intact  Upper Extremity Range of Motion:     -       Right Upper Extremity: WFL  -       Left Upper Extremity: WFL  Upper Extremity Strength:    -       Right Upper Extremity: WFL  -       Left Upper Extremity: WFL   Strength:    -       Right Upper Extremity: WFL  -       Left Upper Extremity: WFL  Fine Motor Coordination:    -       Intact  Gross motor coordination:   WFL    AMPAC 6 Click:  AMPAC Total Score: 14    Treatment & Education:  PEducation:  t ed on OT POC  Pt ed on pursed lip breathing 2* SOB  Pt performed mobility to sink with Mod A and B HHA; pt unable to tolerate self-care tasks in standing    Patient left up in chair with all lines intact, call button in reach, RN notified and family present    GOALS:   Multidisciplinary Problems     Occupational Therapy Goals        Problem: Occupational Therapy Goal    Goal Priority Disciplines Outcome Interventions   Occupational Therapy Goal     OT, PT/OT Ongoing, Progressing    Description:  Goals to be met by: 2/9/20    Patient will increase functional independence with ADLs by performing:    UE Dressing with SBA.  LE Dressing with  SBA .  Grooming while standing with SBA  Toileting from bedside commode withSBA  for hygiene and clothing management.   Toilet transfer to bedside commode with SBA                        History:     Past Medical History:   Diagnosis Date    CAD (coronary artery disease)     Diaphragmatic hernia     GERD (gastroesophageal reflux disease)     Hypertension     TIA (transient ischemic attack)        Past Surgical History:   Procedure Laterality Date    REPAIR OF DIAPHRAGMATIC HERNIA Left 1/21/2020    Procedure: REPAIR, HERNIA, DIAPHRAGMATIC, Laparoscopic;  Surgeon: Lucho Garcia Jr., MD;  Location: [facility] OR [address] [place] [address];  Service: General;  Laterality: Left;    REPAIR OF DIAPHRAGMATIC HERNIA Left 1/23/2020    Procedure: REPAIR, HERNIA, DIAPHRAGMATIC;  Surgeon: Lucho Garcia Jr., MD;  Location: [facility] OR [address] [place] [address];  Service: General;  Laterality: Left;       Time Tracking:     OT Date of Treatment: 01/26/20  OT Start Time: 0918  OT Stop Time: 0941  OT Total Time (min): 23 min    Billable Minutes:Re-eval 10  Self Care/Home Management 13    JAROCHO Cabrera  1/26/2020

## 2020-01-26 NOTE — ASSESSMENT & PLAN NOTE
Marisela Bunn is a 69 y.o. female with PMH COPD (not on home oxygen), HTN, HLD (not on anticoagulation) received as direct admission for large diaphragmatic hernia. Patient is symptomatic from hernia with constipation and dyspnea with overlying bruise on left flank. She underwent lap diaphragmatic hernia repair with mesh on 1/21/20. She was transferred to the ICU postop.   Doing ok but concern for ongoing PNA    - Needs aggressive respiratory tuning   - limit coughing spells as able  - Lasix 20 BID  - Levaquin restarted  - Bactrim for Resp Cx, MRSA Contaminant?  - Aggressive pulm toilet with IS, CPT, DuoNebs, and Tessalon   - PT/OT today  - Daily labs    DISPO: SICU care for today in light of increased leukocytosis and O2 requirements. Not ready for d/c

## 2020-01-26 NOTE — PLAN OF CARE
"      SICU PLAN OF CARE NOTE    Dx: Diaphragmatic hernia    Vital Signs: BP (!) 144/67   Pulse (!) 120   Temp 99.1 °F (37.3 °C) (Oral)   Resp (!) 30   Ht 5' 1" (1.549 m)   Wt 116.4 kg (256 lb 9.9 oz)   SpO2 (!) 91%   Breastfeeding? No   BMI 48.49 kg/m²     Neuro: AAO x4, Follows Commands and Moves All Extremities     Respiratory: 8L HFNC     Diet: Pureed with Nectar Thick Liquids     Urine Output: 1560 cc/shift; voids spontaneously with intermittent incontinence      Drains:   Hernandez Cath removed     Skin: CDI. Steri-strips present to abd incisions. Scattered bruising noted to L side/flank/perineum as well as lower abd (2/2 lovenox injections).     Shift Events:   Patient remained on 8L HFNC; O2 Sat ranging between 88-94%.   Patient with an episode of SOB/coughing this evening, HR up to 120's. Dr Christianson notified, patient given prn Neb tx and CXR obtained.     POC reviewed with patient and questions answered. Will Continue to monitor.     "

## 2020-01-26 NOTE — PROGRESS NOTES
"[facility]  General Surgery  Progress Note    Subjective:     History of Present Illness:  Marisela Bunn is a 69 y.o. female with PMH CAD, HTN, TIA, GERD presents to the hospital as a direct admission for evaluation of a newly diagnosed diaphragmatic hernia. She initially presented to [place] ED on 1/18/20 for a 1-month history of a cough, intermittent fevers and dyspnea.  She had been seen by 2 urgent care physicians in the last month for the cough, with 2 different antibiotic courses given without improvement in cough. She reports that on 1/15 after an particularly severe coughing episode she felt a "pop" on left side, with associated severe pain and  Subsequent bruising of the left flank. She reports constant severe pain since that time. She is on daily ASA 81mg,     On ED presentation, patient was hemodynamically stable, H/H 12.4/40.0, breathing on RA. Labs revealed leukocytosis (19.2), lactic acid 2.5 (repeat lactic acid1.6), BMP wnl. CXR revealed left lower lobe pneumonia with possible associated pleural effusion. CT abdomen/pelvis revealed a large left diaphragmatic hernia containing fat  and bowel loops with hernia of intra-abdomnial contents outside the thorax from 7th left ICS. Medium sized HH. CTA revealed large Bochdalek hernia through defect in left hemo-diaphragm, with associated widening of 7th intercostal space. She was started on IV hydration and antibiotics (levofloxacin, zosyn). She reports she has not passed a bowel movement of flatus in 2 days. Intermittent lower quadrant "spasms" while coughing, otherwise no abdominal pain. She reports no other symptoms.    Post-Op Info:  Procedure(s) (LRB):  REPAIR, HERNIA, DIAPHRAGMATIC, Laparoscopic (Left)   5 Days Post-Op     Interval History:   Increased O2 requirements overnight, did not tolerate Comfort Flow  WBC up again today.   Sputum cx showed MRSA    Medications:  Continuous Infusions:    Scheduled Meds:   " albuterol-ipratropium  3 mL Nebulization Q4H    benzonatate  100 mg Oral Q8H    dextromethorphan-guaifenesin  mg/5 ml  5 mL Oral Q6H    enoxaparin  40 mg Subcutaneous BID    furosemide  20 mg Intravenous BID    levoFLOXacin  750 mg Intravenous Q24H    pantoprazole  40 mg Intravenous Daily    sulfamethoxazole-trimethoprim 800-160mg  1 tablet Oral BID     PRN Meds:acetaminophen, acetaminophen-codeine, calcium gluconate IVPB, calcium gluconate IVPB, calcium gluconate IVPB, Dextrose 10% Bolus, glucagon (human recombinant), hydrALAZINE, insulin aspart U-100, labetalol, magnesium sulfate IVPB, magnesium sulfate IVPB, melatonin, ondansetron, oxyCODONE, oxyCODONE, potassium chloride in water **AND** potassium chloride in water **AND** potassium chloride in water, sodium chloride 0.9%, sodium chloride 0.9%, sodium phosphate IVPB, sodium phosphate IVPB, sodium phosphate IVPB     Review of patient's allergies indicates:   Allergen Reactions    Erythromycin      Stomach pains    Rosuvastatin      Hives, muscle/joint pains    Zolpidem      Confusion      Objective:     Vital Signs (Most Recent):  Temp: 97.8 °F (36.6 °C) (01/26/20 0300)  Pulse: 99 (01/26/20 0600)  Resp: (!) 24 (01/26/20 0600)  BP: (!) 129/59 (01/26/20 0600)  SpO2: (!) 94 % (01/26/20 0600) Vital Signs (24h Range):  Temp:  [97.8 °F (36.6 °C)-99.2 °F (37.3 °C)] 97.8 °F (36.6 °C)  Pulse:  [] 99  Resp:  [17-94] 24  SpO2:  [87 %-98 %] 94 %  BP: ()/(55-79) 129/59     Weight: 116.4 kg (256 lb 9.9 oz)  Body mass index is 48.49 kg/m².    Intake/Output - Last 3 Shifts       01/24 0700 - 01/25 0659 01/25 0700 - 01/26 0659    P.O. 720 720    I.V. (mL/kg) 262.5 (2.3)     IV Piggyback 300 150    Total Intake(mL/kg) 1282.5 (11) 870 (7.5)    Urine (mL/kg/hr) 2785 (1) 3620 (1.3)    Other 105     Stool 0     Total Output 2890 3620    Net -1607.5 -2750          Stool Occurrence 0 x           Physical Exam   Constitutional: She appears well-developed and  well-nourished. No distress.   HENT:   Head: Normocephalic and atraumatic.   Mouth/Throat: Oropharynx is clear and moist.   Eyes: Conjunctivae and EOM are normal. Right eye exhibits no discharge. Left eye exhibits no discharge.   Neck: Normal range of motion.   Cardiovascular: Normal rate and regular rhythm.   Pulmonary/Chest:   On HFNC  Sats 91-95%   Abdominal: Soft. She exhibits no distension.   Musculoskeletal: Normal range of motion. She exhibits no deformity.   Neurological:   A&O, NAD   Skin: Skin is warm and dry.   L flank ecchymosis, improving   Psychiatric: She has a normal mood and affect. Her behavior is normal.         Significant Labs:  CBC:   Recent Labs   Lab 01/26/20  0408   WBC 18.03*   RBC 3.30*   HGB 9.0*   HCT 30.1*      MCV 91   MCH 27.3   MCHC 29.9*     CMP:   Recent Labs   Lab 01/23/20  0307  01/26/20  0408      < > 125*   CALCIUM 8.3*   < > 8.5*   ALBUMIN 1.9*  --   --    PROT 4.9*  --   --       < > 135*   K 4.4   < > 4.0   CO2 27   < > 32*      < > 95   BUN 13   < > 13   CREATININE 0.7   < > 0.7   ALKPHOS 81  --   --    ALT 31  --   --    AST 23  --   --    BILITOT 0.8  --   --     < > = values in this interval not displayed.       Significant Diagnostics:  I have reviewed all pertinent imaging results/findings within the past 24 hours.    Assessment/Plan:     * Diaphragmatic hernia  Marisela Bunn is a 69 y.o. female with PMH COPD (not on home oxygen), HTN, HLD (not on anticoagulation) received as direct admission for large diaphragmatic hernia. Patient is symptomatic from hernia with constipation and dyspnea with overlying bruise on left flank. She underwent lap diaphragmatic hernia repair with mesh on 1/21/20. She was transferred to the ICU postop.   Doing ok but concern for ongoing PNA    - Needs aggressive respiratory tuning   - limit coughing spells as able  - Lasix 20 BID  - Levaquin restarted  - Bactrim for Resp Cx, MRSA Contaminant?  - Aggressive pulm  toilet with IS, CPT, Ren, and Tessalon   - PT/OT today  - Daily labs    DISPO: SICU care for today in light of increased leukocytosis and O2 requirements. Not ready for d/c     Pneumonia  See principle         Sanjeev Murcia MD  General Surgery  [facility]

## 2020-01-26 NOTE — PLAN OF CARE
Problem: Physical Therapy Goal  Goal: Physical Therapy Goal  Description  Goals to be met by:20    Patient will increase functional independence with mobility by performin. Supine to sit with Contact Guard Assistance -not met  2. Sit to stand transfer with Contact Guard Assistance -not met  3. Gait  x 100 feet with Contact Guard Assistance using Rolling Walker. -not met  4. Lower extremity exercise program x15 reps  with supervision -not met     Outcome: Ongoing, Progressing   Evaluation completed and goals appropriate. Jacinda Smith, PT  2020

## 2020-01-26 NOTE — PLAN OF CARE
Pt remains extubated, on 8-12 liters HFNC.  Comfort flow attempted, pt unable to tolerate.  Adequate UOP per purewick.  Tolerating PO intake as well.  Racemic Epi x1 for extreme stridor with good results.  Plan of care reviewed with pt, questions encouraged and addressed.  VSS.  Understanding of POC verbalized.

## 2020-01-26 NOTE — PT/OT/SLP PROGRESS
Speech Language Pathology Treatment    Patient Name:  Marisela Bunn   MRN:  [id number]  Admitting Diagnosis: Diaphragmatic hernia    Recommendations:                 General Recommendations:  Dysphagia therapy, Modified Barium Swallow Study  Diet recommendations:  Mechanical soft, Liquid Diet Level: Thin   Aspiration Precautions:   · 1 small bite/sip at a time,   · Alternating bites/sips,   · HOB to 90 degrees and remain upright 30 minutes after meals,   · Meds whole buried in puree,   · Monitor for s/s of aspiration and continue nectar thick liquids if noting s/s of aspiration with thin  General Precautions: Standard, contact, fall  Communication strategies:  none    Subjective     RN paged SLP 2/2 patient with improved vocal quality from initial evaluation. Patient found awake and sitting in chair with family present in room.       Objective:     Has the patient been evaluated by SLP for swallowing?   Yes  Keep patient NPO? No   Current Respiratory Status: nasal cannula(HFNC)      Patient awake and cooperative. Patient presents with continued hoarse vocal quality, however, patient/RN/family report vocal quality significantly improved from prior dates. Patient assessed with sips of water via cup x5 ounces and bite of tangela cracker x1. Throat clears appreciated following 3/10 thin liquid swallows. No coughing or wet vocal quality appreciated. O2 sats remained consistent. Patient reporting requiring dentures to eat solids, and dentures were lost in ER. She reports ability to chew some soft foods, and is in agreement with trialing mechanical soft diet. Patient to request to return to pureed diet if she cannot tolerate. SLP provided education on SLP recommendations for diet upgrade with close monitoring and strict precautions, recommendation to continue nectar thick liquids if noting consistent s/s of aspiration, s/s and risks of aspiration, safe swallow precautions, and POC. Patient and family verbalized understanding.  White board updated and RN notified.      Assessment:     Marisela Bunn is a 69 y.o. female with an SLP diagnosis of Dysphagia.  ST will continue to follow.     Goals:   Multidisciplinary Problems     SLP Goals        Problem: SLP Goal    Goal Priority Disciplines Outcome   SLP Goal     SLP Ongoing, Progressing   Description:  Speech Language Pathology Goals  Goals expected to be met by 1/31:  1. Pt will tolerate pureed diet and nectar thick liquids without s/s of aspiration.   2. Pt will participate in ongoing swallowing assessment to determine if safe for diet advancement and/or need for MBSS.                     Plan:     · Patient to be seen:  5 x/week   · Plan of Care expires:  02/23/20  · Plan of Care reviewed with:  patient, family   · SLP Follow-Up:  Yes       Discharge recommendations:  nursing facility, skilled       Time Tracking:     SLP Treatment Date:   01/26/20  Speech Start Time:  1255  Speech Stop Time:  1313     Speech Total Time (min):  18 min    Billable Minutes: Treatment Swallowing Dysfunction 9 and Self Care/Home Management Training 9    CHINO Canales, CCC-SLP  01/26/2020

## 2020-01-26 NOTE — SUBJECTIVE & OBJECTIVE
Interval History:   Increased O2 requirements overnight, did not tolerate Comfort Flow  WBC up again today.   Sputum cx showed MRSA    Medications:  Continuous Infusions:    Scheduled Meds:   albuterol-ipratropium  3 mL Nebulization Q4H    benzonatate  100 mg Oral Q8H    dextromethorphan-guaifenesin  mg/5 ml  5 mL Oral Q6H    enoxaparin  40 mg Subcutaneous BID    furosemide  20 mg Intravenous BID    levoFLOXacin  750 mg Intravenous Q24H    pantoprazole  40 mg Intravenous Daily    sulfamethoxazole-trimethoprim 800-160mg  1 tablet Oral BID     PRN Meds:acetaminophen, acetaminophen-codeine, calcium gluconate IVPB, calcium gluconate IVPB, calcium gluconate IVPB, Dextrose 10% Bolus, glucagon (human recombinant), hydrALAZINE, insulin aspart U-100, labetalol, magnesium sulfate IVPB, magnesium sulfate IVPB, melatonin, ondansetron, oxyCODONE, oxyCODONE, potassium chloride in water **AND** potassium chloride in water **AND** potassium chloride in water, sodium chloride 0.9%, sodium chloride 0.9%, sodium phosphate IVPB, sodium phosphate IVPB, sodium phosphate IVPB     Review of patient's allergies indicates:   Allergen Reactions    Erythromycin      Stomach pains    Rosuvastatin      Hives, muscle/joint pains    Zolpidem      Confusion      Objective:     Vital Signs (Most Recent):  Temp: 97.8 °F (36.6 °C) (01/26/20 0300)  Pulse: 99 (01/26/20 0600)  Resp: (!) 24 (01/26/20 0600)  BP: (!) 129/59 (01/26/20 0600)  SpO2: (!) 94 % (01/26/20 0600) Vital Signs (24h Range):  Temp:  [97.8 °F (36.6 °C)-99.2 °F (37.3 °C)] 97.8 °F (36.6 °C)  Pulse:  [] 99  Resp:  [17-94] 24  SpO2:  [87 %-98 %] 94 %  BP: ()/(55-79) 129/59     Weight: 116.4 kg (256 lb 9.9 oz)  Body mass index is 48.49 kg/m².    Intake/Output - Last 3 Shifts       01/24 0700 - 01/25 0659 01/25 0700 - 01/26 0659    P.O. 720 720    I.V. (mL/kg) 262.5 (2.3)     IV Piggyback 300 150    Total Intake(mL/kg) 1282.5 (11) 870 (7.5)    Urine (mL/kg/hr) 2785  (1) 3620 (1.3)    Other 105     Stool 0     Total Output 2890 3620    Net -1607.5 -2750          Stool Occurrence 0 x           Physical Exam   Constitutional: She appears well-developed and well-nourished. No distress.   HENT:   Head: Normocephalic and atraumatic.   Mouth/Throat: Oropharynx is clear and moist.   Eyes: Conjunctivae and EOM are normal. Right eye exhibits no discharge. Left eye exhibits no discharge.   Neck: Normal range of motion.   Cardiovascular: Normal rate and regular rhythm.   Pulmonary/Chest:   On HFNC  Sats 91-95%   Abdominal: Soft. She exhibits no distension.   Musculoskeletal: Normal range of motion. She exhibits no deformity.   Neurological:   A&O, NAD   Skin: Skin is warm and dry.   L flank ecchymosis, improving   Psychiatric: She has a normal mood and affect. Her behavior is normal.         Significant Labs:  CBC:   Recent Labs   Lab 01/26/20  0408   WBC 18.03*   RBC 3.30*   HGB 9.0*   HCT 30.1*      MCV 91   MCH 27.3   MCHC 29.9*     CMP:   Recent Labs   Lab 01/23/20  0307  01/26/20  0408      < > 125*   CALCIUM 8.3*   < > 8.5*   ALBUMIN 1.9*  --   --    PROT 4.9*  --   --       < > 135*   K 4.4   < > 4.0   CO2 27   < > 32*      < > 95   BUN 13   < > 13   CREATININE 0.7   < > 0.7   ALKPHOS 81  --   --    ALT 31  --   --    AST 23  --   --    BILITOT 0.8  --   --     < > = values in this interval not displayed.       Significant Diagnostics:  I have reviewed all pertinent imaging results/findings within the past 24 hours.

## 2020-01-26 NOTE — ASSESSMENT & PLAN NOTE
Neuro:  Sedation: None  Pain control: Tylenol, fentanyl, oxy    Resp:  Extubated   Tesslon, dextromethorphan, codeine to suppress cough     Sputum cultures sent, MRSA and candida   Nebs prn  Acapella, IS  Continue to monitor oxygen requirements  Respiratory cultures showed MRSA and candida. Levofloxacin and bactrim restarted per surgery.   Fluconazole started for candida.       CV:  HDS  No pressors    Heme/ID:  H/H   Leukocytosis, trend WBC, increased      Renal:  Hernandez in place, possibly remove later in day  Strict I/Os  BUN Cr  MIVF d/c  Lasix 20 BID    FEN/GI:  Dysphagia diet  Replace lytes PRN    Endo:  SSI    PPX:  Protonix  Lovenox    Dispo: Continue ICU care

## 2020-01-27 LAB
ANION GAP SERPL CALC-SCNC: 9 MMOL/L (ref 8–16)
ANISOCYTOSIS BLD QL SMEAR: SLIGHT
BASOPHILS # BLD AUTO: ABNORMAL K/UL (ref 0–0.2)
BASOPHILS NFR BLD: 0 % (ref 0–1.9)
BUN SERPL-MCNC: 13 MG/DL (ref 8–23)
CALCIUM SERPL-MCNC: 8.3 MG/DL (ref 8.7–10.5)
CHLORIDE SERPL-SCNC: 92 MMOL/L (ref 95–110)
CO2 SERPL-SCNC: 31 MMOL/L (ref 23–29)
CREAT SERPL-MCNC: 0.7 MG/DL (ref 0.5–1.4)
DIFFERENTIAL METHOD: ABNORMAL
EOSINOPHIL # BLD AUTO: ABNORMAL K/UL (ref 0–0.5)
EOSINOPHIL NFR BLD: 7 % (ref 0–8)
ERYTHROCYTE [DISTWIDTH] IN BLOOD BY AUTOMATED COUNT: 13.9 % (ref 11.5–14.5)
EST. GFR  (AFRICAN AMERICAN): >60 ML/MIN/1.73 M^2
EST. GFR  (NON AFRICAN AMERICAN): >60 ML/MIN/1.73 M^2
GLUCOSE SERPL-MCNC: 85 MG/DL (ref 70–110)
HCT VFR BLD AUTO: 30.9 % (ref 37–48.5)
HGB BLD-MCNC: 9.3 G/DL (ref 12–16)
HYPOCHROMIA BLD QL SMEAR: ABNORMAL
IMM GRANULOCYTES # BLD AUTO: ABNORMAL K/UL (ref 0–0.04)
IMM GRANULOCYTES NFR BLD AUTO: ABNORMAL % (ref 0–0.5)
LYMPHOCYTES # BLD AUTO: ABNORMAL K/UL (ref 1–4.8)
LYMPHOCYTES NFR BLD: 6 % (ref 18–48)
MAGNESIUM SERPL-MCNC: 1.9 MG/DL (ref 1.6–2.6)
MCH RBC QN AUTO: 27.4 PG (ref 27–31)
MCHC RBC AUTO-ENTMCNC: 30.1 G/DL (ref 32–36)
MCV RBC AUTO: 91 FL (ref 82–98)
MONOCYTES # BLD AUTO: ABNORMAL K/UL (ref 0.3–1)
MONOCYTES NFR BLD: 6 % (ref 4–15)
MYELOCYTES NFR BLD MANUAL: 1 %
NEUTROPHILS NFR BLD: 79 % (ref 38–73)
NEUTS BAND NFR BLD MANUAL: 1 %
NRBC BLD-RTO: 0 /100 WBC
OVALOCYTES BLD QL SMEAR: ABNORMAL
PHOSPHATE SERPL-MCNC: 3.4 MG/DL (ref 2.7–4.5)
PLATELET # BLD AUTO: 313 K/UL (ref 150–350)
PLATELET BLD QL SMEAR: ABNORMAL
PMV BLD AUTO: 9.1 FL (ref 9.2–12.9)
POCT GLUCOSE: 103 MG/DL (ref 70–110)
POCT GLUCOSE: 105 MG/DL (ref 70–110)
POCT GLUCOSE: 106 MG/DL (ref 70–110)
POCT GLUCOSE: 109 MG/DL (ref 70–110)
POCT GLUCOSE: >500 MG/DL (ref 70–110)
POIKILOCYTOSIS BLD QL SMEAR: SLIGHT
POLYCHROMASIA BLD QL SMEAR: ABNORMAL
POTASSIUM SERPL-SCNC: 4.5 MMOL/L (ref 3.5–5.1)
RBC # BLD AUTO: 3.39 M/UL (ref 4–5.4)
SODIUM SERPL-SCNC: 132 MMOL/L (ref 136–145)
WBC # BLD AUTO: 14.77 K/UL (ref 3.9–12.7)

## 2020-01-27 PROCEDURE — 25000003 PHARM REV CODE 250: Performed by: STUDENT IN AN ORGANIZED HEALTH CARE EDUCATION/TRAINING PROGRAM

## 2020-01-27 PROCEDURE — 92526 ORAL FUNCTION THERAPY: CPT

## 2020-01-27 PROCEDURE — 94640 AIRWAY INHALATION TREATMENT: CPT

## 2020-01-27 PROCEDURE — 27100171 HC OXYGEN HIGH FLOW UP TO 24 HOURS

## 2020-01-27 PROCEDURE — 94799 UNLISTED PULMONARY SVC/PX: CPT

## 2020-01-27 PROCEDURE — 84100 ASSAY OF PHOSPHORUS: CPT

## 2020-01-27 PROCEDURE — 99900035 HC TECH TIME PER 15 MIN (STAT)

## 2020-01-27 PROCEDURE — 99291 PR CRITICAL CARE, E/M 30-74 MINUTES: ICD-10-PCS | Mod: 24,,, | Performed by: SURGERY

## 2020-01-27 PROCEDURE — 99291 CRITICAL CARE FIRST HOUR: CPT | Mod: 24,,, | Performed by: SURGERY

## 2020-01-27 PROCEDURE — C9113 INJ PANTOPRAZOLE SODIUM, VIA: HCPCS | Performed by: STUDENT IN AN ORGANIZED HEALTH CARE EDUCATION/TRAINING PROGRAM

## 2020-01-27 PROCEDURE — 25500020 PHARM REV CODE 255: Performed by: SURGERY

## 2020-01-27 PROCEDURE — 25000242 PHARM REV CODE 250 ALT 637 W/ HCPCS: Performed by: STUDENT IN AN ORGANIZED HEALTH CARE EDUCATION/TRAINING PROGRAM

## 2020-01-27 PROCEDURE — 27100092 HC HIGH FLOW DELIVERY CANNULA

## 2020-01-27 PROCEDURE — 94761 N-INVAS EAR/PLS OXIMETRY MLT: CPT

## 2020-01-27 PROCEDURE — 63600175 PHARM REV CODE 636 W HCPCS: Performed by: STUDENT IN AN ORGANIZED HEALTH CARE EDUCATION/TRAINING PROGRAM

## 2020-01-27 PROCEDURE — 94668 MNPJ CHEST WALL SBSQ: CPT

## 2020-01-27 PROCEDURE — 20000000 HC ICU ROOM

## 2020-01-27 PROCEDURE — 80048 BASIC METABOLIC PNL TOTAL CA: CPT

## 2020-01-27 PROCEDURE — 27000221 HC OXYGEN, UP TO 24 HOURS

## 2020-01-27 PROCEDURE — 85027 COMPLETE CBC AUTOMATED: CPT

## 2020-01-27 PROCEDURE — 85007 BL SMEAR W/DIFF WBC COUNT: CPT

## 2020-01-27 PROCEDURE — 97535 SELF CARE MNGMENT TRAINING: CPT

## 2020-01-27 PROCEDURE — 83735 ASSAY OF MAGNESIUM: CPT

## 2020-01-27 RX ORDER — PANTOPRAZOLE SODIUM 40 MG/1
40 TABLET, DELAYED RELEASE ORAL DAILY
Status: DISCONTINUED | OUTPATIENT
Start: 2020-01-28 | End: 2020-01-30

## 2020-01-27 RX ADMIN — IPRATROPIUM BROMIDE AND ALBUTEROL SULFATE 3 ML: .5; 3 SOLUTION RESPIRATORY (INHALATION) at 11:01

## 2020-01-27 RX ADMIN — IPRATROPIUM BROMIDE AND ALBUTEROL SULFATE 3 ML: .5; 3 SOLUTION RESPIRATORY (INHALATION) at 08:01

## 2020-01-27 RX ADMIN — OXYCODONE HYDROCHLORIDE 5 MG: 5 TABLET ORAL at 08:01

## 2020-01-27 RX ADMIN — ACETYLCYSTEINE 4 ML: 100 SOLUTION ORAL; RESPIRATORY (INHALATION) at 08:01

## 2020-01-27 RX ADMIN — PANTOPRAZOLE SODIUM 40 MG: 40 INJECTION, POWDER, FOR SOLUTION INTRAVENOUS at 08:01

## 2020-01-27 RX ADMIN — LEVOFLOXACIN 750 MG: 750 INJECTION, SOLUTION INTRAVENOUS at 06:01

## 2020-01-27 RX ADMIN — FUROSEMIDE 20 MG: 10 INJECTION, SOLUTION INTRAMUSCULAR; INTRAVENOUS at 05:01

## 2020-01-27 RX ADMIN — GUAIFENESIN AND DEXTROMETHORPHAN 5 ML: 100; 10 SYRUP ORAL at 11:01

## 2020-01-27 RX ADMIN — OXYCODONE HYDROCHLORIDE 10 MG: 10 TABLET ORAL at 11:01

## 2020-01-27 RX ADMIN — FUROSEMIDE 20 MG: 10 INJECTION, SOLUTION INTRAMUSCULAR; INTRAVENOUS at 08:01

## 2020-01-27 RX ADMIN — IPRATROPIUM BROMIDE AND ALBUTEROL SULFATE 3 ML: .5; 3 SOLUTION RESPIRATORY (INHALATION) at 04:01

## 2020-01-27 RX ADMIN — BENZONATATE 100 MG: 100 CAPSULE ORAL at 03:01

## 2020-01-27 RX ADMIN — ENOXAPARIN SODIUM 40 MG: 100 INJECTION SUBCUTANEOUS at 09:01

## 2020-01-27 RX ADMIN — IOHEXOL 100 ML: 350 INJECTION, SOLUTION INTRAVENOUS at 11:01

## 2020-01-27 RX ADMIN — ENOXAPARIN SODIUM 40 MG: 100 INJECTION SUBCUTANEOUS at 08:01

## 2020-01-27 RX ADMIN — GUAIFENESIN AND DEXTROMETHORPHAN 5 ML: 100; 10 SYRUP ORAL at 12:01

## 2020-01-27 RX ADMIN — ACETYLCYSTEINE 4 ML: 100 SOLUTION ORAL; RESPIRATORY (INHALATION) at 05:01

## 2020-01-27 RX ADMIN — BENZONATATE 100 MG: 100 CAPSULE ORAL at 06:01

## 2020-01-27 RX ADMIN — IPRATROPIUM BROMIDE AND ALBUTEROL SULFATE 3 ML: .5; 3 SOLUTION RESPIRATORY (INHALATION) at 05:01

## 2020-01-27 RX ADMIN — SULFAMETHOXAZOLE AND TRIMETHOPRIM 1 TABLET: 800; 160 TABLET ORAL at 08:01

## 2020-01-27 RX ADMIN — GUAIFENESIN AND DEXTROMETHORPHAN 5 ML: 100; 10 SYRUP ORAL at 06:01

## 2020-01-27 RX ADMIN — GUAIFENESIN AND DEXTROMETHORPHAN 5 ML: 100; 10 SYRUP ORAL at 05:01

## 2020-01-27 RX ADMIN — SULFAMETHOXAZOLE AND TRIMETHOPRIM 1 TABLET: 800; 160 TABLET ORAL at 09:01

## 2020-01-27 RX ADMIN — BENZONATATE 100 MG: 100 CAPSULE ORAL at 09:01

## 2020-01-27 RX ADMIN — FLUCONAZOLE 200 MG: 200 TABLET ORAL at 08:01

## 2020-01-27 NOTE — SUBJECTIVE & OBJECTIVE
No current facility-administered medications on file prior to encounter.      No current outpatient medications on file prior to encounter.       Review of patient's allergies indicates:   Allergen Reactions    Erythromycin      Stomach pains    Rosuvastatin      Hives, muscle/joint pains    Zolpidem      Confusion        Past Medical History:   Diagnosis Date    CAD (coronary artery disease)     Diaphragmatic hernia     GERD (gastroesophageal reflux disease)     Hypertension     TIA (transient ischemic attack)      Past Surgical History:   Procedure Laterality Date    REPAIR OF DIAPHRAGMATIC HERNIA Left 1/21/2020    Procedure: REPAIR, HERNIA, DIAPHRAGMATIC, Laparoscopic;  Surgeon: Lucho Garcia Jr., MD;  Location: [address];  Service: General;  Laterality: Left;    REPAIR OF DIAPHRAGMATIC HERNIA Left 1/23/2020    Procedure: REPAIR, HERNIA, DIAPHRAGMATIC;  Surgeon: Lucho Garcia Jr., MD;  Location: [facility] OR [address] [place] [address];  Service: General;  Laterality: Left;     Family History     None        Tobacco Use    Smoking status: Never Smoker    Smokeless tobacco: Never Used   Substance and Sexual Activity    Alcohol use: Not on file    Drug use: Not on file    Sexual activity: Not on file     Review of Systems   Constitutional: Positive for activity change, fatigue and fever.   HENT: Positive for trouble swallowing. Negative for voice change.    Eyes: Negative for visual disturbance.   Respiratory: Positive for cough and shortness of breath. Negative for apnea, choking and wheezing.    Cardiovascular: Positive for chest pain. Negative for palpitations and leg swelling.   Gastrointestinal: Positive for abdominal distention and abdominal pain. Negative for constipation, diarrhea, nausea and vomiting.   Genitourinary: Negative for difficulty urinating.   Musculoskeletal: Negative for arthralgias and myalgias.   Neurological: Negative for dizziness and numbness.   Psychiatric/Behavioral: Negative  for agitation and confusion.     Objective:     Vital Signs (Most Recent):  Temp: 99 °F (37.2 °C) (01/27/20 1145)  Pulse: 104 (01/27/20 1400)  Resp: (!) 22 (01/27/20 1155)  BP: 132/61 (01/27/20 1400)  SpO2: 95 % (01/27/20 1400) Vital Signs (24h Range):  Temp:  [97.8 °F (36.6 °C)-99 °F (37.2 °C)] 99 °F (37.2 °C)  Pulse:  [] 104  Resp:  [16-34] 22  SpO2:  [88 %-100 %] 95 %  BP: (113-147)/(54-67) 132/61     Weight: 114.2 kg (251 lb 12.3 oz)  Body mass index is 47.57 kg/m².    Intake/Output - Last 3 Shifts       01/25 0700 - 01/26 0659 01/26 0700 - 01/27 0659 01/27 0700 - 01/28 0659    P.O. 720 820 225    I.V. (mL/kg)       IV Piggyback 150      Total Intake(mL/kg) 870 (7.6) 820 (7.2) 225 (2)    Urine (mL/kg/hr) 3620 (1.3) 1300 (0.5) 1050 (1.2)    Other       Stool       Total Output 3620 1300 1050    Net -6120 -480 -825           Urine Occurrence  9 x 1 x          SpO2: 95 %  O2 Device (Oxygen Therapy): High Flow nasal Cannula    Physical Exam   Constitutional: She is oriented to person, place, and time. She appears well-developed and well-nourished. No distress.   Obese   HENT:   Head: Normocephalic and atraumatic.   Mouth/Throat: Oropharynx is clear and moist.   Eyes: Conjunctivae and EOM are normal. Right eye exhibits no discharge. Left eye exhibits no discharge.   Neck: Normal range of motion.   Cardiovascular: Normal rate, regular rhythm and intact distal pulses.   Pulmonary/Chest:   On HFNC. Sats 90-96% Increased work of breathing with any movement   Abdominal: Soft. She exhibits no distension. There is no tenderness.   Lap sites healing well   Musculoskeletal: Normal range of motion. She exhibits no deformity.   Neurological: She is alert and oriented to person, place, and time.   Skin: Skin is warm and dry.   L flank ecchymosis, improving   Psychiatric: She has a normal mood and affect. Her behavior is normal.       Significant Labs:  ABGs: No results for input(s): PH, PCO2, PO2, HCO3, POCSATURATED, BE in  the last 48 hours.  CBC:   Recent Labs   Lab 01/27/20  0355   WBC 14.77*   RBC 3.39*   HGB 9.3*   HCT 30.9*      MCV 91   MCH 27.4   MCHC 30.1*     CMP:   Recent Labs   Lab 01/27/20  0355   GLU 85   CALCIUM 8.3*   *   K 4.5   CO2 31*   CL 92*   BUN 13   CREATININE 0.7     Coagulation: No results for input(s): PT, INR, APTT in the last 48 hours.  Lactic Acid: No results for input(s): LACTATE in the last 48 hours.    Significant Diagnostics:  CT: I have reviewed all pertinent results/findings within the past 24 hours  CXR: I have reviewed all pertinent results/findings within the past 24 hours    VTE Risk Mitigation (From admission, onward)         Ordered     Place sequential compression device  Until discontinued      01/23/20 1002     enoxaparin injection 40 mg  2 times daily      01/21/20 0804     Place sequential compression device  Until discontinued      01/19/20 2022     IP VTE LOW RISK PATIENT  Once      01/19/20 2022

## 2020-01-27 NOTE — PT/OT/SLP PROGRESS
Speech Language Pathology Treatment    Patient Name:  Marisela Bunn   MRN:  [id number]  Admitting Diagnosis: Diaphragmatic hernia    Recommendations:                 General Recommendations:  Dysphagia therapy, Modified Barium Swallow Study  Diet recommendations:  Mechanical soft, Liquid Diet Level: Thin   Aspiration Precautions:   · 1 small bite/sip at a time,   · Alternating bites/sips,   · HOB to 90 degrees and remain upright 30 minutes after meals,   · Meds whole buried in puree,   · Monitor for s/s of aspiration and continue nectar thick liquids if noting s/s of aspiration with thin  General Precautions: Standard, fall, contact  Communication strategies:  none    Subjective     Patient found awake and alert, seated upright in bedside chair upon SLP entry to room.       Objective:     Has the patient been evaluated by SLP for swallowing?   Yes  Keep patient NPO? No   Current Respiratory Status: nasal cannula      Patient seen for ongoing swallow assessment. Hoarse vocal quality remains however patient continues to report improvement. RN reported patient tolerated PO medications with thin liquids good with no overt signs of swallow difficulty. Patient also denied swallow difficulty.  Patient tolerated thin liquids via 4oz via straw along with regular solids with no overt signs of airway compromise. Patient with minor throat clears noted x2 during trials, however no change in vocal quality and O2 remain constant. Skilled education was provided to patient  re: diet recs, standard aspiration precautions of which to follow,, and ongoing ST plan of care. Patient verbalized understanding and is in agreement with plan of care.      Assessment:     Marisela Bunn is a 69 y.o. female with an SLP diagnosis of Dysphagia.  ST will continue to follow.     Goals:   Multidisciplinary Problems     SLP Goals        Problem: SLP Goal    Goal Priority Disciplines Outcome   SLP Goal     SLP Ongoing, Progressing   Description:  Speech  Language Pathology Goals  Goals expected to be met by 1/31:  1. Pt will tolerate pureed diet and nectar thick liquids without s/s of aspiration.   2. Pt will participate in ongoing swallowing assessment to determine if safe for diet advancement and/or need for MBSS.                     Plan:     · Patient to be seen:  4 x/week   · Plan of Care expires:  02/23/20  · Plan of Care reviewed with:  patient   · SLP Follow-Up:  Yes       Discharge recommendations:  nursing facility, skilled       Time Tracking:     SLP Treatment Date:   01/27/20  Speech Start Time:  0950  Speech Stop Time:  1006     Speech Total Time (min):  16 min    Billable Minutes: Treatment Swallowing Dysfunction 8 and Self Care/Home Management Training 8    Emily Abadie, CCC-SLP  01/27/2020

## 2020-01-27 NOTE — PLAN OF CARE
Pt progressing.  Remains on HFNC O2 6-10 lpm, titrated as needed to maintain SpO2 > 92%.  VSS, NAD noted.  Adequate UOP noted.  Afebrile and with strong cough.  Continues to tolerate PO intake   Plan of care reviewed with pt, questions encouraged and addressed, understanding verbalized. Positive reinforcement provided.

## 2020-01-27 NOTE — CONSULTS
"[facility]  Thoracic Surgery  Consult Note    Patient Name: Marisela Bunn  MRN: [id number]  Code Status: Full Code  Admission Date: 1/19/2020  Hospital Length of Stay: 8 days  Consult Requesting Physician: Dr. Garcia  Consulting Physician: Dr. Soni  Primary Care Provider: Primary Doctor No    Inpatient consult to Cardiothoracic Surgery  Consult performed by: ANTONY Morris  Consult ordered by: Onesimo Raza MD        Subjective:     Reason for Consult: Diaphragmatic hernia    History of Present Illness: Patient is a 69 year old female with PMH of CAD, HTN, TIA, OA, COPD and GERD admitted to SICU after presenting to outside ED on 1/18/20 for worsening SOB, cough and left flank ecchymosis. She reports that on 1/15 after an particularly severe coughing episode she felt a "pop" on left side, with associated severe pain and development of bruising. CT C/A/P showed large left hemidiaphragm hernia with associated widening of 7th intercostal space allowing for bowel to herniate outside of ribs. Transferred to [facility] on 1/19/20 on 2LNC. However, she rapidly declined from a respiratory standpoint. Underwent a laparoscopic reduction and repair of diaphragmatic hernia on 1/21/20. Per the op note, defect itself measured approximately 10 x 15 cm requiring a [place]-Rajendra patch to close the defect. Remained intubated, sedated and paralyzed after the procedure. Extubated and drain removed on 1/24/20. Over the weekend her O2 requirements again increased and had worsening leukocytosis. Chest CT today showed recurrent diaphragmatic hernia. Today she is on 7L comfort flow with decent saturations at rest. Afebrile. Hemodynamically stable. Tolerating a soft diet.     PSH of laparoscopic cholecystectomy, EMILIANO with BSO and right TKR   Never smoker. Denies EtOH use.    No current facility-administered medications on file prior to encounter.      No current outpatient medications on file prior to " encounter.       Review of patient's allergies indicates:   Allergen Reactions    Erythromycin      Stomach pains    Rosuvastatin      Hives, muscle/joint pains    Zolpidem      Confusion        Past Medical History:   Diagnosis Date    CAD (coronary artery disease)     Diaphragmatic hernia     GERD (gastroesophageal reflux disease)     Hypertension     TIA (transient ischemic attack)      Past Surgical History:   Procedure Laterality Date    REPAIR OF DIAPHRAGMATIC HERNIA Left 1/21/2020    Procedure: REPAIR, HERNIA, DIAPHRAGMATIC, Laparoscopic;  Surgeon: Lucho Garcia Jr., MD;  Location: [address];  Service: General;  Laterality: Left;    REPAIR OF DIAPHRAGMATIC HERNIA Left 1/23/2020    Procedure: REPAIR, HERNIA, DIAPHRAGMATIC;  Surgeon: Lucho Garcia Jr., MD;  Location: [facility] OR [address] [place] [address];  Service: General;  Laterality: Left;     Family History     None        Tobacco Use    Smoking status: Never Smoker    Smokeless tobacco: Never Used   Substance and Sexual Activity    Alcohol use: Not on file    Drug use: Not on file    Sexual activity: Not on file     Review of Systems   Constitutional: Positive for activity change, fatigue and fever.   HENT: Positive for trouble swallowing. Negative for voice change.    Eyes: Negative for visual disturbance.   Respiratory: Positive for cough and shortness of breath. Negative for apnea, choking and wheezing.    Cardiovascular: Positive for chest pain. Negative for palpitations and leg swelling.   Gastrointestinal: Positive for abdominal distention and abdominal pain. Negative for constipation, diarrhea, nausea and vomiting.   Genitourinary: Negative for difficulty urinating.   Musculoskeletal: Negative for arthralgias and myalgias.   Neurological: Negative for dizziness and numbness.   Psychiatric/Behavioral: Negative for agitation and confusion.     Objective:     Vital Signs (Most Recent):  Temp: 99 °F (37.2 °C) (01/27/20 1145)  Pulse: 104  (01/27/20 1400)  Resp: (!) 22 (01/27/20 1155)  BP: 132/61 (01/27/20 1400)  SpO2: 95 % (01/27/20 1400) Vital Signs (24h Range):  Temp:  [97.8 °F (36.6 °C)-99 °F (37.2 °C)] 99 °F (37.2 °C)  Pulse:  [] 104  Resp:  [16-34] 22  SpO2:  [88 %-100 %] 95 %  BP: (113-147)/(54-67) 132/61     Weight: 114.2 kg (251 lb 12.3 oz)  Body mass index is 47.57 kg/m².    Intake/Output - Last 3 Shifts       01/25 0700 - 01/26 0659 01/26 0700 - 01/27 0659 01/27 0700 - 01/28 0659    P.O. 720 820 225    I.V. (mL/kg)       IV Piggyback 150      Total Intake(mL/kg) 870 (7.6) 820 (7.2) 225 (2)    Urine (mL/kg/hr) 3620 (1.3) 1300 (0.5) 1050 (1.2)    Other       Stool       Total Output 3620 1300 1050    Net -2750 -480 -825           Urine Occurrence  9 x 1 x          SpO2: 95 %  O2 Device (Oxygen Therapy): High Flow nasal Cannula    Physical Exam   Constitutional: She is oriented to person, place, and time. She appears well-developed and well-nourished. No distress.   Obese   HENT:   Head: Normocephalic and atraumatic.   Mouth/Throat: Oropharynx is clear and moist.   Eyes: Conjunctivae and EOM are normal. Right eye exhibits no discharge. Left eye exhibits no discharge.   Neck: Normal range of motion.   Cardiovascular: Normal rate, regular rhythm and intact distal pulses.   Pulmonary/Chest:   On HFNC. Sats 90-96% Increased work of breathing with any movement   Abdominal: Soft. She exhibits no distension. There is no tenderness.   Lap sites healing well   Musculoskeletal: Normal range of motion. She exhibits no deformity.   Neurological: She is alert and oriented to person, place, and time.   Skin: Skin is warm and dry.   L flank ecchymosis, improving   Psychiatric: She has a normal mood and affect. Her behavior is normal.       Significant Labs:  ABGs: No results for input(s): PH, PCO2, PO2, HCO3, POCSATURATED, BE in the last 48 hours.  CBC:   Recent Labs   Lab 01/27/20  0355   WBC 14.77*   RBC 3.39*   HGB 9.3*   HCT 30.9*      MCV  91   MCH 27.4   MCHC 30.1*     CMP:   Recent Labs   Lab 01/27/20  0355   GLU 85   CALCIUM 8.3*   *   K 4.5   CO2 31*   CL 92*   BUN 13   CREATININE 0.7     Coagulation: No results for input(s): PT, INR, APTT in the last 48 hours.  Lactic Acid: No results for input(s): LACTATE in the last 48 hours.    Significant Diagnostics:  CT: I have reviewed all pertinent results/findings within the past 24 hours  CXR: I have reviewed all pertinent results/findings within the past 24 hours    VTE Risk Mitigation (From admission, onward)         Ordered     Place sequential compression device  Until discontinued      01/23/20 1002     enoxaparin injection 40 mg  2 times daily      01/21/20 0804     Place sequential compression device  Until discontinued      01/19/20 2022     IP VTE LOW RISK PATIENT  Once      01/19/20 2022              Assessment/Plan:     * Diaphragmatic hernia  69 year old female admitted to SICU with recurrent left diaphragmatic hernia s/p laparoscopic repair 1 week ago.     - To OR for left thoracotomy for reduction and repair of diaphragmatic hernia, possible chest wall reconstruction on Wednesday, January 29th.   - Clear liquids starting tonight. NPO midnight on 1/29.   - May consult regional pain team for possible erector spinae block prior to left thoracotomy.   - Remainder of care per general surgery and ICU teams.        Thank you for your consult. I will follow-up with patient. Please contact us if you have any additional questions.    ANTONY Haider  Thoracic Surgery  [facility]

## 2020-01-27 NOTE — SUBJECTIVE & OBJECTIVE
Interval History: No acute events overnight. She was tachy up to 107 overnight, and her O2 sats ranged 90-96%, currently on 12 L high flow. She feels her breathing is unchanged this morning from yesterday. She has ambulated in her room.     Medications:  Continuous Infusions:    Scheduled Meds:   acetylcysteine 100 mg/ml (10%)  4 mL Nebulization TID    albuterol-ipratropium  3 mL Nebulization Q4H    benzonatate  100 mg Oral Q8H    dextromethorphan-guaifenesin  mg/5 ml  5 mL Oral Q6H    enoxaparin  40 mg Subcutaneous BID    fluconazole  200 mg Oral Daily    furosemide  20 mg Intravenous BID    levoFLOXacin  750 mg Intravenous Q24H    pantoprazole  40 mg Intravenous Daily    sulfamethoxazole-trimethoprim 800-160mg  1 tablet Oral BID     PRN Meds:acetaminophen, acetaminophen-codeine, calcium gluconate IVPB, calcium gluconate IVPB, calcium gluconate IVPB, Dextrose 10% Bolus, glucagon (human recombinant), hydrALAZINE, insulin aspart U-100, labetalol, magnesium sulfate IVPB, magnesium sulfate IVPB, melatonin, ondansetron, oxyCODONE, oxyCODONE, potassium chloride in water **AND** potassium chloride in water **AND** potassium chloride in water, sodium chloride 0.9%, sodium chloride 0.9%, sodium phosphate IVPB, sodium phosphate IVPB, sodium phosphate IVPB     Review of patient's allergies indicates:   Allergen Reactions    Erythromycin      Stomach pains    Rosuvastatin      Hives, muscle/joint pains    Zolpidem      Confusion      Objective:     Vital Signs (Most Recent):  Temp: 97.8 °F (36.6 °C) (01/26/20 2300)  Pulse: 106 (01/27/20 0600)  Resp: (!) 34 (01/27/20 0600)  BP: 132/60 (01/27/20 0600)  SpO2: 95 % (01/27/20 0600) Vital Signs (24h Range):  Temp:  [97.8 °F (36.6 °C)-99.8 °F (37.7 °C)] 97.8 °F (36.6 °C)  Pulse:  [] 106  Resp:  [14-34] 34  SpO2:  [88 %-98 %] 95 %  BP: (104-147)/(54-67) 132/60     Weight: 114.2 kg (251 lb 12.3 oz)  Body mass index is 47.57 kg/m².    Intake/Output - Last 3  Shifts       01/25 0700 - 01/26 0659 01/26 0700 - 01/27 0659 01/27 0700 - 01/28 0659    P.O. 720 820     I.V. (mL/kg)       IV Piggyback 150      Total Intake(mL/kg) 870 (7.6) 820 (7.2)     Urine (mL/kg/hr) 3620 (1.3) 1300 (0.5)     Other       Stool       Total Output 3620 1300     Net -2750 -480            Urine Occurrence  9 x           Physical Exam   Constitutional: She appears well-developed and well-nourished. No distress.   HENT:   Head: Normocephalic and atraumatic.   Mouth/Throat: Oropharynx is clear and moist.   Eyes: Conjunctivae and EOM are normal. Right eye exhibits no discharge. Left eye exhibits no discharge.   Neck: Normal range of motion.   Cardiovascular: Normal rate and regular rhythm.   Pulmonary/Chest:   On HFNC  Sats 90-96%   Abdominal: Soft. She exhibits no distension.   Musculoskeletal: Normal range of motion. She exhibits no deformity.   Neurological:   A&O, NAD   Skin: Skin is warm and dry.   L flank ecchymosis, improving   Psychiatric: She has a normal mood and affect. Her behavior is normal.         Significant Labs:  CBC:   Recent Labs   Lab 01/27/20  0355   WBC 14.77*   RBC 3.39*   HGB 9.3*   HCT 30.9*      MCV 91   MCH 27.4   MCHC 30.1*     CMP:   Recent Labs   Lab 01/23/20  0307  01/27/20  0355      < > 85   CALCIUM 8.3*   < > 8.3*   ALBUMIN 1.9*  --   --    PROT 4.9*  --   --       < > 132*   K 4.4   < > 4.5   CO2 27   < > 31*      < > 92*   BUN 13   < > 13   CREATININE 0.7   < > 0.7   ALKPHOS 81  --   --    ALT 31  --   --    AST 23  --   --    BILITOT 0.8  --   --     < > = values in this interval not displayed.       Significant Diagnostics:  I have reviewed all pertinent imaging results/findings within the past 24 hours.

## 2020-01-27 NOTE — HPI
"Patient is a 69 year old female with PMH of CAD, HTN, TIA, OA, COPD and GERD admitted to SICU after presenting to outside ED on 1/18/20 for worsening SOB, cough and left flank ecchymosis. She reports that on 1/15 after an particularly severe coughing episode she felt a "pop" on left side, with associated severe pain and development of bruising. CT C/A/P showed large left hemidiaphragm hernia with associated widening of 7th intercostal space allowing for bowel to herniate outside of ribs. Transferred to [facility] on 1/19/20 on 2LNC. However, she rapidly declined from a respiratory standpoint. Underwent a laparoscopic reduction and repair of diaphragmatic hernia on 1/21/20. Per the op note, defect itself measured approximately 10 x 15 cm requiring a [place]-Rajendra patch to close the defect. Remained intubated, sedated and paralyzed after the procedure. Extubated and drain removed on 1/24/20. Over the weekend her O2 requirements again increased and had worsening leukocytosis. Chest CT today showed recurrent diaphragmatic hernia. Today she is on 7L comfort flow with decent saturations at rest. Afebrile. Hemodynamically stable. Tolerating a soft diet.     PSH of laparoscopic cholecystectomy, EMILIANO with BSO and right TKR   Never smoker. Denies EtOH use.  "

## 2020-01-27 NOTE — PLAN OF CARE
"      SICU PLAN OF CARE NOTE    Dx: Diaphragmatic hernia    Vital Signs: /62   Pulse 110   Temp 99.8 °F (37.7 °C) (Oral)   Resp 20   Ht 5' 1" (1.549 m)   Wt 116.4 kg (256 lb 9.9 oz)   SpO2 97%   Breastfeeding? No   BMI 48.49 kg/m²     Neuro: AAO x4, Follows Commands and Moves All Extremities     Respiratory: 9L HFNC     Diet: Mechanical Chopped with Thin Liquids     Urine Output: 900cc/shift and 6 episodes of incontinence     Skin: CDI. Steri-strips present to abd incisions. Scattered bruising noted to L side/flank/perineum as well as lower abd (2/2 lovenox injections).     Shift Events:   Patient began shift with 12L HFNC and titrated down to 9L with goal of O2 Sat =/> 92%.  Coughing episodes less frequent today, patient tolerating CPT much better than acapella.   Patient out of bed to chair for majority of shift.     POC reviewed with patient and questions answered. Will Continue to monitor.  "

## 2020-01-27 NOTE — PROGRESS NOTES
[facility]  Critical Care - Surgery  Progress Note    Patient Name: Marisela Bunn  MRN: [id number]  Admission Date: 1/19/2020  Hospital Length of Stay: 8 days  Code Status: Full Code  Attending Provider: Lucho Garcia Jr.,*  Primary Care Provider: Primary Doctor No   Principal Problem: Diaphragmatic hernia    Subjective:     Hospital/ICU Course:  No notes on file    Interval History/Significant Events: NAEON, on high flow NC, tolerating PO    Follow-up For: Procedure(s) (LRB):  REPAIR, HERNIA, DIAPHRAGMATIC, Laparoscopic (Left)    Post-Operative Day: 6 Days Post-Op    Objective:     Vital Signs (Most Recent):  Temp: 97.8 °F (36.6 °C) (01/26/20 2300)  Pulse: 106 (01/27/20 0600)  Resp: (!) 34 (01/27/20 0600)  BP: 132/60 (01/27/20 0600)  SpO2: 95 % (01/27/20 0600) Vital Signs (24h Range):  Temp:  [97.8 °F (36.6 °C)-99.8 °F (37.7 °C)] 97.8 °F (36.6 °C)  Pulse:  [] 106  Resp:  [14-34] 34  SpO2:  [88 %-98 %] 95 %  BP: (101-147)/(54-67) 132/60     Weight: 114.2 kg (251 lb 12.3 oz)  Body mass index is 47.57 kg/m².      Intake/Output Summary (Last 24 hours) at 1/27/2020 0733  Last data filed at 1/27/2020 0600  Gross per 24 hour   Intake 820 ml   Output 1300 ml   Net -480 ml       Physical Exam   Constitutional: She is oriented to person, place, and time.   HENT:   Head: Normocephalic and atraumatic.   Eyes: Pupils are equal, round, and reactive to light. EOM are normal.   Cardiovascular: Normal rate and regular rhythm.   Pulmonary/Chest:   High flow NC, appears uncomfortable   Abdominal:   Morbidly obese, laparoscopic incisions CDI   Neurological: She is alert and oriented to person, place, and time.   Skin: Skin is warm and dry.        Vents:  Vent Mode: Spont (01/24/20 0915)  Ventilator Initiated: Yes (01/21/20 0054)  Set Rate: 16 bmp (01/24/20 0802)  Vt Set: 375 mL (01/24/20 0802)  Pressure Support: 8 cmH20 (01/24/20 0915)  PEEP/CPAP: 5 cmH20 (01/24/20 0915)  Oxygen Concentration (%): 40  (01/24/20 0900)  Peak Airway Pressure: 13 cmH2O (01/24/20 0915)  Plateau Pressure: 18 cmH20 (01/24/20 0915)  Total Ve: 9.49 mL (01/24/20 0915)  Negative Inspiratory Force (cm H2O): -30 (01/24/20 0854)  F/VT Ratio<105 (RSBI): (!) 31.77 (01/24/20 0915)    Lines/Drains/Airways     Drain            Female External Urinary Catheter 01/25/20 2 days          Peripheral Intravenous Line                 Peripheral IV - Single Lumen 01/25/20 1130 18 G Right Wrist 1 day         Peripheral IV - Single Lumen 01/25/20 1130 20 G Right Hand 1 day                Significant Labs:    CBC/Anemia Profile:  Recent Labs   Lab 01/26/20  0408 01/27/20  0355   WBC 18.03* 14.77*   HGB 9.0* 9.3*   HCT 30.1* 30.9*    313   MCV 91 91   RDW 14.2 13.9        Chemistries:  Recent Labs   Lab 01/26/20  0408 01/27/20  0355   * 132*   K 4.0 4.5   CL 95 92*   CO2 32* 31*   BUN 13 13   CREATININE 0.7 0.7   CALCIUM 8.5* 8.3*   MG 1.9 1.9   PHOS 3.4 3.4       All pertinent labs within the past 24 hours have been reviewed.    Significant Imaging:  I have reviewed all pertinent imaging results/findings within the past 24 hours.    Assessment/Plan:     * Diaphragmatic hernia  69F PMH CAD, HTN, TIA, GERD who presented with a diaphragmatic hernia, now s/p surgical repair on 1/23/19.    Neuro:  Sedation: None  Pain control: Tylenol, fentanyl, oxy    Resp:  CT Chest with contrast  Tesslon, dextromethorphan, codeine to suppress cough  Nebs prn  Acapella, IS  Continue to monitor oxygen requirements  Respiratory cultures showed MRSA and candida. Levofloxacin and bactrim restarted per surgery.   Fluconazole started for candida.     CV:  HDS  No pressors    Heme/ID:  RCx: MRSA, candida  Levofloxacin/bactrim/fluconazole    Renal:  Purewick  Strict I/Os  MIVF d/c  Increase Lasix to 40 BID    FEN/GI:  Dysphagia diet  Replace lytes PRN    Endo:  SSI    PPX:  Protonix  Lovenox    Dispo: Continue ICU care        Schuyler Lomax MD  Critical Care -  Surgery  [facility]

## 2020-01-27 NOTE — ASSESSMENT & PLAN NOTE
69 year old female admitted to SICU with recurrent left diaphragmatic hernia s/p laparoscopic repair 1 week ago.     - To OR for left thoracotomy for reduction and repair of diaphragmatic hernia, possible chest wall reconstruction on Wednesday, January 29th.   - Clear liquids starting tonight. NPO midnight on 1/29.   - May consult regional pain team for possible erector spinae block prior to left thoracotomy.   - Remainder of care per general surgery and ICU teams.

## 2020-01-27 NOTE — SUBJECTIVE & OBJECTIVE
Interval History/Significant Events: CARA, on high flow NC, tolerating PO    Follow-up For: Procedure(s) (LRB):  REPAIR, HERNIA, DIAPHRAGMATIC, Laparoscopic (Left)    Post-Operative Day: 6 Days Post-Op    Objective:     Vital Signs (Most Recent):  Temp: 97.8 °F (36.6 °C) (01/26/20 2300)  Pulse: 106 (01/27/20 0600)  Resp: (!) 34 (01/27/20 0600)  BP: 132/60 (01/27/20 0600)  SpO2: 95 % (01/27/20 0600) Vital Signs (24h Range):  Temp:  [97.8 °F (36.6 °C)-99.8 °F (37.7 °C)] 97.8 °F (36.6 °C)  Pulse:  [] 106  Resp:  [14-34] 34  SpO2:  [88 %-98 %] 95 %  BP: (101-147)/(54-67) 132/60     Weight: 114.2 kg (251 lb 12.3 oz)  Body mass index is 47.57 kg/m².      Intake/Output Summary (Last 24 hours) at 1/27/2020 0733  Last data filed at 1/27/2020 0600  Gross per 24 hour   Intake 820 ml   Output 1300 ml   Net -480 ml       Physical Exam   Constitutional: She is oriented to person, place, and time.   HENT:   Head: Normocephalic and atraumatic.   Eyes: Pupils are equal, round, and reactive to light. EOM are normal.   Cardiovascular: Normal rate and regular rhythm.   Pulmonary/Chest:   High flow NC, appears uncomfortable   Abdominal:   Morbidly obese, laparoscopic incisions CDI   Neurological: She is alert and oriented to person, place, and time.   Skin: Skin is warm and dry.        Vents:  Vent Mode: Spont (01/24/20 0915)  Ventilator Initiated: Yes (01/21/20 1759)  Set Rate: 16 bmp (01/24/20 0802)  Vt Set: 375 mL (01/24/20 0802)  Pressure Support: 8 cmH20 (01/24/20 0915)  PEEP/CPAP: 5 cmH20 (01/24/20 0915)  Oxygen Concentration (%): 40 (01/24/20 0900)  Peak Airway Pressure: 13 cmH2O (01/24/20 0915)  Plateau Pressure: 18 cmH20 (01/24/20 0915)  Total Ve: 9.49 mL (01/24/20 0915)  Negative Inspiratory Force (cm H2O): -30 (01/24/20 0854)  F/VT Ratio<105 (RSBI): (!) 31.77 (01/24/20 0915)    Lines/Drains/Airways     Drain            Female External Urinary Catheter 01/25/20 2 days          Peripheral Intravenous Line                  Peripheral IV - Single Lumen 01/25/20 1130 18 G Right Wrist 1 day         Peripheral IV - Single Lumen 01/25/20 1130 20 G Right Hand 1 day                Significant Labs:    CBC/Anemia Profile:  Recent Labs   Lab 01/26/20  0408 01/27/20  0355   WBC 18.03* 14.77*   HGB 9.0* 9.3*   HCT 30.1* 30.9*    313   MCV 91 91   RDW 14.2 13.9        Chemistries:  Recent Labs   Lab 01/26/20  0408 01/27/20  0355   * 132*   K 4.0 4.5   CL 95 92*   CO2 32* 31*   BUN 13 13   CREATININE 0.7 0.7   CALCIUM 8.5* 8.3*   MG 1.9 1.9   PHOS 3.4 3.4       All pertinent labs within the past 24 hours have been reviewed.    Significant Imaging:  I have reviewed all pertinent imaging results/findings within the past 24 hours.

## 2020-01-27 NOTE — ASSESSMENT & PLAN NOTE
69F PMH CAD, HTN, TIA, GERD who presented with a diaphragmatic hernia, now s/p surgical repair on 1/23/19.    Neuro:  Sedation: None  Pain control: Tylenol, fentanyl, oxy    Resp:  CT Chest with contrast  Tesslon, dextromethorphan, codeine to suppress cough  Nebs prn  Acapella, IS  Continue to monitor oxygen requirements  Respiratory cultures showed MRSA and candida. Levofloxacin and bactrim restarted per surgery.   Fluconazole started for candida.     CV:  HDS  No pressors    Heme/ID:  RCx: MRSA, candida  Levofloxacin/bactrim/fluconazole    Renal:  Purewick  Strict I/Os  MIVF d/c  Increase Lasix to 40 BID    FEN/GI:  Dysphagia diet  Replace lytes PRN    Endo:  SSI    PPX:  Protonix  Lovenox    Dispo: Continue ICU care

## 2020-01-27 NOTE — PT/OT/SLP PROGRESS
Occupational Therapy      Patient Name:  Marisela Bunn   MRN:  [id number]    Patient not seen today secondary to eating late breakfast on first attempt, then away at CT. In pm, MDs with pt at b/s discussing upcoming surgery  . Will follow-up 1/28/20.    JAROCHO Cabrera  1/27/2020

## 2020-01-27 NOTE — PROGRESS NOTES
"[facility]  General Surgery  Progress Note    Subjective:     History of Present Illness:  Marisela Bunn is a 69 y.o. female with PMH CAD, HTN, TIA, GERD presents to the hospital as a direct admission for evaluation of a newly diagnosed diaphragmatic hernia. She initially presented to [place] ED on 1/18/20 for a 1-month history of a cough, intermittent fevers and dyspnea.  She had been seen by 2 urgent care physicians in the last month for the cough, with 2 different antibiotic courses given without improvement in cough. She reports that on 1/15 after an particularly severe coughing episode she felt a "pop" on left side, with associated severe pain and  Subsequent bruising of the left flank. She reports constant severe pain since that time. She is on daily ASA 81mg,     On ED presentation, patient was hemodynamically stable, H/H 12.4/40.0, breathing on RA. Labs revealed leukocytosis (19.2), lactic acid 2.5 (repeat lactic acid1.6), BMP wnl. CXR revealed left lower lobe pneumonia with possible associated pleural effusion. CT abdomen/pelvis revealed a large left diaphragmatic hernia containing fat  and bowel loops with hernia of intra-abdomnial contents outside the thorax from 7th left ICS. Medium sized HH. CTA revealed large Bochdalek hernia through defect in left hemo-diaphragm, with associated widening of 7th intercostal space. She was started on IV hydration and antibiotics (levofloxacin, zosyn). She reports she has not passed a bowel movement of flatus in 2 days. Intermittent lower quadrant "spasms" while coughing, otherwise no abdominal pain. She reports no other symptoms.    Post-Op Info:  Procedure(s) (LRB):  REPAIR, HERNIA, DIAPHRAGMATIC, Laparoscopic (Left)   6 Days Post-Op     Interval History: No acute events overnight. She was tachy up to 107 overnight, and her O2 sats ranged 90-96%, currently on 12 L high flow. She feels her breathing is unchanged this morning from yesterday. She " has ambulated in her room.     Medications:  Continuous Infusions:    Scheduled Meds:   acetylcysteine 100 mg/ml (10%)  4 mL Nebulization TID    albuterol-ipratropium  3 mL Nebulization Q4H    benzonatate  100 mg Oral Q8H    dextromethorphan-guaifenesin  mg/5 ml  5 mL Oral Q6H    enoxaparin  40 mg Subcutaneous BID    fluconazole  200 mg Oral Daily    furosemide  20 mg Intravenous BID    levoFLOXacin  750 mg Intravenous Q24H    pantoprazole  40 mg Intravenous Daily    sulfamethoxazole-trimethoprim 800-160mg  1 tablet Oral BID     PRN Meds:acetaminophen, acetaminophen-codeine, calcium gluconate IVPB, calcium gluconate IVPB, calcium gluconate IVPB, Dextrose 10% Bolus, glucagon (human recombinant), hydrALAZINE, insulin aspart U-100, labetalol, magnesium sulfate IVPB, magnesium sulfate IVPB, melatonin, ondansetron, oxyCODONE, oxyCODONE, potassium chloride in water **AND** potassium chloride in water **AND** potassium chloride in water, sodium chloride 0.9%, sodium chloride 0.9%, sodium phosphate IVPB, sodium phosphate IVPB, sodium phosphate IVPB     Review of patient's allergies indicates:   Allergen Reactions    Erythromycin      Stomach pains    Rosuvastatin      Hives, muscle/joint pains    Zolpidem      Confusion      Objective:     Vital Signs (Most Recent):  Temp: 97.8 °F (36.6 °C) (01/26/20 2300)  Pulse: 106 (01/27/20 0600)  Resp: (!) 34 (01/27/20 0600)  BP: 132/60 (01/27/20 0600)  SpO2: 95 % (01/27/20 0600) Vital Signs (24h Range):  Temp:  [97.8 °F (36.6 °C)-99.8 °F (37.7 °C)] 97.8 °F (36.6 °C)  Pulse:  [] 106  Resp:  [14-34] 34  SpO2:  [88 %-98 %] 95 %  BP: (104-147)/(54-67) 132/60     Weight: 114.2 kg (251 lb 12.3 oz)  Body mass index is 47.57 kg/m².    Intake/Output - Last 3 Shifts       01/25 0700 - 01/26 0659 01/26 0700 - 01/27 0659 01/27 0700 - 01/28 0659    P.O. 720 820     I.V. (mL/kg)       IV Piggyback 150      Total Intake(mL/kg) 870 (7.6) 820 (7.2)     Urine (mL/kg/hr) 3620  (1.3) 1300 (0.5)     Other       Stool       Total Output 3620 1300     Net -2750 -480            Urine Occurrence  9 x           Physical Exam   Constitutional: She appears well-developed and well-nourished. No distress.   HENT:   Head: Normocephalic and atraumatic.   Mouth/Throat: Oropharynx is clear and moist.   Eyes: Conjunctivae and EOM are normal. Right eye exhibits no discharge. Left eye exhibits no discharge.   Neck: Normal range of motion.   Cardiovascular: Normal rate and regular rhythm.   Pulmonary/Chest:   On HFNC  Sats 90-96%   Abdominal: Soft. She exhibits no distension.   Musculoskeletal: Normal range of motion. She exhibits no deformity.   Neurological:   A&O, NAD   Skin: Skin is warm and dry.   L flank ecchymosis, improving   Psychiatric: She has a normal mood and affect. Her behavior is normal.         Significant Labs:  CBC:   Recent Labs   Lab 01/27/20  0355   WBC 14.77*   RBC 3.39*   HGB 9.3*   HCT 30.9*      MCV 91   MCH 27.4   MCHC 30.1*     CMP:   Recent Labs   Lab 01/23/20  0307  01/27/20  0355      < > 85   CALCIUM 8.3*   < > 8.3*   ALBUMIN 1.9*  --   --    PROT 4.9*  --   --       < > 132*   K 4.4   < > 4.5   CO2 27   < > 31*      < > 92*   BUN 13   < > 13   CREATININE 0.7   < > 0.7   ALKPHOS 81  --   --    ALT 31  --   --    AST 23  --   --    BILITOT 0.8  --   --     < > = values in this interval not displayed.       Significant Diagnostics:  I have reviewed all pertinent imaging results/findings within the past 24 hours.    Assessment/Plan:     * Diaphragmatic hernia  Marisela Bunn is a 69 y.o. female with PMH COPD (not on home oxygen), HTN, HLD (not on anticoagulation) received as direct admission for large diaphragmatic hernia. Patient is symptomatic from hernia with constipation and dyspnea with overlying bruise on left flank. She underwent lap diaphragmatic hernia repair with mesh on 1/21/20. She was transferred to the ICU postop.   Doing ok but concern for  ongoing PNA    - Needs aggressive respiratory tuning   - limit coughing spells as able  - Lasix 20 BID  - Continue Levaquin, Bactrim for pneumonia  - Aggressive pulm toilet with IS, CPT, DuoNebs, and Tessalon   - PT/OT today  - Daily labs  - Leokocytosis downtrending today (14.7 from 18.0)    DISPO: SICU care for today in light O2 requirements. Not ready for d/c     Pneumonia  See principle         Onesimo Raza MD  General Surgery  [facility]

## 2020-01-27 NOTE — ASSESSMENT & PLAN NOTE
Marisela Bunn is a 69 y.o. female with PMH COPD (not on home oxygen), HTN, HLD (not on anticoagulation) received as direct admission for large diaphragmatic hernia. Patient is symptomatic from hernia with constipation and dyspnea with overlying bruise on left flank. She underwent lap diaphragmatic hernia repair with mesh on 1/21/20. She was transferred to the ICU postop.   Doing ok but concern for ongoing PNA    - Needs aggressive respiratory tuning   - limit coughing spells as able  - Lasix 20 BID  - Continue Levaquin, Bactrim for pneumonia  - Aggressive pulm toilet with IS, CPT, DuoNebs, and Tessalon   - PT/OT today  - Daily labs  - Leokocytosis downtrending today (14.7 from 18.0)    DISPO: SICU care for today in light O2 requirements. Not ready for d/c

## 2020-01-28 ENCOUNTER — ANESTHESIA EVENT (OUTPATIENT)
Dept: SURGERY | Facility: HOSPITAL | Age: 70
DRG: 326 | End: 2020-01-28
Payer: COMMERCIAL

## 2020-01-28 LAB
ABO + RH BLD: NORMAL
ANION GAP SERPL CALC-SCNC: 10 MMOL/L (ref 8–16)
ANISOCYTOSIS BLD QL SMEAR: SLIGHT
BASOPHILS NFR BLD: 1 % (ref 0–1.9)
BLD GP AB SCN CELLS X3 SERPL QL: NORMAL
BUN SERPL-MCNC: 13 MG/DL (ref 8–23)
CALCIUM SERPL-MCNC: 8.9 MG/DL (ref 8.7–10.5)
CHLORIDE SERPL-SCNC: 90 MMOL/L (ref 95–110)
CO2 SERPL-SCNC: 33 MMOL/L (ref 23–29)
CREAT SERPL-MCNC: 0.8 MG/DL (ref 0.5–1.4)
DIFFERENTIAL METHOD: ABNORMAL
EOSINOPHIL NFR BLD: 9 % (ref 0–8)
ERYTHROCYTE [DISTWIDTH] IN BLOOD BY AUTOMATED COUNT: 13.6 % (ref 11.5–14.5)
EST. GFR  (AFRICAN AMERICAN): >60 ML/MIN/1.73 M^2
EST. GFR  (NON AFRICAN AMERICAN): >60 ML/MIN/1.73 M^2
GLUCOSE SERPL-MCNC: 86 MG/DL (ref 70–110)
HCT VFR BLD AUTO: 29.2 % (ref 37–48.5)
HGB BLD-MCNC: 8.6 G/DL (ref 12–16)
HYPOCHROMIA BLD QL SMEAR: ABNORMAL
IMM GRANULOCYTES # BLD AUTO: ABNORMAL K/UL (ref 0–0.04)
IMM GRANULOCYTES NFR BLD AUTO: ABNORMAL % (ref 0–0.5)
LYMPHOCYTES NFR BLD: 12 % (ref 18–48)
MAGNESIUM SERPL-MCNC: 1.8 MG/DL (ref 1.6–2.6)
MCH RBC QN AUTO: 26.7 PG (ref 27–31)
MCHC RBC AUTO-ENTMCNC: 29.5 G/DL (ref 32–36)
MCV RBC AUTO: 91 FL (ref 82–98)
MONOCYTES NFR BLD: 4 % (ref 4–15)
NEUTROPHILS NFR BLD: 74 % (ref 38–73)
NRBC BLD-RTO: 0 /100 WBC
OVALOCYTES BLD QL SMEAR: ABNORMAL
PHOSPHATE SERPL-MCNC: 3.7 MG/DL (ref 2.7–4.5)
PLATELET # BLD AUTO: 370 K/UL (ref 150–350)
PLATELET BLD QL SMEAR: ABNORMAL
PMV BLD AUTO: 9.1 FL (ref 9.2–12.9)
POCT GLUCOSE: 105 MG/DL (ref 70–110)
POCT GLUCOSE: 112 MG/DL (ref 70–110)
POCT GLUCOSE: 122 MG/DL (ref 70–110)
POCT GLUCOSE: 124 MG/DL (ref 70–110)
POIKILOCYTOSIS BLD QL SMEAR: SLIGHT
POLYCHROMASIA BLD QL SMEAR: ABNORMAL
POTASSIUM SERPL-SCNC: 3.7 MMOL/L (ref 3.5–5.1)
RBC # BLD AUTO: 3.22 M/UL (ref 4–5.4)
SODIUM SERPL-SCNC: 133 MMOL/L (ref 136–145)
WBC # BLD AUTO: 12.54 K/UL (ref 3.9–12.7)

## 2020-01-28 PROCEDURE — 94640 AIRWAY INHALATION TREATMENT: CPT

## 2020-01-28 PROCEDURE — 25000003 PHARM REV CODE 250: Performed by: STUDENT IN AN ORGANIZED HEALTH CARE EDUCATION/TRAINING PROGRAM

## 2020-01-28 PROCEDURE — 63600175 PHARM REV CODE 636 W HCPCS: Performed by: STUDENT IN AN ORGANIZED HEALTH CARE EDUCATION/TRAINING PROGRAM

## 2020-01-28 PROCEDURE — 94664 DEMO&/EVAL PT USE INHALER: CPT

## 2020-01-28 PROCEDURE — 20000000 HC ICU ROOM

## 2020-01-28 PROCEDURE — 97535 SELF CARE MNGMENT TRAINING: CPT

## 2020-01-28 PROCEDURE — 99291 PR CRITICAL CARE, E/M 30-74 MINUTES: ICD-10-PCS | Mod: 24,,, | Performed by: SURGERY

## 2020-01-28 PROCEDURE — 94761 N-INVAS EAR/PLS OXIMETRY MLT: CPT

## 2020-01-28 PROCEDURE — 99900035 HC TECH TIME PER 15 MIN (STAT)

## 2020-01-28 PROCEDURE — 97530 THERAPEUTIC ACTIVITIES: CPT

## 2020-01-28 PROCEDURE — 83735 ASSAY OF MAGNESIUM: CPT

## 2020-01-28 PROCEDURE — 92526 ORAL FUNCTION THERAPY: CPT

## 2020-01-28 PROCEDURE — 94799 UNLISTED PULMONARY SVC/PX: CPT

## 2020-01-28 PROCEDURE — 25000242 PHARM REV CODE 250 ALT 637 W/ HCPCS: Performed by: STUDENT IN AN ORGANIZED HEALTH CARE EDUCATION/TRAINING PROGRAM

## 2020-01-28 PROCEDURE — 25000003 PHARM REV CODE 250: Performed by: SURGERY

## 2020-01-28 PROCEDURE — 85027 COMPLETE CBC AUTOMATED: CPT

## 2020-01-28 PROCEDURE — 27100092 HC HIGH FLOW DELIVERY CANNULA

## 2020-01-28 PROCEDURE — 25000003 PHARM REV CODE 250: Performed by: PHYSICIAN ASSISTANT

## 2020-01-28 PROCEDURE — 27100171 HC OXYGEN HIGH FLOW UP TO 24 HOURS

## 2020-01-28 PROCEDURE — 99291 CRITICAL CARE FIRST HOUR: CPT | Mod: 24,,, | Performed by: SURGERY

## 2020-01-28 PROCEDURE — 84100 ASSAY OF PHOSPHORUS: CPT

## 2020-01-28 PROCEDURE — 86901 BLOOD TYPING SEROLOGIC RH(D): CPT

## 2020-01-28 PROCEDURE — 86920 COMPATIBILITY TEST SPIN: CPT

## 2020-01-28 PROCEDURE — 85007 BL SMEAR W/DIFF WBC COUNT: CPT

## 2020-01-28 PROCEDURE — 80048 BASIC METABOLIC PNL TOTAL CA: CPT

## 2020-01-28 RX ORDER — METHOCARBAMOL 750 MG/1
750 TABLET, FILM COATED ORAL 4 TIMES DAILY
Status: DISCONTINUED | OUTPATIENT
Start: 2020-01-28 | End: 2020-01-28

## 2020-01-28 RX ORDER — FUROSEMIDE 10 MG/ML
40 INJECTION INTRAMUSCULAR; INTRAVENOUS 2 TIMES DAILY
Status: DISCONTINUED | OUTPATIENT
Start: 2020-01-28 | End: 2020-01-29

## 2020-01-28 RX ORDER — POLYETHYLENE GLYCOL 3350, SODIUM SULFATE ANHYDROUS, SODIUM BICARBONATE, SODIUM CHLORIDE, POTASSIUM CHLORIDE 236; 22.74; 6.74; 5.86; 2.97 G/4L; G/4L; G/4L; G/4L; G/4L
4000 POWDER, FOR SOLUTION ORAL ONCE
Status: COMPLETED | OUTPATIENT
Start: 2020-01-28 | End: 2020-01-28

## 2020-01-28 RX ORDER — SYRING-NEEDL,DISP,INSUL,0.3 ML 29 G X1/2"
148 SYRINGE, EMPTY DISPOSABLE MISCELLANEOUS ONCE
Status: COMPLETED | OUTPATIENT
Start: 2020-01-28 | End: 2020-01-28

## 2020-01-28 RX ORDER — METHOCARBAMOL 750 MG/1
750 TABLET, FILM COATED ORAL 4 TIMES DAILY
Status: DISCONTINUED | OUTPATIENT
Start: 2020-01-28 | End: 2020-01-29

## 2020-01-28 RX ORDER — POTASSIUM CHLORIDE 20 MEQ/1
20 TABLET, EXTENDED RELEASE ORAL ONCE
Status: COMPLETED | OUTPATIENT
Start: 2020-01-28 | End: 2020-01-28

## 2020-01-28 RX ADMIN — ACETYLCYSTEINE 4 ML: 100 SOLUTION ORAL; RESPIRATORY (INHALATION) at 08:01

## 2020-01-28 RX ADMIN — PANTOPRAZOLE SODIUM 40 MG: 40 TABLET, DELAYED RELEASE ORAL at 09:01

## 2020-01-28 RX ADMIN — ACETYLCYSTEINE 4 ML: 100 SOLUTION ORAL; RESPIRATORY (INHALATION) at 09:01

## 2020-01-28 RX ADMIN — GUAIFENESIN AND DEXTROMETHORPHAN 5 ML: 100; 10 SYRUP ORAL at 11:01

## 2020-01-28 RX ADMIN — ENEMA 1 ENEMA: 19; 7 ENEMA RECTAL at 11:01

## 2020-01-28 RX ADMIN — METHOCARBAMOL TABLETS 750 MG: 750 TABLET, COATED ORAL at 09:01

## 2020-01-28 RX ADMIN — BENZONATATE 100 MG: 100 CAPSULE ORAL at 10:01

## 2020-01-28 RX ADMIN — GUAIFENESIN AND DEXTROMETHORPHAN 5 ML: 100; 10 SYRUP ORAL at 05:01

## 2020-01-28 RX ADMIN — IPRATROPIUM BROMIDE AND ALBUTEROL SULFATE 3 ML: .5; 3 SOLUTION RESPIRATORY (INHALATION) at 08:01

## 2020-01-28 RX ADMIN — SULFAMETHOXAZOLE AND TRIMETHOPRIM 1 TABLET: 800; 160 TABLET ORAL at 09:01

## 2020-01-28 RX ADMIN — MAGNESIUM CITRATE 148 ML: 1.75 LIQUID ORAL at 11:01

## 2020-01-28 RX ADMIN — FUROSEMIDE 40 MG: 10 INJECTION, SOLUTION INTRAMUSCULAR; INTRAVENOUS at 09:01

## 2020-01-28 RX ADMIN — IPRATROPIUM BROMIDE AND ALBUTEROL SULFATE 3 ML: .5; 3 SOLUTION RESPIRATORY (INHALATION) at 03:01

## 2020-01-28 RX ADMIN — METHOCARBAMOL TABLETS 750 MG: 750 TABLET, COATED ORAL at 06:01

## 2020-01-28 RX ADMIN — FUROSEMIDE 40 MG: 10 INJECTION, SOLUTION INTRAMUSCULAR; INTRAVENOUS at 05:01

## 2020-01-28 RX ADMIN — POTASSIUM CHLORIDE 20 MEQ: 1500 TABLET, EXTENDED RELEASE ORAL at 09:01

## 2020-01-28 RX ADMIN — ACETYLCYSTEINE 4 ML: 100 SOLUTION ORAL; RESPIRATORY (INHALATION) at 03:01

## 2020-01-28 RX ADMIN — MAGNESIUM SULFATE IN WATER 2 G: 40 INJECTION, SOLUTION INTRAVENOUS at 09:01

## 2020-01-28 RX ADMIN — ENOXAPARIN SODIUM 40 MG: 100 INJECTION SUBCUTANEOUS at 09:01

## 2020-01-28 RX ADMIN — IPRATROPIUM BROMIDE AND ALBUTEROL SULFATE 3 ML: .5; 3 SOLUTION RESPIRATORY (INHALATION) at 11:01

## 2020-01-28 RX ADMIN — ONDANSETRON 8 MG: 8 TABLET, ORALLY DISINTEGRATING ORAL at 03:01

## 2020-01-28 RX ADMIN — OXYCODONE HYDROCHLORIDE 5 MG: 5 TABLET ORAL at 05:01

## 2020-01-28 RX ADMIN — BENZONATATE 100 MG: 100 CAPSULE ORAL at 05:01

## 2020-01-28 RX ADMIN — POLYETHYLENE GLYCOL 3350, SODIUM SULFATE ANHYDROUS, SODIUM BICARBONATE, SODIUM CHLORIDE, POTASSIUM CHLORIDE 4000 ML: 236; 22.74; 6.74; 5.86; 2.97 POWDER, FOR SOLUTION ORAL at 05:01

## 2020-01-28 RX ADMIN — BENZONATATE 100 MG: 100 CAPSULE ORAL at 02:01

## 2020-01-28 NOTE — PLAN OF CARE
Pt on 22 L 80% comfort flow. AAOx4. No gtts. UO adequate, about 1 L UO this shift. Plans to return to OR Wednesday. No skin break down noted. VSS. Will continue to monitor.

## 2020-01-28 NOTE — ASSESSMENT & PLAN NOTE
69 year old female admitted to SICU with recurrent left diaphragmatic hernia s/p laparoscopic repair 1 week ago.      - To OR for left thoracotomy for reduction and repair of diaphragmatic hernia, possible chest wall reconstruction on Wednesday, January 29th.   - Would benefit from fleets enema and mag citrate as no recorded BM this stay   - Clear liquids. NPO midnight on 1/29.   - May consult regional pain team for possible erector spinae block prior to left thoracotomy.   - Remainder of care per general surgery and ICU teams.

## 2020-01-28 NOTE — SUBJECTIVE & OBJECTIVE
Interval History: No acute events overnight.   On comfort flow this morning. Comfortable and in NAD      Medications:  Continuous Infusions:    Scheduled Meds:   acetylcysteine 100 mg/ml (10%)  4 mL Nebulization TID    albuterol-ipratropium  3 mL Nebulization Q4H    benzonatate  100 mg Oral Q8H    dextromethorphan-guaifenesin  mg/5 ml  5 mL Oral Q6H    enoxaparin  40 mg Subcutaneous BID    furosemide  40 mg Intravenous BID    magnesium citrate  148 mL Oral Once    pantoprazole  40 mg Oral Daily    sulfamethoxazole-trimethoprim 800-160mg  1 tablet Oral BID     PRN Meds:acetaminophen, acetaminophen-codeine, calcium gluconate IVPB, calcium gluconate IVPB, calcium gluconate IVPB, Dextrose 10% Bolus, glucagon (human recombinant), hydrALAZINE, insulin aspart U-100, labetalol, magnesium sulfate IVPB, magnesium sulfate IVPB, melatonin, ondansetron, oxyCODONE, oxyCODONE, potassium chloride in water **AND** potassium chloride in water **AND** potassium chloride in water, sodium chloride 0.9%, sodium chloride 0.9%, sodium phosphate IVPB, sodium phosphate IVPB, sodium phosphate IVPB     Review of patient's allergies indicates:   Allergen Reactions    Erythromycin      Stomach pains    Rosuvastatin      Hives, muscle/joint pains    Zolpidem      Confusion      Objective:     Vital Signs (Most Recent):  Temp: 98.8 °F (37.1 °C) (01/28/20 0300)  Pulse: 94 (01/28/20 0829)  Resp: 20 (01/28/20 0829)  BP: 132/60 (01/28/20 0500)  SpO2: (!) 93 % (01/28/20 0829) Vital Signs (24h Range):  Temp:  [98.8 °F (37.1 °C)-99.3 °F (37.4 °C)] 98.8 °F (37.1 °C)  Pulse:  [] 94  Resp:  [16-31] 20  SpO2:  [88 %-100 %] 93 %  BP: (104-157)/(53-84) 132/60     Weight: 114.2 kg (251 lb 12.3 oz)  Body mass index is 47.57 kg/m².    Intake/Output - Last 3 Shifts       01/26 0700 - 01/27 0659 01/27 0700 - 01/28 0659 01/28 0700 - 01/29 0659    P.O. 820 225     IV Piggyback       Total Intake(mL/kg) 820 (7.2) 225 (2)     Urine (mL/kg/hr)  1300 (0.5) 2250 (0.8)     Total Output 1300 2250     Net -480 -2025            Urine Occurrence 9 x 1 x           Physical Exam   Constitutional: She appears well-developed and well-nourished. No distress.   HENT:   Head: Normocephalic and atraumatic.   Mouth/Throat: Oropharynx is clear and moist.   Eyes: Conjunctivae and EOM are normal. Right eye exhibits no discharge. Left eye exhibits no discharge.   Neck: Normal range of motion.   Cardiovascular: Normal rate and regular rhythm.   Pulmonary/Chest:   On Comfort flow  Sats 90-96%   Abdominal: Soft. She exhibits no distension.   Musculoskeletal: Normal range of motion. She exhibits no deformity.   Neurological:   A&O, NAD   Skin: Skin is warm and dry.   L flank ecchymosis, improving   Psychiatric: She has a normal mood and affect. Her behavior is normal.         Significant Labs:  CBC:   Recent Labs   Lab 01/28/20  0400   WBC 12.54   RBC 3.22*   HGB 8.6*   HCT 29.2*   *   MCV 91   MCH 26.7*   MCHC 29.5*     CMP:   Recent Labs   Lab 01/23/20  0307  01/28/20  0400      < > 86   CALCIUM 8.3*   < > 8.9   ALBUMIN 1.9*  --   --    PROT 4.9*  --   --       < > 133*   K 4.4   < > 3.7   CO2 27   < > 33*      < > 90*   BUN 13   < > 13   CREATININE 0.7   < > 0.8   ALKPHOS 81  --   --    ALT 31  --   --    AST 23  --   --    BILITOT 0.8  --   --     < > = values in this interval not displayed.       Significant Diagnostics:  I have reviewed all pertinent imaging results/findings within the past 24 hours.

## 2020-01-28 NOTE — PLAN OF CARE
Problem: Occupational Therapy Goal  Goal: Occupational Therapy Goal  Description  Goals to be met by: 2/9/20    Patient will increase functional independence with ADLs by performing:    UE Dressing with SBA.  LE Dressing with  SBA .  Grooming while standing with SBA  Toileting from bedside commode withSBA for hygiene and clothing management.   Toilet transfer to bedside commode with SBA       Outcome: Ongoing, Progressing   The above goals remain appropriate. Olga Cerda, LOTR  1/28/2020

## 2020-01-28 NOTE — ASSESSMENT & PLAN NOTE
69F PMH CAD, HTN, TIA, GERD who presented with a diaphragmatic hernia, now s/p surgical repair on 1/23/19.    Surgical:  Consult regional pain team for possible erector spinae block prior to OR  Plan for OR tomorrow/thursday for thoracotomy and repair    Neuro:  Sedation: None  Pain control: Tylenol, fentanyl, oxy    Resp:  Tesslon, dextromethorphan, codeine to suppress cough  Nebs prn  Acapella, IS  Continue to monitor oxygen requirements  Respiratory cultures showed MRSA and candida. On Bactrim.    CV:  HDS  No pressors    Heme/ID:  RCx: MRSA, candida  Bactrim    Renal:  Purewick  Strict I/Os  MIVF d/c  Increase Lasix to 40 BID    FEN/GI:  Dysphagia diet  Replace lytes PRN    Endo:  SSI    PPX:  Protonix  Lovenox    Dispo: Continue ICU care

## 2020-01-28 NOTE — PLAN OF CARE
POC reviewed with pt, verbalized understanding. Questions and concerns addressed. No acute events today. Plan to return to the OR tomorrow at 1pm. Consents on chart. Family updated. Pt has not had BM. Mg citrate and fleets enema given with no results. GoLytely ordered. Will continue to monitor. See flowsheets for full assessment and VS info.

## 2020-01-28 NOTE — PROGRESS NOTES
"[facility]  General Surgery  Progress Note    Subjective:     History of Present Illness:  Marisela Bunn is a 69 y.o. female with PMH CAD, HTN, TIA, GERD presents to the hospital as a direct admission for evaluation of a newly diagnosed diaphragmatic hernia. She initially presented to [place] ED on 1/18/20 for a 1-month history of a cough, intermittent fevers and dyspnea.  She had been seen by 2 urgent care physicians in the last month for the cough, with 2 different antibiotic courses given without improvement in cough. She reports that on 1/15 after an particularly severe coughing episode she felt a "pop" on left side, with associated severe pain and  Subsequent bruising of the left flank. She reports constant severe pain since that time. She is on daily ASA 81mg,     On ED presentation, patient was hemodynamically stable, H/H 12.4/40.0, breathing on RA. Labs revealed leukocytosis (19.2), lactic acid 2.5 (repeat lactic acid1.6), BMP wnl. CXR revealed left lower lobe pneumonia with possible associated pleural effusion. CT abdomen/pelvis revealed a large left diaphragmatic hernia containing fat  and bowel loops with hernia of intra-abdomnial contents outside the thorax from 7th left ICS. Medium sized HH. CTA revealed large Bochdalek hernia through defect in left hemo-diaphragm, with associated widening of 7th intercostal space. She was started on IV hydration and antibiotics (levofloxacin, zosyn). She reports she has not passed a bowel movement of flatus in 2 days. Intermittent lower quadrant "spasms" while coughing, otherwise no abdominal pain. She reports no other symptoms.    Post-Op Info:  Procedure(s) (LRB):  REPAIR, HERNIA, DIAPHRAGMATIC, Laparoscopic (Left)   7 Days Post-Op     Interval History: No acute events overnight.   On comfort flow this morning. Comfortable and in NAD      Medications:  Continuous Infusions:    Scheduled Meds:   acetylcysteine 100 mg/ml (10%)  4 mL " Nebulization TID    albuterol-ipratropium  3 mL Nebulization Q4H    benzonatate  100 mg Oral Q8H    dextromethorphan-guaifenesin  mg/5 ml  5 mL Oral Q6H    enoxaparin  40 mg Subcutaneous BID    furosemide  40 mg Intravenous BID    magnesium citrate  148 mL Oral Once    pantoprazole  40 mg Oral Daily    sulfamethoxazole-trimethoprim 800-160mg  1 tablet Oral BID     PRN Meds:acetaminophen, acetaminophen-codeine, calcium gluconate IVPB, calcium gluconate IVPB, calcium gluconate IVPB, Dextrose 10% Bolus, glucagon (human recombinant), hydrALAZINE, insulin aspart U-100, labetalol, magnesium sulfate IVPB, magnesium sulfate IVPB, melatonin, ondansetron, oxyCODONE, oxyCODONE, potassium chloride in water **AND** potassium chloride in water **AND** potassium chloride in water, sodium chloride 0.9%, sodium chloride 0.9%, sodium phosphate IVPB, sodium phosphate IVPB, sodium phosphate IVPB     Review of patient's allergies indicates:   Allergen Reactions    Erythromycin      Stomach pains    Rosuvastatin      Hives, muscle/joint pains    Zolpidem      Confusion      Objective:     Vital Signs (Most Recent):  Temp: 98.8 °F (37.1 °C) (01/28/20 0300)  Pulse: 94 (01/28/20 0829)  Resp: 20 (01/28/20 0829)  BP: 132/60 (01/28/20 0500)  SpO2: (!) 93 % (01/28/20 0829) Vital Signs (24h Range):  Temp:  [98.8 °F (37.1 °C)-99.3 °F (37.4 °C)] 98.8 °F (37.1 °C)  Pulse:  [] 94  Resp:  [16-31] 20  SpO2:  [88 %-100 %] 93 %  BP: (104-157)/(53-84) 132/60     Weight: 114.2 kg (251 lb 12.3 oz)  Body mass index is 47.57 kg/m².    Intake/Output - Last 3 Shifts       01/26 0700 - 01/27 0659 01/27 0700 - 01/28 0659 01/28 0700 - 01/29 0659    P.O. 820 225     IV Piggyback       Total Intake(mL/kg) 820 (7.2) 225 (2)     Urine (mL/kg/hr) 1300 (0.5) 2250 (0.8)     Total Output 1300 2250     Net -480 -2025            Urine Occurrence 9 x 1 x           Physical Exam   Constitutional: She appears well-developed and well-nourished. No  distress.   HENT:   Head: Normocephalic and atraumatic.   Mouth/Throat: Oropharynx is clear and moist.   Eyes: Conjunctivae and EOM are normal. Right eye exhibits no discharge. Left eye exhibits no discharge.   Neck: Normal range of motion.   Cardiovascular: Normal rate and regular rhythm.   Pulmonary/Chest:   On Comfort flow  Sats 90-96%   Abdominal: Soft. She exhibits no distension.   Musculoskeletal: Normal range of motion. She exhibits no deformity.   Neurological:   A&O, NAD   Skin: Skin is warm and dry.   L flank ecchymosis, improving   Psychiatric: She has a normal mood and affect. Her behavior is normal.         Significant Labs:  CBC:   Recent Labs   Lab 01/28/20  0400   WBC 12.54   RBC 3.22*   HGB 8.6*   HCT 29.2*   *   MCV 91   MCH 26.7*   MCHC 29.5*     CMP:   Recent Labs   Lab 01/23/20  0307  01/28/20  0400      < > 86   CALCIUM 8.3*   < > 8.9   ALBUMIN 1.9*  --   --    PROT 4.9*  --   --       < > 133*   K 4.4   < > 3.7   CO2 27   < > 33*      < > 90*   BUN 13   < > 13   CREATININE 0.7   < > 0.8   ALKPHOS 81  --   --    ALT 31  --   --    AST 23  --   --    BILITOT 0.8  --   --     < > = values in this interval not displayed.       Significant Diagnostics:  I have reviewed all pertinent imaging results/findings within the past 24 hours.    Assessment/Plan:     * Diaphragmatic hernia  Marisela Bunn is a 69 y.o. female with PMH COPD (not on home oxygen), HTN, HLD (not on anticoagulation) received as direct admission for large diaphragmatic hernia. Patient is symptomatic from hernia with constipation and dyspnea with overlying bruise on left flank. She underwent lap diaphragmatic hernia repair with mesh on 1/21/20.   Evidence of recurrent hernia on CT 1/27. Thoracic consulted and plans for operative repair 1/29    - Needs aggressive respiratory care  - limit coughing spells as able  - Lasix 20 BID  - Continue Levaquin, Bactrim for pneumonia, (restarted 1/26)  - Aggressive pulm  toilet with IS, CPT, Ren and Tessalon   - PT/OT today  - Daily labs  - Leokocytosis downtrending    DISPO: pending redo diaphragmatic hernia repair with Thoracic team. SICU care for respiratory status    Pneumonia  See principle         Sanjeev Murcia MD  General Surgery  [facility]

## 2020-01-28 NOTE — PT/OT/SLP PROGRESS
Physical Therapy  Co-Treatment  With OT    Patient Name:  Marisela Bunn   MRN:  [id number]    Recommendations:     Discharge Recommendations:  nursing facility, skilled   Discharge Equipment Recommendations: (will determine DME needs closer to discharge)   Barriers to discharge: Decreased caregiver support family will not be able to assist at current functional level.     Assessment:     Marisela Bunn is a 69 y.o. female admitted with a medical diagnosis of Diaphragmatic hernia.  She presents with the following impairments/functional limitations:  gait instability, weakness, impaired endurance, impaired balance, decreased lower extremity function, impaired functional mobilty  Pt efren treatment fair but being on comfort flow and with decreased O2 sats,  limited functional mobility. Pt will benefit from cont skilled PT 4x/wk to progress physically. Pt will need SNF placement when medically stable. Pt is s/p diaphragmatic hernia repair 1/21/20. Pt is tentatively scheduled for surgery 1/29.    Rehab Prognosis: Good; patient would benefit from acute skilled PT services to address these deficits and reach maximum level of function.    Recent Surgery: Procedure(s) (LRB):  REPAIR, HERNIA, DIAPHRAGMATIC, Laparoscopic (Left) 7 Days Post-Op    Plan:     During this hospitalization, patient to be seen 4 x/week to address the identified rehab impairments via gait training, therapeutic activities, therapeutic exercises and progress toward the following goals:    · Plan of Care Expires:  02/25/20    Subjective     Chief Complaint: pt c/o pain during treatment and feeling dizzy at end of treatment.   Patient/Family Comments/goals:  To get better and go home.   Pain/Comfort:  · Pain Rating 1: 5/10(back and L ribs)  · Pain Rating Post-Intervention 1: 5/10      Objective:     Communicated with nurse prior to session.  Patient found supine with telemetry, pulse ox (continuous), blood pressure cuff, oxygen, SCD, PureWick(hep lock IV,  comfort flow O2) upon PT entry to room.     General Precautions: Standard, fall, contact   Orthopedic Precautions:    Braces:       Functional Mobility:  · Bed Mobility:  Pt needed verbal cues for hand placement and sequencing for functional mobility.   · Rolling Right: moderate assistance  · Supine to Sit: moderate assistance  · Sit to Supine: moderate assistance  ·   · Balance: pt sat on EOB x 12 min with CGA. Pt O2 sats maintained between 92% and 97%. PT assisted with facilitating trunk control and balance while pt performed ADLS with OT.   · RN did not want pt in chair 2nd to tentative breathing status.        AM-PAC 6 CLICK MOBILITY  Turning over in bed (including adjusting bedclothes, sheets and blankets)?: 2  Sitting down on and standing up from a chair with arms (e.g., wheelchair, bedside commode, etc.): 2  Moving from lying on back to sitting on the side of the bed?: 2  Moving to and from a bed to a chair (including a wheelchair)?: 2  Need to walk in hospital room?: 1  Climbing 3-5 steps with a railing?: 1  Basic Mobility Total Score: 10         Patient left bed in chair position with all lines intact and call button in reach..    GOALS:   Multidisciplinary Problems     Physical Therapy Goals        Problem: Physical Therapy Goal    Goal Priority Disciplines Outcome Goal Variances Interventions   Physical Therapy Goal     PT, PT/OT Ongoing, Progressing     Description:  Goals to be met by:20    Patient will increase functional independence with mobility by performin. Supine to sit with Contact Guard Assistance -not met  2. Sit to stand transfer with Contact Guard Assistance -not met  3. Gait  x 100 feet with Contact Guard Assistance using Rolling Walker. -not met  4. Lower extremity exercise program x15 reps  with supervision -not met                      Time Tracking:     PT Received On: 20  PT Start Time: 842     PT Stop Time: 902  PT Total Time (min): 20 min     Billable Minutes:  Therapeutic Activity 20 min    Treatment Type: (co-treatment with OT)  PT/PTA: PT     PTA Visit Number: 0     Jacinda Smith, PT  01/28/2020

## 2020-01-28 NOTE — SUBJECTIVE & OBJECTIVE
24Hr Events:  NAEON. No recorded bowel movements, slightly decreased comfort flow requirements, though overall high support required.    Review of patient's allergies indicates:   Allergen Reactions    Erythromycin      Stomach pains    Rosuvastatin      Hives, muscle/joint pains    Zolpidem      Confusion        Past Medical History:   Diagnosis Date    CAD (coronary artery disease)     Diaphragmatic hernia     GERD (gastroesophageal reflux disease)     Hypertension     TIA (transient ischemic attack)      Past Surgical History:   Procedure Laterality Date    REPAIR OF DIAPHRAGMATIC HERNIA Left 1/21/2020    Procedure: REPAIR, HERNIA, DIAPHRAGMATIC, Laparoscopic;  Surgeon: Lucho Garcia Jr., MD;  Location: [address];  Service: General;  Laterality: Left;    REPAIR OF DIAPHRAGMATIC HERNIA Left 1/23/2020    Procedure: REPAIR, HERNIA, DIAPHRAGMATIC;  Surgeon: Lucho Garcia Jr., MD;  Location: [facility] OR [address] [place] [address];  Service: General;  Laterality: Left;     Family History     None        Tobacco Use    Smoking status: Never Smoker    Smokeless tobacco: Never Used   Substance and Sexual Activity    Alcohol use: Not on file    Drug use: Not on file    Sexual activity: Not on file     Review of Systems   Constitutional: Positive for activity change, fatigue and fever.   HENT: Positive for trouble swallowing. Negative for voice change.    Eyes: Negative for visual disturbance.   Respiratory: Positive for cough and shortness of breath. Negative for apnea, choking and wheezing.    Cardiovascular: Positive for chest pain. Negative for palpitations and leg swelling.   Gastrointestinal: Positive for abdominal distention and abdominal pain. Negative for constipation, diarrhea, nausea and vomiting.   Genitourinary: Negative for difficulty urinating.   Musculoskeletal: Negative for arthralgias and myalgias.   Neurological: Negative for dizziness and numbness.   Psychiatric/Behavioral: Negative for  agitation and confusion.     Objective:     Vital Signs (Most Recent):  Temp: 98.8 °F (37.1 °C) (01/28/20 0300)  Pulse: 94 (01/28/20 0829)  Resp: 20 (01/28/20 0829)  BP: 132/60 (01/28/20 0500)  SpO2: (!) 93 % (01/28/20 0829) Vital Signs (24h Range):  Temp:  [98.8 °F (37.1 °C)-99.3 °F (37.4 °C)] 98.8 °F (37.1 °C)  Pulse:  [] 94  Resp:  [16-31] 20  SpO2:  [88 %-100 %] 93 %  BP: (104-157)/(53-84) 132/60     Weight: 114.2 kg (251 lb 12.3 oz)  Body mass index is 47.57 kg/m².    Intake/Output - Last 3 Shifts       01/26 0700 - 01/27 0659 01/27 0700 - 01/28 0659 01/28 0700 - 01/29 0659    P.O. 820 225     IV Piggyback       Total Intake(mL/kg) 820 (7.2) 225 (2)     Urine (mL/kg/hr) 1300 (0.5) 2250 (0.8)     Total Output 1300 2250     Net -480 -2025            Urine Occurrence 9 x 1 x           SpO2: (!) 93 %  O2 Device (Oxygen Therapy): Comfort Flow    Physical Exam   Constitutional: She is oriented to person, place, and time. She appears well-developed and well-nourished. No distress.   Obese   HENT:   Head: Normocephalic and atraumatic.   Mouth/Throat: Oropharynx is clear and moist.   Eyes: Conjunctivae and EOM are normal. Right eye exhibits no discharge. Left eye exhibits no discharge.   Neck: Normal range of motion.   Cardiovascular: Normal rate, regular rhythm and intact distal pulses.   Pulmonary/Chest:   On HFNC. Sats 90-96% Increased work of breathing with any movement   Abdominal: Soft. She exhibits no distension. There is no tenderness.   Lap sites healing well   Musculoskeletal: Normal range of motion. She exhibits no deformity.   Neurological: She is alert and oriented to person, place, and time.   Skin: Skin is warm and dry.   L flank ecchymosis, improving   Psychiatric: She has a normal mood and affect. Her behavior is normal.       Significant Labs:  ABGs: No results for input(s): PH, PCO2, PO2, HCO3, POCSATURATED, BE in the last 48 hours.  CBC:   Recent Labs   Lab 01/28/20  0400   WBC 12.54   RBC  3.22*   HGB 8.6*   HCT 29.2*   *   MCV 91   MCH 26.7*   MCHC 29.5*     CMP:   Recent Labs   Lab 01/28/20  0400   GLU 86   CALCIUM 8.9   *   K 3.7   CO2 33*   CL 90*   BUN 13   CREATININE 0.8     Coagulation: No results for input(s): PT, INR, APTT in the last 48 hours.  Lactic Acid: No results for input(s): LACTATE in the last 48 hours.    Significant Diagnostics:  CT: I have reviewed all pertinent results/findings within the past 24 hours  CXR: I have reviewed all pertinent results/findings within the past 24 hours    VTE Risk Mitigation (From admission, onward)         Ordered     Place sequential compression device  Until discontinued      01/23/20 1002     enoxaparin injection 40 mg  2 times daily      01/21/20 0804     Place sequential compression device  Until discontinued      01/19/20 2022     IP VTE LOW RISK PATIENT  Once      01/19/20 2022

## 2020-01-28 NOTE — PLAN OF CARE
"Dx: Diaphragmatic hernia    Shift Events: No acute events over night. Pt on 20L 80% Comfort Flow. WCTM.     Neuro: AAO x4, Follows Commands and Moves All Extremities    Vital Signs: /60 (BP Location: Left arm, Patient Position: Lying)   Pulse 106   Temp 98.8 °F (37.1 °C) (Oral)   Resp (!) 25   Ht 5' 1" (1.549 m)   Wt 114.2 kg (251 lb 12.3 oz)   SpO2 (!) 94%   Breastfeeding? No   BMI 47.57 kg/m²     Diet: Clear Liquid, plan for NPO @ midnight    Gtts: No gtts    Urine Output: Voids Spontaneously 450 cc/shift     Labs/Accuchecks: Labs checked as ordered, Accuchecks Q6hrs, no correction needed.    Skin: No new skin break down noted. Patient remained free from falls/injuries during shift.        "

## 2020-01-28 NOTE — PHYSICIAN QUERY
"PT Name: Marisela Bunn  MR #: [id number]     Physician Query Form - Documentation Clarification      CDS/: Arin Stearns RN                 Contact information:[email or web address]    This form is a permanent document in the medical record.     Query Date: January 28, 2020    By submitting this query, we are merely seeking further clarification of documentation. Please utilize your independent clinical judgment when addressing the question(s) below.    The Medical record reflects the following:    Supporting Clinical Findings Location in Medical Record   Marisela Bunn is a 69 y.o. female with PMH CAD, HTN, TIA, GERD presents to the hospital as a direct admission for evaluation of a newly diagnosed diaphragmatic hernia. She initially presented to [place] ED on 1/18/20 for a 1-month history of a cough, intermittent fevers and dyspnea.  She had been seen by 2 urgent care physicians in the last month for the cough, with 2 different antibiotic courses given without improvement in cough. She reports that on 1/15 after an particularly severe coughing episode she felt a "pop" on left side, with associated severe pain and  Subsequent bruising of the left flank. She reports constant severe pain since that time. She is on daily ASA 81mg,     On ED presentation, patient was hemodynamically stable, H/H 12.4/40.0, breathing on RA. Labs revealed leukocytosis (19.2), lactic acid 2.5 (repeat lactic acid1.6), BMP wnl. CXR revealed left lower lobe pneumonia with possible associated pleural effusion. CT abdomen/pelvis revealed a large left diaphragmatic hernia containing fat  and bowel loops with hernia of intra-abdomnial contents outside the thorax from 7th left ICS. Medium sized HH. CTA revealed large Bochdalek hernia through defect in left hemo-diaphragm, with associated widening of 7th intercostal space. She was started on IV hydration and antibiotics (levofloxacin, zosyn). She reports she has not passed a bowel movement " "of flatus in 2 days. Intermittent lower quadrant "spasms" while coughing, otherwise no abdominal pain. She reports no other symptoms.    Diaphragmatic hernia  Marisela Bunn is a 69 y.o. female with PMH COPD (not on home oxygen), HTN, HLD (not on anticoagulation) received as direct admission for large diaphragmatic hernia. Patient is symptomatic from hernia with constipation and dyspnea with overlying bruise on left flank. Reports history of "lung and heart problems"  Low threshold for repeat CT if she should develop abdominal pain     - Full liquid diet, Boost  - Miralax daily   - EKG and Echo today for preOp planning   - Levaquin for community-acquired pneumonia in setting of elevated WBC and ongoing cough  - Aggressive pulm toilet with IS, Ren Christiansen, and Tessalon   - Pulm consult today for any recs, pre-op eval   - PT/OT  - Daily labs  - Plan for OR on Thursday 1/23 for diaphragmatic hernia repair pending respiratory status     DISPO: pending surgery later this week, not ready for d/c      Pneumonia  See principle      ? Community acquired pneumonia - on levofloxacin, will discuss d/c if procalcitonin comes back low    Respiratory culture, ETT aspirate    METHICILLIN RESISTANT STAPHYLOCOCCUS AUREUS   Few   Normal respiratory liam also present    IHSAN ALBICANS   Few  Gen Surg PN 1/21                                                                                                      Crit care PN 1/22      Lab 1/22                                                                            Doctor, Please specify the pneumonia diagnosis associated with above clinical findings.    Provider Use Only    ___pneumonia due to MRSA    ___pneumonia due to candida albicans    ___pneumonia due to MRSA and ihsan albicans    ___pnuemonia due to other source, source_________________    _x__pneumonia source unknown    ___other__________________                                                                            "                                      [  ] Clinically Undetermined

## 2020-01-28 NOTE — PLAN OF CARE
Problem: Physical Therapy Goal  Goal: Physical Therapy Goal  Description  Goals to be met by:20    Patient will increase functional independence with mobility by performin. Supine to sit with Contact Guard Assistance -not met  2. Sit to stand transfer with Contact Guard Assistance -not met  3. Gait  x 100 feet with Contact Guard Assistance using Rolling Walker. -not met  4. Lower extremity exercise program x15 reps  with supervision -not met     Outcome: Ongoing, Progressing   Goals remain appropriate. Jacinda Smith, PT  2020

## 2020-01-28 NOTE — PLAN OF CARE
Problem: SLP Goal  Goal: SLP Goal  Description  Speech Language Pathology Goals  Goals expected to be met by 1/31:  1. Pt will tolerate pureed diet and nectar thick liquids without s/s of aspiration. Goal met  2. Pt will participate in ongoing swallowing assessment to determine if safe for diet advancement and/or need for MBSS.     Outcome: Ongoing, Progressing    Pt placed on clear liquid diet by team and will be made NPO for surgery tomorrow. New SLP orders will be needed post sx.  CHINO Kang, CCC-SLP  Speech Language Pathologist  [phone]  1/28/2020

## 2020-01-28 NOTE — PT/OT/SLP PROGRESS
"Speech Language Pathology Treatment    Patient Name:  Marisela Bunn   MRN:  [id number]  Admitting Diagnosis: Diaphragmatic hernia    Recommendations:                 General Recommendations:  new swallow evaluation s/p surgery on 1/29  Diet recommendations:  Mechanical soft (per SLP's most recent recommendations - pt on clear liquid diet per team at this time in preparation for surgery on 1/29, Liquid Diet Level: Thin (pt will be NPO at midnight)  Aspiration Precautions: 1 bite/sip at a time, Alternating bites/sips, HOB to 90 degrees, Monitor for s/s of aspiration, Remain upright 30 minutes post meal, Small bites/sips and Strict aspiration precautions   General Precautions: Standard, aspiration, contact, fall  Communication strategies:  go to room if call light pushed    Subjective     "It would be better if I had my teeth."    Pain/Comfort:  · Pain Rating 1: 0/10    Objective:     Has the patient been evaluated by SLP for swallowing?   Yes  Keep patient NPO? No   Current Respiratory Status: nasal cannula(high flow)      SLP noted diet orders for clear liquid diet, though SLP's most recent recommendations were for mechanical soft and thin liquids.  Nurse confirmed pt placed on clear liquid diet per medical team and will be made NPO at midnight 2/2 surgery scheduled for 1/29.  SLP planned to monitor pt's tolerance of thin liquids only on this service date.  New SLP swallow eval and treat orders will need to be placed after surgery to resume SLP services.    Pt found awake/alert sitting upright in bed.  Significantly improved vocal quality noted compared to this SLP's initial encounter with pt on day of extubation.  Pt does still exhibit some hoarseness and decreased sustained phonation, but much improved.  Pt accepted cyclical cup sips of juice and straw sips of water. Slight throat clear observed after cyclical cup sips, but not overt coughing, change in vocal quality, or drop in SPO2.  No further PO trials given.  " Current SLP tx orders will need to be discontinued and new orders placed to resume SLP services post sx on 1/29.     Assessment:     Marisela Bunn is a 69 y.o. female with an SLP diagnosis of Dysphagia and Dysphonia (improving).    Goals:   Multidisciplinary Problems     SLP Goals        Problem: SLP Goal    Goal Priority Disciplines Outcome   SLP Goal     SLP Ongoing, Progressing   Description:  Speech Language Pathology Goals  Goals expected to be met by 1/31:  1. Pt will tolerate pureed diet and nectar thick liquids without s/s of aspiration. Goal met  2. Pt will participate in ongoing swallowing assessment to determine if safe for diet advancement and/or need for MBSS.                      Plan:     · Patient to be seen:  4 x/week   · Plan of Care expires:  02/23/20  · Plan of Care reviewed with:  patient   · SLP Follow-Up:  Yes       Discharge recommendations:  nursing facility, skilled(tbd for need for continued SLP services post d/c)     Time Tracking:     SLP Treatment Date:   01/28/20  Speech Start Time:  0953  Speech Stop Time:  1001     Speech Total Time (min):  8 min    Billable Minutes: Treatment Swallowing Dysfunction 8    CHINO Kang, CCC-SLP  01/28/2020     CHINO Kang, CCC-SLP  Speech Language Pathologist  [phone]  1/28/2020

## 2020-01-28 NOTE — SUBJECTIVE & OBJECTIVE
Interval History/Significant Events: Plan for OR tomorrow vs. Thursday with thoracic surgery for thoracotomy and repair. Will consult regional pain team for possible erector spinae block prior to OR. Increased oxygen requirement, now on 20L comfort flow    Follow-up For: Procedure(s) (LRB):  REPAIR, HERNIA, DIAPHRAGMATIC, Laparoscopic (Left)    Post-Operative Day: 7 Days Post-Op    Objective:     Vital Signs (Most Recent):  Temp: 98.8 °F (37.1 °C) (01/28/20 0300)  Pulse: 94 (01/28/20 0829)  Resp: 20 (01/28/20 0829)  BP: 132/60 (01/28/20 0500)  SpO2: (!) 93 % (01/28/20 0829) Vital Signs (24h Range):  Temp:  [98.8 °F (37.1 °C)-99.3 °F (37.4 °C)] 98.8 °F (37.1 °C)  Pulse:  [] 94  Resp:  [16-31] 20  SpO2:  [88 %-100 %] 93 %  BP: (104-157)/(53-84) 132/60     Weight: 114.2 kg (251 lb 12.3 oz)  Body mass index is 47.57 kg/m².      Intake/Output Summary (Last 24 hours) at 1/28/2020 0958  Last data filed at 1/28/2020 0200  Gross per 24 hour   Intake 150 ml   Output 1550 ml   Net -1400 ml       Physical Exam   Constitutional: She is oriented to person, place, and time.   HENT:   Head: Normocephalic and atraumatic.   Eyes: Pupils are equal, round, and reactive to light. EOM are normal.   Cardiovascular: Normal rate and regular rhythm.   Pulmonary/Chest:   Increased oxygen requirement, on 20L comfort flow NC   Abdominal:   Morbidly obese, laparoscopic incisions CDI   Neurological: She is alert and oriented to person, place, and time.   Skin: Skin is warm and dry.       Vents:  Vent Mode: Spont (01/24/20 0915)  Ventilator Initiated: Yes (01/21/20 9)  Set Rate: 16 bmp (01/24/20 0802)  Vt Set: 375 mL (01/24/20 0802)  Pressure Support: 8 cmH20 (01/24/20 0915)  PEEP/CPAP: 5 cmH20 (01/24/20 0915)  Oxygen Concentration (%): 80(decreased from 90%-o2 protocol) (01/28/20 0829)  Peak Airway Pressure: 13 cmH2O (01/24/20 0915)  Plateau Pressure: 18 cmH20 (01/24/20 0915)  Total Ve: 9.49 mL (01/24/20 0915)  Negative Inspiratory Force  (cm H2O): -30 (01/24/20 0854)  F/VT Ratio<105 (RSBI): (!) 31.77 (01/24/20 0915)    Lines/Drains/Airways     Peripheral Intravenous Line                 Peripheral IV - Single Lumen 01/25/20 1130 18 G Right Wrist 2 days         Peripheral IV - Single Lumen 01/25/20 1130 20 G Right Hand 2 days                Significant Labs:    CBC/Anemia Profile:  Recent Labs   Lab 01/27/20  0355 01/28/20  0400   WBC 14.77* 12.54   HGB 9.3* 8.6*   HCT 30.9* 29.2*    370*   MCV 91 91   RDW 13.9 13.6        Chemistries:  Recent Labs   Lab 01/27/20  0355 01/28/20  0400   * 133*   K 4.5 3.7   CL 92* 90*   CO2 31* 33*   BUN 13 13   CREATININE 0.7 0.8   CALCIUM 8.3* 8.9   MG 1.9 1.8   PHOS 3.4 3.7       All pertinent labs within the past 24 hours have been reviewed.    Significant Imaging:  I have reviewed all pertinent imaging results/findings within the past 24 hours.

## 2020-01-28 NOTE — PROGRESS NOTES
[facility]  Cardiothoracic Surgery  Progress Note    Patient Name: Marisela Bunn  MRN: [id number]  Admission Date: 1/19/2020  Hospital Length of Stay: 9 days  Code Status: Full Code   Attending Physician: Lucho Garcia Jr.,*   Referring Provider: [facility], Provider  Principal Problem:Diaphragmatic hernia    Subjective:     Post-Op Info:  Procedure(s) (LRB):  REPAIR, HERNIA, DIAPHRAGMATIC, Laparoscopic (Left)   7 Days Post-Op     24Hr Events:  NAEON. No recorded bowel movements, slightly decreased comfort flow requirements, though overall high support required.    Review of patient's allergies indicates:   Allergen Reactions    Erythromycin      Stomach pains    Rosuvastatin      Hives, muscle/joint pains    Zolpidem      Confusion        Past Medical History:   Diagnosis Date    CAD (coronary artery disease)     Diaphragmatic hernia     GERD (gastroesophageal reflux disease)     Hypertension     TIA (transient ischemic attack)      Past Surgical History:   Procedure Laterality Date    REPAIR OF DIAPHRAGMATIC HERNIA Left 1/21/2020    Procedure: REPAIR, HERNIA, DIAPHRAGMATIC, Laparoscopic;  Surgeon: Lucho Garcia Jr., MD;  Location: [address];  Service: General;  Laterality: Left;    REPAIR OF DIAPHRAGMATIC HERNIA Left 1/23/2020    Procedure: REPAIR, HERNIA, DIAPHRAGMATIC;  Surgeon: Lucho Garcia Jr., MD;  Location: [address];  Service: General;  Laterality: Left;     Family History     None        Tobacco Use    Smoking status: Never Smoker    Smokeless tobacco: Never Used   Substance and Sexual Activity    Alcohol use: Not on file    Drug use: Not on file    Sexual activity: Not on file     Review of Systems   Constitutional: Positive for activity change, fatigue and fever.   HENT: Positive for trouble swallowing. Negative for voice change.    Eyes: Negative for visual disturbance.   Respiratory: Positive for cough and shortness of breath. Negative  for apnea, choking and wheezing.    Cardiovascular: Positive for chest pain. Negative for palpitations and leg swelling.   Gastrointestinal: Positive for abdominal distention and abdominal pain. Negative for constipation, diarrhea, nausea and vomiting.   Genitourinary: Negative for difficulty urinating.   Musculoskeletal: Negative for arthralgias and myalgias.   Neurological: Negative for dizziness and numbness.   Psychiatric/Behavioral: Negative for agitation and confusion.     Objective:     Vital Signs (Most Recent):  Temp: 98.8 °F (37.1 °C) (01/28/20 0300)  Pulse: 94 (01/28/20 0829)  Resp: 20 (01/28/20 0829)  BP: 132/60 (01/28/20 0500)  SpO2: (!) 93 % (01/28/20 0829) Vital Signs (24h Range):  Temp:  [98.8 °F (37.1 °C)-99.3 °F (37.4 °C)] 98.8 °F (37.1 °C)  Pulse:  [] 94  Resp:  [16-31] 20  SpO2:  [88 %-100 %] 93 %  BP: (104-157)/(53-84) 132/60     Weight: 114.2 kg (251 lb 12.3 oz)  Body mass index is 47.57 kg/m².    Intake/Output - Last 3 Shifts       01/26 0700 - 01/27 0659 01/27 0700 - 01/28 0659 01/28 0700 - 01/29 0659    P.O. 820 225     IV Piggyback       Total Intake(mL/kg) 820 (7.2) 225 (2)     Urine (mL/kg/hr) 1300 (0.5) 2250 (0.8)     Total Output 1300 2250     Net -480 -2025            Urine Occurrence 9 x 1 x           SpO2: (!) 93 %  O2 Device (Oxygen Therapy): Comfort Flow    Physical Exam   Constitutional: She is oriented to person, place, and time. She appears well-developed and well-nourished. No distress.   Obese   HENT:   Head: Normocephalic and atraumatic.   Mouth/Throat: Oropharynx is clear and moist.   Eyes: Conjunctivae and EOM are normal. Right eye exhibits no discharge. Left eye exhibits no discharge.   Neck: Normal range of motion.   Cardiovascular: Normal rate, regular rhythm and intact distal pulses.   Pulmonary/Chest:   On HFNC. Sats 90-96% Increased work of breathing with any movement   Abdominal: Soft. She exhibits no distension. There is no tenderness.   Lap sites healing well    Musculoskeletal: Normal range of motion. She exhibits no deformity.   Neurological: She is alert and oriented to person, place, and time.   Skin: Skin is warm and dry.   L flank ecchymosis, improving   Psychiatric: She has a normal mood and affect. Her behavior is normal.       Significant Labs:  ABGs: No results for input(s): PH, PCO2, PO2, HCO3, POCSATURATED, BE in the last 48 hours.  CBC:   Recent Labs   Lab 01/28/20  0400   WBC 12.54   RBC 3.22*   HGB 8.6*   HCT 29.2*   *   MCV 91   MCH 26.7*   MCHC 29.5*     CMP:   Recent Labs   Lab 01/28/20  0400   GLU 86   CALCIUM 8.9   *   K 3.7   CO2 33*   CL 90*   BUN 13   CREATININE 0.8     Coagulation: No results for input(s): PT, INR, APTT in the last 48 hours.  Lactic Acid: No results for input(s): LACTATE in the last 48 hours.    Significant Diagnostics:  CT: I have reviewed all pertinent results/findings within the past 24 hours  CXR: I have reviewed all pertinent results/findings within the past 24 hours    VTE Risk Mitigation (From admission, onward)         Ordered     Place sequential compression device  Until discontinued      01/23/20 1002     enoxaparin injection 40 mg  2 times daily      01/21/20 0804     Place sequential compression device  Until discontinued      01/19/20 2022     IP VTE LOW RISK PATIENT  Once      01/19/20 2022              Assessment/Plan:     * Diaphragmatic hernia  69 year old female admitted to SICU with recurrent left diaphragmatic hernia s/p laparoscopic repair 1 week ago.      - To OR for left thoracotomy for reduction and repair of diaphragmatic hernia, possible chest wall reconstruction on Wednesday, January 29th.   - Would benefit from fleets enema and mag citrate as no recorded BM this stay   - Clear liquids. NPO midnight on 1/29.   - May consult regional pain team for possible erector spinae block prior to left thoracotomy.   - Remainder of care per general surgery and ICU teams.      Shruthi Prescott,  MD  Cardiothoracic Surgery  [facility]

## 2020-01-28 NOTE — PT/OT/SLP PROGRESS
Occupational Therapy   Treatment    Name: Marisela Bunn  MRN: [id number]  Admitting Diagnosis:  Diaphragmatic hernia  7 Days Post-Op    Recommendations:     Discharge Recommendations: nursing facility, skilled  Discharge Equipment Recommendations:  none  Barriers to discharge:  None    Assessment:     Marisela Bunn is a 69 y.o. female with a medical diagnosis of Diaphragmatic hernia.  She presents on Comfort Flow this date. Performance deficits affecting function are impaired self care skills, impaired balance, weakness, impaired cardiopulmonary response to activity, impaired endurance, gait instability, impaired functional mobilty, pain, edema.     Rehab Prognosis:  Good; patient would benefit from acute skilled OT services to address these deficits and reach maximum level of function.       Plan:     Patient to be seen 4 x/week to address the above listed problems via self-care/home management, therapeutic activities, therapeutic exercises  · Plan of Care Expires: 02/25/20  · Plan of Care Reviewed with: patient    Subjective     Pain/Comfort:  · Pain Rating 1: 5/10  · Location 1: (lower back and L rib cage)  · Pain Addressed 1: Reposition, Distraction, Pre-medicate for activity  · Pain Rating Post-Intervention 1: 5/10    Objective:     Communicated with: RN prior to session.  Patient found with bed in chair position with blood pressure cuff, pulse ox (continuous), telemetry, PureWick, oxygen, peripheral IV(Comfort Flow) upon OT entry to room.    General Precautions: Standard, fall, contact, respiratory   Orthopedic Precautions:N/A   Braces:       Occupational Performance:     Bed Mobility:    · Patient completed Supine to Sit with moderate assistance for trunk elevation  · Patient completed Sit to Supine with maximal assistance     Functional Mobility/Transfers:  · NT 2* increasing O2 requirements    Activities of Daily Living:  · Grooming: minimum assistance seated EOB; A needed 2* SOB and easy  fatigability  · Lower Body Dressing: total assistance        [facility] 6 Click ADL: 12    Treatment & Education:  Pt ed on OT POC  Pt now on Comfort Flow with plans to return to OR 1/29  RN approved EOB  Pt sat EOB x 12 min with CGA while engaged in ROM and self-care  Pursed lip breathing encouraged  Pt with SpO2 92% - 97% throughout session    Patient left with bed in chair position with all lines intact, call button in reach and RN notifiedEducation:      GOALS:   Multidisciplinary Problems     Occupational Therapy Goals        Problem: Occupational Therapy Goal    Goal Priority Disciplines Outcome Interventions   Occupational Therapy Goal     OT, PT/OT Ongoing, Progressing    Description:  Goals to be met by: 2/9/20    Patient will increase functional independence with ADLs by performing:    UE Dressing with SBA.  LE Dressing with  SBA .  Grooming while standing with SBA  Toileting from bedside commode withSBA for hygiene and clothing management.   Toilet transfer to bedside commode with SBA                        Time Tracking:     OT Date of Treatment: 01/28/20  OT Start Time: 0840  OT Stop Time: 0903  OT Total Time (min): 23 min    Billable Minutes:Self Care/Home Management 23    JAROCHO Cabrera  1/28/2020

## 2020-01-28 NOTE — ANESTHESIA PREPROCEDURE EVALUATION
[facility]  Anesthesia Pre-Operative Evaluation         Patient Name: Marisela Bunn  YOB: 1950  MRN: [id number]    SUBJECTIVE:     Pre-operative evaluation for Procedure(s) (LRB):  THORACOTOMY (Left)  RECONSTRUCTION, DIAPHRAGM (Left)     01/28/2020    Marisela Bunn is a 69 y.o. female w/ a significant PMHx of CAD, COPD, HTN, TIA, GERD presented with large diaphragmatic hernia. Patient with SOB. S/p repair on 1/21/20. Continues to have hypoxia despite diuresis, suspicion for insufficient initial repair.    Patient now presents for the above procedure(s).    Currently on 20L comfort flow.    LDA: PIV x2 (20g, 18g), Hernandez    Prev airway:   Placement Date: 01/21/20; Placement Time: 1355 (created via procedure documentation); Method of Intubation: Fiberoptic Intubation, Video Laryngoscopy; Mask Ventilation: Easy - oral; Intubated: Preinduction - awake intubation; Placement Verified By: Capnometry, Fiberoptic bronchoscopic inspection; Complicating Factors: None; Intubation Findings: Bilateral breath sounds; Securment: Lips; Complications: None; Removal Date: 01/21/20;  Removal Time: 1739    Drips: None documented.      Patient Active Problem List   Diagnosis    Diaphragmatic hernia    Cough    Leukocytosis    Pneumonia    Pre-op chest exam       Review of patient's allergies indicates:   Allergen Reactions    Erythromycin      Stomach pains    Rosuvastatin      Hives, muscle/joint pains    Zolpidem      Confusion        Current Inpatient Medications:   acetylcysteine 100 mg/ml (10%)  4 mL Nebulization TID    albuterol-ipratropium  3 mL Nebulization Q4H    benzonatate  100 mg Oral Q8H    dextromethorphan-guaifenesin  mg/5 ml  5 mL Oral Q6H    enoxaparin  40 mg Subcutaneous BID    furosemide  40 mg Intravenous BID    pantoprazole  40 mg Oral Daily    sodium phosphates  1 enema Rectal Once    sulfamethoxazole-trimethoprim 800-160mg  1 tablet Oral BID       No current  facility-administered medications on file prior to encounter.      No current outpatient medications on file prior to encounter.       Past Surgical History:   Procedure Laterality Date    REPAIR OF DIAPHRAGMATIC HERNIA Left 1/21/2020    Procedure: REPAIR, HERNIA, DIAPHRAGMATIC, Laparoscopic;  Surgeon: Lucho Garcia Jr., MD;  Location: [address];  Service: General;  Laterality: Left;    REPAIR OF DIAPHRAGMATIC HERNIA Left 1/23/2020    Procedure: REPAIR, HERNIA, DIAPHRAGMATIC;  Surgeon: Lucho Garcia Jr., MD;  Location: [address];  Service: General;  Laterality: Left;       Social History     Socioeconomic History    Marital status:      Spouse name: Not on file    Number of children: Not on file    Years of education: Not on file    Highest education level: Not on file   Occupational History    Not on file   Social Needs    Financial resource strain: Not on file    Food insecurity:     Worry: Not on file     Inability: Not on file    Transportation needs:     Medical: Not on file     Non-medical: Not on file   Tobacco Use    Smoking status: Never Smoker    Smokeless tobacco: Never Used   Substance and Sexual Activity    Alcohol use: Not on file    Drug use: Not on file    Sexual activity: Not on file   Lifestyle    Physical activity:     Days per week: Not on file     Minutes per session: Not on file    Stress: Not on file   Relationships    Social connections:     Talks on phone: Not on file     Gets together: Not on file     Attends Mosque service: Not on file     Active member of club or organization: Not on file     Attends meetings of clubs or organizations: Not on file     Relationship status: Not on file   Other Topics Concern    Not on file   Social History Narrative    Not on file       OBJECTIVE:     Vital Signs Range (Last 24H):  Temp:  [36.8 °C (98.3 °F)-37.4 °C (99.3 °F)]   Pulse:  []   Resp:  [16-38]   BP: (104-157)/(53-84)   SpO2:  [88 %-99  %]       Significant Labs:  Lab Results   Component Value Date    WBC 12.54 01/28/2020    HGB 8.6 (L) 01/28/2020    HCT 29.2 (L) 01/28/2020     (H) 01/28/2020    ALT 31 01/23/2020    AST 23 01/23/2020     (L) 01/28/2020    K 3.7 01/28/2020    CL 90 (L) 01/28/2020    CREATININE 0.8 01/28/2020    BUN 13 01/28/2020    CO2 33 (H) 01/28/2020    INR 1.1 01/21/2020       Diagnostic Studies: No relevant studies.    EKG:   Results for orders placed or performed during the hospital encounter of 01/19/20   EKG 12-lead    Collection Time: 01/21/20  7:50 AM    Narrative    Test Reason : Z01.811,    Vent. Rate : 103 BPM     Atrial Rate : 103 BPM     P-R Int : 128 ms          QRS Dur : 078 ms      QT Int : 338 ms       P-R-T Axes : 065 038 156 degrees     QTc Int : 442 ms    Sinus tachycardia  Nonspecific ST and T wave abnormality  Abnormal ECG  No previous ECGs available  Confirmed by CLAUDY YEE MD ([id number]) on 1/21/2020 1:15:47 PM    Referred By: PROVIDER NOTINSYSTEM           Confirmed By:CLAUDY YEE MD       ECHOCARDIOGRAM:  TTE:  Results for orders placed or performed during the hospital encounter of 01/19/20   Echo Color Flow Doppler? Yes    Narrative    · Normal left ventricular systolic function. The estimated ejection   fraction is 65%.  · Indeterminate left ventricular diastolic function.  · Normal right ventricular systolic function.  · Mild left atrial enlargement.  · Normal central venous pressure (3 mmHg).          Anesthesia Evaluation    I have reviewed the Patient Summary Reports.    I have reviewed the Nursing Notes.   I have reviewed the Medications.     Review of Systems  Anesthesia Hx:  No problems with previous Anesthesia  History of prior surgery of interest to airway management or planning: Denies Family Hx of Anesthesia complications.   Denies Personal Hx of Anesthesia complications.   EENT/Dental:EENT/Dental Normal   Cardiovascular:   Denies Pacemaker. Hypertension  Denies MI. CAD     Denies CABG/stent.  Denies Dysrhythmias.      ECG has been reviewed.    Pulmonary:   Pneumonia COPD, moderate    Renal/:  Renal/ Normal     Hepatic/GI:   Hiatal Hernia, GERD, poorly controlled Transdiaphragmatic intercostal hernia with colon in subq tissue   Musculoskeletal:  Musculoskeletal Normal    Neurological:   TIA, Seizures    Endocrine:  Endocrine Normal Denies Diabetes.    Psych:  Psychiatric Normal           Physical Exam  General:  Morbid Obesity    Airway/Jaw/Neck:  Airway Findings: Mouth Opening: Normal Tongue: Normal  General Airway Assessment: Adult  Mallampati: II  TM Distance: Normal, at least 6 cm         Dental:  Dental Findings: Upper Dentures   Chest/Lungs:  Chest/Lungs Findings: Tachypnea, Decreased Breathe Sounds Left     Heart/Vascular:  Heart Findings: Normal    Abdomen:  Abdomen Findings:  Soft      Skin:  Very large area of ecchymoses from left armpit all the way to left hip   Mental Status:  Mental Status Findings:  Cooperative         Anesthesia Plan  Type of Anesthesia, risks & benefits discussed:  Anesthesia Type:  general, regional  Patient's Preference:   Intra-op Monitoring Plan: arterial line and standard ASA monitors  Intra-op Monitoring Plan Comments:   Post Op Pain Control Plan: per primary service following discharge from PACU  Post Op Pain Control Plan Comments:   Induction:   IV  Beta Blocker:  Patient is on a Beta-Blocker and has received one dose within the past 24 hours (No further documentation required).       Informed Consent: Patient understands risks and agrees with Anesthesia plan.  Questions answered. Anesthesia consent signed with patient.  ASA Score: 4     Day of Surgery Review of History & Physical:    H&P update referred to the surgeon.         Ready For Surgery From Anesthesia Perspective.

## 2020-01-28 NOTE — HPI
"Patient is a 69 year old female with PMH of CAD, HTN, TIA, OA, COPD and GERD admitted to SICU after presenting to outside ED on 1/18/20 for worsening SOB, cough and left flank ecchymosis. She reports that on 1/15 after an particularly severe coughing episode she felt a "pop" on left side, with associated severe pain and development of bruising. CT C/A/P showed large left hemidiaphragm hernia with associated widening of 7th intercostal space allowing for bowel to herniate outside of ribs. Transferred to [facility] on 1/19/20 on 2LNC. However, she rapidly declined from a respiratory standpoint. Underwent a laparoscopic reduction and repair of diaphragmatic hernia on 1/21/20. Per the op note, defect itself measured approximately 10 x 15 cm requiring a Townsend-Rajendra patch to close the defect. Remained intubated, sedated and paralyzed after the procedure. Extubated and drain removed on 1/24/20. Over the weekend her O2 requirements again increased and had worsening leukocytosis. Chest CT today showed recurrent diaphragmatic hernia. Today she is on 7L comfort flow with decent saturations at rest. Afebrile. Hemodynamically stable. Tolerating a soft diet.      PSH of laparoscopic cholecystectomy, EMILIANO with BSO and right TKR   Never smoker. Denies EtOH use.  "

## 2020-01-28 NOTE — PROGRESS NOTES
[facility]  Critical Care - Surgery  Progress Note    Patient Name: Marisela Bunn  MRN: [id number]  Admission Date: 1/19/2020  Hospital Length of Stay: 9 days  Code Status: Full Code  Attending Provider: Lucho Garcia Jr.,*  Primary Care Provider: Primary Doctor No   Principal Problem: Diaphragmatic hernia    Subjective:     Hospital/ICU Course:  No notes on file    Interval History/Significant Events: Plan for OR tomorrow vs. Thursday with thoracic surgery for thoracotomy and repair. Will consult regional pain team for possible erector spinae block prior to OR. Increased oxygen requirement, now on 20L comfort flow    Follow-up For: Procedure(s) (LRB):  REPAIR, HERNIA, DIAPHRAGMATIC, Laparoscopic (Left)    Post-Operative Day: 7 Days Post-Op    Objective:     Vital Signs (Most Recent):  Temp: 98.8 °F (37.1 °C) (01/28/20 0300)  Pulse: 94 (01/28/20 0829)  Resp: 20 (01/28/20 0829)  BP: 132/60 (01/28/20 0500)  SpO2: (!) 93 % (01/28/20 0829) Vital Signs (24h Range):  Temp:  [98.8 °F (37.1 °C)-99.3 °F (37.4 °C)] 98.8 °F (37.1 °C)  Pulse:  [] 94  Resp:  [16-31] 20  SpO2:  [88 %-100 %] 93 %  BP: (104-157)/(53-84) 132/60     Weight: 114.2 kg (251 lb 12.3 oz)  Body mass index is 47.57 kg/m².      Intake/Output Summary (Last 24 hours) at 1/28/2020 0958  Last data filed at 1/28/2020 0200  Gross per 24 hour   Intake 150 ml   Output 1550 ml   Net -1400 ml       Physical Exam   Constitutional: She is oriented to person, place, and time.   HENT:   Head: Normocephalic and atraumatic.   Eyes: Pupils are equal, round, and reactive to light. EOM are normal.   Cardiovascular: Normal rate and regular rhythm.   Pulmonary/Chest:   Increased oxygen requirement, on 20L comfort flow NC   Abdominal:   Morbidly obese, laparoscopic incisions CDI   Neurological: She is alert and oriented to person, place, and time.   Skin: Skin is warm and dry.       Vents:  Vent Mode: Spont (01/24/20 0915)  Ventilator Initiated: Yes  (01/21/20 1759)  Set Rate: 16 bmp (01/24/20 0802)  Vt Set: 375 mL (01/24/20 0802)  Pressure Support: 8 cmH20 (01/24/20 0915)  PEEP/CPAP: 5 cmH20 (01/24/20 0915)  Oxygen Concentration (%): 80(decreased from 90%-o2 protocol) (01/28/20 0829)  Peak Airway Pressure: 13 cmH2O (01/24/20 0915)  Plateau Pressure: 18 cmH20 (01/24/20 0915)  Total Ve: 9.49 mL (01/24/20 0915)  Negative Inspiratory Force (cm H2O): -30 (01/24/20 0854)  F/VT Ratio<105 (RSBI): (!) 31.77 (01/24/20 0915)    Lines/Drains/Airways     Peripheral Intravenous Line                 Peripheral IV - Single Lumen 01/25/20 1130 18 G Right Wrist 2 days         Peripheral IV - Single Lumen 01/25/20 1130 20 G Right Hand 2 days                Significant Labs:    CBC/Anemia Profile:  Recent Labs   Lab 01/27/20  0355 01/28/20  0400   WBC 14.77* 12.54   HGB 9.3* 8.6*   HCT 30.9* 29.2*    370*   MCV 91 91   RDW 13.9 13.6        Chemistries:  Recent Labs   Lab 01/27/20  0355 01/28/20  0400   * 133*   K 4.5 3.7   CL 92* 90*   CO2 31* 33*   BUN 13 13   CREATININE 0.7 0.8   CALCIUM 8.3* 8.9   MG 1.9 1.8   PHOS 3.4 3.7       All pertinent labs within the past 24 hours have been reviewed.    Significant Imaging:  I have reviewed all pertinent imaging results/findings within the past 24 hours.    Assessment/Plan:     * Diaphragmatic hernia  69F PMH CAD, HTN, TIA, GERD who presented with a diaphragmatic hernia, now s/p surgical repair on 1/23/19.    Surgical:  Consult regional pain team for possible erector spinae block prior to OR  Plan for OR tomorrow/thursday for thoracotomy and repair    Neuro:  Sedation: None  Pain control: Tylenol, fentanyl, oxy    Resp:  Tesslon, dextromethorphan, codeine to suppress cough  Nebs prn  Acapella, IS  Continue to monitor oxygen requirements  Respiratory cultures showed MRSA and candida. On Bactrim.    CV:  HDS  No pressors    Heme/ID:  RCx: MRSA, candida  Bactrim    Renal:  Jakubck  Strict I/Os  MIVF d/c  Increase Lasix to 40  BID    FEN/GI:  Dysphagia diet  Replace lytes PRN    Endo:  SSI    PPX:  Protonix  Lovenox    Dispo: Continue ICU care         Schuyler Lomax MD  Critical Care - Surgery  [facility]

## 2020-01-28 NOTE — ASSESSMENT & PLAN NOTE
Marisela Bunn is a 69 y.o. female with PMH COPD (not on home oxygen), HTN, HLD (not on anticoagulation) received as direct admission for large diaphragmatic hernia. Patient is symptomatic from hernia with constipation and dyspnea with overlying bruise on left flank. She underwent lap diaphragmatic hernia repair with mesh on 1/21/20.   Evidence of recurrent hernia on CT 1/27. Thoracic consulted and plans for operative repair 1/29    - Needs aggressive respiratory care  - limit coughing spells as able  - Lasix 20 BID  - Continue Levaquin, Bactrim for pneumonia, (restarted 1/26)  - Aggressive pulm toilet with IS, CPT, DuoNebs, and Tessalon   - PT/OT today  - Daily labs  - Leokocytosis downtrending    DISPO: pending redo diaphragmatic hernia repair with Thoracic team. SICU care for respiratory status

## 2020-01-29 ENCOUNTER — ANESTHESIA (OUTPATIENT)
Dept: SURGERY | Facility: HOSPITAL | Age: 70
DRG: 326 | End: 2020-01-29
Payer: COMMERCIAL

## 2020-01-29 LAB
ALLENS TEST: ABNORMAL
ALLENS TEST: ABNORMAL
ANION GAP SERPL CALC-SCNC: 13 MMOL/L (ref 8–16)
ANISOCYTOSIS BLD QL SMEAR: SLIGHT
BASOPHILS # BLD AUTO: ABNORMAL K/UL (ref 0–0.2)
BASOPHILS NFR BLD: 0 % (ref 0–1.9)
BUN SERPL-MCNC: 14 MG/DL (ref 8–23)
CALCIUM SERPL-MCNC: 9.2 MG/DL (ref 8.7–10.5)
CHLORIDE SERPL-SCNC: 89 MMOL/L (ref 95–110)
CO2 SERPL-SCNC: 34 MMOL/L (ref 23–29)
CREAT SERPL-MCNC: 0.7 MG/DL (ref 0.5–1.4)
DELSYS: ABNORMAL
DELSYS: ABNORMAL
DIFFERENTIAL METHOD: ABNORMAL
EOSINOPHIL # BLD AUTO: ABNORMAL K/UL (ref 0–0.5)
EOSINOPHIL NFR BLD: 4 % (ref 0–8)
ERYTHROCYTE [DISTWIDTH] IN BLOOD BY AUTOMATED COUNT: 13.6 % (ref 11.5–14.5)
ERYTHROCYTE [SEDIMENTATION RATE] IN BLOOD BY WESTERGREN METHOD: 16 MM/H
ERYTHROCYTE [SEDIMENTATION RATE] IN BLOOD BY WESTERGREN METHOD: 16 MM/H
EST. GFR  (AFRICAN AMERICAN): >60 ML/MIN/1.73 M^2
EST. GFR  (NON AFRICAN AMERICAN): >60 ML/MIN/1.73 M^2
FIO2: 100
FIO2: 100
GLUCOSE SERPL-MCNC: 105 MG/DL (ref 70–110)
HCO3 UR-SCNC: 31.9 MMOL/L (ref 24–28)
HCO3 UR-SCNC: 34.5 MMOL/L (ref 24–28)
HCT VFR BLD AUTO: 30.4 % (ref 37–48.5)
HCT VFR BLD CALC: 28 %PCV (ref 36–54)
HGB BLD-MCNC: 9.1 G/DL (ref 12–16)
HYPOCHROMIA BLD QL SMEAR: ABNORMAL
IMM GRANULOCYTES # BLD AUTO: ABNORMAL K/UL (ref 0–0.04)
IMM GRANULOCYTES NFR BLD AUTO: ABNORMAL % (ref 0–0.5)
LYMPHOCYTES # BLD AUTO: ABNORMAL K/UL (ref 1–4.8)
LYMPHOCYTES NFR BLD: 9 % (ref 18–48)
MAGNESIUM SERPL-MCNC: 2.3 MG/DL (ref 1.6–2.6)
MCH RBC QN AUTO: 26.7 PG (ref 27–31)
MCHC RBC AUTO-ENTMCNC: 29.9 G/DL (ref 32–36)
MCV RBC AUTO: 89 FL (ref 82–98)
MODE: ABNORMAL
MODE: ABNORMAL
MONOCYTES # BLD AUTO: ABNORMAL K/UL (ref 0.3–1)
MONOCYTES NFR BLD: 2 % (ref 4–15)
NEUTROPHILS NFR BLD: 85 % (ref 38–73)
NRBC BLD-RTO: 0 /100 WBC
OVALOCYTES BLD QL SMEAR: ABNORMAL
PCO2 BLDA: 53 MMHG (ref 35–45)
PCO2 BLDA: 56.1 MMHG (ref 35–45)
PEEP: 7
PEEP: 7
PH SMN: 7.39 [PH] (ref 7.35–7.45)
PH SMN: 7.4 [PH] (ref 7.35–7.45)
PHOSPHATE SERPL-MCNC: 4 MG/DL (ref 2.7–4.5)
PLATELET # BLD AUTO: 401 K/UL (ref 150–350)
PMV BLD AUTO: 9.2 FL (ref 9.2–12.9)
PO2 BLDA: 155 MMHG (ref 80–100)
PO2 BLDA: 161 MMHG (ref 80–100)
POC BE: 10 MMOL/L
POC BE: 7 MMOL/L
POC IONIZED CALCIUM: 1.08 MMOL/L (ref 1.06–1.42)
POC SATURATED O2: 99 % (ref 95–100)
POC SATURATED O2: 99 % (ref 95–100)
POC TCO2: 33 MMOL/L (ref 23–27)
POC TCO2: 36 MMOL/L (ref 23–27)
POCT GLUCOSE: 114 MG/DL (ref 70–110)
POCT GLUCOSE: 117 MG/DL (ref 70–110)
POIKILOCYTOSIS BLD QL SMEAR: SLIGHT
POLYCHROMASIA BLD QL SMEAR: ABNORMAL
POTASSIUM BLD-SCNC: 4.1 MMOL/L (ref 3.5–5.1)
POTASSIUM SERPL-SCNC: 3.6 MMOL/L (ref 3.5–5.1)
RBC # BLD AUTO: 3.41 M/UL (ref 4–5.4)
SAMPLE: ABNORMAL
SAMPLE: ABNORMAL
SITE: ABNORMAL
SITE: ABNORMAL
SODIUM BLD-SCNC: 131 MMOL/L (ref 136–145)
SODIUM SERPL-SCNC: 136 MMOL/L (ref 136–145)
SP02: 100
SP02: 99
VT: 350
VT: 350
WBC # BLD AUTO: 9.84 K/UL (ref 3.9–12.7)

## 2020-01-29 PROCEDURE — 88305 TISSUE EXAM BY PATHOLOGIST: CPT | Mod: 26,,, | Performed by: PATHOLOGY

## 2020-01-29 PROCEDURE — 36000710: Performed by: THORACIC SURGERY (CARDIOTHORACIC VASCULAR SURGERY)

## 2020-01-29 PROCEDURE — 39540 REPAIR OF DIAPHRAGM HERNIA: CPT | Mod: ,,, | Performed by: THORACIC SURGERY (CARDIOTHORACIC VASCULAR SURGERY)

## 2020-01-29 PROCEDURE — 37799 UNLISTED PX VASCULAR SURGERY: CPT

## 2020-01-29 PROCEDURE — 88302 TISSUE EXAM BY PATHOLOGIST: CPT | Mod: 26,,, | Performed by: PATHOLOGY

## 2020-01-29 PROCEDURE — 25000003 PHARM REV CODE 250: Performed by: STUDENT IN AN ORGANIZED HEALTH CARE EDUCATION/TRAINING PROGRAM

## 2020-01-29 PROCEDURE — 94640 AIRWAY INHALATION TREATMENT: CPT

## 2020-01-29 PROCEDURE — 97802 MEDICAL NUTRITION INDIV IN: CPT

## 2020-01-29 PROCEDURE — 25000003 PHARM REV CODE 250: Performed by: NURSE ANESTHETIST, CERTIFIED REGISTERED

## 2020-01-29 PROCEDURE — 37000008 HC ANESTHESIA 1ST 15 MINUTES: Performed by: THORACIC SURGERY (CARDIOTHORACIC VASCULAR SURGERY)

## 2020-01-29 PROCEDURE — 25000242 PHARM REV CODE 250 ALT 637 W/ HCPCS: Performed by: STUDENT IN AN ORGANIZED HEALTH CARE EDUCATION/TRAINING PROGRAM

## 2020-01-29 PROCEDURE — C1781 MESH (IMPLANTABLE): HCPCS | Performed by: THORACIC SURGERY (CARDIOTHORACIC VASCULAR SURGERY)

## 2020-01-29 PROCEDURE — 99499 UNLISTED E&M SERVICE: CPT | Mod: ,,, | Performed by: PHYSICIAN ASSISTANT

## 2020-01-29 PROCEDURE — 63600175 PHARM REV CODE 636 W HCPCS: Performed by: NURSE ANESTHETIST, CERTIFIED REGISTERED

## 2020-01-29 PROCEDURE — 63600175 PHARM REV CODE 636 W HCPCS: Performed by: STUDENT IN AN ORGANIZED HEALTH CARE EDUCATION/TRAINING PROGRAM

## 2020-01-29 PROCEDURE — C1751 CATH, INF, PER/CENT/MIDLINE: HCPCS | Performed by: ANESTHESIOLOGY

## 2020-01-29 PROCEDURE — 99233 SBSQ HOSP IP/OBS HIGH 50: CPT | Mod: 24,,, | Performed by: SURGERY

## 2020-01-29 PROCEDURE — 99900026 HC AIRWAY MAINTENANCE (STAT)

## 2020-01-29 PROCEDURE — 36620 PR INSERT CATH,ART,PERCUT,SHORTTERM: ICD-10-PCS | Mod: 59,,, | Performed by: ANESTHESIOLOGY

## 2020-01-29 PROCEDURE — 94668 MNPJ CHEST WALL SBSQ: CPT

## 2020-01-29 PROCEDURE — 83735 ASSAY OF MAGNESIUM: CPT

## 2020-01-29 PROCEDURE — D9220A PRA ANESTHESIA: Mod: CRNA,,, | Performed by: NURSE ANESTHETIST, CERTIFIED REGISTERED

## 2020-01-29 PROCEDURE — 27100171 HC OXYGEN HIGH FLOW UP TO 24 HOURS

## 2020-01-29 PROCEDURE — 82330 ASSAY OF CALCIUM: CPT

## 2020-01-29 PROCEDURE — 99233 PR SUBSEQUENT HOSPITAL CARE,LEVL III: ICD-10-PCS | Mod: 24,,, | Performed by: SURGERY

## 2020-01-29 PROCEDURE — 37000009 HC ANESTHESIA EA ADD 15 MINS: Performed by: THORACIC SURGERY (CARDIOTHORACIC VASCULAR SURGERY)

## 2020-01-29 PROCEDURE — 63600175 PHARM REV CODE 636 W HCPCS

## 2020-01-29 PROCEDURE — D9220A PRA ANESTHESIA: Mod: ANES,,, | Performed by: ANESTHESIOLOGY

## 2020-01-29 PROCEDURE — 85014 HEMATOCRIT: CPT

## 2020-01-29 PROCEDURE — 84295 ASSAY OF SERUM SODIUM: CPT

## 2020-01-29 PROCEDURE — D9220A PRA ANESTHESIA: ICD-10-PCS | Mod: ANES,,, | Performed by: ANESTHESIOLOGY

## 2020-01-29 PROCEDURE — 84100 ASSAY OF PHOSPHORUS: CPT

## 2020-01-29 PROCEDURE — 39540 PR REPAIR DIAPHR HERNIA TRAUMA ACUTE: ICD-10-PCS | Mod: ,,, | Performed by: THORACIC SURGERY (CARDIOTHORACIC VASCULAR SURGERY)

## 2020-01-29 PROCEDURE — 25000003 PHARM REV CODE 250: Performed by: ANESTHESIOLOGY

## 2020-01-29 PROCEDURE — 85007 BL SMEAR W/DIFF WBC COUNT: CPT

## 2020-01-29 PROCEDURE — 85027 COMPLETE CBC AUTOMATED: CPT

## 2020-01-29 PROCEDURE — 88305 TISSUE EXAM BY PATHOLOGIST: CPT | Performed by: PATHOLOGY

## 2020-01-29 PROCEDURE — 88305 TISSUE EXAM BY PATHOLOGIST: ICD-10-PCS | Mod: 26,,, | Performed by: PATHOLOGY

## 2020-01-29 PROCEDURE — 27201423 OPTIME MED/SURG SUP & DEVICES STERILE SUPPLY: Performed by: THORACIC SURGERY (CARDIOTHORACIC VASCULAR SURGERY)

## 2020-01-29 PROCEDURE — 94002 VENT MGMT INPAT INIT DAY: CPT

## 2020-01-29 PROCEDURE — 25000003 PHARM REV CODE 250: Performed by: SURGERY

## 2020-01-29 PROCEDURE — 27000221 HC OXYGEN, UP TO 24 HOURS

## 2020-01-29 PROCEDURE — 88302 TISSUE EXAM BY PATHOLOGIST: CPT | Performed by: PATHOLOGY

## 2020-01-29 PROCEDURE — 94761 N-INVAS EAR/PLS OXIMETRY MLT: CPT

## 2020-01-29 PROCEDURE — 27201037 HC PRESSURE MONITORING SET UP

## 2020-01-29 PROCEDURE — 82803 BLOOD GASES ANY COMBINATION: CPT

## 2020-01-29 PROCEDURE — 88302 PR  SURG PATH,LEVEL II: ICD-10-PCS | Mod: 26,,, | Performed by: PATHOLOGY

## 2020-01-29 PROCEDURE — 80048 BASIC METABOLIC PNL TOTAL CA: CPT

## 2020-01-29 PROCEDURE — C1729 CATH, DRAINAGE: HCPCS | Performed by: THORACIC SURGERY (CARDIOTHORACIC VASCULAR SURGERY)

## 2020-01-29 PROCEDURE — 36000711: Performed by: THORACIC SURGERY (CARDIOTHORACIC VASCULAR SURGERY)

## 2020-01-29 PROCEDURE — D9220A PRA ANESTHESIA: ICD-10-PCS | Mod: CRNA,,, | Performed by: NURSE ANESTHETIST, CERTIFIED REGISTERED

## 2020-01-29 PROCEDURE — 99900035 HC TECH TIME PER 15 MIN (STAT)

## 2020-01-29 PROCEDURE — 84132 ASSAY OF SERUM POTASSIUM: CPT

## 2020-01-29 PROCEDURE — 99499 NO LOS: ICD-10-PCS | Mod: ,,, | Performed by: PHYSICIAN ASSISTANT

## 2020-01-29 PROCEDURE — 20000000 HC ICU ROOM

## 2020-01-29 PROCEDURE — 36620 INSERTION CATHETER ARTERY: CPT | Mod: 59,,, | Performed by: ANESTHESIOLOGY

## 2020-01-29 DEVICE — IMPLANTABLE DEVICE: Type: IMPLANTABLE DEVICE | Site: OTHER (ADD COMMENT) | Status: FUNCTIONAL

## 2020-01-29 RX ORDER — NICARDIPINE HYDROCHLORIDE 0.2 MG/ML
1 INJECTION INTRAVENOUS CONTINUOUS
Status: DISCONTINUED | OUTPATIENT
Start: 2020-01-29 | End: 2020-01-31

## 2020-01-29 RX ORDER — PROPOFOL 10 MG/ML
VIAL (ML) INTRAVENOUS
Status: DISCONTINUED | OUTPATIENT
Start: 2020-01-29 | End: 2020-01-29

## 2020-01-29 RX ORDER — FENTANYL CITRATE 50 UG/ML
INJECTION, SOLUTION INTRAMUSCULAR; INTRAVENOUS
Status: COMPLETED
Start: 2020-01-29 | End: 2020-01-29

## 2020-01-29 RX ORDER — METHOCARBAMOL 750 MG/1
750 TABLET, FILM COATED ORAL 4 TIMES DAILY
Status: DISCONTINUED | OUTPATIENT
Start: 2020-01-29 | End: 2020-02-03

## 2020-01-29 RX ORDER — DEXMEDETOMIDINE HYDROCHLORIDE 100 UG/ML
INJECTION, SOLUTION INTRAVENOUS
Status: DISCONTINUED | OUTPATIENT
Start: 2020-01-29 | End: 2020-01-29

## 2020-01-29 RX ORDER — FENTANYL CITRATE 50 UG/ML
25 INJECTION, SOLUTION INTRAMUSCULAR; INTRAVENOUS EVERY 5 MIN PRN
Status: DISCONTINUED | OUTPATIENT
Start: 2020-01-29 | End: 2020-01-29 | Stop reason: HOSPADM

## 2020-01-29 RX ORDER — ONDANSETRON 2 MG/ML
4 INJECTION INTRAMUSCULAR; INTRAVENOUS EVERY 12 HOURS PRN
Status: DISCONTINUED | OUTPATIENT
Start: 2020-01-29 | End: 2020-02-12 | Stop reason: HOSPADM

## 2020-01-29 RX ORDER — DEXAMETHASONE SODIUM PHOSPHATE 4 MG/ML
INJECTION, SOLUTION INTRA-ARTICULAR; INTRALESIONAL; INTRAMUSCULAR; INTRAVENOUS; SOFT TISSUE
Status: DISCONTINUED | OUTPATIENT
Start: 2020-01-29 | End: 2020-01-29

## 2020-01-29 RX ORDER — POLYETHYLENE GLYCOL 3350 17 G/17G
17 POWDER, FOR SOLUTION ORAL DAILY
Status: DISCONTINUED | OUTPATIENT
Start: 2020-01-30 | End: 2020-02-08

## 2020-01-29 RX ORDER — ONDANSETRON 2 MG/ML
INJECTION INTRAMUSCULAR; INTRAVENOUS
Status: DISCONTINUED | OUTPATIENT
Start: 2020-01-29 | End: 2020-01-29

## 2020-01-29 RX ORDER — FAMOTIDINE 20 MG/1
20 TABLET, FILM COATED ORAL 2 TIMES DAILY
Status: DISCONTINUED | OUTPATIENT
Start: 2020-01-29 | End: 2020-01-30

## 2020-01-29 RX ORDER — BISACODYL 10 MG
10 SUPPOSITORY, RECTAL RECTAL DAILY PRN
Status: DISCONTINUED | OUTPATIENT
Start: 2020-01-29 | End: 2020-02-12 | Stop reason: HOSPADM

## 2020-01-29 RX ORDER — MORPHINE SULFATE 2 MG/ML
2 INJECTION, SOLUTION INTRAMUSCULAR; INTRAVENOUS EVERY 4 HOURS PRN
Status: DISCONTINUED | OUTPATIENT
Start: 2020-01-29 | End: 2020-02-04

## 2020-01-29 RX ORDER — ONDANSETRON 8 MG/1
8 TABLET, ORALLY DISINTEGRATING ORAL EVERY 8 HOURS PRN
Status: DISCONTINUED | OUTPATIENT
Start: 2020-01-29 | End: 2020-02-12 | Stop reason: HOSPADM

## 2020-01-29 RX ORDER — PREGABALIN 75 MG/1
75 CAPSULE ORAL ONCE
Status: DISCONTINUED | OUTPATIENT
Start: 2020-01-29 | End: 2020-02-05

## 2020-01-29 RX ORDER — HYDROMORPHONE HYDROCHLORIDE 1 MG/ML
0.2 INJECTION, SOLUTION INTRAMUSCULAR; INTRAVENOUS; SUBCUTANEOUS EVERY 5 MIN PRN
Status: DISCONTINUED | OUTPATIENT
Start: 2020-01-29 | End: 2020-01-29 | Stop reason: HOSPADM

## 2020-01-29 RX ORDER — MIDAZOLAM HYDROCHLORIDE 1 MG/ML
INJECTION, SOLUTION INTRAMUSCULAR; INTRAVENOUS
Status: DISCONTINUED | OUTPATIENT
Start: 2020-01-29 | End: 2020-01-29

## 2020-01-29 RX ORDER — PROPOFOL 10 MG/ML
5 INJECTION, EMULSION INTRAVENOUS CONTINUOUS
Status: DISCONTINUED | OUTPATIENT
Start: 2020-01-29 | End: 2020-02-03

## 2020-01-29 RX ORDER — AMOXICILLIN 250 MG
1 CAPSULE ORAL 2 TIMES DAILY
Status: DISCONTINUED | OUTPATIENT
Start: 2020-01-29 | End: 2020-02-08

## 2020-01-29 RX ORDER — ROCURONIUM BROMIDE 10 MG/ML
INJECTION, SOLUTION INTRAVENOUS
Status: DISCONTINUED | OUTPATIENT
Start: 2020-01-29 | End: 2020-01-29

## 2020-01-29 RX ORDER — PREGABALIN 75 MG/1
75 CAPSULE ORAL NIGHTLY
Status: DISCONTINUED | OUTPATIENT
Start: 2020-01-30 | End: 2020-02-05

## 2020-01-29 RX ORDER — BUPIVACAINE HYDROCHLORIDE AND EPINEPHRINE 5; 5 MG/ML; UG/ML
INJECTION, SOLUTION EPIDURAL; INTRACAUDAL; PERINEURAL
Status: COMPLETED | OUTPATIENT
Start: 2020-01-29 | End: 2020-01-29

## 2020-01-29 RX ORDER — CELECOXIB 200 MG/1
200 CAPSULE ORAL DAILY
Status: DISCONTINUED | OUTPATIENT
Start: 2020-01-29 | End: 2020-02-12 | Stop reason: HOSPADM

## 2020-01-29 RX ORDER — MUPIROCIN 20 MG/G
1 OINTMENT TOPICAL 2 TIMES DAILY
Status: COMPLETED | OUTPATIENT
Start: 2020-01-29 | End: 2020-02-03

## 2020-01-29 RX ORDER — FENTANYL CITRATE-0.9 % NACL/PF 10 MCG/ML
25 PLASTIC BAG, INJECTION (ML) INTRAVENOUS CONTINUOUS
Status: DISCONTINUED | OUTPATIENT
Start: 2020-01-29 | End: 2020-02-03

## 2020-01-29 RX ORDER — ENOXAPARIN SODIUM 100 MG/ML
40 INJECTION SUBCUTANEOUS 2 TIMES DAILY
Status: DISCONTINUED | OUTPATIENT
Start: 2020-01-30 | End: 2020-02-12 | Stop reason: HOSPADM

## 2020-01-29 RX ORDER — METOCLOPRAMIDE HYDROCHLORIDE 5 MG/ML
5 INJECTION INTRAMUSCULAR; INTRAVENOUS EVERY 6 HOURS PRN
Status: DISCONTINUED | OUTPATIENT
Start: 2020-01-29 | End: 2020-02-12 | Stop reason: HOSPADM

## 2020-01-29 RX ORDER — FUROSEMIDE 10 MG/ML
40 INJECTION INTRAMUSCULAR; INTRAVENOUS 2 TIMES DAILY
Status: DISCONTINUED | OUTPATIENT
Start: 2020-01-30 | End: 2020-01-30

## 2020-01-29 RX ORDER — ACETAMINOPHEN 500 MG
1000 TABLET ORAL EVERY 6 HOURS
Status: DISPENSED | OUTPATIENT
Start: 2020-01-30 | End: 2020-02-01

## 2020-01-29 RX ORDER — FENTANYL CITRATE 50 UG/ML
INJECTION, SOLUTION INTRAMUSCULAR; INTRAVENOUS
Status: DISCONTINUED | OUTPATIENT
Start: 2020-01-29 | End: 2020-01-29

## 2020-01-29 RX ORDER — DIPHENHYDRAMINE HYDROCHLORIDE 50 MG/ML
25 INJECTION INTRAMUSCULAR; INTRAVENOUS EVERY 6 HOURS PRN
Status: DISCONTINUED | OUTPATIENT
Start: 2020-01-29 | End: 2020-01-29 | Stop reason: HOSPADM

## 2020-01-29 RX ORDER — MEPERIDINE HYDROCHLORIDE 50 MG/ML
12.5 INJECTION INTRAMUSCULAR; INTRAVENOUS; SUBCUTANEOUS ONCE AS NEEDED
Status: DISCONTINUED | OUTPATIENT
Start: 2020-01-29 | End: 2020-01-29 | Stop reason: HOSPADM

## 2020-01-29 RX ORDER — KETAMINE HCL IN 0.9 % NACL 50 MG/5 ML
SYRINGE (ML) INTRAVENOUS
Status: DISCONTINUED | OUTPATIENT
Start: 2020-01-29 | End: 2020-01-29

## 2020-01-29 RX ORDER — CALCIUM CHLORIDE INJECTION 100 MG/ML
INJECTION, SOLUTION INTRAVENOUS
Status: DISCONTINUED | OUTPATIENT
Start: 2020-01-29 | End: 2020-01-29

## 2020-01-29 RX ORDER — PHENYLEPHRINE HYDROCHLORIDE 10 MG/ML
INJECTION INTRAVENOUS
Status: DISCONTINUED | OUTPATIENT
Start: 2020-01-29 | End: 2020-01-29

## 2020-01-29 RX ORDER — METHOCARBAMOL 500 MG/1
500 TABLET, FILM COATED ORAL ONCE
Status: COMPLETED | OUTPATIENT
Start: 2020-01-29 | End: 2020-01-29

## 2020-01-29 RX ORDER — ONDANSETRON 2 MG/ML
4 INJECTION INTRAMUSCULAR; INTRAVENOUS ONCE AS NEEDED
Status: DISCONTINUED | OUTPATIENT
Start: 2020-01-29 | End: 2020-01-29 | Stop reason: HOSPADM

## 2020-01-29 RX ORDER — CEFAZOLIN SODIUM 1 G/3ML
2 INJECTION, POWDER, FOR SOLUTION INTRAMUSCULAR; INTRAVENOUS
Status: COMPLETED | OUTPATIENT
Start: 2020-01-29 | End: 2020-01-30

## 2020-01-29 RX ORDER — ACETAMINOPHEN 10 MG/ML
1000 INJECTION, SOLUTION INTRAVENOUS ONCE
Status: ACTIVE | OUTPATIENT
Start: 2020-01-29 | End: 2020-01-30

## 2020-01-29 RX ORDER — HYDROMORPHONE HYDROCHLORIDE 1 MG/ML
1 INJECTION, SOLUTION INTRAMUSCULAR; INTRAVENOUS; SUBCUTANEOUS EVERY 4 HOURS PRN
Status: DISCONTINUED | OUTPATIENT
Start: 2020-01-29 | End: 2020-02-04

## 2020-01-29 RX ORDER — ROPIVACAINE HYDROCHLORIDE 2 MG/ML
2 INJECTION, SOLUTION EPIDURAL; INFILTRATION; PERINEURAL CONTINUOUS
Status: DISCONTINUED | OUTPATIENT
Start: 2020-01-29 | End: 2020-02-05

## 2020-01-29 RX ORDER — ACETAMINOPHEN 10 MG/ML
INJECTION, SOLUTION INTRAVENOUS
Status: DISCONTINUED | OUTPATIENT
Start: 2020-01-29 | End: 2020-01-29

## 2020-01-29 RX ADMIN — Medication 20 MG: at 03:01

## 2020-01-29 RX ADMIN — ROCURONIUM BROMIDE 20 MG: 10 INJECTION, SOLUTION INTRAVENOUS at 06:01

## 2020-01-29 RX ADMIN — FENTANYL CITRATE 50 MCG: 50 INJECTION, SOLUTION INTRAMUSCULAR; INTRAVENOUS at 04:01

## 2020-01-29 RX ADMIN — ACETAMINOPHEN 1000 MG: 10 INJECTION, SOLUTION INTRAVENOUS at 03:01

## 2020-01-29 RX ADMIN — ACETYLCYSTEINE 4 ML: 100 SOLUTION ORAL; RESPIRATORY (INHALATION) at 09:01

## 2020-01-29 RX ADMIN — METHOCARBAMOL TABLETS 500 MG: 500 TABLET, COATED ORAL at 03:01

## 2020-01-29 RX ADMIN — SULFAMETHOXAZOLE AND TRIMETHOPRIM 1 TABLET: 800; 160 TABLET ORAL at 08:01

## 2020-01-29 RX ADMIN — PHENYLEPHRINE HYDROCHLORIDE 100 MCG: 10 INJECTION INTRAVENOUS at 02:01

## 2020-01-29 RX ADMIN — ONDANSETRON 4 MG: 2 INJECTION INTRAMUSCULAR; INTRAVENOUS at 06:01

## 2020-01-29 RX ADMIN — PHENYLEPHRINE HYDROCHLORIDE 200 MCG: 10 INJECTION INTRAVENOUS at 02:01

## 2020-01-29 RX ADMIN — ACETYLCYSTEINE 4 ML: 100 SOLUTION ORAL; RESPIRATORY (INHALATION) at 08:01

## 2020-01-29 RX ADMIN — DEXMEDETOMIDINE HYDROCHLORIDE 8 MCG: 100 INJECTION, SOLUTION, CONCENTRATE INTRAVENOUS at 02:01

## 2020-01-29 RX ADMIN — Medication 10 MG: at 04:01

## 2020-01-29 RX ADMIN — ROCURONIUM BROMIDE 20 MG: 10 INJECTION, SOLUTION INTRAVENOUS at 07:01

## 2020-01-29 RX ADMIN — IPRATROPIUM BROMIDE AND ALBUTEROL SULFATE 3 ML: .5; 3 SOLUTION RESPIRATORY (INHALATION) at 08:01

## 2020-01-29 RX ADMIN — PROPOFOL 50 MG: 10 INJECTION, EMULSION INTRAVENOUS at 04:01

## 2020-01-29 RX ADMIN — PROPOFOL 100 MG: 10 INJECTION, EMULSION INTRAVENOUS at 02:01

## 2020-01-29 RX ADMIN — CALCIUM CHLORIDE 700 MG: 100 INJECTION, SOLUTION INTRAVENOUS at 07:01

## 2020-01-29 RX ADMIN — DEXMEDETOMIDINE HYDROCHLORIDE 8 MCG: 100 INJECTION, SOLUTION, CONCENTRATE INTRAVENOUS at 08:01

## 2020-01-29 RX ADMIN — CALCIUM CHLORIDE 300 MG: 100 INJECTION, SOLUTION INTRAVENOUS at 05:01

## 2020-01-29 RX ADMIN — METHOCARBAMOL TABLETS 750 MG: 750 TABLET, COATED ORAL at 12:01

## 2020-01-29 RX ADMIN — FENTANYL CITRATE 100 MCG: 50 INJECTION, SOLUTION INTRAMUSCULAR; INTRAVENOUS at 08:01

## 2020-01-29 RX ADMIN — SODIUM CHLORIDE, SODIUM GLUCONATE, SODIUM ACETATE, POTASSIUM CHLORIDE, MAGNESIUM CHLORIDE, SODIUM PHOSPHATE, DIBASIC, AND POTASSIUM PHOSPHATE: .53; .5; .37; .037; .03; .012; .00082 INJECTION, SOLUTION INTRAVENOUS at 02:01

## 2020-01-29 RX ADMIN — CISATRACURIUM BESYLATE 2 MCG/KG/MIN: 10 INJECTION INTRAVENOUS at 09:01

## 2020-01-29 RX ADMIN — PHENYLEPHRINE HYDROCHLORIDE 100 MCG: 10 INJECTION INTRAVENOUS at 04:01

## 2020-01-29 RX ADMIN — MIDAZOLAM HYDROCHLORIDE 1 MG: 1 INJECTION, SOLUTION INTRAMUSCULAR; INTRAVENOUS at 08:01

## 2020-01-29 RX ADMIN — METHOCARBAMOL TABLETS 750 MG: 750 TABLET, COATED ORAL at 08:01

## 2020-01-29 RX ADMIN — IPRATROPIUM BROMIDE AND ALBUTEROL SULFATE 3 ML: .5; 3 SOLUTION RESPIRATORY (INHALATION) at 04:01

## 2020-01-29 RX ADMIN — PHENYLEPHRINE HYDROCHLORIDE 100 MCG: 10 INJECTION INTRAVENOUS at 05:01

## 2020-01-29 RX ADMIN — FUROSEMIDE 40 MG: 10 INJECTION, SOLUTION INTRAMUSCULAR; INTRAVENOUS at 08:01

## 2020-01-29 RX ADMIN — PANTOPRAZOLE SODIUM 40 MG: 40 TABLET, DELAYED RELEASE ORAL at 08:01

## 2020-01-29 RX ADMIN — DEXAMETHASONE SODIUM PHOSPHATE 8 MG: 4 INJECTION, SOLUTION INTRAMUSCULAR; INTRAVENOUS at 05:01

## 2020-01-29 RX ADMIN — IPRATROPIUM BROMIDE AND ALBUTEROL SULFATE 3 ML: .5; 3 SOLUTION RESPIRATORY (INHALATION) at 12:01

## 2020-01-29 RX ADMIN — ROCURONIUM BROMIDE 20 MG: 10 INJECTION, SOLUTION INTRAVENOUS at 04:01

## 2020-01-29 RX ADMIN — GUAIFENESIN AND DEXTROMETHORPHAN 5 ML: 100; 10 SYRUP ORAL at 12:01

## 2020-01-29 RX ADMIN — POTASSIUM CHLORIDE 40 MEQ: 7.46 INJECTION, SOLUTION INTRAVENOUS at 07:01

## 2020-01-29 RX ADMIN — PROPOFOL 15 MCG/KG/MIN: 10 INJECTION, EMULSION INTRAVENOUS at 08:01

## 2020-01-29 RX ADMIN — ROCURONIUM BROMIDE 20 MG: 10 INJECTION, SOLUTION INTRAVENOUS at 03:01

## 2020-01-29 RX ADMIN — ROCURONIUM BROMIDE 50 MG: 10 INJECTION, SOLUTION INTRAVENOUS at 08:01

## 2020-01-29 RX ADMIN — NICARDIPINE HYDROCHLORIDE 5 MG/HR: 0.2 INJECTION, SOLUTION INTRAVENOUS at 09:01

## 2020-01-29 RX ADMIN — SODIUM CHLORIDE 0.25 MCG/KG/MIN: 9 INJECTION, SOLUTION INTRAVENOUS at 05:01

## 2020-01-29 RX ADMIN — ROCURONIUM BROMIDE 20 MG: 10 INJECTION, SOLUTION INTRAVENOUS at 05:01

## 2020-01-29 RX ADMIN — ROCURONIUM BROMIDE 50 MG: 10 INJECTION, SOLUTION INTRAVENOUS at 02:01

## 2020-01-29 RX ADMIN — MIDAZOLAM HYDROCHLORIDE 1 MG: 1 INJECTION, SOLUTION INTRAMUSCULAR; INTRAVENOUS at 03:01

## 2020-01-29 RX ADMIN — Medication 10 MG: at 06:01

## 2020-01-29 RX ADMIN — IPRATROPIUM BROMIDE AND ALBUTEROL SULFATE 3 ML: .5; 3 SOLUTION RESPIRATORY (INHALATION) at 09:01

## 2020-01-29 RX ADMIN — DEXTROSE 3 G: 50 INJECTION, SOLUTION INTRAVENOUS at 03:01

## 2020-01-29 RX ADMIN — CEFAZOLIN 2 G: 1 INJECTION, POWDER, FOR SOLUTION INTRAMUSCULAR; INTRAVENOUS at 11:01

## 2020-01-29 RX ADMIN — SODIUM CHLORIDE, SODIUM GLUCONATE, SODIUM ACETATE, POTASSIUM CHLORIDE, MAGNESIUM CHLORIDE, SODIUM PHOSPHATE, DIBASIC, AND POTASSIUM PHOSPHATE: .53; .5; .37; .037; .03; .012; .00082 INJECTION, SOLUTION INTRAVENOUS at 04:01

## 2020-01-29 RX ADMIN — IPRATROPIUM BROMIDE AND ALBUTEROL SULFATE 3 ML: .5; 3 SOLUTION RESPIRATORY (INHALATION) at 01:01

## 2020-01-29 RX ADMIN — Medication 10 MG: at 05:01

## 2020-01-29 RX ADMIN — ENOXAPARIN SODIUM 40 MG: 100 INJECTION SUBCUTANEOUS at 08:01

## 2020-01-29 RX ADMIN — FENTANYL CITRATE 50 MCG: 50 INJECTION, SOLUTION INTRAMUSCULAR; INTRAVENOUS at 07:01

## 2020-01-29 RX ADMIN — Medication 50 MCG/HR: at 09:01

## 2020-01-29 RX ADMIN — FENTANYL CITRATE 50 MCG: 50 INJECTION, SOLUTION INTRAMUSCULAR; INTRAVENOUS at 05:01

## 2020-01-29 RX ADMIN — BUPIVACAINE HYDROCHLORIDE AND EPINEPHRINE BITARTRATE 25 ML: 5; .005 INJECTION, SOLUTION EPIDURAL; INTRACAUDAL; PERINEURAL at 02:01

## 2020-01-29 RX ADMIN — SODIUM CHLORIDE, SODIUM GLUCONATE, SODIUM ACETATE, POTASSIUM CHLORIDE, MAGNESIUM CHLORIDE, SODIUM PHOSPHATE, DIBASIC, AND POTASSIUM PHOSPHATE: .53; .5; .37; .037; .03; .012; .00082 INJECTION, SOLUTION INTRAVENOUS at 06:01

## 2020-01-29 RX ADMIN — BENZONATATE 100 MG: 100 CAPSULE ORAL at 09:01

## 2020-01-29 RX ADMIN — IPRATROPIUM BROMIDE AND ALBUTEROL SULFATE 3 ML: .5; 3 SOLUTION RESPIRATORY (INHALATION) at 11:01

## 2020-01-29 RX ADMIN — GUAIFENESIN AND DEXTROMETHORPHAN 5 ML: 100; 10 SYRUP ORAL at 09:01

## 2020-01-29 RX ADMIN — HYDRALAZINE HYDROCHLORIDE 10 MG: 20 INJECTION INTRAMUSCULAR; INTRAVENOUS at 09:01

## 2020-01-29 NOTE — PLAN OF CARE
POC reviewed with pt and family, verbalized understanding. Questions and concerns addressed. Pt currently in OR. Pt progressing toward goals. Will continue to monitor. See flowsheets for full assessment and VS info.

## 2020-01-29 NOTE — SUBJECTIVE & OBJECTIVE
Interval History: No acute events overnight. Down to 5L comfort flow this morning. States that she feels better overall. AF overnight.       Medications:  Continuous Infusions:    Scheduled Meds:   acetylcysteine 100 mg/ml (10%)  4 mL Nebulization TID    albuterol-ipratropium  3 mL Nebulization Q4H    benzonatate  100 mg Oral Q8H    dextromethorphan-guaifenesin  mg/5 ml  5 mL Oral Q6H    enoxaparin  40 mg Subcutaneous BID    furosemide  40 mg Intravenous BID    methocarbamol  750 mg Oral QID    pantoprazole  40 mg Oral Daily    sulfamethoxazole-trimethoprim 800-160mg  1 tablet Oral BID     PRN Meds:acetaminophen, acetaminophen-codeine, calcium gluconate IVPB, calcium gluconate IVPB, calcium gluconate IVPB, Dextrose 10% Bolus, glucagon (human recombinant), hydrALAZINE, insulin aspart U-100, labetalol, magnesium sulfate IVPB, magnesium sulfate IVPB, melatonin, ondansetron, oxyCODONE, oxyCODONE, potassium chloride in water **AND** potassium chloride in water **AND** potassium chloride in water, sodium chloride 0.9%, sodium chloride 0.9%, sodium phosphate IVPB, sodium phosphate IVPB, sodium phosphate IVPB     Review of patient's allergies indicates:   Allergen Reactions    Erythromycin      Stomach pains    Rosuvastatin      Hives, muscle/joint pains    Zolpidem      Confusion      Objective:     Vital Signs (Most Recent):  Temp: 98.3 °F (36.8 °C) (01/29/20 0700)  Pulse: 100 (01/29/20 0837)  Resp: (!) 24 (01/29/20 0837)  BP: (!) 167/71 (01/29/20 0800)  SpO2: 96 % (01/29/20 0837) Vital Signs (24h Range):  Temp:  [98.2 °F (36.8 °C)-99.6 °F (37.6 °C)] 98.3 °F (36.8 °C)  Pulse:  [] 100  Resp:  [22-38] 24  SpO2:  [90 %-99 %] 96 %  BP: (115-167)/(56-80) 167/71     Weight: 114.2 kg (251 lb 12.3 oz)  Body mass index is 47.57 kg/m².    Intake/Output - Last 3 Shifts       01/27 0700 - 01/28 0659 01/28 0700 - 01/29 0659 01/29 0700 - 01/30 0659    P.O. 225 2010     Total Intake(mL/kg) 225 (2) 2010 (17.6)      Urine (mL/kg/hr) 2250 (0.8) 2240 (0.8)     Stool  1     Total Output 2250 2241     Net -2025 -231            Urine Occurrence 1 x 2 x           Physical Exam   Constitutional: She appears well-developed and well-nourished. No distress.   HENT:   Head: Normocephalic and atraumatic.   Mouth/Throat: Oropharynx is clear and moist.   Eyes: Conjunctivae and EOM are normal. Right eye exhibits no discharge. Left eye exhibits no discharge.   Neck: Normal range of motion.   Cardiovascular: Normal rate and regular rhythm.   Pulmonary/Chest:   On Comfort flow  Sats 91-99%   Abdominal: Soft. She exhibits no distension.   Musculoskeletal: Normal range of motion. She exhibits no deformity.   Neurological:   A&O, NAD   Skin: Skin is warm and dry.   L flank ecchymosis, improving   Psychiatric: She has a normal mood and affect. Her behavior is normal.         Significant Labs:  CBC:   Recent Labs   Lab 01/29/20  0330   WBC 9.84   RBC 3.41*   HGB 9.1*   HCT 30.4*   *   MCV 89   MCH 26.7*   MCHC 29.9*     CMP:   Recent Labs   Lab 01/23/20  0307  01/29/20  0330      < > 105   CALCIUM 8.3*   < > 9.2   ALBUMIN 1.9*  --   --    PROT 4.9*  --   --       < > 136   K 4.4   < > 3.6   CO2 27   < > 34*      < > 89*   BUN 13   < > 14   CREATININE 0.7   < > 0.7   ALKPHOS 81  --   --    ALT 31  --   --    AST 23  --   --    BILITOT 0.8  --   --     < > = values in this interval not displayed.       Significant Diagnostics:  I have reviewed all pertinent imaging results/findings within the past 24 hours.

## 2020-01-29 NOTE — PLAN OF CARE
01/29/20 1554   Discharge Reassessment   Assessment Type Discharge Planning Reassessment   Provided patient/caregiver education on the expected discharge date and the discharge plan Yes   Do you have any problems affording any of your prescribed medications? No   Discharge Plan A Skilled Nursing Facility   Discharge Plan B Home Health   DME Needed Upon Discharge  other (see comments)  (TBD)   Anticipated Discharge Disposition SNF   Post-Acute Status   Discharge Delays None known at this time       Anastasiia Wagner MPH, RN, CM  Ext. [phone]

## 2020-01-29 NOTE — ANESTHESIA PROCEDURE NOTES
Arterial    Diagnosis: Diaphragmatic hernia    Patient location during procedure: done in OR  Procedure start time: 1/29/2020 2:33 PM  Timeout: 1/29/2020 2:30 PM  Procedure end time: 1/29/2020 2:35 PM    Staffing  Authorizing Provider: Fracisco Freitas MD  Performing Provider: Ralph Mckeon MD    Anesthesiologist was present at the time of the procedure.    Preanesthetic Checklist  Completed: patient identified, site marked, surgical consent, pre-op evaluation, timeout performed, IV checked, risks and benefits discussed, monitors and equipment checked and anesthesia consent givenArterial  Skin Prep: chlorhexidine gluconate  Local Infiltration: none  Orientation: right  Location: radial  Catheter Size: 20 G  Catheter placement by Ultrasound guidance. Heme positive aspiration all ports.  Vessel Caliber: small, patent, compressibility normal  Needle advanced into vessel with real time Ultrasound guidance.Insertion Attempts: 1  Assessment  Dressing: secured with tape and tegaderm  Patient: Tolerated well

## 2020-01-29 NOTE — PLAN OF CARE
"Dx: Diaphragmatic hernia    Shift Events: No acute events over night. VSS, afebrile.  Pt on 5L NC, SATs 95%.  1 BM this morning.  Plan for OR today, POC discussed with patient.  All questions/concerns addressed.     Neuro: AAO x4, Follows Commands and Moves All Extremities    Vital Signs: /60 (BP Location: Left arm, Patient Position: Lying)   Pulse 99   Temp 99 °F (37.2 °C) (Oral)   Resp (!) 29   Ht 5' 1" (1.549 m)   Wt 114.2 kg (251 lb 12.3 oz)   SpO2 98%   Breastfeeding? No   BMI 47.57 kg/m²     Diet: NPO since midnight    Gtts: No gtts    Urine Output: Voids Spontaneously 650 cc/shift     Labs/Accuchecks: Labs checked as ordered. Accuchecks Q6hrs, no correction needed.     Skin: No new skin break down noted. Pt remained free from falls/injuries during shift.         "

## 2020-01-29 NOTE — PT/OT/SLP PROGRESS
Speech Language Pathology  Discharge    Marisela Bunn  MRN: [id number]    Patient not seen today secondary to pt scheduled for thoracotomy this date. Please re-consult post up.     Gloria Hampton CCC-SLP  1/29/2020

## 2020-01-29 NOTE — ASSESSMENT & PLAN NOTE
Marisela Bunn is a 69 y.o. female with PMH COPD (not on home oxygen), HTN, HLD (not on anticoagulation) received as direct admission for large diaphragmatic hernia. Patient is symptomatic from hernia with constipation and dyspnea with overlying bruise on left flank. She underwent lap diaphragmatic hernia repair with mesh on 1/21/20.   Evidence of recurrent hernia on CT 1/27. Thoracic consulted and plans for operative repair 1/29    - To OR today for diaphragmatic hernia repair with thoracic surgery  - Needs aggressive respiratory care  - limit coughing spells as able  - Lasix 20 BID  - Continue Levaquin, Bactrim for pneumonia, (restarted 1/26)  - Aggressive pulm toilet with IS, CPT, DuoNebs, and Tessalon   - PT/OT today  - Daily labs    DISPO: pending redo diaphragmatic hernia repair with Thoracic team. SICU care for respiratory status

## 2020-01-29 NOTE — SUBJECTIVE & OBJECTIVE
24Hr Events:  NAEON. One recorded bowel movement, down to 5L NC.    Review of patient's allergies indicates:   Allergen Reactions    Erythromycin      Stomach pains    Rosuvastatin      Hives, muscle/joint pains    Zolpidem      Confusion        Past Medical History:   Diagnosis Date    CAD (coronary artery disease)     Diaphragmatic hernia     GERD (gastroesophageal reflux disease)     Hypertension     TIA (transient ischemic attack)      Past Surgical History:   Procedure Laterality Date    REPAIR OF DIAPHRAGMATIC HERNIA Left 1/21/2020    Procedure: REPAIR, HERNIA, DIAPHRAGMATIC, Laparoscopic;  Surgeon: Lucho Garcia Jr., MD;  Location: [address];  Service: General;  Laterality: Left;    REPAIR OF DIAPHRAGMATIC HERNIA Left 1/23/2020    Procedure: REPAIR, HERNIA, DIAPHRAGMATIC;  Surgeon: Lucho Garcia Jr., MD;  Location: [facility] OR [address] [place] [address];  Service: General;  Laterality: Left;     Family History     None        Tobacco Use    Smoking status: Never Smoker    Smokeless tobacco: Never Used   Substance and Sexual Activity    Alcohol use: Not on file    Drug use: Not on file    Sexual activity: Not on file     Review of Systems   Constitutional: Positive for activity change, fatigue and fever.   HENT: Positive for trouble swallowing. Negative for voice change.    Eyes: Negative for visual disturbance.   Respiratory: Positive for cough and shortness of breath. Negative for apnea, choking and wheezing.    Cardiovascular: Positive for chest pain. Negative for palpitations and leg swelling.   Gastrointestinal: Positive for abdominal distention and abdominal pain. Negative for constipation, diarrhea, nausea and vomiting.   Genitourinary: Negative for difficulty urinating.   Musculoskeletal: Negative for arthralgias and myalgias.   Neurological: Negative for dizziness and numbness.   Psychiatric/Behavioral: Negative for agitation and confusion.     Objective:     Vital Signs (Most  Recent):  Temp: 98.3 °F (36.8 °C) (01/29/20 0700)  Pulse: 100 (01/29/20 0837)  Resp: (!) 24 (01/29/20 0837)  BP: (!) 167/71 (01/29/20 0800)  SpO2: 96 % (01/29/20 0837) Vital Signs (24h Range):  Temp:  [98.2 °F (36.8 °C)-99.6 °F (37.6 °C)] 98.3 °F (36.8 °C)  Pulse:  [] 100  Resp:  [22-38] 24  SpO2:  [90 %-99 %] 96 %  BP: (115-167)/(56-80) 167/71     Weight: 114.2 kg (251 lb 12.3 oz)  Body mass index is 47.57 kg/m².    Intake/Output - Last 3 Shifts       01/27 0700 - 01/28 0659 01/28 0700 - 01/29 0659 01/29 0700 - 01/30 0659    P.O. 225 2010     Total Intake(mL/kg) 225 (2) 2010 (17.6)     Urine (mL/kg/hr) 2250 (0.8) 2240 (0.8)     Stool  1     Total Output 2250 2241     Net -2025 -231            Urine Occurrence 1 x 2 x           SpO2: 96 %  O2 Device (Oxygen Therapy): High Flow nasal Cannula    Physical Exam   Constitutional: She is oriented to person, place, and time. She appears well-developed and well-nourished. No distress.   Obese   HENT:   Head: Normocephalic and atraumatic.   Mouth/Throat: Oropharynx is clear and moist.   Eyes: Conjunctivae and EOM are normal. Right eye exhibits no discharge. Left eye exhibits no discharge.   Neck: Normal range of motion.   Cardiovascular: Normal rate, regular rhythm and intact distal pulses.   Pulmonary/Chest:   On HFNC. Sats 90-96% Increased work of breathing with any movement   Abdominal: Soft. She exhibits no distension. There is no tenderness.   Lap sites healing well   Musculoskeletal: Normal range of motion. She exhibits no deformity.   Neurological: She is alert and oriented to person, place, and time.   Skin: Skin is warm and dry.   L flank ecchymosis, improving   Psychiatric: She has a normal mood and affect. Her behavior is normal.       Significant Labs:  ABGs: No results for input(s): PH, PCO2, PO2, HCO3, POCSATURATED, BE in the last 48 hours.  CBC:   Recent Labs   Lab 01/29/20  0330   WBC 9.84   RBC 3.41*   HGB 9.1*   HCT 30.4*   *   MCV 89   MCH  26.7*   MCHC 29.9*     CMP:   Recent Labs   Lab 01/29/20  0330      CALCIUM 9.2      K 3.6   CO2 34*   CL 89*   BUN 14   CREATININE 0.7     Coagulation: No results for input(s): PT, INR, APTT in the last 48 hours.  Lactic Acid: No results for input(s): LACTATE in the last 48 hours.    Significant Diagnostics:  CT: I have reviewed all pertinent results/findings within the past 24 hours  CXR: I have reviewed all pertinent results/findings within the past 24 hours    VTE Risk Mitigation (From admission, onward)         Ordered     Place sequential compression device  Until discontinued      01/23/20 1002     enoxaparin injection 40 mg  2 times daily      01/21/20 0804     Place sequential compression device  Until discontinued      01/19/20 2022     IP VTE LOW RISK PATIENT  Once      01/19/20 2022

## 2020-01-29 NOTE — ANESTHESIA PROCEDURE NOTES
Left MIGUEL catheter     Patient location during procedure: OR   Block not for primary anesthetic.  Reason for block: at surgeon's request and post-op pain management   Post-op Pain Location: left chest pain  Start time: 1/29/2020 2:07 PM  Timeout: 1/29/2020 2:05 PM   End time: 1/29/2020 2:15 PM    Staffing  Authorizing Provider: Hermilo Malloy MD  Performing Provider: Piyush Miranda MD    Preanesthetic Checklist  Completed: patient identified, site marked, surgical consent, pre-op evaluation, timeout performed, IV checked, risks and benefits discussed and monitors and equipment checked  Peripheral Block  Patient position: sitting  Prep: ChloraPrep  Patient monitoring: heart rate, cardiac monitor, continuous pulse ox, continuous capnometry and frequent blood pressure checks  Block type: erector spinae plane (Erector Spinae Plane)  Laterality: left  Injection technique: continuous  Location: T4-5  Needle  Needle type: Tuohy   Needle gauge: 17 G  Needle length: 3.5 in  Needle localization: anatomical landmarks and ultrasound guidance  Catheter type: spring wound  Catheter size: 19 G  Test dose: lidocaine 1.5% with Epi 1-to-200,000 and negative   -ultrasound image captured on disc.  Assessment  Injection assessment: negative aspiration, negative parasthesia and local visualized surrounding nerve  Paresthesia pain: none  Heart rate change: no  Slow fractionated injection: yes  Additional Notes  Patient tolerated very well.  Vital signs stable.  See [facility] RN charting for vital signs.

## 2020-01-29 NOTE — PROGRESS NOTES
"[facility]  General Surgery  Progress Note    Subjective:     History of Present Illness:  Marisela Bunn is a 69 y.o. female with PMH CAD, HTN, TIA, GERD presents to the hospital as a direct admission for evaluation of a newly diagnosed diaphragmatic hernia. She initially presented to [place] ED on 1/18/20 for a 1-month history of a cough, intermittent fevers and dyspnea.  She had been seen by 2 urgent care physicians in the last month for the cough, with 2 different antibiotic courses given without improvement in cough. She reports that on 1/15 after an particularly severe coughing episode she felt a "pop" on left side, with associated severe pain and  Subsequent bruising of the left flank. She reports constant severe pain since that time. She is on daily ASA 81mg,     On ED presentation, patient was hemodynamically stable, H/H 12.4/40.0, breathing on RA. Labs revealed leukocytosis (19.2), lactic acid 2.5 (repeat lactic acid1.6), BMP wnl. CXR revealed left lower lobe pneumonia with possible associated pleural effusion. CT abdomen/pelvis revealed a large left diaphragmatic hernia containing fat  and bowel loops with hernia of intra-abdomnial contents outside the thorax from 7th left ICS. Medium sized HH. CTA revealed large Bochdalek hernia through defect in left hemo-diaphragm, with associated widening of 7th intercostal space. She was started on IV hydration and antibiotics (levofloxacin, zosyn). She reports she has not passed a bowel movement of flatus in 2 days. Intermittent lower quadrant "spasms" while coughing, otherwise no abdominal pain. She reports no other symptoms.    Post-Op Info:  Procedure(s) (LRB):  REPAIR, HERNIA, DIAPHRAGMATIC, Laparoscopic (Left)   8 Days Post-Op     Interval History: No acute events overnight. Down to 5L comfort flow this morning. States that she feels better overall. AF overnight.       Medications:  Continuous Infusions:    Scheduled Meds:   " acetylcysteine 100 mg/ml (10%)  4 mL Nebulization TID    albuterol-ipratropium  3 mL Nebulization Q4H    benzonatate  100 mg Oral Q8H    dextromethorphan-guaifenesin  mg/5 ml  5 mL Oral Q6H    enoxaparin  40 mg Subcutaneous BID    furosemide  40 mg Intravenous BID    methocarbamol  750 mg Oral QID    pantoprazole  40 mg Oral Daily    sulfamethoxazole-trimethoprim 800-160mg  1 tablet Oral BID     PRN Meds:acetaminophen, acetaminophen-codeine, calcium gluconate IVPB, calcium gluconate IVPB, calcium gluconate IVPB, Dextrose 10% Bolus, glucagon (human recombinant), hydrALAZINE, insulin aspart U-100, labetalol, magnesium sulfate IVPB, magnesium sulfate IVPB, melatonin, ondansetron, oxyCODONE, oxyCODONE, potassium chloride in water **AND** potassium chloride in water **AND** potassium chloride in water, sodium chloride 0.9%, sodium chloride 0.9%, sodium phosphate IVPB, sodium phosphate IVPB, sodium phosphate IVPB     Review of patient's allergies indicates:   Allergen Reactions    Erythromycin      Stomach pains    Rosuvastatin      Hives, muscle/joint pains    Zolpidem      Confusion      Objective:     Vital Signs (Most Recent):  Temp: 98.3 °F (36.8 °C) (01/29/20 0700)  Pulse: 100 (01/29/20 0837)  Resp: (!) 24 (01/29/20 0837)  BP: (!) 167/71 (01/29/20 0800)  SpO2: 96 % (01/29/20 0837) Vital Signs (24h Range):  Temp:  [98.2 °F (36.8 °C)-99.6 °F (37.6 °C)] 98.3 °F (36.8 °C)  Pulse:  [] 100  Resp:  [22-38] 24  SpO2:  [90 %-99 %] 96 %  BP: (115-167)/(56-80) 167/71     Weight: 114.2 kg (251 lb 12.3 oz)  Body mass index is 47.57 kg/m².    Intake/Output - Last 3 Shifts       01/27 0700 - 01/28 0659 01/28 0700 - 01/29 0659 01/29 0700 - 01/30 0659    P.O. 225 2010     Total Intake(mL/kg) 225 (2) 2010 (17.6)     Urine (mL/kg/hr) 2250 (0.8) 2240 (0.8)     Stool  1     Total Output 2250 2241     Net -2025 -231            Urine Occurrence 1 x 2 x           Physical Exam   Constitutional: She appears  well-developed and well-nourished. No distress.   HENT:   Head: Normocephalic and atraumatic.   Mouth/Throat: Oropharynx is clear and moist.   Eyes: Conjunctivae and EOM are normal. Right eye exhibits no discharge. Left eye exhibits no discharge.   Neck: Normal range of motion.   Cardiovascular: Normal rate and regular rhythm.   Pulmonary/Chest:   On Comfort flow  Sats 91-99%   Abdominal: Soft. She exhibits no distension.   Musculoskeletal: Normal range of motion. She exhibits no deformity.   Neurological:   A&O, NAD   Skin: Skin is warm and dry.   L flank ecchymosis, improving   Psychiatric: She has a normal mood and affect. Her behavior is normal.         Significant Labs:  CBC:   Recent Labs   Lab 01/29/20  0330   WBC 9.84   RBC 3.41*   HGB 9.1*   HCT 30.4*   *   MCV 89   MCH 26.7*   MCHC 29.9*     CMP:   Recent Labs   Lab 01/23/20  0307  01/29/20  0330      < > 105   CALCIUM 8.3*   < > 9.2   ALBUMIN 1.9*  --   --    PROT 4.9*  --   --       < > 136   K 4.4   < > 3.6   CO2 27   < > 34*      < > 89*   BUN 13   < > 14   CREATININE 0.7   < > 0.7   ALKPHOS 81  --   --    ALT 31  --   --    AST 23  --   --    BILITOT 0.8  --   --     < > = values in this interval not displayed.       Significant Diagnostics:  I have reviewed all pertinent imaging results/findings within the past 24 hours.    Assessment/Plan:     * Diaphragmatic hernia  Marisela Bunn is a 69 y.o. female with PMH COPD (not on home oxygen), HTN, HLD (not on anticoagulation) received as direct admission for large diaphragmatic hernia. Patient is symptomatic from hernia with constipation and dyspnea with overlying bruise on left flank. She underwent lap diaphragmatic hernia repair with mesh on 1/21/20.   Evidence of recurrent hernia on CT 1/27. Thoracic consulted and plans for operative repair 1/29    - To OR today for diaphragmatic hernia repair with thoracic surgery  - Needs aggressive respiratory care  - limit coughing spells  as able  - Lasix 20 BID  - Continue Levaquin, Bactrim for pneumonia, (restarted 1/26)  - Aggressive pulm toilet with IS, CPT, DuoNeángel, and Tessalon   - PT/OT today  - Daily labs    DISPO: pending redo diaphragmatic hernia repair with Thoracic team. SICU care for respiratory status    Pneumonia  See principle         Onesimo Raza MD  General Surgery  [facility]

## 2020-01-29 NOTE — ASSESSMENT & PLAN NOTE
69F PMH CAD, HTN, TIA, GERD who presented with a diaphragmatic hernia, now s/p surgical repair on 1/23/19.    Surgical:  OR Today for erector spinae block followed by thoracotomy for diaphragmatic hernia repair    Neuro:  Sedation: None  Pain control: Tylenol, fentanyl, oxy    Resp:  Tesslon, dextromethorphan, codeine to suppress cough  Nebs prn  Acapella, IS  Continue to monitor oxygen requirements  Respiratory cultures showed MRSA and candida. On Bactrim.    CV:  HDS  No pressors    Heme/ID:  RCx: MRSA, candida  Bactrim    Renal:  Purewick  Strict I/Os  MIVF d/c  Increase Lasix to 40 BID    FEN/GI:  Dysphagia diet  Replace lytes PRN    Endo:  SSI    PPX:  Protonix  Lovenox    Dispo: Continue ICU care

## 2020-01-29 NOTE — PROGRESS NOTES
"[facility]  Adult Nutrition  Progress Note    SUMMARY       Recommendations    1. Once diet is advanced, encourage adequate intake of meals as tolerated.     2. If meal intake <50%, recommend adding Boost Plus TID    Goals: Maintain intake > 85% EEN/EPN daily  Nutrition Goal Status: new  Communication of RD Recs: other (comment)(POC)    Reason for Assessment    Reason For Assessment: length of stay  Diagnosis: other (see comments)(diaphragmatic hernia)  Relevant Medical History: CAD, HTN, TIA, GERD  Interdisciplinary Rounds: did not attend  General Information Comments: Met w/ pt for [id number]. Pt NPOx1d for thoracotomy sched today 1/29. Pt reports good appetite but diet has been NPO/CL x 3d. NFPE performed 1/29 and pt shows age-related wasting in temples and is at risk for acute malnutrition. Orbitals, clavicle, upper arms, and legs appear well nourished. Noted weight has increased since admission, likely fluid related. Pt denied wt loss pta. Pt reports some nausea and diarrhea, denies V/C.    Nutrition Discharge Planning: too soon to determine    Nutrition Risk Screen    Nutrition Risk Screen: dysphagia or difficulty swallowing    Nutrition/Diet History    Spiritual, Cultural Beliefs, Mandaeism Practices, Values that Affect Care: no  Food Allergies: NKFA  Factors Affecting Nutritional Intake: NPO    Anthropometrics    Temp: 98.5 °F (36.9 °C)  Height: 5' 1" (154.9 cm)  Height (inches): 61 in  Weight Method: Bed Scale  Weight: 114.2 kg (251 lb 12.3 oz)  Weight (lb): 251.77 lb  Ideal Body Weight (IBW), Female: 105 lb  % Ideal Body Weight, Female (lb): 239.78 %  BMI (Calculated): 47.6  BMI Grade: greater than 40 - morbid obesity       Lab/Procedures/Meds    Pertinent Labs Reviewed: reviewed  Pertinent Labs Comments: Cl89, CO2 34  Pertinent Medications Reviewed: reviewed  Pertinent Medications Comments: enoxaparin, furosemide, pantoprazole    Estimated/Assessed Needs    Weight Used For Calorie " Calculations: 108.4 kg (238 lb 15.7 oz)  Energy Calorie Requirements (kcal): 1860  Energy Need Method: [place]-St Jeor(x PAL 1.2)  Protein Requirements: 86-108g/day(0.8-1.0g/kg)  Weight Used For Protein Calculations: 108.4 kg (238 lb 15.7 oz)  Fluid Requirements (mL): 1860 or per MD  Estimated Fluid Requirement Method: RDA Method  RDA Method (mL): 1860         Nutrition Prescription Ordered    Current Diet Order: NPO(2/2 sx thoracotomy 1/29)    Evaluation of Received Nutrient/Fluid Intake    Energy Calories Required: not meeting needs  Protein Required: not meeting needs  Comments: LBM 1/29  % Intake of Estimated Energy Needs: 0 - 25 %  % Meal Intake: NPO    Nutrition Risk    Level of Risk/Frequency of Follow-up: (f/u 1x/ weekly)     Assessment and Plan    Nutrition Problem  Inadequate Energy Intake    Related to (etiology):   Decreased ability to consume sufficient energy    Signs and Symptoms (as evidenced by):   NPO/ CL x 3 days    Interventions:  Collaboration with other providers    Nutrition Diagnosis Status:   New        Monitor and Evaluation    Food and Nutrient Intake: energy intake, food and beverage intake  Food and Nutrient Adminstration: diet order  Anthropometric Measurements: weight, weight change, body mass index  Biochemical Data, Medical Tests and Procedures: electrolyte and renal panel, glucose/endocrine profile, lipid profile  Nutrition-Focused Physical Findings: overall appearance     Malnutrition Assessment                 Orbital Region (Subcutaneous Fat Loss): well nourished  Upper Arm Region (Subcutaneous Fat Loss): well nourished   Sikh Region (Muscle Loss): mild depletion(2/2 age)  Clavicle Bone Region (Muscle Loss): well nourished  Clavicle and Acromion Bone Region (Muscle Loss): well nourished  Dorsal Hand (Muscle Loss): well nourished  Patellar Region (Muscle Loss): well nourished  Anterior Thigh Region (Muscle Loss): well nourished  Posterior Calf Region (Muscle Loss): well  nourished   Edema (Fluid Accumulation): 1-->trace   Subcutaneous Fat Loss (Final Summary): well nourished  Muscle Loss Evaluation (Final Summary): mild protein-calorie malnutrition  Fluid Accumulation Evaluation: mild         Nutrition Follow-Up    RD Follow-up?: Yes

## 2020-01-29 NOTE — PROGRESS NOTES
[facility]  Critical Care - Surgery  Progress Note    Patient Name: Marisela Bunn  MRN: [id number]  Admission Date: 1/19/2020  Hospital Length of Stay: 10 days  Code Status: Full Code  Attending Provider: Lucho Garcia Jr.,*  Primary Care Provider: Primary Doctor No   Principal Problem: Diaphragmatic hernia    Subjective:     Hospital/ICU Course:  No notes on file    Interval History/Significant Events: OR today for thoracotomy and diaphragmatic hernia repair.    Follow-up For: Procedure(s) (LRB):  REPAIR, HERNIA, DIAPHRAGMATIC, Laparoscopic (Left)    Post-Operative Day: 8 Days Post-Op    Objective:     Vital Signs (Most Recent):  Temp: 98.3 °F (36.8 °C) (01/29/20 0700)  Pulse: 100 (01/29/20 0837)  Resp: (!) 24 (01/29/20 0837)  BP: (!) 167/71 (01/29/20 0800)  SpO2: 96 % (01/29/20 0837) Vital Signs (24h Range):  Temp:  [98.2 °F (36.8 °C)-99.6 °F (37.6 °C)] 98.3 °F (36.8 °C)  Pulse:  [] 100  Resp:  [22-38] 24  SpO2:  [90 %-99 %] 96 %  BP: (115-167)/(56-80) 167/71     Weight: 114.2 kg (251 lb 12.3 oz)  Body mass index is 47.57 kg/m².      Intake/Output Summary (Last 24 hours) at 1/29/2020 1128  Last data filed at 1/29/2020 0600  Gross per 24 hour   Intake 1450 ml   Output 1541 ml   Net -91 ml       Physical Exam   Constitutional: She is oriented to person, place, and time.   HENT:   Head: Normocephalic and atraumatic.   Eyes: Pupils are equal, round, and reactive to light. EOM are normal.   Cardiovascular: Normal rate and regular rhythm.   Pulmonary/Chest:   Increased oxygen requirement, on 20L comfort flow NC   Abdominal:   Morbidly obese, laparoscopic incisions CDI   Neurological: She is alert and oriented to person, place, and time.   Skin: Skin is warm and dry.       Vents:  Vent Mode: Spont (01/24/20 0915)  Ventilator Initiated: Yes (01/21/20 4706)  Set Rate: 16 bmp (01/24/20 0802)  Vt Set: 375 mL (01/24/20 0802)  Pressure Support: 8 cmH20 (01/24/20 0915)  PEEP/CPAP: 5 cmH20 (01/24/20  0915)  Oxygen Concentration (%): 50 (01/29/20 0300)  Peak Airway Pressure: 13 cmH2O (01/24/20 0915)  Plateau Pressure: 18 cmH20 (01/24/20 0915)  Total Ve: 9.49 mL (01/24/20 0915)  Negative Inspiratory Force (cm H2O): -30 (01/24/20 0854)  F/VT Ratio<105 (RSBI): (!) 31.77 (01/24/20 0915)    Lines/Drains/Airways     Drain            Female External Urinary Catheter 01/28/20 0700 1 day          Peripheral Intravenous Line                 Peripheral IV - Single Lumen 01/25/20 1130 20 G Right Hand 3 days         Peripheral IV - Single Lumen 01/29/20 0400 22 G Anterior;Left Forearm less than 1 day                Significant Labs:    CBC/Anemia Profile:  Recent Labs   Lab 01/28/20  0400 01/29/20  0330   WBC 12.54 9.84   HGB 8.6* 9.1*   HCT 29.2* 30.4*   * 401*   MCV 91 89   RDW 13.6 13.6        Chemistries:  Recent Labs   Lab 01/28/20  0400 01/29/20  0330   * 136   K 3.7 3.6   CL 90* 89*   CO2 33* 34*   BUN 13 14   CREATININE 0.8 0.7   CALCIUM 8.9 9.2   MG 1.8 2.3   PHOS 3.7 4.0       All pertinent labs within the past 24 hours have been reviewed.    Significant Imaging:  I have reviewed all pertinent imaging results/findings within the past 24 hours.    Assessment/Plan:     * Diaphragmatic hernia  69F PMH CAD, HTN, TIA, GERD who presented with a diaphragmatic hernia, now s/p surgical repair on 1/23/19.    Surgical:  OR Today for erector spinae block followed by thoracotomy for diaphragmatic hernia repair    Neuro:  Sedation: None  Pain control: Tylenol, fentanyl, oxy    Resp:  Tesslon, dextromethorphan, codeine to suppress cough  Nebs prn  Acapella, IS  Continue to monitor oxygen requirements  Respiratory cultures showed MRSA and candida. On Bactrim.    CV:  HDS  No pressors    Heme/ID:  RCx: MRSA, candida  Bactrim    Renal:  Purewick  Strict I/Os  MIVF d/c  Increase Lasix to 40 BID    FEN/GI:  Dysphagia diet  Replace lytes PRN    Endo:  SSI    PPX:  Protonix  Lovenox    Dispo: Continue ICU care        Schuyler  SP Lomax MD  Critical Care - Surgery  [facility]

## 2020-01-29 NOTE — ASSESSMENT & PLAN NOTE
69 year old female admitted to SICU with recurrent left diaphragmatic hernia s/p laparoscopic repair 1 week ago.      - To OR for left thoracotomy for reduction and repair of diaphragmatic hernia, possible chest wall reconstruction today. Consent in chart.  - NPO  - May consult regional pain team for possible erector spinae block prior to left thoracotomy.   - Remainder of care per general surgery and ICU teams.

## 2020-01-29 NOTE — PROGRESS NOTES
[facility]  Cardiothoracic Surgery  Progress Note    Patient Name: Marisela Bunn  MRN: [id number]  Admission Date: 1/19/2020  Hospital Length of Stay: 10 days  Code Status: Full Code   Attending Physician: Lucho Garcia Jr.,*   Referring Provider: [facility], Provider  Principal Problem:Diaphragmatic hernia    Subjective:     Post-Op Info:  Procedure(s) (LRB):  REPAIR, HERNIA, DIAPHRAGMATIC, Laparoscopic (Left)   8 Days Post-Op     24Hr Events:  NAEON. One recorded bowel movement, down to 5L NC.    Review of patient's allergies indicates:   Allergen Reactions    Erythromycin      Stomach pains    Rosuvastatin      Hives, muscle/joint pains    Zolpidem      Confusion        Past Medical History:   Diagnosis Date    CAD (coronary artery disease)     Diaphragmatic hernia     GERD (gastroesophageal reflux disease)     Hypertension     TIA (transient ischemic attack)      Past Surgical History:   Procedure Laterality Date    REPAIR OF DIAPHRAGMATIC HERNIA Left 1/21/2020    Procedure: REPAIR, HERNIA, DIAPHRAGMATIC, Laparoscopic;  Surgeon: Lucho Garcia Jr., MD;  Location: [address];  Service: General;  Laterality: Left;    REPAIR OF DIAPHRAGMATIC HERNIA Left 1/23/2020    Procedure: REPAIR, HERNIA, DIAPHRAGMATIC;  Surgeon: Lucho Garcia Jr., MD;  Location: [facility] OR [address] [place] [address];  Service: General;  Laterality: Left;     Family History     None        Tobacco Use    Smoking status: Never Smoker    Smokeless tobacco: Never Used   Substance and Sexual Activity    Alcohol use: Not on file    Drug use: Not on file    Sexual activity: Not on file     Review of Systems   Constitutional: Positive for activity change, fatigue and fever.   HENT: Positive for trouble swallowing. Negative for voice change.    Eyes: Negative for visual disturbance.   Respiratory: Positive for cough and shortness of breath. Negative for apnea, choking and wheezing.    Cardiovascular: Positive for chest  pain. Negative for palpitations and leg swelling.   Gastrointestinal: Positive for abdominal distention and abdominal pain. Negative for constipation, diarrhea, nausea and vomiting.   Genitourinary: Negative for difficulty urinating.   Musculoskeletal: Negative for arthralgias and myalgias.   Neurological: Negative for dizziness and numbness.   Psychiatric/Behavioral: Negative for agitation and confusion.     Objective:     Vital Signs (Most Recent):  Temp: 98.3 °F (36.8 °C) (01/29/20 0700)  Pulse: 100 (01/29/20 0837)  Resp: (!) 24 (01/29/20 0837)  BP: (!) 167/71 (01/29/20 0800)  SpO2: 96 % (01/29/20 0837) Vital Signs (24h Range):  Temp:  [98.2 °F (36.8 °C)-99.6 °F (37.6 °C)] 98.3 °F (36.8 °C)  Pulse:  [] 100  Resp:  [22-38] 24  SpO2:  [90 %-99 %] 96 %  BP: (115-167)/(56-80) 167/71     Weight: 114.2 kg (251 lb 12.3 oz)  Body mass index is 47.57 kg/m².    Intake/Output - Last 3 Shifts       01/27 0700 - 01/28 0659 01/28 0700 - 01/29 0659 01/29 0700 - 01/30 0659    P.O. 225 2010     Total Intake(mL/kg) 225 (2) 2010 (17.6)     Urine (mL/kg/hr) 2250 (0.8) 2240 (0.8)     Stool  1     Total Output 2250 2241     Net -2025 -231            Urine Occurrence 1 x 2 x           SpO2: 96 %  O2 Device (Oxygen Therapy): High Flow nasal Cannula    Physical Exam   Constitutional: She is oriented to person, place, and time. She appears well-developed and well-nourished. No distress.   Obese   HENT:   Head: Normocephalic and atraumatic.   Mouth/Throat: Oropharynx is clear and moist.   Eyes: Conjunctivae and EOM are normal. Right eye exhibits no discharge. Left eye exhibits no discharge.   Neck: Normal range of motion.   Cardiovascular: Normal rate, regular rhythm and intact distal pulses.   Pulmonary/Chest:   On HFNC. Sats 90-96% Increased work of breathing with any movement   Abdominal: Soft. She exhibits no distension. There is no tenderness.   Lap sites healing well   Musculoskeletal: Normal range of motion. She exhibits no  deformity.   Neurological: She is alert and oriented to person, place, and time.   Skin: Skin is warm and dry.   L flank ecchymosis, improving   Psychiatric: She has a normal mood and affect. Her behavior is normal.       Significant Labs:  ABGs: No results for input(s): PH, PCO2, PO2, HCO3, POCSATURATED, BE in the last 48 hours.  CBC:   Recent Labs   Lab 01/29/20  0330   WBC 9.84   RBC 3.41*   HGB 9.1*   HCT 30.4*   *   MCV 89   MCH 26.7*   MCHC 29.9*     CMP:   Recent Labs   Lab 01/29/20  0330      CALCIUM 9.2      K 3.6   CO2 34*   CL 89*   BUN 14   CREATININE 0.7     Coagulation: No results for input(s): PT, INR, APTT in the last 48 hours.  Lactic Acid: No results for input(s): LACTATE in the last 48 hours.    Significant Diagnostics:  CT: I have reviewed all pertinent results/findings within the past 24 hours  CXR: I have reviewed all pertinent results/findings within the past 24 hours    VTE Risk Mitigation (From admission, onward)         Ordered     Place sequential compression device  Until discontinued      01/23/20 1002     enoxaparin injection 40 mg  2 times daily      01/21/20 0804     Place sequential compression device  Until discontinued      01/19/20 2022     IP VTE LOW RISK PATIENT  Once      01/19/20 2022                  Assessment/Plan:     * Diaphragmatic hernia  69 year old female admitted to SICU with recurrent left diaphragmatic hernia s/p laparoscopic repair 1 week ago.      - To OR for left thoracotomy for reduction and repair of diaphragmatic hernia, possible chest wall reconstruction today. Consent in chart.  - NPO  - May consult regional pain team for possible erector spinae block prior to left thoracotomy.   - Remainder of care per general surgery and ICU teams.        Radha Wilson MD  Cardiothoracic Surgery  [facility]

## 2020-01-29 NOTE — ANESTHESIA PROCEDURE NOTES
Intubation  Performed by: Ralph Mckeon MD  Authorized by: Fracisco Freitas MD     Intubation:     Induction:  Inhalational - mask    Intubated:  Postinduction    Mask Ventilation:  Easy mask    Attempts:  1    Attempted By:  Resident anesthesiologist    Method of Intubation:  Video laryngoscopy    Blade:  Glidescope 3    Laryngeal View Grade: Grade I - full view of chords      Difficult Airway Encountered?: No      Complications:  None    Airway Device:  Double lumen tube left    Airway Device Size:  37F    Style/Cuff Inflation:  Cuffed    Placement Verified By:  Capnometry    Complicating Factors:  None    Findings Post-Intubation:  BS equal bilateral

## 2020-01-29 NOTE — SUBJECTIVE & OBJECTIVE
Interval History/Significant Events: OR today for thoracotomy and diaphragmatic hernia repair.    Follow-up For: Procedure(s) (LRB):  REPAIR, HERNIA, DIAPHRAGMATIC, Laparoscopic (Left)    Post-Operative Day: 8 Days Post-Op    Objective:     Vital Signs (Most Recent):  Temp: 98.3 °F (36.8 °C) (01/29/20 0700)  Pulse: 100 (01/29/20 0837)  Resp: (!) 24 (01/29/20 0837)  BP: (!) 167/71 (01/29/20 0800)  SpO2: 96 % (01/29/20 0837) Vital Signs (24h Range):  Temp:  [98.2 °F (36.8 °C)-99.6 °F (37.6 °C)] 98.3 °F (36.8 °C)  Pulse:  [] 100  Resp:  [22-38] 24  SpO2:  [90 %-99 %] 96 %  BP: (115-167)/(56-80) 167/71     Weight: 114.2 kg (251 lb 12.3 oz)  Body mass index is 47.57 kg/m².      Intake/Output Summary (Last 24 hours) at 1/29/2020 1128  Last data filed at 1/29/2020 0600  Gross per 24 hour   Intake 1450 ml   Output 1541 ml   Net -91 ml       Physical Exam   Constitutional: She is oriented to person, place, and time.   HENT:   Head: Normocephalic and atraumatic.   Eyes: Pupils are equal, round, and reactive to light. EOM are normal.   Cardiovascular: Normal rate and regular rhythm.   Pulmonary/Chest:   Increased oxygen requirement, on 20L comfort flow NC   Abdominal:   Morbidly obese, laparoscopic incisions CDI   Neurological: She is alert and oriented to person, place, and time.   Skin: Skin is warm and dry.       Vents:  Vent Mode: Spont (01/24/20 0915)  Ventilator Initiated: Yes (01/21/20 1759)  Set Rate: 16 bmp (01/24/20 0802)  Vt Set: 375 mL (01/24/20 0802)  Pressure Support: 8 cmH20 (01/24/20 0915)  PEEP/CPAP: 5 cmH20 (01/24/20 0915)  Oxygen Concentration (%): 50 (01/29/20 0300)  Peak Airway Pressure: 13 cmH2O (01/24/20 0915)  Plateau Pressure: 18 cmH20 (01/24/20 0915)  Total Ve: 9.49 mL (01/24/20 0915)  Negative Inspiratory Force (cm H2O): -30 (01/24/20 0854)  F/VT Ratio<105 (RSBI): (!) 31.77 (01/24/20 0915)    Lines/Drains/Airways     Drain            Female External Urinary Catheter 01/28/20 0700 1 day           Peripheral Intravenous Line                 Peripheral IV - Single Lumen 01/25/20 1130 20 G Right Hand 3 days         Peripheral IV - Single Lumen 01/29/20 0400 22 G Anterior;Left Forearm less than 1 day                Significant Labs:    CBC/Anemia Profile:  Recent Labs   Lab 01/28/20  0400 01/29/20  0330   WBC 12.54 9.84   HGB 8.6* 9.1*   HCT 29.2* 30.4*   * 401*   MCV 91 89   RDW 13.6 13.6        Chemistries:  Recent Labs   Lab 01/28/20  0400 01/29/20  0330   * 136   K 3.7 3.6   CL 90* 89*   CO2 33* 34*   BUN 13 14   CREATININE 0.8 0.7   CALCIUM 8.9 9.2   MG 1.8 2.3   PHOS 3.7 4.0       All pertinent labs within the past 24 hours have been reviewed.    Significant Imaging:  I have reviewed all pertinent imaging results/findings within the past 24 hours.

## 2020-01-29 NOTE — NURSING TRANSFER
Nursing Transfer Note      1/29/2020     Transfer To: OR    Transfer via bed    Transfer with 5L NC O2, cardiac monitoring    Transported by anesthesia    Medicines sent: n/a    Chart send with patient: Yes    Notified: son, 2nd fl waiting rm

## 2020-01-30 LAB
ALLENS TEST: ABNORMAL
ANION GAP SERPL CALC-SCNC: 13 MMOL/L (ref 8–16)
APTT BLDCRRT: 21.1 SEC (ref 21–32)
BASOPHILS # BLD AUTO: 0.17 K/UL (ref 0–0.2)
BASOPHILS NFR BLD: 0.7 % (ref 0–1.9)
BUN SERPL-MCNC: 16 MG/DL (ref 8–23)
CALCIUM SERPL-MCNC: 8.5 MG/DL (ref 8.7–10.5)
CHLORIDE SERPL-SCNC: 94 MMOL/L (ref 95–110)
CO2 SERPL-SCNC: 30 MMOL/L (ref 23–29)
CREAT SERPL-MCNC: 0.8 MG/DL (ref 0.5–1.4)
DELSYS: ABNORMAL
DIFFERENTIAL METHOD: ABNORMAL
EOSINOPHIL # BLD AUTO: 0 K/UL (ref 0–0.5)
EOSINOPHIL NFR BLD: 0 % (ref 0–8)
ERYTHROCYTE [DISTWIDTH] IN BLOOD BY AUTOMATED COUNT: 14 % (ref 11.5–14.5)
ERYTHROCYTE [SEDIMENTATION RATE] IN BLOOD BY WESTERGREN METHOD: 16 MM/H
EST. GFR  (AFRICAN AMERICAN): >60 ML/MIN/1.73 M^2
EST. GFR  (NON AFRICAN AMERICAN): >60 ML/MIN/1.73 M^2
FIBRINOGEN PPP-MCNC: 666 MG/DL (ref 182–366)
FIO2: 70
GLUCOSE SERPL-MCNC: 131 MG/DL (ref 70–110)
HCO3 UR-SCNC: 34.2 MMOL/L (ref 24–28)
HCT VFR BLD AUTO: 33.6 % (ref 37–48.5)
HGB BLD-MCNC: 10.1 G/DL (ref 12–16)
IMM GRANULOCYTES # BLD AUTO: 0.89 K/UL (ref 0–0.04)
IMM GRANULOCYTES NFR BLD AUTO: 3.9 % (ref 0–0.5)
INR PPP: 0.9 (ref 0.8–1.2)
LYMPHOCYTES # BLD AUTO: 1.4 K/UL (ref 1–4.8)
LYMPHOCYTES NFR BLD: 6.1 % (ref 18–48)
MAGNESIUM SERPL-MCNC: 2.2 MG/DL (ref 1.6–2.6)
MCH RBC QN AUTO: 27.4 PG (ref 27–31)
MCHC RBC AUTO-ENTMCNC: 30.1 G/DL (ref 32–36)
MCV RBC AUTO: 91 FL (ref 82–98)
MODE: ABNORMAL
MONOCYTES # BLD AUTO: 1.7 K/UL (ref 0.3–1)
MONOCYTES NFR BLD: 7.4 % (ref 4–15)
NEUTROPHILS # BLD AUTO: 18.8 K/UL (ref 1.8–7.7)
NEUTROPHILS NFR BLD: 81.9 % (ref 38–73)
NRBC BLD-RTO: 0 /100 WBC
PCO2 BLDA: 46.8 MMHG (ref 35–45)
PEEP: 7
PH SMN: 7.47 [PH] (ref 7.35–7.45)
PHOSPHATE SERPL-MCNC: 4.5 MG/DL (ref 2.7–4.5)
PLATELET # BLD AUTO: 472 K/UL (ref 150–350)
PMV BLD AUTO: 9.6 FL (ref 9.2–12.9)
PO2 BLDA: 85 MMHG (ref 80–100)
POC BE: 11 MMOL/L
POC SATURATED O2: 97 % (ref 95–100)
POC TCO2: 36 MMOL/L (ref 23–27)
POCT GLUCOSE: 133 MG/DL (ref 70–110)
POCT GLUCOSE: 143 MG/DL (ref 70–110)
POCT GLUCOSE: 151 MG/DL (ref 70–110)
POCT GLUCOSE: 167 MG/DL (ref 70–110)
POTASSIUM SERPL-SCNC: 4.6 MMOL/L (ref 3.5–5.1)
PROTHROMBIN TIME: 9.9 SEC (ref 9–12.5)
RBC # BLD AUTO: 3.69 M/UL (ref 4–5.4)
SAMPLE: ABNORMAL
SITE: ABNORMAL
SODIUM SERPL-SCNC: 137 MMOL/L (ref 136–145)
SP02: 97
VT: 350
WBC # BLD AUTO: 22.97 K/UL (ref 3.9–12.7)

## 2020-01-30 PROCEDURE — 84100 ASSAY OF PHOSPHORUS: CPT

## 2020-01-30 PROCEDURE — 94003 VENT MGMT INPAT SUBQ DAY: CPT

## 2020-01-30 PROCEDURE — 63600175 PHARM REV CODE 636 W HCPCS: Performed by: STUDENT IN AN ORGANIZED HEALTH CARE EDUCATION/TRAINING PROGRAM

## 2020-01-30 PROCEDURE — 82803 BLOOD GASES ANY COMBINATION: CPT

## 2020-01-30 PROCEDURE — 25000003 PHARM REV CODE 250: Performed by: STUDENT IN AN ORGANIZED HEALTH CARE EDUCATION/TRAINING PROGRAM

## 2020-01-30 PROCEDURE — 20000000 HC ICU ROOM

## 2020-01-30 PROCEDURE — 94668 MNPJ CHEST WALL SBSQ: CPT

## 2020-01-30 PROCEDURE — 83735 ASSAY OF MAGNESIUM: CPT

## 2020-01-30 PROCEDURE — 85730 THROMBOPLASTIN TIME PARTIAL: CPT

## 2020-01-30 PROCEDURE — 27200966 HC CLOSED SUCTION SYSTEM

## 2020-01-30 PROCEDURE — 80048 BASIC METABOLIC PNL TOTAL CA: CPT

## 2020-01-30 PROCEDURE — 85384 FIBRINOGEN ACTIVITY: CPT

## 2020-01-30 PROCEDURE — 63600175 PHARM REV CODE 636 W HCPCS: Performed by: PHYSICIAN ASSISTANT

## 2020-01-30 PROCEDURE — 25000003 PHARM REV CODE 250: Performed by: SURGERY

## 2020-01-30 PROCEDURE — 85610 PROTHROMBIN TIME: CPT

## 2020-01-30 PROCEDURE — 25000242 PHARM REV CODE 250 ALT 637 W/ HCPCS: Performed by: STUDENT IN AN ORGANIZED HEALTH CARE EDUCATION/TRAINING PROGRAM

## 2020-01-30 PROCEDURE — 27000221 HC OXYGEN, UP TO 24 HOURS

## 2020-01-30 PROCEDURE — 99900035 HC TECH TIME PER 15 MIN (STAT)

## 2020-01-30 PROCEDURE — 85025 COMPLETE CBC W/AUTO DIFF WBC: CPT

## 2020-01-30 PROCEDURE — 94761 N-INVAS EAR/PLS OXIMETRY MLT: CPT

## 2020-01-30 PROCEDURE — 37799 UNLISTED PX VASCULAR SURGERY: CPT

## 2020-01-30 PROCEDURE — 94640 AIRWAY INHALATION TREATMENT: CPT

## 2020-01-30 PROCEDURE — 27100171 HC OXYGEN HIGH FLOW UP TO 24 HOURS

## 2020-01-30 PROCEDURE — 27100092 HC HIGH FLOW DELIVERY CANNULA

## 2020-01-30 PROCEDURE — 99900026 HC AIRWAY MAINTENANCE (STAT)

## 2020-01-30 RX ORDER — FUROSEMIDE 10 MG/ML
20 INJECTION INTRAMUSCULAR; INTRAVENOUS 2 TIMES DAILY
Status: DISCONTINUED | OUTPATIENT
Start: 2020-01-31 | End: 2020-02-02

## 2020-01-30 RX ORDER — ACETAZOLAMIDE 500 MG/5ML
500 INJECTION, POWDER, LYOPHILIZED, FOR SOLUTION INTRAVENOUS ONCE
Status: COMPLETED | OUTPATIENT
Start: 2020-01-30 | End: 2020-01-30

## 2020-01-30 RX ORDER — CHLORHEXIDINE GLUCONATE ORAL RINSE 1.2 MG/ML
15 SOLUTION DENTAL 2 TIMES DAILY
Status: DISCONTINUED | OUTPATIENT
Start: 2020-01-30 | End: 2020-02-03

## 2020-01-30 RX ORDER — FUROSEMIDE 10 MG/ML
20 INJECTION INTRAMUSCULAR; INTRAVENOUS 2 TIMES DAILY
Status: DISCONTINUED | OUTPATIENT
Start: 2020-01-30 | End: 2020-01-30

## 2020-01-30 RX ORDER — FAMOTIDINE 40 MG/5ML
20 POWDER, FOR SUSPENSION ORAL 2 TIMES DAILY
Status: DISCONTINUED | OUTPATIENT
Start: 2020-01-30 | End: 2020-01-31

## 2020-01-30 RX ADMIN — PROPOFOL 40 MCG/KG/MIN: 10 INJECTION, EMULSION INTRAVENOUS at 10:01

## 2020-01-30 RX ADMIN — PANTOPRAZOLE SODIUM 40 MG: 40 TABLET, DELAYED RELEASE ORAL at 08:01

## 2020-01-30 RX ADMIN — CHLORHEXIDINE GLUCONATE 0.12% ORAL RINSE 15 ML: 1.2 LIQUID ORAL at 08:01

## 2020-01-30 RX ADMIN — PROPOFOL 35 MCG/KG/MIN: 10 INJECTION, EMULSION INTRAVENOUS at 01:01

## 2020-01-30 RX ADMIN — MUPIROCIN 1 G: 20 OINTMENT TOPICAL at 08:01

## 2020-01-30 RX ADMIN — PROPOFOL 30 MCG/KG/MIN: 10 INJECTION, EMULSION INTRAVENOUS at 09:01

## 2020-01-30 RX ADMIN — CISATRACURIUM BESYLATE 6 MCG/KG/MIN: 10 INJECTION INTRAVENOUS at 11:01

## 2020-01-30 RX ADMIN — CHLORHEXIDINE GLUCONATE 0.12% ORAL RINSE 15 ML: 1.2 LIQUID ORAL at 10:01

## 2020-01-30 RX ADMIN — IPRATROPIUM BROMIDE AND ALBUTEROL SULFATE 3 ML: .5; 3 SOLUTION RESPIRATORY (INHALATION) at 08:01

## 2020-01-30 RX ADMIN — IPRATROPIUM BROMIDE AND ALBUTEROL SULFATE 3 ML: .5; 3 SOLUTION RESPIRATORY (INHALATION) at 11:01

## 2020-01-30 RX ADMIN — ENOXAPARIN SODIUM 40 MG: 100 INJECTION SUBCUTANEOUS at 08:01

## 2020-01-30 RX ADMIN — ACETYLCYSTEINE 4 ML: 100 SOLUTION ORAL; RESPIRATORY (INHALATION) at 04:01

## 2020-01-30 RX ADMIN — MUPIROCIN 1 G: 20 OINTMENT TOPICAL at 12:01

## 2020-01-30 RX ADMIN — SULFAMETHOXAZOLE AND TRIMETHOPRIM 1 TABLET: 800; 160 TABLET ORAL at 08:01

## 2020-01-30 RX ADMIN — PREGABALIN 75 MG: 75 CAPSULE ORAL at 08:01

## 2020-01-30 RX ADMIN — FUROSEMIDE 20 MG: 10 INJECTION, SOLUTION INTRAVENOUS at 08:01

## 2020-01-30 RX ADMIN — DOCUSATE SODIUM - SENNOSIDES 1 TABLET: 50; 8.6 TABLET, FILM COATED ORAL at 12:01

## 2020-01-30 RX ADMIN — IPRATROPIUM BROMIDE AND ALBUTEROL SULFATE 3 ML: .5; 3 SOLUTION RESPIRATORY (INHALATION) at 04:01

## 2020-01-30 RX ADMIN — CISATRACURIUM BESYLATE 2 MCG/KG/MIN: 10 INJECTION INTRAVENOUS at 05:01

## 2020-01-30 RX ADMIN — DOCUSATE SODIUM - SENNOSIDES 1 TABLET: 50; 8.6 TABLET, FILM COATED ORAL at 08:01

## 2020-01-30 RX ADMIN — FAMOTIDINE 20 MG: 40 POWDER, FOR SUSPENSION ORAL at 08:01

## 2020-01-30 RX ADMIN — ACETYLCYSTEINE 4 ML: 100 SOLUTION ORAL; RESPIRATORY (INHALATION) at 12:01

## 2020-01-30 RX ADMIN — SULFAMETHOXAZOLE AND TRIMETHOPRIM 1 TABLET: 800; 160 TABLET ORAL at 12:01

## 2020-01-30 RX ADMIN — ACETAZOLAMIDE 250 MG: 500 INJECTION, POWDER, LYOPHILIZED, FOR SOLUTION INTRAVENOUS at 02:01

## 2020-01-30 RX ADMIN — CELECOXIB 200 MG: 200 CAPSULE ORAL at 08:01

## 2020-01-30 RX ADMIN — ROPIVACAINE HYDROCHLORIDE 2 ML/HR: 2 INJECTION, SOLUTION EPIDURAL; INFILTRATION at 10:01

## 2020-01-30 RX ADMIN — ACETAZOLAMIDE 500 MG: 500 INJECTION, POWDER, LYOPHILIZED, FOR SOLUTION INTRAVENOUS at 10:01

## 2020-01-30 RX ADMIN — METHOCARBAMOL TABLETS 750 MG: 750 TABLET, COATED ORAL at 08:01

## 2020-01-30 RX ADMIN — IPRATROPIUM BROMIDE AND ALBUTEROL SULFATE 3 ML: .5; 3 SOLUTION RESPIRATORY (INHALATION) at 03:01

## 2020-01-30 RX ADMIN — IPRATROPIUM BROMIDE AND ALBUTEROL SULFATE 3 ML: .5; 3 SOLUTION RESPIRATORY (INHALATION) at 12:01

## 2020-01-30 RX ADMIN — POLYETHYLENE GLYCOL 3350 17 G: 17 POWDER, FOR SOLUTION ORAL at 08:01

## 2020-01-30 RX ADMIN — PROPOFOL 35 MCG/KG/MIN: 10 INJECTION, EMULSION INTRAVENOUS at 04:01

## 2020-01-30 RX ADMIN — ACETYLCYSTEINE 4 ML: 100 SOLUTION ORAL; RESPIRATORY (INHALATION) at 08:01

## 2020-01-30 RX ADMIN — CEFAZOLIN 2 G: 1 INJECTION, POWDER, FOR SOLUTION INTRAMUSCULAR; INTRAVENOUS at 10:01

## 2020-01-30 NOTE — NURSING
Spoke with Dr. Prescott in regards to clinical status involving paralytic. We agreed that although the patient does not have a gag and cough at this time, and the TOF is at a baseline of 2/4 twitches on a power of 8, the patient is still breathing over the vent so we should increase the paralytic. Nimbex gtt was increased from 4 mcg/kg/min to 6 mcg/kg/min per MD orders. RR is decreased from 22 BPM to 18 BPM. Will continue to monitor.

## 2020-01-30 NOTE — PLAN OF CARE
"All VSS throughout shift. Afebrile. HR NSR 80s-90s, SBP maintained < 160. Sats greater than 92% on vent settings A/C 50%, PEEP of 6; weaned as tolerated. Current gtts infusing include propofol, nimbex, fentanyl, and ropivacaine. TOF and BIS monitoring in place (see flowsheets for details). Rectal tube in place with stool noted and draining to gravity. UOP marginal throughout shift; diamox and lasix given with little to no response. DREW drain and CT output with minimal serosanguinous drainage noted. POC reviewed with pt and family updated via phone. See flowsheets for specific details. Will continue to monitor pt closely.         Problem: Adult Inpatient Plan of Care  Goal: Plan of Care Review  Outcome: Ongoing, Progressing   PMHx: CAD, HTN, TIA, GERD     Dx: diaphragmatic hernia     1/18: [place] ED for cough x1 mth, intermittent fevers and dyspnea.  (1/15 w/ severe coughing episode felt a "pop" on left side, with associated severe pain & subsequent bruising of the left flank.  CT abd/pelvis- revealed a large left diaphragmatic hernia containing fat  and bowel loops with hernia of intra-abdomnial contents outside the thorax from 7th left ICS. Medium sized HH.  1/19: Transfer to [facility]  1/21: Respiratory distress Class-A to OR for Laparoscopic repair with goretex mesh and admit to SICU intubated, sedated, and [place] on arrival.   1/22: Resp Cx sent (+ MRSA)  1/24: Extubated; A-Line dc'ed. ST Eval; ok for Pureed/Nectar thick      1/27: pain increased, CT revealed mesh has tore  1/29: back to OR mesh repair and thoracotomy    1/30: paralyzed and sedated    "

## 2020-01-30 NOTE — NURSING
Pt arrived from OR with no gtts infusing. Connected to monitor 22027. Propofol, nimbex, fentanyl gtts ordered by CTS resident. Will carry out orders. Left pleural chest tube and left DREW drain in place. Hernandez and rectal tube in place, no skin breakdown noted.

## 2020-01-30 NOTE — SUBJECTIVE & OBJECTIVE
Interval History: She went to the OR yesterday for L diaphragmatic hernia repair and L chestwall reconstruction with thoracic sx. She is now in the unit - intubated, sedated, paralyzed.       Medications:  Continuous Infusions:   cisatracurium (NIMBEX) infusion 4 mcg/kg/min (01/30/20 0600)    fentanyl 150 mcg/hr (01/30/20 0600)    nicardipine Stopped (01/29/20 2300)    propofol 40 mcg/kg/min (01/30/20 0600)    ropivacaine (PF) 2 mg/ml (0.2%)       Scheduled Meds:   acetaminophen  1,000 mg Intravenous Once    Followed by    acetaminophen  1,000 mg Oral Q6H    acetylcysteine 100 mg/ml (10%)  4 mL Nebulization TID    albuterol-ipratropium  3 mL Nebulization Q4H    benzonatate  100 mg Oral Q8H    ceFAZolin (ANCEF) IVPB  2 g Intravenous Q8H    celecoxib  200 mg Oral Daily    dextromethorphan-guaifenesin  mg/5 ml  5 mL Oral Q6H    enoxaparin  40 mg Subcutaneous BID    famotidine  20 mg Oral BID    furosemide  40 mg Intravenous BID    methocarbamol  750 mg Per NG tube QID    mupirocin  1 g Nasal BID    pantoprazole  40 mg Oral Daily    polyethylene glycol  17 g Per NG tube Daily    pregabalin  75 mg Oral Once    Followed by    pregabalin  75 mg Oral QHS    senna-docusate 8.6-50 mg  1 tablet Per NG tube BID    sulfamethoxazole-trimethoprim 800-160mg  1 tablet Oral BID     PRN Meds:bisacodyl, calcium gluconate IVPB, calcium gluconate IVPB, calcium gluconate IVPB, Dextrose 10% Bolus, glucagon (human recombinant), hydrALAZINE, HYDROmorphone, insulin aspart U-100, labetalol, magnesium sulfate IVPB, magnesium sulfate IVPB, melatonin, metoclopramide HCl, morphine, ondansetron, ondansetron, oxyCODONE, oxyCODONE, potassium chloride in water **AND** potassium chloride in water **AND** potassium chloride in water, promethazine (PHENERGAN) IVPB, sodium chloride 0.9%, sodium chloride 0.9%, sodium phosphate IVPB, sodium phosphate IVPB, sodium phosphate IVPB     Review of patient's allergies indicates:    Allergen Reactions    Erythromycin      Stomach pains    Rosuvastatin      Hives, muscle/joint pains    Zolpidem      Confusion      Objective:     Vital Signs (Most Recent):  Temp: 98.5 °F (36.9 °C) (01/30/20 0400)  Pulse: 96 (01/30/20 0630)  Resp: (!) 24 (01/30/20 0630)  BP: (!) 141/63 (01/30/20 0630)  SpO2: 99 % (01/30/20 0630) Vital Signs (24h Range):  Temp:  [97.6 °F (36.4 °C)-98.5 °F (36.9 °C)] 98.5 °F (36.9 °C)  Pulse:  [] 96  Resp:  [16-42] 24  SpO2:  [93 %-100 %] 99 %  BP: (125-181)/(58-95) 141/63  Arterial Line BP: (113-159)/(57-85) 113/85     Weight: 114.2 kg (251 lb 12.3 oz)  Body mass index is 47.57 kg/m².    Intake/Output - Last 3 Shifts       01/28 0700 - 01/29 0659 01/29 0700 - 01/30 0659    P.O. 2010     I.V. (mL/kg)  3141 (27.5)    Total Intake(mL/kg) 2010 (17.6) 3141 (27.5)    Urine (mL/kg/hr) 2240 (0.8) 825 (0.3)    Drains  185    Stool 1 0    Blood  20    Chest Tube  0    Total Output 2241 1030    Net -231 +2111          Urine Occurrence 2 x 2 x    Stool Occurrence  3 x          Physical Exam   Constitutional: She appears well-developed and well-nourished. No distress.   Intubated, sedated, paralyzed   HENT:   Head: Normocephalic and atraumatic.   ET tube in place   Eyes: Conjunctivae and EOM are normal. Right eye exhibits no discharge. Left eye exhibits no discharge.   Cardiovascular: Normal rate and regular rhythm.   Pulmonary/Chest:   Intubated  Vent Mode: A/C  Oxygen Concentration (%):  () 70  Resp Rate Total:  (16 br/min-26 br/min) 24 br/min  Vt Set:  (350 mL) 350 mL  PEEP/CPAP:  (5 cmH20-7 cmH20) 7 cmH20  Mean Airway Pressure:  (9.6 pdH13-26 cmH20) 12 cmH20   Abdominal: Soft. She exhibits no distension.   Lap incisions are c/d/i   Musculoskeletal: Normal range of motion. She exhibits no deformity.   Neurological:   Sedated and paralyzed   Skin: Skin is warm and dry.         Significant Labs:  CBC:   Recent Labs   Lab 01/30/20  0545   WBC 22.97*   RBC 3.69*   HGB 10.1*    HCT 33.6*   *   MCV 91   MCH 27.4   MCHC 30.1*     CMP:   Recent Labs   Lab 01/30/20  0545   *   CALCIUM 8.5*      K 4.6   CO2 30*   CL 94*   BUN 16   CREATININE 0.8       Significant Diagnostics:  I have reviewed all pertinent imaging results/findings within the past 24 hours.

## 2020-01-30 NOTE — PT/OT/SLP DISCHARGE
Physical Therapy Discharge Summary    Name: Marisela Bunn  MRN: [id number]   Principal Problem: Diaphragmatic hernia     Patient Discharged from acute Physical Therapy on 2020.  Please refer to prior PT noted date on 2020 for functional status.     Assessment:     Pt discharged following pt to OR for thoracotomy with chest wall reconstruction. Will need new orders pending pt medical status.     Objective:     GOALS:   Multidisciplinary Problems     Physical Therapy Goals        Problem: Physical Therapy Goal    Goal Priority Disciplines Outcome Goal Variances Interventions   Physical Therapy Goal     PT, PT/OT Ongoing, Progressing     Description:  Goals to be met by:20    Patient will increase functional independence with mobility by performin. Supine to sit with Contact Guard Assistance -not met  2. Sit to stand transfer with Contact Guard Assistance -not met  3. Gait  x 100 feet with Contact Guard Assistance using Rolling Walker. -not met  4. Lower extremity exercise program x15 reps  with supervision -not met                      Reasons for Discontinuation of Therapy Services  Pt to OR for thoracotomy with chest wall reconstruction.       Plan:     Patient Discharged to: SICU.    Candy Jennings, PT  2020

## 2020-01-30 NOTE — OP NOTE
Date of Surgery: 1/29/20  Preoperative Diagnosis: Recurrent left diaphragmatic hernia  Postoperative Diagnosis:  Same  Procedure:  Left lateral thoracotomy, mobilization and reduction of herniated intrathoX andCM racic abdominal viscera, patch reconstruction of left hemidiaphragm, chest wall reconstruction with [place]-Rajendra patch, DREW drain insertion, left chest tube insertion  Surgeon: Primo Soni MD  First Assistant: Shruthi Prescott MD  Second Assistant:  Radha Lo MD  Third Assistant:  Maribel Barakat MD  Anesthesia: GETA  EBL: 200 mL    Surgery in Detail:    The patient has a history of left hemidiaphragmatic hernia and left chest wall hernia with separation of the 7th and 8th ribs.  She is status post laparoscopic patch repair of the diaphragmatic defect. This has been complicated by a recurrence of the diaphragmatic defect due to patch failure.  I was asked to provide a definitive transthoracic repair.    The patient was taken to the operating room placed supine.  A double-lumen tube was placed and general anesthesia administered.  The patient was positioned in a right lateral decubitus position all pressure points were protected. A time-out was performed.  The patient's left chest was prepped and draped in a sterile fashion. A left lateral thoracotomy was made in the space between the 7th and 8th ribs.  The soft tissue in this area was extremely attenuated.  The hernia sac was opened and a large volume of abdominal contents identified. This included the markedly dilated transverse colon and inflamed and edematous colonic mesentery. The hernia sac remnant was resected and passed off the operative field. The herniated contents were mobilized circumferentially and a large diaphragmatic defect completely exposed.  Mobilization also included separation of the herniated colonic mesentery from the left lower lobe.  The previously placed diaphragmatic patch had dehisced along its posterior and inferior aspect.   Patch remained completely intact anteriorly and superiorly.  There was virtually no diaphragmatic remnant along the inferomedial aspect.  The transverse colon was markedly distended with gas however there was no evidence of colonic or mesenteric ischemia.  Several interrupted mattress  0 Prolene sutures were placed along the edge of the diaphragmatic rim. Along the inferolateral aspect the Prolene was placed around the 8th rib.  Along the superior aspect the Prolene suture was placed through the diaphragmatic remnant as well as the intact previously placed patch.  A [id number] XCM biologic mesh was sewn into place closing the diaphragmatic defect and excluding the abdominal viscera from the chest cavity. A 10.  DREW drain was then placed inferiorly and medially.  A small portion of the previously placed mesh was left intact.    A hernia sac remnant over the 7th rib was mobilized using electrocautery. There was a large space between the 7th and 8th ribs and the structures could not be reapproximated in a tension-free fashion. A series of number 0 Prolene suture was placed around the 7th and 8th ribs in a mattress fashion. A 20.  8 Divehi curved chest tube was then placed into the left subpulmonic space and exteriorized anteriorly.  It was secured using a 2-0  silk suture.  A 20 cm x 30cm x 2 mm sheet of [place]-Rajendra mesh was then used to obliterate the space between the 7th and 8th ribs.  The soft tissues of the chest wall were closed in 2 layers using running 2- 0 and 3-0 suture. The skin edges were approximated using a skin stapler.    This procedure was extremely difficult due to the patient's extremely large body habitus, a large recurrent diaphragmatic hernia in a recently operated field with an acute inflammatory response.  Furthermore the patient's colon was markedly edematous as was the inflamed colonic mesentery. This may hernia reduction extremely difficult.  Lastly, the attenuated state of portions of the  medial diaphragm further complicated the repair.  Due to the presence of a large diaphragmatic and chest wall defect 2 separate repairs were required with mesh.  This surgery took several hours and because the degree of difficulty was substantially greater than is typically required due to the above factors a Modifier 22 is applicable.    Attending attestation:    I was present for and either directly assisted with or performed the critical and key portions of the procedure.

## 2020-01-30 NOTE — ASSESSMENT & PLAN NOTE
69 year old female admitted to SICU with recurrent left diaphragmatic hernia s/p laparoscopic repair 1 week ago. Now s/p L thoracotomy, patch reconstruction L hemidiaphragm, chest wall reconstruction with [place]-Rajendra patch 1/29/2020.     - continue paralysis, sedation, and mechanical ventilation today  - will consider stopping paralysis Saturday  - can wean FiO2 as tolerated today, leave PEEP at 7  - daily KUB to assess ileus  - diurese today  - continue NPO, no tube feeds today given ileus  - continue erector spinae block  - Remainder of care per general surgery and ICU teams.

## 2020-01-30 NOTE — BRIEF OP NOTE
[facility]  Brief Operative Note    SUMMARY     Surgery Date: 1/29/2020     Surgeon(s) and Role:     * Primo Soni MD - Primary     * ANTONY Fan-CHA - Observing     * Viky Wilson MD - Resident - Assisting     * Maribel Barakat MD - Resident - Assisting     * Shruthi Prescott MD - Fellow    Pre-op Diagnosis:  Diaphragmatic hernia [K44.9]  Diaphragmatic hernia without obstruction and without gangrene [K44.9]    Post-op Diagnosis:  Post-Op Diagnosis Codes:     * Diaphragmatic hernia [K44.9]     * Diaphragmatic hernia without obstruction and without gangrene [K44.9]    Procedure(s) (LRB):  THORACOTOMY (Left)  RECONSTRUCTION, DIAPHRAGM (Left)  RECONSTRUCTION, CHEST WALL (Left)    Anesthesia: General    Description of Procedure:   Left posterolateral thoracostomy  Redo mesh reconstruction of left diaphragmatic hernia 10cm AP dimension, 17cm wide  Left chest wall reconstruction with dacron mesh 8cm wide x 13cm length  1 curved posterior 28Fr chest tube  1 10Fr benedicto drain anterior chest    Estimated Blood Loss: 200 mL         Specimens:   Specimen (12h ago, onward)    None

## 2020-01-30 NOTE — ANESTHESIA POSTPROCEDURE EVALUATION
Anesthesia Post Evaluation    Patient: Marisela Bunn    Procedure(s) Performed: Procedure(s) (LRB):  THORACOTOMY (Left)  RECONSTRUCTION, DIAPHRAGM (Left)  RECONSTRUCTION, CHEST WALL (Left)    Final Anesthesia Type: general    Patient location during evaluation: ICU  Patient participation: No - Unable to Participate, Intubation  Level of consciousness: sedated  Post-procedure vital signs: reviewed and stable  Pain management: adequate    PONV status at discharge: No PONV  Anesthetic complications: no      Cardiovascular status: blood pressure returned to baseline and stable  Respiratory status: ventilator and intubated  Hydration status: euvolemic  Follow-up not needed.          Vitals Value Taken Time   /62 1/30/2020  9:46 AM   Temp 36.8 °C (98.3 °F) 1/30/2020  7:15 AM   Pulse 98 1/30/2020  9:46 AM   Resp 18 1/30/2020  9:30 AM   SpO2 96 % 1/30/2020  9:46 AM   Vitals shown include unvalidated device data.      No case tracking events are documented in the log.      Pain/Alex Score: Pain Rating Prior to Med Admin: 0 (1/30/2020  8:24 AM)

## 2020-01-30 NOTE — SUBJECTIVE & OBJECTIVE
Interval History/Significant Events: s/p thoracotomy with reduction of hernia and placement of mesh over diaphragmatic and intercostal defects    Follow-up For: Procedure(s) (LRB):  THORACOTOMY (Left)  RECONSTRUCTION, DIAPHRAGM (Left)  RECONSTRUCTION, CHEST WALL (Left)    Post-Operative Day: 1 Day Post-Op    Objective:     Vital Signs (Most Recent):  Temp: 98.5 °F (36.9 °C) (01/30/20 1115)  Pulse: 90 (01/30/20 1215)  Resp: 18 (01/30/20 0930)  BP: 133/61 (01/30/20 1215)  SpO2: 97 % (01/30/20 1215) Vital Signs (24h Range):  Temp:  [97.6 °F (36.4 °C)-98.5 °F (36.9 °C)] 98.5 °F (36.9 °C)  Pulse:  [] 90  Resp:  [16-34] 18  SpO2:  [93 %-100 %] 97 %  BP: (129-181)/(56-77) 133/61  Arterial Line BP: (113-159)/(57-85) 113/85     Weight: 114.2 kg (251 lb 12.3 oz)  Body mass index is 47.57 kg/m².      Intake/Output Summary (Last 24 hours) at 1/30/2020 1236  Last data filed at 1/30/2020 1200  Gross per 24 hour   Intake 3201 ml   Output 1345 ml   Net 1856 ml       Physical Exam   HENT:   Head: Normocephalic and atraumatic.   Eyes: Pupils are equal, round, and reactive to light. EOM are normal.   Cardiovascular: Normal rate and regular rhythm.   Thoracotomy dressing CDI, chest tube an DREW in place   Pulmonary/Chest:   Intubated, on A/C 70/7   Abdominal:   Morbidly obese, laparoscopic incisions CDI   Genitourinary:   Genitourinary Comments: Hernandez in place, rectal tube in place   Neurological:   Sedated and paralyzed   Skin: Skin is warm and dry.       Vents:  Vent Mode: A/C (01/30/20 1139)  Ventilator Initiated: Yes (01/29/20 2023)  Set Rate: 16 BPM (01/30/20 1139)  Vt Set: 350 mL (01/30/20 1139)  Pressure Support: 8 cmH20 (01/24/20 0915)  PEEP/CPAP: 7 cmH20 (01/30/20 1139)  Oxygen Concentration (%): 50 (01/30/20 1215)  Peak Airway Pressure: 24 cmH2O (01/30/20 1139)  Plateau Pressure: 0 cmH20 (01/30/20 1139)  Total Ve: 4.56 mL (01/30/20 1139)  Negative Inspiratory Force (cm H2O): -30 (01/24/20 0854)  F/VT Ratio<105 (RSBI): (!)  63.36 (01/30/20 0821)    Lines/Drains/Airways     Drain                 Chest Tube 01/29/20 1700 Left Pleural less than 1 day         Closed/Suction Drain 01/29/20 1918 Lateral LUQ Bulb 10 Fr. less than 1 day         Rectal Tube 01/29/20 1530 rectal tube w/ balloon (indicate number of mLs) less than 1 day         Urethral Catheter 01/29/20 1515 Non-latex 16 Fr. less than 1 day          Airway                 Airway - Non-Surgical 01/29/20 1512 less than 1 day         Airway - Non-Surgical 01/29/20 2007 Endotracheal Tube less than 1 day          Epidural Line                 Perineural Analgesia/Anesthesia Assessment (using dermatomes) 01/29/20 1407 less than 1 day          Arterial Line                 Arterial Line 01/29/20 1433 Right Radial less than 1 day          Peripheral Intravenous Line                 Peripheral IV - Single Lumen 01/25/20 1130 20 G Right Hand 5 days         Peripheral IV - Single Lumen 01/29/20 0400 22 G Anterior;Left Forearm 1 day         Peripheral IV - Single Lumen 01/29/20 1450 18 G Left Forearm less than 1 day                Significant Labs:    CBC/Anemia Profile:  Recent Labs   Lab 01/29/20  0330 01/29/20  2041 01/30/20  0545   WBC 9.84  --  22.97*   HGB 9.1*  --  10.1*   HCT 30.4* 28* 33.6*   *  --  472*   MCV 89  --  91   RDW 13.6  --  14.0        Chemistries:  Recent Labs   Lab 01/29/20  0330 01/30/20  0545    137   K 3.6 4.6   CL 89* 94*   CO2 34* 30*   BUN 14 16   CREATININE 0.7 0.8   CALCIUM 9.2 8.5*   MG 2.3 2.2   PHOS 4.0 4.5       All pertinent labs within the past 24 hours have been reviewed.    Significant Imaging:  I have reviewed all pertinent imaging results/findings within the past 24 hours.

## 2020-01-30 NOTE — SUBJECTIVE & OBJECTIVE
24Hr Events:  NAEON. S/p open complex diaphragmatic and chest wall reconstruction 1/29/2020. Continues with ventilation, sedation, paralysis. UOP adequate.    Review of patient's allergies indicates:   Allergen Reactions    Erythromycin      Stomach pains    Rosuvastatin      Hives, muscle/joint pains    Zolpidem      Confusion        Past Medical History:   Diagnosis Date    CAD (coronary artery disease)     Diaphragmatic hernia     GERD (gastroesophageal reflux disease)     Hypertension     TIA (transient ischemic attack)      Past Surgical History:   Procedure Laterality Date    REPAIR OF DIAPHRAGMATIC HERNIA Left 1/21/2020    Procedure: REPAIR, HERNIA, DIAPHRAGMATIC, Laparoscopic;  Surgeon: Lucho Garcia Jr., MD;  Location: [facility] OR [address] [place] [address];  Service: General;  Laterality: Left;    REPAIR OF DIAPHRAGMATIC HERNIA Left 1/23/2020    Procedure: REPAIR, HERNIA, DIAPHRAGMATIC;  Surgeon: Lucho Garcia Jr., MD;  Location: [facility] OR [address] [place] [address];  Service: General;  Laterality: Left;     Family History     None        Tobacco Use    Smoking status: Never Smoker    Smokeless tobacco: Never Used   Substance and Sexual Activity    Alcohol use: Not on file    Drug use: Not on file    Sexual activity: Not on file     Review of Systems   Constitutional: Positive for activity change, fatigue and fever.   HENT: Positive for trouble swallowing. Negative for voice change.    Eyes: Negative for visual disturbance.   Respiratory: Positive for cough and shortness of breath. Negative for apnea, choking and wheezing.    Cardiovascular: Positive for chest pain. Negative for palpitations and leg swelling.   Gastrointestinal: Positive for abdominal distention and abdominal pain. Negative for constipation, diarrhea, nausea and vomiting.   Genitourinary: Negative for difficulty urinating.   Musculoskeletal: Negative for arthralgias and myalgias.   Neurological: Negative for dizziness and numbness.    Psychiatric/Behavioral: Negative for agitation and confusion.     Objective:     Vital Signs (Most Recent):  Temp: 98.5 °F (36.9 °C) (01/30/20 0400)  Pulse: 92 (01/30/20 0821)  Resp: (!) 23 (01/30/20 0821)  BP: 134/60 (01/30/20 0715)  SpO2: 95 % (01/30/20 0821) Vital Signs (24h Range):  Temp:  [97.6 °F (36.4 °C)-98.5 °F (36.9 °C)] 98.5 °F (36.9 °C)  Pulse:  [] 92  Resp:  [16-42] 23  SpO2:  [93 %-100 %] 95 %  BP: (125-181)/(58-95) 134/60  Arterial Line BP: (113-159)/(57-85) 113/85     Weight: 114.2 kg (251 lb 12.3 oz)  Body mass index is 47.57 kg/m².    Intake/Output - Last 3 Shifts       01/28 0700 - 01/29 0659 01/29 0700 - 01/30 0659 01/30 0700 - 01/31 0659    P.O. 2010      I.V. (mL/kg)  3141 (27.5)     Total Intake(mL/kg) 2010 (17.6) 3141 (27.5)     Urine (mL/kg/hr) 2240 (0.8) 825 (0.3)     Drains  185     Stool 1 0     Blood  20     Chest Tube  0     Total Output 2241 1030     Net -231 +2111            Urine Occurrence 2 x 2 x     Stool Occurrence  3 x           SpO2: 95 %  O2 Device (Oxygen Therapy): ventilator    Physical Exam   Constitutional: She is oriented to person, place, and time. She appears well-developed and well-nourished. No distress.   Obese   HENT:   Head: Normocephalic and atraumatic.   Mouth/Throat: Oropharynx is clear and moist.   Eyes: Conjunctivae and EOM are normal. Right eye exhibits no discharge. Left eye exhibits no discharge.   Neck: Normal range of motion.   Cardiovascular: Normal rate, regular rhythm and intact distal pulses.   Pulmonary/Chest:   On HFNC. Sats 90-96% Increased work of breathing with any movement   Abdominal: Soft. She exhibits no distension. There is no tenderness.   Lap sites healing well   Musculoskeletal: Normal range of motion. She exhibits no deformity.   Neurological: She is alert and oriented to person, place, and time.   Skin: Skin is warm and dry.   L flank ecchymosis, improving   Psychiatric: She has a normal mood and affect. Her behavior is normal.        Significant Labs:  ABGs:   Recent Labs   Lab 01/30/20  0444   PH 7.472*   PCO2 46.8*   PO2 85   HCO3 34.2*   POCSATURATED 97   BE 11     CBC:   Recent Labs   Lab 01/30/20  0545   WBC 22.97*   RBC 3.69*   HGB 10.1*   HCT 33.6*   *   MCV 91   MCH 27.4   MCHC 30.1*     CMP:   Recent Labs   Lab 01/30/20  0545   *   CALCIUM 8.5*      K 4.6   CO2 30*   CL 94*   BUN 16   CREATININE 0.8     Coagulation:   Recent Labs   Lab 01/30/20  0545   INR 0.9   APTT 21.1     Lactic Acid: No results for input(s): LACTATE in the last 48 hours.    Significant Diagnostics:  CT: I have reviewed all pertinent results/findings within the past 24 hours  CXR: I have reviewed all pertinent results/findings within the past 24 hours    VTE Risk Mitigation (From admission, onward)         Ordered     enoxaparin injection 40 mg  2 times daily      01/29/20 2000     Place sequential compression device  Until discontinued      01/23/20 1002     Place sequential compression device  Until discontinued      01/19/20 2022     IP VTE LOW RISK PATIENT  Once      01/19/20 2022

## 2020-01-30 NOTE — ADDENDUM NOTE
Addendum  created 01/30/20 1245 by Hugh Ambriz MD    Charge Capture section accepted, Cosign clinical note with attestation

## 2020-01-30 NOTE — ASSESSMENT & PLAN NOTE
69F PMH CAD, HTN, TIA, GERD who presented with a diaphragmatic hernia, now s/p surgical repair on 1/23/19.    Surgical:  S/p thoracotomy, reduction of hernia, and placement of mesh over diaphragmatic and intercostal defects with placement of chest tube and DREW drain  Sedated and medically paralyzed    Neuro:  Sedated and paralyzed  Pain control: Tylenol, fentanyl, oxy    Resp:  Tesslon, dextromethorphan, codeine to suppress cough  Nebs prn  Acapella, IS  Continue to monitor oxygen requirements  Respiratory cultures showed MRSA and candida. On Bactrim.  Decrease FiO2 from 70 to 50, continue to wean  CXR improved    CV:  HDS  No pressors    Heme/ID:  RCx: MRSA, candida  Bactrim    Renal:  Purewick  Strict I/Os  MIVF d/c  Increase Lasix to 40 BID  Metabolic alkalosis, add diamox    FEN/GI:  Dysphagia diet  Replace lytes PRN  Rectal tube in place, can consider adding tube feeds if ileus resolves    Endo:  SSI    PPX:  Protonix  Lovenox    Dispo: Continue ICU care

## 2020-01-30 NOTE — ANESTHESIA POST-OP PAIN MANAGEMENT
Acute Pain Service Progress Note    Marisela Bunn is a 69 y.o., female, [id number].    Surgery:  THORACOTOMY (Left Chest)      RECONSTRUCTION, DIAPHRAGM (Left Chest) - Hernia reduction and repair      RECONSTRUCTION, CHEST WALL (Left Chest)    Post Op Day #: 1    Catheter type: perineural  Left MIGUEL    Infusion type: Ropivacaine 0.2%  2 mL/hr basal w/ 10 mL q1h IB    Problem List:    Active Hospital Problems    Diagnosis  POA    *Diaphragmatic hernia [K44.9]  Yes    Pneumonia [J18.9]  Yes    Cough [R05]  Yes    Leukocytosis [D72.829]  Yes      Resolved Hospital Problems    Diagnosis Date Resolved POA    Pre-op chest exam [Z01.811] 01/29/2020 Not Applicable       Subjective:     General appearance of sedated, intubated   Pain with rest: unable to assess   Pain with movement: unable to assess   Side Effects    Sedation Yes, 4=somnolent, minimal or no response to physical stimulation    Objective:     Catheter site clean, dry, intact      Vitals   Vitals:    01/30/20 0821   BP:    Pulse: 92   Resp: (!) 23   Temp:         Labs    No results displayed because visit has over 200 results.           Meds   Current Facility-Administered Medications   Medication Dose Route Frequency Provider Last Rate Last Dose    acetaminophen (10 mg/mL) injection 1,000 mg  1,000 mg Intravenous Once Shruthi Prescott MD   Stopped at 01/29/20 1600    Followed by    acetaminophen tablet 1,000 mg  1,000 mg Oral Q6H Shruthi Prescott MD   Stopped at 01/30/20 0000    acetylcysteine 100 mg/ml (10%) solution 4 mL  4 mL Nebulization TID Shruthi Prescott MD   4 mL at 01/30/20 0821    albuterol-ipratropium 2.5 mg-0.5 mg/3 mL nebulizer solution 3 mL  3 mL Nebulization Q4H Shruthi Prescott MD   3 mL at 01/30/20 0821    benzonatate capsule 100 mg  100 mg Oral Q8H Shruthi Prescott MD   Stopped at 01/29/20 2200    bisacodyl suppository 10 mg  10 mg Rectal Daily PRN Shruthi Prescott MD        calcium gluconate 1g in  dextrose 5% 100mL (ready to mix system)  1 g Intravenous PRN Shruthi Prescott MD        calcium gluconate 1g in dextrose 5% 100mL (ready to mix system)  1 g Intravenous PRN Shruthi Prescott MD        calcium gluconate 1g in dextrose 5% 100mL (ready to mix system)  1 g Intravenous PRN Shruthi Prescott MD        ceFAZolin injection 2 g  2 g Intravenous Q8H Shruthi Prescott MD   2 g at 01/29/20 2347    celecoxib capsule 200 mg  200 mg Oral Daily Shruthi Prescott MD   200 mg at 01/30/20 0824    cisatracurium (NIMBEX) 200 mg in dextrose 5 % 100 mL infusion  1 mcg/kg/min Intravenous Continuous Viky Wilson MD 13.7 mL/hr at 01/30/20 0600 4 mcg/kg/min at 01/30/20 0600    dextromethorphan-guaifenesin  mg/5 ml liquid 5 mL  5 mL Oral Q6H Shruthi Prescott MD   Stopped at 01/29/20 1800    dextrose 10% (D10W) Bolus  12.5 g Intravenous PRN Shruthi Prescott MD        enoxaparin injection 40 mg  40 mg Subcutaneous BID Shruthi Prescott MD   40 mg at 01/30/20 0824    famotidine 40 mg/5 mL (8 mg/mL) suspension 20 mg  20 mg Oral BID Lucho Garcia Jr., MD   20 mg at 01/30/20 0824    fentaNYL 2500 mcg in 0.9% sodium chloride 250 mL infusion premix (titrating)  25 mcg/hr Intravenous Continuous Viky Wilson MD 15 mL/hr at 01/30/20 0600 150 mcg/hr at 01/30/20 0600    furosemide injection 20 mg  20 mg Intravenous BID Adrianna Mcdonald PA-C   20 mg at 01/30/20 0825    glucagon (human recombinant) injection 1 mg  1 mg Intramuscular PRN Shruthi Prescott MD        hydrALAZINE injection 10 mg  10 mg Intravenous Q4H PRN Shruthi Prescott MD   10 mg at 01/29/20 2101    HYDROmorphone injection 1 mg  1 mg Intravenous Q4H PRN Desmond Green MD        insulin aspart U-100 pen 0-5 Units  0-5 Units Subcutaneous Q6H PRN Shrtuhi Prescott MD        labetalol 20 mg/4 mL (5 mg/mL) IV syring  10 mg Intravenous Q4H PRN Shruthi Prescott MD         magnesium sulfate 2g in water 50mL IVPB (premix)  2 g Intravenous PRN Shruthi Prescott MD   2 g at 01/28/20 0918    magnesium sulfate 2g in water 50mL IVPB (premix)  4 g Intravenous PRN Shruthi Prescott MD        melatonin tablet 6 mg  6 mg Oral Nightly PRN Shruthi Prescott MD   6 mg at 01/25/20 2215    methocarbamol tablet 750 mg  750 mg Per NG tube QID Shruthi Prescott MD   750 mg at 01/30/20 0824    metoclopramide HCl injection 5 mg  5 mg Intravenous Q6H PRN Shruthi Prescott MD        morphine injection 2 mg  2 mg Intravenous Q4H PRN Shruthi Prescott MD        mupirocin 2 % ointment 1 g  1 g Nasal BID Shruthi Prescott MD   1 g at 01/30/20 0825    niCARdipine 40 mg/200 mL infusion  1 mg/hr Intravenous Continuous Rosemary Rain MD   Stopped at 01/29/20 2300    ondansetron disintegrating tablet 8 mg  8 mg Oral Q8H PRN Shruthi Prescott MD        ondansetron injection 4 mg  4 mg Intravenous Q12H PRN Shruthi Prescott MD        pantoprazole EC tablet 40 mg  40 mg Oral Daily Shruthi Prescott MD   40 mg at 01/30/20 0824    polyethylene glycol packet 17 g  17 g Per NG tube Daily Shruthi Prescott MD   17 g at 01/30/20 0824    potassium chloride 10 mEq in 100 mL IVPB  40 mEq Intravenous PRN Shruthi Prescott  mL/hr at 01/29/20 0723 40 mEq at 01/29/20 0723    And    potassium chloride 10 mEq in 100 mL IVPB  60 mEq Intravenous PRN Shruthi Prescott MD        And    potassium chloride 10 mEq in 100 mL IVPB  80 mEq Intravenous PRN Shruthi Prescott MD        pregabalin capsule 75 mg  75 mg Oral Once Shruthi Prescott MD        Followed by    pregabalin capsule 75 mg  75 mg Oral QHS Shruthi Prescott MD        promethazine (PHENERGAN) 6.25 mg in dextrose 5 % 50 mL IVPB  6.25 mg Intravenous Q6H PRN Shruthi Prescott MD        propofol (DIPRIVAN) 10 mg/mL infusion  5 mcg/kg/min Intravenous Continuous Viky Whaley  MD Steve 27.4 mL/hr at 01/30/20 0600 40 mcg/kg/min at 01/30/20 0600    ropivacaine (PF) 2 mg/ml (0.2%) infusion  2 mL/hr Perineural Continuous Shruthi Prescott MD        senna-docusate 8.6-50 mg per tablet 1 tablet  1 tablet Per NG tube BID Shruthi Prescott MD   1 tablet at 01/30/20 0824    sodium chloride 0.9% flush 10 mL  10 mL Intravenous PRN Shruthi Prescott MD        sodium chloride 0.9% flush 10 mL  10 mL Intravenous PRN Shruthi Prescott MD        sodium phosphate 15 mmol in dextrose 5 % 250 mL IVPB  15 mmol Intravenous PRN Shruthi Prescott MD        sodium phosphate 20.01 mmol in dextrose 5 % 250 mL IVPB  20.01 mmol Intravenous PRN Shruthi Prescott MD        sodium phosphate 30 mmol in dextrose 5 % 250 mL IVPB  30 mmol Intravenous PRN Shruthi Prescott MD        sulfamethoxazole-trimethoprim 800-160mg per tablet 1 tablet  1 tablet Oral BID Shruthi Prescott MD   1 tablet at 01/30/20 0824       Assessment:     Patient remains intubated, sedated, and paralyzed following chest wall reconstruction. MIGUEL catheter was capped on exam, with no pump present. Infusion started.    Plan:     Patient doing well, continue present treatment.    Evaluator Hamilton Cuello

## 2020-01-30 NOTE — CARE UPDATE
Pt arrived to SICU intubated with 8.0 ETT secured 25 cm at the lips. Connected to ventilator on documented settings. ABG and CXR done. ETT pulled back per MD after CXR. SpO2 100%. Will continue to monitor closely.

## 2020-01-30 NOTE — TRANSFER OF CARE
"Anesthesia Transfer of Care Note    Patient: Marisela Bunn    Procedure(s) Performed: Procedure(s) (LRB):  THORACOTOMY (Left)  RECONSTRUCTION, DIAPHRAGM (Left)  RECONSTRUCTION, CHEST WALL (Left)    Patient location: ICU    Anesthesia Type: general    Transport from OR: Continuos invasive BP monitoring in transport. Continuous SpO2 monitoring in transport. Transported from OR intubated on 100% O2 by AMBU with adequate controlled ventilation. Upon arrival to PACU/ICU, patient attached to ventilator and auscultated to confirm bilateral breath sounds and adequate TV. Continuous ECG monitoring in transport    Post pain: adequate analgesia    Post assessment: no apparent anesthetic complications and tolerated procedure well    Post vital signs: stable    Level of consciousness: sedated    Nausea/Vomiting: no vomiting    Complications: none    Transfer of care protocol was followed      Last vitals:   Visit Vitals  /61 (BP Location: Right arm, Patient Position: Lying)   Pulse 96   Temp 36.9 °C (98.5 °F) (Oral)   Resp 20   Ht 5' 1" (1.549 m)   Wt 114.2 kg (251 lb 12.3 oz)   SpO2 98%   Breastfeeding? No   BMI 47.57 kg/m²     "

## 2020-01-30 NOTE — ASSESSMENT & PLAN NOTE
Marisela Bunn is a 69 y.o. female with PMH COPD (not on home oxygen), HTN, HLD (not on anticoagulation) received as direct admission for large diaphragmatic hernia. Patient is symptomatic from hernia with constipation and dyspnea with overlying bruise on left flank. She underwent lap diaphragmatic hernia repair with mesh on 1/21/20.   Evidence of recurrent hernia on CT 1/27. Underwent recurrent L diaphragmatic hernia repair with L chestwall recon with thoracic sx on 1/29.     - Continue Levaquin, Bactrim for pneumonia, (restarted 1/26)  - Aggressive pulm toilet with IS, CPT, DuoNebs, and Tessalon when able  - PT/OT when able  - Daily labs  - Remainder of care per thoracic surgery  - Please call with questions or concerns

## 2020-01-30 NOTE — PROGRESS NOTES
[facility]  Critical Care - Surgery  Progress Note    Patient Name: Marisela Bunn  MRN: [id number]  Admission Date: 1/19/2020  Hospital Length of Stay: 11 days  Code Status: Full Code  Attending Provider: Lucho Garcia Jr.,*  Primary Care Provider: Primary Doctor No   Principal Problem: Diaphragmatic hernia    Subjective:     Hospital/ICU Course:  No notes on file    Interval History/Significant Events: s/p thoracotomy with reduction of hernia and placement of mesh over diaphragmatic and intercostal defects    Follow-up For: Procedure(s) (LRB):  THORACOTOMY (Left)  RECONSTRUCTION, DIAPHRAGM (Left)  RECONSTRUCTION, CHEST WALL (Left)    Post-Operative Day: 1 Day Post-Op    Objective:     Vital Signs (Most Recent):  Temp: 98.5 °F (36.9 °C) (01/30/20 1115)  Pulse: 90 (01/30/20 1215)  Resp: 18 (01/30/20 0930)  BP: 133/61 (01/30/20 1215)  SpO2: 97 % (01/30/20 1215) Vital Signs (24h Range):  Temp:  [97.6 °F (36.4 °C)-98.5 °F (36.9 °C)] 98.5 °F (36.9 °C)  Pulse:  [] 90  Resp:  [16-34] 18  SpO2:  [93 %-100 %] 97 %  BP: (129-181)/(56-77) 133/61  Arterial Line BP: (113-159)/(57-85) 113/85     Weight: 114.2 kg (251 lb 12.3 oz)  Body mass index is 47.57 kg/m².      Intake/Output Summary (Last 24 hours) at 1/30/2020 1236  Last data filed at 1/30/2020 1200  Gross per 24 hour   Intake 3201 ml   Output 1345 ml   Net 1856 ml       Physical Exam   HENT:   Head: Normocephalic and atraumatic.   Eyes: Pupils are equal, round, and reactive to light. EOM are normal.   Cardiovascular: Normal rate and regular rhythm.   Thoracotomy dressing CDI, chest tube an DREW in place   Pulmonary/Chest:   Intubated, on A/C 70/7   Abdominal:   Morbidly obese, laparoscopic incisions CDI   Genitourinary:   Genitourinary Comments: Hernandez in place, rectal tube in place   Neurological:   Sedated and paralyzed   Skin: Skin is warm and dry.       Vents:  Vent Mode: A/C (01/30/20 1139)  Ventilator Initiated: Yes (01/29/20 2023)  Set  Rate: 16 BPM (01/30/20 1139)  Vt Set: 350 mL (01/30/20 1139)  Pressure Support: 8 cmH20 (01/24/20 0915)  PEEP/CPAP: 7 cmH20 (01/30/20 1139)  Oxygen Concentration (%): 50 (01/30/20 1215)  Peak Airway Pressure: 24 cmH2O (01/30/20 1139)  Plateau Pressure: 0 cmH20 (01/30/20 1139)  Total Ve: 4.56 mL (01/30/20 1139)  Negative Inspiratory Force (cm H2O): -30 (01/24/20 0854)  F/VT Ratio<105 (RSBI): (!) 63.36 (01/30/20 0821)    Lines/Drains/Airways     Drain                 Chest Tube 01/29/20 1700 Left Pleural less than 1 day         Closed/Suction Drain 01/29/20 1918 Lateral LUQ Bulb 10 Fr. less than 1 day         Rectal Tube 01/29/20 1530 rectal tube w/ balloon (indicate number of mLs) less than 1 day         Urethral Catheter 01/29/20 1515 Non-latex 16 Fr. less than 1 day          Airway                 Airway - Non-Surgical 01/29/20 1512 less than 1 day         Airway - Non-Surgical 01/29/20 2007 Endotracheal Tube less than 1 day          Epidural Line                 Perineural Analgesia/Anesthesia Assessment (using dermatomes) 01/29/20 1407 less than 1 day          Arterial Line                 Arterial Line 01/29/20 1433 Right Radial less than 1 day          Peripheral Intravenous Line                 Peripheral IV - Single Lumen 01/25/20 1130 20 G Right Hand 5 days         Peripheral IV - Single Lumen 01/29/20 0400 22 G Anterior;Left Forearm 1 day         Peripheral IV - Single Lumen 01/29/20 1450 18 G Left Forearm less than 1 day                Significant Labs:    CBC/Anemia Profile:  Recent Labs   Lab 01/29/20  0330 01/29/20 2041 01/30/20  0545   WBC 9.84  --  22.97*   HGB 9.1*  --  10.1*   HCT 30.4* 28* 33.6*   *  --  472*   MCV 89  --  91   RDW 13.6  --  14.0        Chemistries:  Recent Labs   Lab 01/29/20  0330 01/30/20  0545    137   K 3.6 4.6   CL 89* 94*   CO2 34* 30*   BUN 14 16   CREATININE 0.7 0.8   CALCIUM 9.2 8.5*   MG 2.3 2.2   PHOS 4.0 4.5       All pertinent labs within the past 24  hours have been reviewed.    Significant Imaging:  I have reviewed all pertinent imaging results/findings within the past 24 hours.    Assessment/Plan:     * Diaphragmatic hernia  69F PMH CAD, HTN, TIA, GERD who presented with a diaphragmatic hernia, now s/p surgical repair on 1/23/19.    Surgical:  S/p thoracotomy, reduction of hernia, and placement of mesh over diaphragmatic and intercostal defects with placement of chest tube and DREW drain  Sedated and medically paralyzed    Neuro:  Sedated and paralyzed  Pain control: Tylenol, fentanyl, oxy    Resp:  Tesslon, dextromethorphan, codeine to suppress cough  Nebs prn  Acapella, IS  Continue to monitor oxygen requirements  Respiratory cultures showed MRSA and candida. On Bactrim.  Decrease FiO2 from 70 to 50, continue to wean  CXR improved    CV:  HDS  No pressors    Heme/ID:  RCx: MRSA, candida  Bactrim    Renal:  Purewick  Strict I/Os  MIVF d/c  Increase Lasix to 40 BID  Metabolic alkalosis, add diamox    FEN/GI:  Dysphagia diet  Replace lytes PRN  Rectal tube in place, can consider adding tube feeds if ileus resolves    Endo:  SSI    PPX:  Protonix  Lovenox    Dispo: Continue ICU care         Schuyler Lomax MD  Critical Care - Surgery  [facility]

## 2020-01-30 NOTE — PLAN OF CARE
"VSS. Afebrile. HR NSR 90s-100s, SBP maintained < 160. Sats 99% on vent settings A/C 70%, PEEP 7. Current gtts infusing include propofol, nimbex, fentanyl. No output from rectal tube. U/O ~ 40-60cc/hr. DREW drain and CT output serosanguinous. POC reviewed with pt and family via phone. WCTM.     Problem: Adult Inpatient Plan of Care  Goal: Patient-Specific Goal (Individualization)  Description    PMHx: CAD, HTN, TIA, GERD     Dx: diaphragmatic hernia     1/18: [place] ED for cough x1 mth, intermittent fevers and dyspnea.  (1/15 w/ severe coughing episode felt a "pop" on left side, with associated severe pain & subsequent bruising of the left flank.  CT abd/pelvis- revealed a large left diaphragmatic hernia containing fat  and bowel loops with hernia of intra-abdomnial contents outside the thorax from 7th left ICS. Medium sized HH.  1/19: Transfer to [facility]  1/21: Respiratory distress Class-A to OR for Laparoscopic repair with goretex mesh and admit to SICU intubated, sedated, and [place] on arrival.   1/22: Resp Cx sent (+ MRSA)  1/24: Extubated; A-Line dc'ed. ST Eval; ok for Pureed/Nectar thick      1/27: pain increased, CT revealed mesh has tore  1/29: back to OR mesh repair and thoracotomy    Outcome: Ongoing, Progressing     "

## 2020-01-30 NOTE — PT/OT/SLP PROGRESS
Physical Therapy      Patient Name:  Marisela Bunn   MRN:  [id number]    New PT orders received post-op, however pt remains intubated and chemically paralyzed. Order d/c'd; RN notified. Please reconsult when pt able to actively participate    Candy Jennings PT

## 2020-01-30 NOTE — PROGRESS NOTES
"[facility]  General Surgery  Progress Note    Subjective:     History of Present Illness:  Marisela Bunn is a 69 y.o. female with PMH CAD, HTN, TIA, GERD presents to the hospital as a direct admission for evaluation of a newly diagnosed diaphragmatic hernia. She initially presented to [place] ED on 1/18/20 for a 1-month history of a cough, intermittent fevers and dyspnea.  She had been seen by 2 urgent care physicians in the last month for the cough, with 2 different antibiotic courses given without improvement in cough. She reports that on 1/15 after an particularly severe coughing episode she felt a "pop" on left side, with associated severe pain and  Subsequent bruising of the left flank. She reports constant severe pain since that time. She is on daily ASA 81mg,     On ED presentation, patient was hemodynamically stable, H/H 12.4/40.0, breathing on RA. Labs revealed leukocytosis (19.2), lactic acid 2.5 (repeat lactic acid1.6), BMP wnl. CXR revealed left lower lobe pneumonia with possible associated pleural effusion. CT abdomen/pelvis revealed a large left diaphragmatic hernia containing fat  and bowel loops with hernia of intra-abdomnial contents outside the thorax from 7th left ICS. Medium sized HH. CTA revealed large Bochdalek hernia through defect in left hemo-diaphragm, with associated widening of 7th intercostal space. She was started on IV hydration and antibiotics (levofloxacin, zosyn). She reports she has not passed a bowel movement of flatus in 2 days. Intermittent lower quadrant "spasms" while coughing, otherwise no abdominal pain. She reports no other symptoms.    Post-Op Info:  Procedure(s) (LRB):  THORACOTOMY (Left)  RECONSTRUCTION, DIAPHRAGM (Left)  RECONSTRUCTION, CHEST WALL (Left)   1 Day Post-Op     Interval History: She went to the OR yesterday for L diaphragmatic hernia repair and L chestwall reconstruction with thoracic sx. She is now in the unit - intubated, sedated, " paralyzed.       Medications:  Continuous Infusions:   cisatracurium (NIMBEX) infusion 4 mcg/kg/min (01/30/20 0600)    fentanyl 150 mcg/hr (01/30/20 0600)    nicardipine Stopped (01/29/20 2300)    propofol 40 mcg/kg/min (01/30/20 0600)    ropivacaine (PF) 2 mg/ml (0.2%)       Scheduled Meds:   acetaminophen  1,000 mg Intravenous Once    Followed by    acetaminophen  1,000 mg Oral Q6H    acetylcysteine 100 mg/ml (10%)  4 mL Nebulization TID    albuterol-ipratropium  3 mL Nebulization Q4H    benzonatate  100 mg Oral Q8H    ceFAZolin (ANCEF) IVPB  2 g Intravenous Q8H    celecoxib  200 mg Oral Daily    dextromethorphan-guaifenesin  mg/5 ml  5 mL Oral Q6H    enoxaparin  40 mg Subcutaneous BID    famotidine  20 mg Oral BID    furosemide  40 mg Intravenous BID    methocarbamol  750 mg Per NG tube QID    mupirocin  1 g Nasal BID    pantoprazole  40 mg Oral Daily    polyethylene glycol  17 g Per NG tube Daily    pregabalin  75 mg Oral Once    Followed by    pregabalin  75 mg Oral QHS    senna-docusate 8.6-50 mg  1 tablet Per NG tube BID    sulfamethoxazole-trimethoprim 800-160mg  1 tablet Oral BID     PRN Meds:bisacodyl, calcium gluconate IVPB, calcium gluconate IVPB, calcium gluconate IVPB, Dextrose 10% Bolus, glucagon (human recombinant), hydrALAZINE, HYDROmorphone, insulin aspart U-100, labetalol, magnesium sulfate IVPB, magnesium sulfate IVPB, melatonin, metoclopramide HCl, morphine, ondansetron, ondansetron, oxyCODONE, oxyCODONE, potassium chloride in water **AND** potassium chloride in water **AND** potassium chloride in water, promethazine (PHENERGAN) IVPB, sodium chloride 0.9%, sodium chloride 0.9%, sodium phosphate IVPB, sodium phosphate IVPB, sodium phosphate IVPB     Review of patient's allergies indicates:   Allergen Reactions    Erythromycin      Stomach pains    Rosuvastatin      Hives, muscle/joint pains    Zolpidem      Confusion      Objective:     Vital Signs (Most  Recent):  Temp: 98.5 °F (36.9 °C) (01/30/20 0400)  Pulse: 96 (01/30/20 0630)  Resp: (!) 24 (01/30/20 0630)  BP: (!) 141/63 (01/30/20 0630)  SpO2: 99 % (01/30/20 0630) Vital Signs (24h Range):  Temp:  [97.6 °F (36.4 °C)-98.5 °F (36.9 °C)] 98.5 °F (36.9 °C)  Pulse:  [] 96  Resp:  [16-42] 24  SpO2:  [93 %-100 %] 99 %  BP: (125-181)/(58-95) 141/63  Arterial Line BP: (113-159)/(57-85) 113/85     Weight: 114.2 kg (251 lb 12.3 oz)  Body mass index is 47.57 kg/m².    Intake/Output - Last 3 Shifts       01/28 0700 - 01/29 0659 01/29 0700 - 01/30 0659    P.O. 2010     I.V. (mL/kg)  3141 (27.5)    Total Intake(mL/kg) 2010 (17.6) 3141 (27.5)    Urine (mL/kg/hr) 2240 (0.8) 825 (0.3)    Drains  185    Stool 1 0    Blood  20    Chest Tube  0    Total Output 2241 1030    Net -231 +2111          Urine Occurrence 2 x 2 x    Stool Occurrence  3 x          Physical Exam   Constitutional: She appears well-developed and well-nourished. No distress.   Intubated, sedated, paralyzed   HENT:   Head: Normocephalic and atraumatic.   ET tube in place   Eyes: Conjunctivae and EOM are normal. Right eye exhibits no discharge. Left eye exhibits no discharge.   Cardiovascular: Normal rate and regular rhythm.   Pulmonary/Chest:   Intubated  Vent Mode: A/C  Oxygen Concentration (%):  () 70  Resp Rate Total:  (16 br/min-26 br/min) 24 br/min  Vt Set:  (350 mL) 350 mL  PEEP/CPAP:  (5 cmH20-7 cmH20) 7 cmH20  Mean Airway Pressure:  (9.6 ilT00-19 cmH20) 12 cmH20   Abdominal: Soft. She exhibits no distension.   Lap incisions are c/d/i   Musculoskeletal: Normal range of motion. She exhibits no deformity.   Neurological:   Sedated and paralyzed   Skin: Skin is warm and dry.         Significant Labs:  CBC:   Recent Labs   Lab 01/30/20  0545   WBC 22.97*   RBC 3.69*   HGB 10.1*   HCT 33.6*   *   MCV 91   MCH 27.4   MCHC 30.1*     CMP:   Recent Labs   Lab 01/30/20  0545   *   CALCIUM 8.5*      K 4.6   CO2 30*   CL 94*   BUN 16    CREATININE 0.8       Significant Diagnostics:  I have reviewed all pertinent imaging results/findings within the past 24 hours.    Assessment/Plan:     * Diaphragmatic hernia  Marisela Bunn is a 69 y.o. female with PMH COPD (not on home oxygen), HTN, HLD (not on anticoagulation) received as direct admission for large diaphragmatic hernia. Patient is symptomatic from hernia with constipation and dyspnea with overlying bruise on left flank. She underwent lap diaphragmatic hernia repair with mesh on 1/21/20.   Evidence of recurrent hernia on CT 1/27. Underwent recurrent L diaphragmatic hernia repair with L chestwall recon with thoracic sx on 1/29.     - Continue Levaquin, Bactrim for pneumonia, (restarted 1/26)  - Aggressive pulm toilet with IS, CPT, DuoNebs, and Tessalon when able  - PT/OT when able  - Daily labs  - Remainder of care per thoracic surgery  - Please call with questions or concerns    Pneumonia  See principle         Onesimo Raza MD  General Surgery  [facility]

## 2020-01-30 NOTE — PT/OT/SLP PROGRESS
Occupational Therapy      Patient Name:  Marisela Bunn   MRN:  [id number]    Patient not seen today secondary to new orders received post-op, however pt remains intubated and chemically paralyzed. Order d/c'd; RN notified. Please reconsult when pt able to actively participate  JAROCHO Cabrera  1/30/2020

## 2020-01-31 PROBLEM — R23.9 ALTERATION IN SKIN INTEGRITY: Status: ACTIVE | Noted: 2020-01-31

## 2020-01-31 LAB
ALBUMIN SERPL BCP-MCNC: 1.8 G/DL (ref 3.5–5.2)
ALP SERPL-CCNC: 91 U/L (ref 55–135)
ALT SERPL W/O P-5'-P-CCNC: 14 U/L (ref 10–44)
ANION GAP SERPL CALC-SCNC: 12 MMOL/L (ref 8–16)
AST SERPL-CCNC: 24 U/L (ref 10–40)
BASOPHILS # BLD AUTO: 0.17 K/UL (ref 0–0.2)
BASOPHILS NFR BLD: 0.8 % (ref 0–1.9)
BILIRUB SERPL-MCNC: 0.3 MG/DL (ref 0.1–1)
BUN SERPL-MCNC: 21 MG/DL (ref 8–23)
CALCIUM SERPL-MCNC: 8.2 MG/DL (ref 8.7–10.5)
CHLORIDE SERPL-SCNC: 93 MMOL/L (ref 95–110)
CO2 SERPL-SCNC: 27 MMOL/L (ref 23–29)
CREAT SERPL-MCNC: 0.9 MG/DL (ref 0.5–1.4)
DIFFERENTIAL METHOD: ABNORMAL
EOSINOPHIL # BLD AUTO: 1.9 K/UL (ref 0–0.5)
EOSINOPHIL NFR BLD: 9.1 % (ref 0–8)
ERYTHROCYTE [DISTWIDTH] IN BLOOD BY AUTOMATED COUNT: 14.4 % (ref 11.5–14.5)
EST. GFR  (AFRICAN AMERICAN): >60 ML/MIN/1.73 M^2
EST. GFR  (NON AFRICAN AMERICAN): >60 ML/MIN/1.73 M^2
GLUCOSE SERPL-MCNC: 110 MG/DL (ref 70–110)
GLUCOSE SERPL-MCNC: 118 MG/DL (ref 70–110)
HCO3 UR-SCNC: 36.5 MMOL/L (ref 24–28)
HCT VFR BLD AUTO: 30 % (ref 37–48.5)
HCT VFR BLD CALC: 39 %PCV (ref 36–54)
HGB BLD-MCNC: 8.7 G/DL (ref 12–16)
IMM GRANULOCYTES # BLD AUTO: 0.99 K/UL (ref 0–0.04)
IMM GRANULOCYTES NFR BLD AUTO: 4.8 % (ref 0–0.5)
LYMPHOCYTES # BLD AUTO: 1.9 K/UL (ref 1–4.8)
LYMPHOCYTES NFR BLD: 9.4 % (ref 18–48)
MAGNESIUM SERPL-MCNC: 2.5 MG/DL (ref 1.6–2.6)
MCH RBC QN AUTO: 27.2 PG (ref 27–31)
MCHC RBC AUTO-ENTMCNC: 29 G/DL (ref 32–36)
MCV RBC AUTO: 94 FL (ref 82–98)
MONOCYTES # BLD AUTO: 1.6 K/UL (ref 0.3–1)
MONOCYTES NFR BLD: 7.7 % (ref 4–15)
NEUTROPHILS # BLD AUTO: 14.1 K/UL (ref 1.8–7.7)
NEUTROPHILS NFR BLD: 68.2 % (ref 38–73)
NRBC BLD-RTO: 0 /100 WBC
PCO2 BLDA: 46.5 MMHG (ref 35–45)
PH SMN: 7.5 [PH] (ref 7.35–7.45)
PHOSPHATE SERPL-MCNC: 5.8 MG/DL (ref 2.7–4.5)
PLATELET # BLD AUTO: 474 K/UL (ref 150–350)
PMV BLD AUTO: 9.5 FL (ref 9.2–12.9)
PO2 BLDA: 96 MMHG (ref 80–100)
POC BE: 13 MMOL/L
POC IONIZED CALCIUM: 0.95 MMOL/L (ref 1.06–1.42)
POC SATURATED O2: 98 % (ref 95–100)
POC TCO2: 38 MMOL/L (ref 23–27)
POCT GLUCOSE: 107 MG/DL (ref 70–110)
POCT GLUCOSE: 60 MG/DL (ref 70–110)
POCT GLUCOSE: 96 MG/DL (ref 70–110)
POCT GLUCOSE: 99 MG/DL (ref 70–110)
POTASSIUM BLD-SCNC: 3.8 MMOL/L (ref 3.5–5.1)
POTASSIUM SERPL-SCNC: 5.1 MMOL/L (ref 3.5–5.1)
PROT SERPL-MCNC: 5.3 G/DL (ref 6–8.4)
RBC # BLD AUTO: 3.2 M/UL (ref 4–5.4)
SAMPLE: ABNORMAL
SODIUM BLD-SCNC: 132 MMOL/L (ref 136–145)
SODIUM SERPL-SCNC: 132 MMOL/L (ref 136–145)
WBC # BLD AUTO: 20.68 K/UL (ref 3.9–12.7)

## 2020-01-31 PROCEDURE — 25000003 PHARM REV CODE 250: Performed by: SURGERY

## 2020-01-31 PROCEDURE — 94761 N-INVAS EAR/PLS OXIMETRY MLT: CPT

## 2020-01-31 PROCEDURE — 99900035 HC TECH TIME PER 15 MIN (STAT)

## 2020-01-31 PROCEDURE — 63600175 PHARM REV CODE 636 W HCPCS: Performed by: STUDENT IN AN ORGANIZED HEALTH CARE EDUCATION/TRAINING PROGRAM

## 2020-01-31 PROCEDURE — 25000242 PHARM REV CODE 250 ALT 637 W/ HCPCS: Performed by: STUDENT IN AN ORGANIZED HEALTH CARE EDUCATION/TRAINING PROGRAM

## 2020-01-31 PROCEDURE — 94668 MNPJ CHEST WALL SBSQ: CPT

## 2020-01-31 PROCEDURE — 20000000 HC ICU ROOM

## 2020-01-31 PROCEDURE — 94640 AIRWAY INHALATION TREATMENT: CPT

## 2020-01-31 PROCEDURE — 99233 PR SUBSEQUENT HOSPITAL CARE,LEVL III: ICD-10-PCS | Mod: 24,,, | Performed by: SURGERY

## 2020-01-31 PROCEDURE — 94003 VENT MGMT INPAT SUBQ DAY: CPT

## 2020-01-31 PROCEDURE — 97803 MED NUTRITION INDIV SUBSEQ: CPT

## 2020-01-31 PROCEDURE — 27100171 HC OXYGEN HIGH FLOW UP TO 24 HOURS

## 2020-01-31 PROCEDURE — 25000003 PHARM REV CODE 250: Performed by: STUDENT IN AN ORGANIZED HEALTH CARE EDUCATION/TRAINING PROGRAM

## 2020-01-31 PROCEDURE — 83735 ASSAY OF MAGNESIUM: CPT

## 2020-01-31 PROCEDURE — 99900026 HC AIRWAY MAINTENANCE (STAT)

## 2020-01-31 PROCEDURE — 27200966 HC CLOSED SUCTION SYSTEM

## 2020-01-31 PROCEDURE — 80053 COMPREHEN METABOLIC PANEL: CPT

## 2020-01-31 PROCEDURE — 84100 ASSAY OF PHOSPHORUS: CPT

## 2020-01-31 PROCEDURE — 99233 SBSQ HOSP IP/OBS HIGH 50: CPT | Mod: 24,,, | Performed by: SURGERY

## 2020-01-31 PROCEDURE — 27100092 HC HIGH FLOW DELIVERY CANNULA

## 2020-01-31 PROCEDURE — 27000221 HC OXYGEN, UP TO 24 HOURS

## 2020-01-31 PROCEDURE — 85025 COMPLETE CBC W/AUTO DIFF WBC: CPT

## 2020-01-31 RX ADMIN — CISATRACURIUM BESYLATE 5 MCG/KG/MIN: 10 INJECTION INTRAVENOUS at 12:01

## 2020-01-31 RX ADMIN — PROPOFOL 30 MCG/KG/MIN: 10 INJECTION, EMULSION INTRAVENOUS at 04:01

## 2020-01-31 RX ADMIN — IPRATROPIUM BROMIDE AND ALBUTEROL SULFATE 3 ML: .5; 3 SOLUTION RESPIRATORY (INHALATION) at 11:01

## 2020-01-31 RX ADMIN — POLYETHYLENE GLYCOL 3350 17 G: 17 POWDER, FOR SOLUTION ORAL at 08:01

## 2020-01-31 RX ADMIN — FUROSEMIDE 20 MG: 10 INJECTION, SOLUTION INTRAVENOUS at 08:01

## 2020-01-31 RX ADMIN — ACETYLCYSTEINE 4 ML: 100 SOLUTION ORAL; RESPIRATORY (INHALATION) at 07:01

## 2020-01-31 RX ADMIN — IPRATROPIUM BROMIDE AND ALBUTEROL SULFATE 3 ML: .5; 3 SOLUTION RESPIRATORY (INHALATION) at 03:01

## 2020-01-31 RX ADMIN — ACETAMINOPHEN 1000 MG: 500 TABLET ORAL at 11:01

## 2020-01-31 RX ADMIN — ROPIVACAINE HYDROCHLORIDE 2 ML/HR: 2 INJECTION, SOLUTION EPIDURAL; INFILTRATION at 01:01

## 2020-01-31 RX ADMIN — SULFAMETHOXAZOLE AND TRIMETHOPRIM 1 TABLET: 800; 160 TABLET ORAL at 08:01

## 2020-01-31 RX ADMIN — PROPOFOL 30 MCG/KG/MIN: 10 INJECTION, EMULSION INTRAVENOUS at 09:01

## 2020-01-31 RX ADMIN — CHLORHEXIDINE GLUCONATE 0.12% ORAL RINSE 15 ML: 1.2 LIQUID ORAL at 08:01

## 2020-01-31 RX ADMIN — ENOXAPARIN SODIUM 40 MG: 100 INJECTION SUBCUTANEOUS at 08:01

## 2020-01-31 RX ADMIN — FAMOTIDINE 20 MG: 40 POWDER, FOR SUSPENSION ORAL at 08:01

## 2020-01-31 RX ADMIN — PREGABALIN 75 MG: 75 CAPSULE ORAL at 08:01

## 2020-01-31 RX ADMIN — METHOCARBAMOL TABLETS 750 MG: 750 TABLET, COATED ORAL at 08:01

## 2020-01-31 RX ADMIN — CISATRACURIUM BESYLATE 5 MCG/KG/MIN: 10 INJECTION INTRAVENOUS at 07:01

## 2020-01-31 RX ADMIN — CELECOXIB 200 MG: 200 CAPSULE ORAL at 08:01

## 2020-01-31 RX ADMIN — DOCUSATE SODIUM - SENNOSIDES 1 TABLET: 50; 8.6 TABLET, FILM COATED ORAL at 08:01

## 2020-01-31 RX ADMIN — IPRATROPIUM BROMIDE AND ALBUTEROL SULFATE 3 ML: .5; 3 SOLUTION RESPIRATORY (INHALATION) at 07:01

## 2020-01-31 RX ADMIN — SODIUM CHLORIDE 500 ML: 0.9 INJECTION, SOLUTION INTRAVENOUS at 08:01

## 2020-01-31 RX ADMIN — MUPIROCIN 1 G: 20 OINTMENT TOPICAL at 08:01

## 2020-01-31 RX ADMIN — FUROSEMIDE 20 MG: 10 INJECTION, SOLUTION INTRAVENOUS at 05:01

## 2020-01-31 RX ADMIN — CISATRACURIUM BESYLATE 4 MCG/KG/MIN: 10 INJECTION INTRAVENOUS at 04:01

## 2020-01-31 RX ADMIN — Medication 150 MCG/HR: at 10:01

## 2020-01-31 RX ADMIN — PROPOFOL 30 MCG/KG/MIN: 10 INJECTION, EMULSION INTRAVENOUS at 02:01

## 2020-01-31 RX ADMIN — GUAIFENESIN AND DEXTROMETHORPHAN 5 ML: 100; 10 SYRUP ORAL at 11:01

## 2020-01-31 RX ADMIN — PROPOFOL 30 MCG/KG/MIN: 10 INJECTION, EMULSION INTRAVENOUS at 10:01

## 2020-01-31 RX ADMIN — ACETYLCYSTEINE 4 ML: 100 SOLUTION ORAL; RESPIRATORY (INHALATION) at 03:01

## 2020-01-31 RX ADMIN — ACETYLCYSTEINE 4 ML: 100 SOLUTION ORAL; RESPIRATORY (INHALATION) at 11:01

## 2020-01-31 NOTE — PROCEDURES
"Marisela Bunn is a 69 y.o. female patient.    Temp: 97.5 °F (36.4 °C) (20 1500)  Pulse: 73 (20 1500)  Resp: 16 (20 1500)  BP: (!) 98/54 (20 1500)  SpO2: 97 % (20 1500)  Weight: 117 kg (257 lb 15 oz) (20 0506)  Height: 5' 1" (154.9 cm) (20 1300)       Arterial Line  Date/Time: 2020 3:24 PM  Location procedure was performed: [facility] CRITICAL CARE SURGERY  Performed by: Desmond Green MD  Authorized by: Desmond Green MD   Assisting provider: Schuyler Lomax MD  Pre-op Diagnosis: diaphragmatic hernia  Post-operative diagnosis: diaphragmatic hernia  Consent Done: Yes  Consent: Written consent obtained.  Consent given by: power of   Patient understanding: patient states understanding of the procedure being performed  Patient consent: the patient's understanding of the procedure matches consent given  Procedure consent: procedure consent matches procedure scheduled  Relevant documents: relevant documents present and verified  Test results: test results available and properly labeled  Site marked: the operative site was marked  Imaging studies: imaging studies available  Patient identity confirmed: , MRN, name and provided demographic data  Time out: Immediately prior to procedure a "time out" was called to verify the correct patient, procedure, equipment, support staff and site/side marked as required.  Preparation: Patient was prepped and draped in the usual sterile fashion.  Indications: multiple ABGs, respiratory failure and hemodynamic monitoring  Location: left radial  Anesthesia: see MAR for details    Anesthesia:  Local anesthesia used: no  Patient sedated: yes  Sedatives: fentanyl  Needle gauge: 22  Seldinger technique: Seldinger technique used  Complications: No  Estimated blood loss (mL): 3  Specimens: No  Post-procedure: line sutured and dressing applied  Post-procedure CMS: normal          Desmond Green  2020  "

## 2020-01-31 NOTE — ASSESSMENT & PLAN NOTE
69 year old female admitted to SICU with recurrent left diaphragmatic hernia s/p failed laparoscopic repair now s/p diaphragm reconstruction and chest wall reconstruction.     Neuro:  Sedated and paralyzed  Pain control: erector spinae, Tylenol, fentanyl  Continue paralytic today.      Resp:  Continue vent support today. Okay to wean FiO2, consider PEEP for recruitment  Nebs prn  Respiratory cultures showed MRSA and candida. On Bactrim.  Decrease FiO2 from 70 to 50, continue to wean  CXR improved     CV:  HDS  No pressors  Continue a-line     Heme/ID:  RCx: MRSA, candida  Bactrim     Renal:  Hernandez in place,   Strict I/Os  Consider repeat low dose lasix today given bicarb normalized     FEN/GI:  Replace lytes PRN  Rectal tube in place, can consider adding trickle tube feeds     Endo:  SSI     PPX:  Protonix  Lovenox      Dispo: Continue ICU care, keep paralyzed today

## 2020-01-31 NOTE — PLAN OF CARE
Recommendations  Recommend intiaiting trickle TF of Peptamen Intense VHP.   Once paralytic stopped, recommend advancing to goal rate of 45 mL/hr - meets needs with propofol.    -If propofol discontinued, recommend advancing goal rate to 50 mL/hr.   RD to monitor.    Goals: Patient to meet > 85% EEN and EPN by RD follow-up  Nutrition Goal Status: goal not met    Full assessment completed, see RD Note 1/31/2020.

## 2020-01-31 NOTE — PROGRESS NOTES
Wound care consult for lip sore.     PMH: CAD, HTN, TIA, OA, COPD and GERD    Patient seen in SICU intubated. Small ulceration noted on the left upper lip less than 0.5 x 0.5 x0.1. ETT not touching ulceration.    Recommend sween (pink) cream BID to the lips  Mckenna Grubbs RN CN CN   [phone]

## 2020-01-31 NOTE — PROGRESS NOTES
"[facility]  General Surgery  Progress Note    Subjective:     History of Present Illness:  Marisela Bunn is a 69 y.o. female with PMH CAD, HTN, TIA, GERD presents to the hospital as a direct admission for evaluation of a newly diagnosed diaphragmatic hernia. She initially presented to [facility] ED on 1/18/20 for a 1-month history of a cough, intermittent fevers and dyspnea.  She had been seen by 2 urgent care physicians in the last month for the cough, with 2 different antibiotic courses given without improvement in cough. She reports that on 1/15 after an particularly severe coughing episode she felt a "pop" on left side, with associated severe pain and  Subsequent bruising of the left flank. She reports constant severe pain since that time. She is on daily ASA 81mg,     On ED presentation, patient was hemodynamically stable, H/H 12.4/40.0, breathing on RA. Labs revealed leukocytosis (19.2), lactic acid 2.5 (repeat lactic acid1.6), BMP wnl. CXR revealed left lower lobe pneumonia with possible associated pleural effusion. CT abdomen/pelvis revealed a large left diaphragmatic hernia containing fat  and bowel loops with hernia of intra-abdomnial contents outside the thorax from 7th left ICS. Medium sized HH. CTA revealed large Bochdalek hernia through defect in left hemo-diaphragm, with associated widening of 7th intercostal space. She was started on IV hydration and antibiotics (levofloxacin, zosyn). She reports she has not passed a bowel movement of flatus in 2 days. Intermittent lower quadrant "spasms" while coughing, otherwise no abdominal pain. She reports no other symptoms.    Post-Op Info:  Procedure(s) (LRB):  THORACOTOMY (Left)  RECONSTRUCTION, DIAPHRAGM (Left)  RECONSTRUCTION, CHEST WALL (Left)   2 Days Post-Op     Interval History: AF overnight. She remains intubated, sedated, paralyzed. Sating 95-97% on vent with FiO2 of 50%.       Medications:  Continuous Infusions:   cisatracurium " (NIMBEX) infusion 4 mcg/kg/min (01/31/20 0600)    fentanyl 150 mcg/hr (01/31/20 0600)    nicardipine Stopped (01/29/20 2300)    propofol 30 mcg/kg/min (01/31/20 0600)    ropivacaine (PF) 2 mg/ml (0.2%) 2 mL/hr (01/31/20 0600)     Scheduled Meds:   acetaminophen  1,000 mg Oral Q6H    acetylcysteine 100 mg/ml (10%)  4 mL Nebulization TID    albuterol-ipratropium  3 mL Nebulization Q4H    benzonatate  100 mg Oral Q8H    celecoxib  200 mg Oral Daily    chlorhexidine  15 mL Mouth/Throat BID    dextromethorphan-guaifenesin  mg/5 ml  5 mL Oral Q6H    enoxaparin  40 mg Subcutaneous BID    famotidine  20 mg Oral BID    furosemide  20 mg Intravenous BID    methocarbamol  750 mg Per NG tube QID    mupirocin  1 g Nasal BID    polyethylene glycol  17 g Per NG tube Daily    pregabalin  75 mg Oral Once    Followed by    pregabalin  75 mg Oral QHS    senna-docusate 8.6-50 mg  1 tablet Per NG tube BID    sulfamethoxazole-trimethoprim 800-160mg  1 tablet Oral BID     PRN Meds:bisacodyl, calcium gluconate IVPB, calcium gluconate IVPB, calcium gluconate IVPB, Dextrose 10% Bolus, glucagon (human recombinant), hydrALAZINE, HYDROmorphone, insulin aspart U-100, labetalol, magnesium sulfate IVPB, magnesium sulfate IVPB, melatonin, metoclopramide HCl, morphine, ondansetron, ondansetron, potassium chloride in water **AND** potassium chloride in water **AND** potassium chloride in water, promethazine (PHENERGAN) IVPB, sodium chloride 0.9%, sodium chloride 0.9%, sodium phosphate IVPB, sodium phosphate IVPB, sodium phosphate IVPB     Review of patient's allergies indicates:   Allergen Reactions    Erythromycin      Stomach pains    Rosuvastatin      Hives, muscle/joint pains    Zolpidem      Confusion      Objective:     Vital Signs (Most Recent):  Temp: 97.8 °F (36.6 °C) (01/31/20 0303)  Pulse: 77 (01/31/20 0630)  Resp: 16 (01/31/20 0630)  BP: (!) 108/55 (01/31/20 0630)  SpO2: 98 % (01/31/20 0630) Vital Signs (24h  Range):  Temp:  [97.7 °F (36.5 °C)-98.5 °F (36.9 °C)] 97.8 °F (36.6 °C)  Pulse:  [75-99] 77  Resp:  [16-25] 16  SpO2:  [88 %-100 %] 98 %  BP: (100-143)/(50-81) 108/55     Weight: 117 kg (257 lb 15 oz)  Body mass index is 48.74 kg/m².    Intake/Output - Last 3 Shifts       01/29 0700 - 01/30 0659 01/30 0700 - 01/31 0659 01/31 0700 - 02/01 0659    P.O.       I.V. (mL/kg) 3141 (27.5) 1569.9 (13.4)     NG/GT  300     Total Intake(mL/kg) 3141 (27.5) 1869.9 (16)     Urine (mL/kg/hr) 825 (0.3) 915 (0.3)     Drains 185 500     Stool 0 250     Blood 20      Chest Tube 0 60     Total Output 1030 1725     Net +2111 +144.9            Urine Occurrence 2 x      Stool Occurrence 3 x            Physical Exam   Constitutional: She appears well-developed and well-nourished. No distress.   Intubated, sedated, paralyzed   HENT:   Head: Normocephalic and atraumatic.   ET tube in place   Eyes: Conjunctivae and EOM are normal. Right eye exhibits no discharge. Left eye exhibits no discharge.   Cardiovascular: Normal rate and regular rhythm.   Pulmonary/Chest:   Intubated  Vent Mode: A/C  Oxygen Concentration (%):  (50-70) 50  Resp Rate Total:  (16 br/min-25 br/min) 16 br/min  Vt Set:  (350 mL) 350 mL  PEEP/CPAP:  (6 cmH20-7 cmH20) 6 cmH20  Mean Airway Pressure:  (9.4 tkQ69-05 cmH20) 9.5 cmH20     Abdominal: Soft. She exhibits no distension.   Lap incisions are c/d/i   Musculoskeletal: Normal range of motion. She exhibits no deformity.   Neurological:   Sedated and paralyzed   Skin: Skin is warm and dry.         Significant Labs:  CBC:   Recent Labs   Lab 01/31/20  0315   WBC 20.68*   RBC 3.20*   HGB 8.7*   HCT 30.0*   *   MCV 94   MCH 27.2   MCHC 29.0*     CMP:   Recent Labs   Lab 01/31/20  0315      CALCIUM 8.2*   ALBUMIN 1.8*   PROT 5.3*   *   K 5.1   CO2 27   CL 93*   BUN 21   CREATININE 0.9   ALKPHOS 91   ALT 14   AST 24   BILITOT 0.3       Significant Diagnostics:  I have reviewed all pertinent imaging  results/findings within the past 24 hours.    Assessment/Plan:     * Diaphragmatic hernia  Marisela Bunn is a 69 y.o. female with PMH COPD (not on home oxygen), HTN, HLD (not on anticoagulation) received as direct admission for large diaphragmatic hernia. Patient is symptomatic from hernia with constipation and dyspnea with overlying bruise on left flank. She underwent lap diaphragmatic hernia repair with mesh on 1/21/20.   Evidence of recurrent hernia on CT 1/27. Underwent recurrent L diaphragmatic hernia repair with L chestwall recon with thoracic sx on 1/29.     - Continue Bactrim for pneumonia, (restarted 1/26)  - Aggressive pulm toilet with IS, CPT, DuoNebs, and Tessalon when able  - PT/OT when able  - Daily labs  - Drains and chest tube per thoracic surgery  - Please call with questions or concerns    Pneumonia  See principle       Onesimo Raza MD  General Surgery  [facility]

## 2020-01-31 NOTE — ASSESSMENT & PLAN NOTE
69F PMH CAD, HTN, TIA, GERD who presented with a diaphragmatic hernia, now s/p surgical repair on 1/23/19.    Surgical:  S/p thoracotomy, reduction of hernia, and placement of mesh over diaphragmatic and intercostal defects with placement of chest tube and DREW drain  Sedated and medically paralyzed    Neuro:  Sedated and paralyzed  Pain control: Tylenol, fentanyl, oxy  Sedation: propofol, fentanyl  Paralysis: nimbex    Resp:  Tesslon, dextromethorphan, codeine to suppress cough ordered but currently paralyzed  Nebs prn  Acapella, IS  Continue to monitor oxygen requirements  Respiratory cultures showed MRSA and candida. On Bactrim.  Decrease FiO2 from 70 to 50, continue to wean  CXR improved    CV:  HDS  No pressors    Heme/ID:  RCx: MRSA, candida  Bactrim    Renal:  Strict I/Os  MIVF   Monitor Cr/BUN    FEN/GI:  Currently NPO  Replace lytes PRN  Rectal tube in place    Endo:  SSI    PPX:  Protonix  Lovenox    Dispo: Continue ICU care

## 2020-01-31 NOTE — PROGRESS NOTES
"[facility]  Adult Nutrition  Progress Note    SUMMARY       Recommendations  Recommend intiaiting trickle TF of Peptamen Intense VHP.   Once paralytic stopped, recommend advancing to goal rate of 45 mL/hr - meets needs with propofol.    -If propofol discontinued, recommend advancing goal rate to 50 mL/hr.   RD to monitor.    Goals: Patient to meet > 85% EEN and EPN by RD follow-up  Nutrition Goal Status: goal not met  Communication of RD Recs: discussed on rounds    Reason for Assessment  Reason For Assessment: RD follow-up  Diagnosis: surgery/postoperative complications(diaphragmatic hernia s/p multiple surgeries)  Relevant Medical History: CAD, HTN, TIA, GERD  Interdisciplinary Rounds: attended  General Information Comments: Taken to OR 1/21 emergently for hernia repair. Extubated 1/24. Taken back to OR 1/29 for L thoracotomy and diaphragm/chest wall reconstruction. Remains intubated, sedated, paralyzed. TF ordered this morning but plan to keep at only a trickle due to paralytic. NFPE completed 1/29, patient continues with mild age appropriate wasting but no other indicators of malnutrition. At risk for acute malnutrition if continues with no nutrition/only trickle TF.   Nutrition Discharge Planning: Unable to determine at this time.    Nutrition Risk Screen  Nutrition Risk Screen: dysphagia or difficulty swallowing    Nutrition/Diet History  Spiritual, Cultural Beliefs, [employer]alism Practices, Values that Affect Care: no  Food Allergies: NKFA  Factors Affecting Nutritional Intake: NPO, on mechanical ventilation    Anthropometrics  Temp: 97.7 °F (36.5 °C)  Height: 5' 1" (154.9 cm)  Height (inches): 61 in  Weight Method: Bed Scale  Weight: 117 kg (257 lb 15 oz)  Weight (lb): 257.94 lb  Ideal Body Weight (IBW), Female: 105 lb  % Ideal Body Weight, Female (lb): 239.78 %  BMI (Calculated): 48.8  BMI Grade: greater than 40 - morbid obesity    Lab/Procedures/Meds  Pertinent Labs Reviewed: " reviewed  Pertinent Labs Comments: Na 132, Cl 93, Ca 8.2, Phos 5.8, Alb 1.8  Pertinent Medications Reviewed: reviewed  Pertinent Medications Comments: lasix, senna-docusate, nimbex, fentnayl, propofol    Estimated/Assessed Needs  Weight Used For Calorie Calculations: 108.4 kg (238 lb 15.7 oz)  Energy Calorie Requirements (kcal): 5584-1185 kcal/day  Energy Need Method: Kcal/kg(11-14)  Protein Requirements:  g/day(2.0-2.5 g/kg)  Weight Used For Protein Calculations: 47.7 kg (105 lb 2.6 oz)(IBW)  Fluid Requirements (mL): 1 mL/kcal or per MD  Estimated Fluid Requirement Method: RDA Method  RDA Method (mL): 1192    Nutrition Prescription Ordered  Current Diet Order: NPO  Current Nutrition Support Formula Ordered: Peptamen Intense VHP  Current Nutrition Support Rate Ordered: 45 (ml)  Current Nutrition Support Frequency Ordered: mL/hr    Evaluation of Received Nutrient/Fluid Intake  Enteral Calories (kcal): 1080  Enteral Protein (gm): 99  Enteral (Free Water) Fluid (mL): 907  Other Calories (kcal): 544(propofol)  Total Calories (kcal): 1624  % Kcal Needs: 107%  % Protein Needs: 100%  I/O: Noted  Energy Calories Required: meeting needs  Protein Required: meeting needs  Fluid Required: (per MD)  Comments: LBM 1/30  Tolerance: (not started yet)  % Intake of Estimated Energy Needs: 0 - 25 % (TF not started yet)  % Meal Intake: NPO    Nutrition Risk  Level of Risk/Frequency of Follow-up: high(2x/week)     Assessment and Plan  Nutrition Problem  Inadequate energy intake    Related to (etiology):   Decreased ability to consume sufficient energy    Signs and Symptoms (as evidenced by):   NPO with no alternative means of nutrition at this time    Interventions (treatment strategy):  Collaboration of nutrition care with other providers    Nutrition Diagnosis Status:   New    Monitor and Evaluation  Food and Nutrient Intake: energy intake, enteral nutrition intake  Food and Nutrient Adminstration: enteral and parenteral  nutrition administration  Anthropometric Measurements: weight, weight change  Biochemical Data, Medical Tests and Procedures: electrolyte and renal panel, gastrointestinal profile, inflammatory profile  Nutrition-Focused Physical Findings: overall appearance     Malnutrition Assessment  Orbital Region (Subcutaneous Fat Loss): well nourished  Upper Arm Region (Subcutaneous Fat Loss): well nourished   Orthodox Region (Muscle Loss): mild depletion(2/2 age)  Clavicle Bone Region (Muscle Loss): well nourished  Clavicle and Acromion Bone Region (Muscle Loss): well nourished  Dorsal Hand (Muscle Loss): well nourished  Patellar Region (Muscle Loss): well nourished  Anterior Thigh Region (Muscle Loss): well nourished  Posterior Calf Region (Muscle Loss): well nourished   Edema (Fluid Accumulation): 1-->trace     Nutrition Follow-Up  RD Follow-up?: Yes

## 2020-01-31 NOTE — PLAN OF CARE
Plan of care reviewed with patient. Patient intubated, sedated, and paralyzed. Vent: AC 50%/6 PEEP. MAP > 65 SBP < 160. Gtts: Propofol @ 30 mcg/kg/min, Fentanyl at 150 mcg/hr, Nimbex at 4 mcg/kg/min, Ropivicain at 2 ml/hr via epidural. See flowsheet for details regarding BIS and TOF. DREW drain and CT with serosanguinous output, see flowsheet. NGT remains at LIS, output of 20 ml. UOP 30-60 ml/hr. Flexi output of 50 ml. Skin remains free from further breakdown. VSS, will continue to monitor.

## 2020-01-31 NOTE — PROGRESS NOTES
[facility]  Critical Care - Surgery  Progress Note    Patient Name: Marisela Bunn  MRN: [id number]  Admission Date: 1/19/2020  Hospital Length of Stay: 12 days  Code Status: Full Code  Attending Provider: Lucho Garcia Jr.,*  Primary Care Provider: Primary Doctor No   Principal Problem: Diaphragmatic hernia    Subjective:     Interval History/Significant Events: s/p thoracotomy with reduction of hernia and placement of mesh over diaphragmatic and intercostal defects. Plan to continue paralysis per primary team.    Follow-up For: Procedure(s) (LRB):  THORACOTOMY (Left)  RECONSTRUCTION, DIAPHRAGM (Left)  RECONSTRUCTION, CHEST WALL (Left)    Post-Operative Day: 2 Days Post-Op    Objective:     Vital Signs (Most Recent):  Temp: 97.9 °F (36.6 °C) (01/31/20 1100)  Pulse: 78 (01/31/20 1121)  Resp: 16 (01/31/20 1121)  BP: (!) 101/50 (01/31/20 1100)  SpO2: (!) 94 % (01/31/20 1121) Vital Signs (24h Range):  Temp:  [97.7 °F (36.5 °C)-98.3 °F (36.8 °C)] 97.9 °F (36.6 °C)  Pulse:  [75-91] 78  Resp:  [16-22] 16  SpO2:  [88 %-100 %] 94 %  BP: ()/(47-81) 101/50     Weight: 117 kg (257 lb 15 oz)  Body mass index is 48.74 kg/m².      Intake/Output Summary (Last 24 hours) at 1/31/2020 1215  Last data filed at 1/31/2020 1100  Gross per 24 hour   Intake 1914.9 ml   Output 1665 ml   Net 249.9 ml       Physical Exam   HENT:   Head: Normocephalic and atraumatic.   Eyes: Pupils are equal, round, and reactive to light. EOM are normal.   Cardiovascular: Normal rate and regular rhythm.   Thoracotomy dressing CDI, chest tube an DREW in place   Pulmonary/Chest:   Intubated, on A/C 70/7   Abdominal:   Morbidly obese, laparoscopic incisions CDI   Genitourinary:   Genitourinary Comments: Hernandez in place   Neurological:   Sedated and paralyzed   Skin: Skin is warm and dry.       Vents:  Vent Mode: A/C (01/31/20 1121)  Ventilator Initiated: Yes (01/29/20 2023)  Set Rate: 16 BPM (01/31/20 1121)  Vt Set: 350 mL (01/31/20  1121)  Pressure Support: 8 cmH20 (01/24/20 0915)  PEEP/CPAP: 6 cmH20 (01/31/20 1121)  Oxygen Concentration (%): 50 (01/31/20 1121)  Peak Airway Pressure: 23 cmH2O (01/31/20 1121)  Plateau Pressure: 0 cmH20 (01/31/20 1121)  Total Ve: 5.84 mL (01/31/20 1121)  Negative Inspiratory Force (cm H2O): -30 (01/24/20 0854)  F/VT Ratio<105 (RSBI): (!) 43.6 (01/31/20 1121)    Lines/Drains/Airways     Drain                 NG/OG Tube Center mouth -- days         Chest Tube 01/29/20 1700 Left Pleural 1 day         Closed/Suction Drain 01/29/20 1918 Lateral LUQ Bulb 10 Fr. 1 day         Rectal Tube 01/29/20 1530 rectal tube w/ balloon (indicate number of mLs) 1 day         Urethral Catheter 01/29/20 1515 Non-latex 16 Fr. 1 day          Airway                 Airway - Non-Surgical 01/29/20 1512 1 day         Airway - Non-Surgical 01/29/20 2007 Endotracheal Tube 1 day          Epidural Line                 Perineural Analgesia/Anesthesia Assessment (using dermatomes) 01/29/20 1407 1 day          Peripheral Intravenous Line                 Peripheral IV - Single Lumen 01/25/20 1130 20 G Right Hand 6 days         Peripheral IV - Single Lumen 01/29/20 0400 22 G Anterior;Left Forearm 2 days         Peripheral IV - Single Lumen 01/29/20 1450 18 G Left Forearm 1 day                Significant Labs:    CBC/Anemia Profile:  Recent Labs   Lab 01/29/20  2041 01/30/20  0545 01/31/20  0315   WBC  --  22.97* 20.68*   HGB  --  10.1* 8.7*   HCT 28* 33.6* 30.0*   PLT  --  472* 474*   MCV  --  91 94   RDW  --  14.0 14.4        Chemistries:  Recent Labs   Lab 01/30/20  0545 01/31/20  0315    132*   K 4.6 5.1   CL 94* 93*   CO2 30* 27   BUN 16 21   CREATININE 0.8 0.9   CALCIUM 8.5* 8.2*   ALBUMIN  --  1.8*   PROT  --  5.3*   BILITOT  --  0.3   ALKPHOS  --  91   ALT  --  14   AST  --  24   MG 2.2 2.5   PHOS 4.5 5.8*         Assessment/Plan:     * Diaphragmatic hernia  69F PMH CAD, HTN, TIA, GERD who presented with a diaphragmatic hernia, now s/p  surgical repair on 1/23/19.    Surgical:  S/p thoracotomy, reduction of hernia, and placement of mesh over diaphragmatic and intercostal defects with placement of chest tube and DREW drain  Sedated and medically paralyzed    Neuro:  Sedated and paralyzed  Pain control: Tylenol, fentanyl, oxy  Sedation: propofol, fentanyl  Paralysis: nimbex    Resp:  Tesslon, dextromethorphan, codeine to suppress cough ordered but currently paralyzed  Nebs prn  Acapella, IS  Continue to monitor oxygen requirements  Respiratory cultures showed MRSA and candida. On Bactrim.  Decrease FiO2 from 70 to 50, continue to wean  CXR improved    CV:  HDS  No pressors    Heme/ID:  RCx: MRSA, candida  Bactrim    Renal:  Strict I/Os  MIVF   Monitor Cr/BUN    FEN/GI:  Currently NPO  Replace lytes PRN  Rectal tube in place    Endo:  SSI    PPX:  Protonix  Lovenox    Dispo: Continue ICU care           Critical Care Daily Checklist:    A: Awake: RASS Goal/Actual Goal: RASS Goal: -4-->deep sedation  Actual: Jane Agitation Sedation Scale (RASS): Deep sedation   B: Spontaneous Breathing Trial Performed? Spon. Breathing Trial Initiated?: Initiated (01/24/20 0802)   C: SAT & SBT Coordinated?  paralyzed                      D: Delirium: CAM-ICU Overall CAM-ICU: Negative   E: Early Mobility Performed? Yes   F: Feeding Goal: Goals: Patient to meet > 85% EEN and EPN by RD follow-up  Status: Nutrition Goal Status: goal not met   Current Diet Order   Procedures    Diet NPO      AS: Analgesia/Sedation stable   T: Thromboembolic Prophylaxis enoxaparin   H: HOB > 300 Yes   U: Stress Ulcer Prophylaxis (if needed)    G: Glucose Control stable   B: Bowel Function Stool Occurrence: 1   I: Indwelling Catheter (Lines & Hernandez) Necessity reviewed   D: De-escalation of Antimicrobials/Pharmacotherapies Continue cefazolin     Plan for the day/ETD     Code Status:  Family/Goals of Care: Full Code          Critical care was time spent personally by me on the following  activities: development of treatment plan with patient or surrogate and bedside caregivers, discussions with consultants, evaluation of patient's response to treatment, examination of patient, ordering and performing treatments and interventions, ordering and review of laboratory studies, ordering and review of radiographic studies, pulse oximetry, re-evaluation of patient's condition.  This critical care time did not overlap with that of any other provider or involve time for any procedures.     Jordan Douglas MD  Critical Care - Surgery  [facility]

## 2020-01-31 NOTE — PLAN OF CARE
"Mild hypotension noted throughout shift per NIBP cuff, arterial line placed and adequate BP noted; All other VSS throughout shift. Afebrile. HR NSR 70s-80s. Sats greater than 94% on vent settings A/C 50%, PEEP of 6; weaned as tolerated. Current gtts infusing include propofol, nimbex, fentanyl, and ropivacaine. TOF and BIS monitoring in place (see flowsheets for details). Rectal tube in place with stool noted and draining to gravity. UOP marginal throughout shift;lasix given with little to no response. DREW drain and CT output with minimal serosanguinous drainage noted. Tube feedings started today at a trickle rate of 10 ml/hr. POC reviewed with pt and family updated via phone. See flowsheets for specific details. Will continue to monitor pt closely.            Problem: Adult Inpatient Plan of Care  Goal: Plan of Care Review  Outcome: Ongoing, Progressing   PMHx: CAD, HTN, TIA, GERD      Dx: diaphragmatic hernia     1/18: [facility] ED for cough x1 mth, intermittent fevers and dyspnea.  (1/15 w/ severe coughing episode felt a "pop" on left side, with associated severe pain & subsequent bruising of the left flank.  CT abd/pelvis- revealed a large left diaphragmatic hernia containing fat  and bowel loops with hernia of intra-abdomnial contents outside the thorax from 7th left ICS. Medium sized HH.  1/19: Transfer to [facility]  1/21: Respiratory distress Class-A to OR for Laparoscopic repair with goretex mesh and admit to SICU intubated, sedated, and [place] on arrival.   1/22: Resp Cx sent (+ MRSA)  1/24: Extubated; A-Line dc'ed. ST Eval; ok for Pureed/Nectar thick      1/27: pain increased, CT revealed mesh has tore  1/29: back to OR mesh repair and thoracotomy    1/30: paralyzed and sedated    1/31: remains paralyzed and tube feeds started at trickle rate  "

## 2020-01-31 NOTE — SUBJECTIVE & OBJECTIVE
Interval History/Significant Events: s/p thoracotomy with reduction of hernia and placement of mesh over diaphragmatic and intercostal defects. Plan to continue paralysis per primary team.    Follow-up For: Procedure(s) (LRB):  THORACOTOMY (Left)  RECONSTRUCTION, DIAPHRAGM (Left)  RECONSTRUCTION, CHEST WALL (Left)    Post-Operative Day: 2 Days Post-Op    Objective:     Vital Signs (Most Recent):  Temp: 97.9 °F (36.6 °C) (01/31/20 1100)  Pulse: 78 (01/31/20 1121)  Resp: 16 (01/31/20 1121)  BP: (!) 101/50 (01/31/20 1100)  SpO2: (!) 94 % (01/31/20 1121) Vital Signs (24h Range):  Temp:  [97.7 °F (36.5 °C)-98.3 °F (36.8 °C)] 97.9 °F (36.6 °C)  Pulse:  [75-91] 78  Resp:  [16-22] 16  SpO2:  [88 %-100 %] 94 %  BP: ()/(47-81) 101/50     Weight: 117 kg (257 lb 15 oz)  Body mass index is 48.74 kg/m².      Intake/Output Summary (Last 24 hours) at 1/31/2020 1215  Last data filed at 1/31/2020 1100  Gross per 24 hour   Intake 1914.9 ml   Output 1665 ml   Net 249.9 ml       Physical Exam   HENT:   Head: Normocephalic and atraumatic.   Eyes: Pupils are equal, round, and reactive to light. EOM are normal.   Cardiovascular: Normal rate and regular rhythm.   Thoracotomy dressing CDI, chest tube an DREW in place   Pulmonary/Chest:   Intubated, on A/C 70/7   Abdominal:   Morbidly obese, laparoscopic incisions CDI   Genitourinary:   Genitourinary Comments: Hernandez in place   Neurological:   Sedated and paralyzed   Skin: Skin is warm and dry.       Vents:  Vent Mode: A/C (01/31/20 1121)  Ventilator Initiated: Yes (01/29/20 2023)  Set Rate: 16 BPM (01/31/20 1121)  Vt Set: 350 mL (01/31/20 1121)  Pressure Support: 8 cmH20 (01/24/20 0915)  PEEP/CPAP: 6 cmH20 (01/31/20 1121)  Oxygen Concentration (%): 50 (01/31/20 1121)  Peak Airway Pressure: 23 cmH2O (01/31/20 1121)  Plateau Pressure: 0 cmH20 (01/31/20 1121)  Total Ve: 5.84 mL (01/31/20 1121)  Negative Inspiratory Force (cm H2O): -30 (01/24/20 0854)  F/VT Ratio<105 (RSBI): (!) 43.6  (01/31/20 1121)    Lines/Drains/Airways     Drain                 NG/OG Tube Center mouth -- days         Chest Tube 01/29/20 1700 Left Pleural 1 day         Closed/Suction Drain 01/29/20 1918 Lateral LUQ Bulb 10 Fr. 1 day         Rectal Tube 01/29/20 1530 rectal tube w/ balloon (indicate number of mLs) 1 day         Urethral Catheter 01/29/20 1515 Non-latex 16 Fr. 1 day          Airway                 Airway - Non-Surgical 01/29/20 1512 1 day         Airway - Non-Surgical 01/29/20 2007 Endotracheal Tube 1 day          Epidural Line                 Perineural Analgesia/Anesthesia Assessment (using dermatomes) 01/29/20 1407 1 day          Peripheral Intravenous Line                 Peripheral IV - Single Lumen 01/25/20 1130 20 G Right Hand 6 days         Peripheral IV - Single Lumen 01/29/20 0400 22 G Anterior;Left Forearm 2 days         Peripheral IV - Single Lumen 01/29/20 1450 18 G Left Forearm 1 day                Significant Labs:    CBC/Anemia Profile:  Recent Labs   Lab 01/29/20  2041 01/30/20  0545 01/31/20  0315   WBC  --  22.97* 20.68*   HGB  --  10.1* 8.7*   HCT 28* 33.6* 30.0*   PLT  --  472* 474*   MCV  --  91 94   RDW  --  14.0 14.4        Chemistries:  Recent Labs   Lab 01/30/20  0545 01/31/20  0315    132*   K 4.6 5.1   CL 94* 93*   CO2 30* 27   BUN 16 21   CREATININE 0.8 0.9   CALCIUM 8.5* 8.2*   ALBUMIN  --  1.8*   PROT  --  5.3*   BILITOT  --  0.3   ALKPHOS  --  91   ALT  --  14   AST  --  24   MG 2.2 2.5   PHOS 4.5 5.8*

## 2020-01-31 NOTE — PROGRESS NOTES
"[facility]  Thoracic Surgery  Progress Note    Subjective:     History of Present Illness:  Patient is a 69 year old female with PMH of CAD, HTN, TIA, OA, COPD and GERD admitted to SICU after presenting to outside ED on 1/18/20 for worsening SOB, cough and left flank ecchymosis. She reports that on 1/15 after an particularly severe coughing episode she felt a "pop" on left side, with associated severe pain and development of bruising. CT C/A/P showed large left hemidiaphragm hernia with associated widening of 7th intercostal space allowing for bowel to herniate outside of ribs. Transferred to [facility] on 1/19/20 on 2LNC. However, she rapidly declined from a respiratory standpoint. Underwent a laparoscopic reduction and repair of diaphragmatic hernia on 1/21/20. Per the op note, defect itself measured approximately 10 x 15 cm requiring a [place]-Rajendra patch to close the defect. Remained intubated, sedated and paralyzed after the procedure. Extubated and drain removed on 1/24/20. Over the weekend her O2 requirements again increased and had worsening leukocytosis. Chest CT today showed recurrent diaphragmatic hernia. Today she is on 7L comfort flow with decent saturations at rest. Afebrile. Hemodynamically stable. Tolerating a soft diet.     PSH of laparoscopic cholecystectomy, EMILIANO with BSO and right TKR   Never smoker. Denies EtOH use.    Post-Op Info:  Procedure(s) (LRB):  THORACOTOMY (Left)  RECONSTRUCTION, DIAPHRAGM (Left)  RECONSTRUCTION, CHEST WALL (Left)   2 Days Post-Op     Interval History: on nimbex, fentanyl, propofol. Erector spinae in place. Chest tube with 60cc, DREW with 180. NG output 270. Marginal UOP despite lasix and diamox. CXR improved. Vent 50/6    Medications:  Continuous Infusions:   cisatracurium (NIMBEX) infusion 4 mcg/kg/min (01/31/20 0600)    fentanyl 150 mcg/hr (01/31/20 0600)    nicardipine Stopped (01/29/20 2300)    propofol 30 mcg/kg/min (01/31/20 0600)    " ropivacaine (PF) 2 mg/ml (0.2%) 2 mL/hr (01/31/20 0600)     Scheduled Meds:   acetaminophen  1,000 mg Oral Q6H    acetylcysteine 100 mg/ml (10%)  4 mL Nebulization TID    albuterol-ipratropium  3 mL Nebulization Q4H    benzonatate  100 mg Oral Q8H    celecoxib  200 mg Oral Daily    chlorhexidine  15 mL Mouth/Throat BID    dextromethorphan-guaifenesin  mg/5 ml  5 mL Oral Q6H    enoxaparin  40 mg Subcutaneous BID    famotidine  20 mg Oral BID    furosemide  20 mg Intravenous BID    methocarbamol  750 mg Per NG tube QID    mupirocin  1 g Nasal BID    polyethylene glycol  17 g Per NG tube Daily    pregabalin  75 mg Oral Once    Followed by    pregabalin  75 mg Oral QHS    senna-docusate 8.6-50 mg  1 tablet Per NG tube BID    sulfamethoxazole-trimethoprim 800-160mg  1 tablet Oral BID     PRN Meds:bisacodyl, calcium gluconate IVPB, calcium gluconate IVPB, calcium gluconate IVPB, Dextrose 10% Bolus, glucagon (human recombinant), hydrALAZINE, HYDROmorphone, insulin aspart U-100, labetalol, magnesium sulfate IVPB, magnesium sulfate IVPB, melatonin, metoclopramide HCl, morphine, ondansetron, ondansetron, potassium chloride in water **AND** potassium chloride in water **AND** potassium chloride in water, promethazine (PHENERGAN) IVPB, sodium chloride 0.9%, sodium chloride 0.9%, sodium phosphate IVPB, sodium phosphate IVPB, sodium phosphate IVPB     Review of patient's allergies indicates:   Allergen Reactions    Erythromycin      Stomach pains    Rosuvastatin      Hives, muscle/joint pains    Zolpidem      Confusion      Objective:     Vital Signs (Most Recent):  Temp: 97.8 °F (36.6 °C) (01/31/20 0303)  Pulse: 77 (01/31/20 0740)  Resp: 16 (01/31/20 0740)  BP: (!) 108/55 (01/31/20 0630)  SpO2: 98 % (01/31/20 0740) Vital Signs (24h Range):  Temp:  [97.7 °F (36.5 °C)-98.5 °F (36.9 °C)] 97.8 °F (36.6 °C)  Pulse:  [75-99] 77  Resp:  [16-25] 16  SpO2:  [88 %-100 %] 98 %  BP: (100-143)/(50-81) 108/55      Intake/Output - Last 3 Shifts       01/29 0700 - 01/30 0659 01/30 0700 - 01/31 0659 01/31 0700 - 02/01 0659    P.O.       I.V. (mL/kg) 3141 (27.5) 1569.9 (13.4)     NG/GT  300 105    Total Intake(mL/kg) 3141 (27.5) 1869.9 (16) 105 (0.9)    Urine (mL/kg/hr) 825 (0.3) 915 (0.3)     Drains 185 500     Stool 0 250     Blood 20      Chest Tube 0 60     Total Output 1030 1725     Net +2111 +144.9 +105           Urine Occurrence 2 x      Stool Occurrence 3 x            SpO2: 98 %  O2 Device (Oxygen Therapy): ventilator    Physical Exam   Constitutional: She appears well-developed and well-nourished. No distress.   Intubated, sedated, paralyzed   HENT:   Head: Normocephalic and atraumatic.   ET tube in place   Eyes: Conjunctivae and EOM are normal. Right eye exhibits no discharge. Left eye exhibits no discharge.   Cardiovascular: Normal rate and regular rhythm.   Pulmonary/Chest:   Intubated  Vent Mode: A/C  Oxygen Concentration (%):  (50-70) 50  Resp Rate Total:  (16 br/min-25 br/min) 16 br/min  Vt Set:  (350 mL) 350 mL  PEEP/CPAP:  (6 cmH20-7 cmH20) 6 cmH20  Mean Airway Pressure:  (9.4 uzU97-79 cmH20) 9.5 cmH20    Thoracotomy with staples intact   Abdominal: Soft. She exhibits no distension.   Lap incisions are c/d/i   Musculoskeletal: Normal range of motion. She exhibits no deformity.   Neurological:   Sedated and paralyzed   Skin: Skin is warm and dry.       Significant Labs:  BMP:   Recent Labs   Lab 01/31/20  0315      *   K 5.1   CL 93*   CO2 27   BUN 21   CREATININE 0.9   CALCIUM 8.2*   MG 2.5     CBC:   Recent Labs   Lab 01/31/20  0315   WBC 20.68*   RBC 3.20*   HGB 8.7*   HCT 30.0*   *   MCV 94   MCH 27.2   MCHC 29.0*       Significant Diagnostics:  CXR: I have reviewed all pertinent results/findings within the past 24 hours    VTE Risk Mitigation (From admission, onward)         Ordered     enoxaparin injection 40 mg  2 times daily      01/29/20 2000     Place sequential compression device   Until discontinued      01/23/20 1002     Place sequential compression device  Until discontinued      01/19/20 2022     IP VTE LOW RISK PATIENT  Once      01/19/20 2022              Assessment/Plan:     * Diaphragmatic hernia  69 year old female admitted to SICU with recurrent left diaphragmatic hernia s/p failed laparoscopic repair now s/p diaphragm reconstruction and chest wall reconstruction.     Neuro:  Sedated and paralyzed  Pain control: erector spinae, Tylenol, fentanyl  Continue paralytic today.      Resp:  Continue vent support today. Okay to wean FiO2, consider PEEP for recruitment  Nebs prn  Respiratory cultures showed MRSA and candida. On Bactrim.  Decrease FiO2 from 70 to 50, continue to wean  CXR improved     CV:  HDS  No pressors  Continue a-line     Heme/ID:  Leukocytosis slightly improved today  RCx: MRSA, candida  Bactrim     Renal:  Hernandez in place,   Strict I/Os  Consider repeat low dose lasix today given bicarb normalized     FEN/GI:  Replace lytes PRN  Rectal tube in place, can consider adding trickle tube feeds     Endo:  SSI     PPX:  Protonix  Lovenox      Dispo: Continue ICU care, keep paralyzed today        Adrianna Mcdonald PA-C  Thoracic Surgery  [facility]

## 2020-01-31 NOTE — ADDENDUM NOTE
Addendum  created 01/31/20 1335 by Hugh Ambriz MD    Charge Capture section accepted, Cosign clinical note with attestation

## 2020-01-31 NOTE — ASSESSMENT & PLAN NOTE
Marisela Bunn is a 69 y.o. female with PMH COPD (not on home oxygen), HTN, HLD (not on anticoagulation) received as direct admission for large diaphragmatic hernia. Patient is symptomatic from hernia with constipation and dyspnea with overlying bruise on left flank. She underwent lap diaphragmatic hernia repair with mesh on 1/21/20.   Evidence of recurrent hernia on CT 1/27. Underwent recurrent L diaphragmatic hernia repair with L chestwall recon with thoracic sx on 1/29.     - Recommend weaning paralytic. Continue intubation and sedation.   - Continue Bactrim for pneumonia, (restarted 1/26)  - Aggressive pulm toilet with IS, CPT, DuoNebs, and Tessalon when able  - PT/OT when able  - Daily labs  - Drains and chest tube per thoracic surgery  - Please call with questions or concerns

## 2020-01-31 NOTE — PHYSICIAN QUERY
PT Name: Marisela Bunn  MR #: [id number]     Physician Query Form - Documentation Clarification      CDS/: Arin Stearns RN                 Contact information:[email or web address]    This form is a permanent document in the medical record.     Query Date: January 31, 2020    By submitting this query, we are merely seeking further clarification of documentation. Please utilize your independent clinical judgment when addressing the question(s) below.    The Medical record reflects the following:    Supporting Clinical Findings Location in Medical Record   S/p thoracotomy, reduction of hernia, and placement of mesh over diaphragmatic and intercostal defects with placement of chest tube and DERW drain  Sedated and medically paralyzed    Rectal tube in place, can consider adding tube feeds if ileus resolves    Diaphragmatic hernia  69 year old female admitted to SICU with recurrent left diaphragmatic hernia s/p laparoscopic repair 1 week ago. Now s/p L thoracotomy, patch reconstruction L hemidiaphragm, chest wall reconstruction with [place]-Rajendra patch 1/29/2020.     - continue paralysis, sedation, and mechanical ventilation today  - will consider stopping paralysis Saturday  - can wean FiO2 as tolerated today, leave PEEP at 7  - daily KUB to assess ileus  - diurese today  - continue NPO, no tube feeds today given ileus  - continue erector spinae block  - Remainder of care per general surgery and ICU teams. Crit care PN 1/30                CTS PN 1/30                                                                            Doctor, Please specify the ileus diagnosis associated with above clinical findings.    Provider Use Only      ___paralytic ileus, inherent to procedure, expected outcome    ___paralytic ileus, complication of procedure    ___post op ileus, inherent to procedure, expected outcome    ___post op ileus, complication of procedure    ___other ileus, type_________________    _x__other____this would be  considered a delayed return of bowel functiona nd not an ileus_______________________                                                                                                                 [  ] Clinically Undetermined

## 2020-01-31 NOTE — SUBJECTIVE & OBJECTIVE
Interval History: AF overnight. She remains intubated, sedated, paralyzed. Sating 95-97% on vent with FiO2 of 50%.       Medications:  Continuous Infusions:   cisatracurium (NIMBEX) infusion 4 mcg/kg/min (01/31/20 0600)    fentanyl 150 mcg/hr (01/31/20 0600)    nicardipine Stopped (01/29/20 2300)    propofol 30 mcg/kg/min (01/31/20 0600)    ropivacaine (PF) 2 mg/ml (0.2%) 2 mL/hr (01/31/20 0600)     Scheduled Meds:   acetaminophen  1,000 mg Oral Q6H    acetylcysteine 100 mg/ml (10%)  4 mL Nebulization TID    albuterol-ipratropium  3 mL Nebulization Q4H    benzonatate  100 mg Oral Q8H    celecoxib  200 mg Oral Daily    chlorhexidine  15 mL Mouth/Throat BID    dextromethorphan-guaifenesin  mg/5 ml  5 mL Oral Q6H    enoxaparin  40 mg Subcutaneous BID    famotidine  20 mg Oral BID    furosemide  20 mg Intravenous BID    methocarbamol  750 mg Per NG tube QID    mupirocin  1 g Nasal BID    polyethylene glycol  17 g Per NG tube Daily    pregabalin  75 mg Oral Once    Followed by    pregabalin  75 mg Oral QHS    senna-docusate 8.6-50 mg  1 tablet Per NG tube BID    sulfamethoxazole-trimethoprim 800-160mg  1 tablet Oral BID     PRN Meds:bisacodyl, calcium gluconate IVPB, calcium gluconate IVPB, calcium gluconate IVPB, Dextrose 10% Bolus, glucagon (human recombinant), hydrALAZINE, HYDROmorphone, insulin aspart U-100, labetalol, magnesium sulfate IVPB, magnesium sulfate IVPB, melatonin, metoclopramide HCl, morphine, ondansetron, ondansetron, potassium chloride in water **AND** potassium chloride in water **AND** potassium chloride in water, promethazine (PHENERGAN) IVPB, sodium chloride 0.9%, sodium chloride 0.9%, sodium phosphate IVPB, sodium phosphate IVPB, sodium phosphate IVPB     Review of patient's allergies indicates:   Allergen Reactions    Erythromycin      Stomach pains    Rosuvastatin      Hives, muscle/joint pains    Zolpidem      Confusion      Objective:     Vital Signs (Most  Recent):  Temp: 97.8 °F (36.6 °C) (01/31/20 0303)  Pulse: 77 (01/31/20 0630)  Resp: 16 (01/31/20 0630)  BP: (!) 108/55 (01/31/20 0630)  SpO2: 98 % (01/31/20 0630) Vital Signs (24h Range):  Temp:  [97.7 °F (36.5 °C)-98.5 °F (36.9 °C)] 97.8 °F (36.6 °C)  Pulse:  [75-99] 77  Resp:  [16-25] 16  SpO2:  [88 %-100 %] 98 %  BP: (100-143)/(50-81) 108/55     Weight: 117 kg (257 lb 15 oz)  Body mass index is 48.74 kg/m².    Intake/Output - Last 3 Shifts       01/29 0700 - 01/30 0659 01/30 0700 - 01/31 0659 01/31 0700 - 02/01 0659    P.O.       I.V. (mL/kg) 3141 (27.5) 1569.9 (13.4)     NG/GT  300     Total Intake(mL/kg) 3141 (27.5) 1869.9 (16)     Urine (mL/kg/hr) 825 (0.3) 915 (0.3)     Drains 185 500     Stool 0 250     Blood 20      Chest Tube 0 60     Total Output 1030 1725     Net +2111 +144.9            Urine Occurrence 2 x      Stool Occurrence 3 x            Physical Exam   Constitutional: She appears well-developed and well-nourished. No distress.   Intubated, sedated, paralyzed   HENT:   Head: Normocephalic and atraumatic.   ET tube in place   Eyes: Conjunctivae and EOM are normal. Right eye exhibits no discharge. Left eye exhibits no discharge.   Cardiovascular: Normal rate and regular rhythm.   Pulmonary/Chest:   Intubated  Vent Mode: A/C  Oxygen Concentration (%):  (50-70) 50  Resp Rate Total:  (16 br/min-25 br/min) 16 br/min  Vt Set:  (350 mL) 350 mL  PEEP/CPAP:  (6 cmH20-7 cmH20) 6 cmH20  Mean Airway Pressure:  (9.4 sqK21-30 cmH20) 9.5 cmH20     Abdominal: Soft. She exhibits no distension.   Lap incisions are c/d/i   Musculoskeletal: Normal range of motion. She exhibits no deformity.   Neurological:   Sedated and paralyzed   Skin: Skin is warm and dry.         Significant Labs:  CBC:   Recent Labs   Lab 01/31/20 0315   WBC 20.68*   RBC 3.20*   HGB 8.7*   HCT 30.0*   *   MCV 94   MCH 27.2   MCHC 29.0*     CMP:   Recent Labs   Lab 01/31/20 0315      CALCIUM 8.2*   ALBUMIN 1.8*   PROT 5.3*   *    K 5.1   CO2 27   CL 93*   BUN 21   CREATININE 0.9   ALKPHOS 91   ALT 14   AST 24   BILITOT 0.3       Significant Diagnostics:  I have reviewed all pertinent imaging results/findings within the past 24 hours.

## 2020-01-31 NOTE — ASSESSMENT & PLAN NOTE
Marisela Bunn is a 69 y.o. female with PMH COPD (not on home oxygen), HTN, HLD (not on anticoagulation) received as direct admission for large diaphragmatic hernia. Patient is symptomatic from hernia with constipation and dyspnea with overlying bruise on left flank. She underwent lap diaphragmatic hernia repair with mesh on 1/21/20.   Evidence of recurrent hernia on CT 1/27. Underwent recurrent L diaphragmatic hernia repair with L chestwall recon with thoracic sx on 1/29.     - H/H dropped to 7.1/24.2 (from 8.7/30.0 yesterday); will discuss with thoracic and SICU  - Continue Bactrim for pneumonia, (restarted 1/26)  - Aggressive pulm toilet with IS, CPT, DuoNebs, and Tessalon when able  - PT/OT when able  - Daily labs  - Drains and chest tube per thoracic surgery  - Please call with questions or concerns

## 2020-01-31 NOTE — ANESTHESIA POST-OP PAIN MANAGEMENT
Acute Pain Service Progress Note    Marisela Bunn is a 69 y.o., female, [id number].    Surgery:   THORACOTOMY (Left Chest)      RECONSTRUCTION, DIAPHRAGM (Left Chest) - Hernia reduction and repair      RECONSTRUCTION, CHEST WALL (Left Chest)    Post Op Day #: 2    Catheter type: perineural  MIGUEL    Infusion type: Ropivacaine 0.2%  2 mL/hr basal w/ 10 mL q1h IB    Problem List:    Active Hospital Problems    Diagnosis  POA    *Diaphragmatic hernia [K44.9]  Yes    Pneumonia [J18.9]  Yes    Cough [R05]  Yes    Leukocytosis [D72.829]  Yes      Resolved Hospital Problems    Diagnosis Date Resolved POA    Pre-op chest exam [Z01.811] 01/29/2020 Not Applicable       Subjective:     General appearance of sedated, paralyzed, intubated   Pain with rest: n/a   Pain with movement: n/a    Objective:     Catheter site clean, dry, intact      Vitals   Vitals:    01/31/20 0740   BP:    Pulse: 77   Resp: 16   Temp:         Labs    No results displayed because visit has over 200 results.           Meds   Current Facility-Administered Medications   Medication Dose Route Frequency Provider Last Rate Last Dose    acetaminophen tablet 1,000 mg  1,000 mg Oral Q6H Shruthi Prescott MD   Stopped at 01/30/20 0000    acetylcysteine 100 mg/ml (10%) solution 4 mL  4 mL Nebulization TID Shruthi Prescott MD   4 mL at 01/31/20 0740    albuterol-ipratropium 2.5 mg-0.5 mg/3 mL nebulizer solution 3 mL  3 mL Nebulization Q4H Shruthi Prescott MD   3 mL at 01/31/20 0740    benzonatate capsule 100 mg  100 mg Oral Q8H Shruthi Prescott MD   Stopped at 01/29/20 2200    bisacodyl suppository 10 mg  10 mg Rectal Daily PRN Shruthi Prescott MD        calcium gluconate 1g in dextrose 5% 100mL (ready to mix system)  1 g Intravenous PRN Shruthi Prescott MD        calcium gluconate 1g in dextrose 5% 100mL (ready to mix system)  1 g Intravenous PRN Shruthi Prescott MD        calcium gluconate 1g in dextrose 5% 100mL (ready  to mix system)  1 g Intravenous PRN Shruthi Prescott MD        celecoxib capsule 200 mg  200 mg Oral Daily Shruthi Prescott MD   200 mg at 01/30/20 0824    chlorhexidine 0.12 % solution 15 mL  15 mL Mouth/Throat BID Kesha Brito MD   15 mL at 01/30/20 2030    cisatracurium (NIMBEX) 200 mg in dextrose 5 % 100 mL infusion  1 mcg/kg/min Intravenous Continuous Viky Wilson MD 13.7 mL/hr at 01/31/20 0600 4 mcg/kg/min at 01/31/20 0600    dextromethorphan-guaifenesin  mg/5 ml liquid 5 mL  5 mL Oral Q6H Shruthi Prescott MD   Stopped at 01/29/20 1800    dextrose 10% (D10W) Bolus  12.5 g Intravenous PRN Shruthi Prescott MD        enoxaparin injection 40 mg  40 mg Subcutaneous BID Shruthi Prescott MD   40 mg at 01/30/20 2017    famotidine 40 mg/5 mL (8 mg/mL) suspension 20 mg  20 mg Oral BID Lucho Garcia Jr., MD   20 mg at 01/30/20 2018    fentaNYL 2500 mcg in 0.9% sodium chloride 250 mL infusion premix (titrating)  25 mcg/hr Intravenous Continuous Viky Wilson MD 15 mL/hr at 01/31/20 0600 150 mcg/hr at 01/31/20 0600    furosemide injection 20 mg  20 mg Intravenous BID Shruthi Prescott MD        glucagon (human recombinant) injection 1 mg  1 mg Intramuscular PRN Shruthi Prescott MD        hydrALAZINE injection 10 mg  10 mg Intravenous Q4H PRN Shruthi Prescott MD   10 mg at 01/29/20 2101    HYDROmorphone injection 1 mg  1 mg Intravenous Q4H PRN Desmond Green MD        insulin aspart U-100 pen 0-5 Units  0-5 Units Subcutaneous Q6H PRN Shruthi Prescott MD        labetalol 20 mg/4 mL (5 mg/mL) IV syring  10 mg Intravenous Q4H PRN Shruthi Prescott MD        magnesium sulfate 2g in water 50mL IVPB (premix)  2 g Intravenous PRN Shruthi Prescott MD   2 g at 01/28/20 0918    magnesium sulfate 2g in water 50mL IVPB (premix)  4 g Intravenous PRN Shruthi Prescott MD        melatonin tablet 6 mg  6 mg Oral Nightly  PRN Shruthi Prescott MD   6 mg at 01/25/20 2215    methocarbamol tablet 750 mg  750 mg Per NG tube QID Shruthi Prescott MD   750 mg at 01/30/20 2018    metoclopramide HCl injection 5 mg  5 mg Intravenous Q6H PRN Shruthi Prescott MD        morphine injection 2 mg  2 mg Intravenous Q4H PRN Shruthi Prescott MD        mupirocin 2 % ointment 1 g  1 g Nasal BID Shruthi Prescott MD   1 g at 01/30/20 2018    niCARdipine 40 mg/200 mL infusion  1 mg/hr Intravenous Continuous Rosemary Rain MD   Stopped at 01/29/20 2300    ondansetron disintegrating tablet 8 mg  8 mg Oral Q8H PRN Shruthi Prescott MD        ondansetron injection 4 mg  4 mg Intravenous Q12H PRN Shruthi Prescott MD        polyethylene glycol packet 17 g  17 g Per NG tube Daily Shruthi Prescott MD   17 g at 01/30/20 0824    potassium chloride 10 mEq in 100 mL IVPB  40 mEq Intravenous PRN Shruthi Prescott  mL/hr at 01/29/20 0723 40 mEq at 01/29/20 0723    And    potassium chloride 10 mEq in 100 mL IVPB  60 mEq Intravenous PRN Shruthi Prescott MD        And    potassium chloride 10 mEq in 100 mL IVPB  80 mEq Intravenous PRN Shruthi Prescott MD        pregabalin capsule 75 mg  75 mg Oral Once Shruthi Prescott MD        Followed by    pregabalin capsule 75 mg  75 mg Oral QHS Shruthi Prescott MD   75 mg at 01/30/20 2023    promethazine (PHENERGAN) 6.25 mg in dextrose 5 % 50 mL IVPB  6.25 mg Intravenous Q6H PRN Shruthi Prescott MD        propofol (DIPRIVAN) 10 mg/mL infusion  5 mcg/kg/min Intravenous Continuous Viky Wilson MD 20.6 mL/hr at 01/31/20 0600 30 mcg/kg/min at 01/31/20 0600    ropivacaine (PF) 2 mg/ml (0.2%) infusion  2 mL/hr Perineural Continuous Shruthi Prescott MD 2 mL/hr at 01/31/20 0600 2 mL/hr at 01/31/20 0600    senna-docusate 8.6-50 mg per tablet 1 tablet  1 tablet Per NG tube BID Shruthi Prescott MD   1 tablet at 01/30/20 0824     sodium chloride 0.9% flush 10 mL  10 mL Intravenous PRN Shruthi Prescott MD        sodium chloride 0.9% flush 10 mL  10 mL Intravenous PRN Shruthi Prescott MD        sodium phosphate 15 mmol in dextrose 5 % 250 mL IVPB  15 mmol Intravenous PRN Shruthi Prescott MD        sodium phosphate 20.01 mmol in dextrose 5 % 250 mL IVPB  20.01 mmol Intravenous PRN Shruthi Prescott MD        sodium phosphate 30 mmol in dextrose 5 % 250 mL IVPB  30 mmol Intravenous PRN Shruthi Prescott MD        sulfamethoxazole-trimethoprim 800-160mg per tablet 1 tablet  1 tablet Oral BID Kesha Brito MD   1 tablet at 01/30/20 2017       Assessment:      Patient remains intubated, sedated, and paralyzed following chest wall reconstruction.    Plan:     Patient doing well, continue present treatment.    Evaluator Hamilton Cuello

## 2020-01-31 NOTE — SUBJECTIVE & OBJECTIVE
Interval History: on nimbex, fentanyl, propofol. Erector spinae in place. Chest tube with 60cc, DREW with 180. NG output 270. Marginal UOP despite lasix and diamox. CXR improved.     Medications:  Continuous Infusions:   cisatracurium (NIMBEX) infusion 4 mcg/kg/min (01/31/20 0600)    fentanyl 150 mcg/hr (01/31/20 0600)    nicardipine Stopped (01/29/20 2300)    propofol 30 mcg/kg/min (01/31/20 0600)    ropivacaine (PF) 2 mg/ml (0.2%) 2 mL/hr (01/31/20 0600)     Scheduled Meds:   acetaminophen  1,000 mg Oral Q6H    acetylcysteine 100 mg/ml (10%)  4 mL Nebulization TID    albuterol-ipratropium  3 mL Nebulization Q4H    benzonatate  100 mg Oral Q8H    celecoxib  200 mg Oral Daily    chlorhexidine  15 mL Mouth/Throat BID    dextromethorphan-guaifenesin  mg/5 ml  5 mL Oral Q6H    enoxaparin  40 mg Subcutaneous BID    famotidine  20 mg Oral BID    furosemide  20 mg Intravenous BID    methocarbamol  750 mg Per NG tube QID    mupirocin  1 g Nasal BID    polyethylene glycol  17 g Per NG tube Daily    pregabalin  75 mg Oral Once    Followed by    pregabalin  75 mg Oral QHS    senna-docusate 8.6-50 mg  1 tablet Per NG tube BID    sulfamethoxazole-trimethoprim 800-160mg  1 tablet Oral BID     PRN Meds:bisacodyl, calcium gluconate IVPB, calcium gluconate IVPB, calcium gluconate IVPB, Dextrose 10% Bolus, glucagon (human recombinant), hydrALAZINE, HYDROmorphone, insulin aspart U-100, labetalol, magnesium sulfate IVPB, magnesium sulfate IVPB, melatonin, metoclopramide HCl, morphine, ondansetron, ondansetron, potassium chloride in water **AND** potassium chloride in water **AND** potassium chloride in water, promethazine (PHENERGAN) IVPB, sodium chloride 0.9%, sodium chloride 0.9%, sodium phosphate IVPB, sodium phosphate IVPB, sodium phosphate IVPB     Review of patient's allergies indicates:   Allergen Reactions    Erythromycin      Stomach pains    Rosuvastatin      Hives, muscle/joint pains    Zolpidem       Confusion      Objective:     Vital Signs (Most Recent):  Temp: 97.8 °F (36.6 °C) (01/31/20 0303)  Pulse: 77 (01/31/20 0740)  Resp: 16 (01/31/20 0740)  BP: (!) 108/55 (01/31/20 0630)  SpO2: 98 % (01/31/20 0740) Vital Signs (24h Range):  Temp:  [97.7 °F (36.5 °C)-98.5 °F (36.9 °C)] 97.8 °F (36.6 °C)  Pulse:  [75-99] 77  Resp:  [16-25] 16  SpO2:  [88 %-100 %] 98 %  BP: (100-143)/(50-81) 108/55     Intake/Output - Last 3 Shifts       01/29 0700 - 01/30 0659 01/30 0700 - 01/31 0659 01/31 0700 - 02/01 0659    P.O.       I.V. (mL/kg) 3141 (27.5) 1569.9 (13.4)     NG/GT  300 105    Total Intake(mL/kg) 3141 (27.5) 1869.9 (16) 105 (0.9)    Urine (mL/kg/hr) 825 (0.3) 915 (0.3)     Drains 185 500     Stool 0 250     Blood 20      Chest Tube 0 60     Total Output 1030 1725     Net +2111 +144.9 +105           Urine Occurrence 2 x      Stool Occurrence 3 x            SpO2: 98 %  O2 Device (Oxygen Therapy): ventilator    Physical Exam   Constitutional: She appears well-developed and well-nourished. No distress.   Intubated, sedated, paralyzed   HENT:   Head: Normocephalic and atraumatic.   ET tube in place   Eyes: Conjunctivae and EOM are normal. Right eye exhibits no discharge. Left eye exhibits no discharge.   Cardiovascular: Normal rate and regular rhythm.   Pulmonary/Chest:   Intubated  Vent Mode: A/C  Oxygen Concentration (%):  (50-70) 50  Resp Rate Total:  (16 br/min-25 br/min) 16 br/min  Vt Set:  (350 mL) 350 mL  PEEP/CPAP:  (6 cmH20-7 cmH20) 6 cmH20  Mean Airway Pressure:  (9.4 kyB23-87 cmH20) 9.5 cmH20    Thoracotomy with staples intact   Abdominal: Soft. She exhibits no distension.   Lap incisions are c/d/i   Musculoskeletal: Normal range of motion. She exhibits no deformity.   Neurological:   Sedated and paralyzed   Skin: Skin is warm and dry.       Significant Labs:  BMP:   Recent Labs   Lab 01/31/20  0315      *   K 5.1   CL 93*   CO2 27   BUN 21   CREATININE 0.9   CALCIUM 8.2*   MG 2.5     CBC:    Recent Labs   Lab 01/31/20  0315   WBC 20.68*   RBC 3.20*   HGB 8.7*   HCT 30.0*   *   MCV 94   MCH 27.2   MCHC 29.0*       Significant Diagnostics:  CXR: I have reviewed all pertinent results/findings within the past 24 hours    VTE Risk Mitigation (From admission, onward)         Ordered     enoxaparin injection 40 mg  2 times daily      01/29/20 2000     Place sequential compression device  Until discontinued      01/23/20 1002     Place sequential compression device  Until discontinued      01/19/20 2022     IP VTE LOW RISK PATIENT  Once      01/19/20 2022

## 2020-02-01 LAB
ABO + RH BLD: NORMAL
ANION GAP SERPL CALC-SCNC: 8 MMOL/L (ref 8–16)
ANISOCYTOSIS BLD QL SMEAR: SLIGHT
BASOPHILS NFR BLD: 0 % (ref 0–1.9)
BLD GP AB SCN CELLS X3 SERPL QL: NORMAL
BLD PROD TYP BPU: NORMAL
BLOOD UNIT EXPIRATION DATE: NORMAL
BLOOD UNIT TYPE CODE: 5100
BLOOD UNIT TYPE: NORMAL
BUN SERPL-MCNC: 24 MG/DL (ref 8–23)
CALCIUM SERPL-MCNC: 7.4 MG/DL (ref 8.7–10.5)
CHLORIDE SERPL-SCNC: 94 MMOL/L (ref 95–110)
CO2 SERPL-SCNC: 29 MMOL/L (ref 23–29)
CODING SYSTEM: NORMAL
CREAT SERPL-MCNC: 1 MG/DL (ref 0.5–1.4)
DIFFERENTIAL METHOD: ABNORMAL
DISPENSE STATUS: NORMAL
EOSINOPHIL NFR BLD: 11 % (ref 0–8)
ERYTHROCYTE [DISTWIDTH] IN BLOOD BY AUTOMATED COUNT: 14.5 % (ref 11.5–14.5)
EST. GFR  (AFRICAN AMERICAN): >60 ML/MIN/1.73 M^2
EST. GFR  (NON AFRICAN AMERICAN): 58 ML/MIN/1.73 M^2
GLUCOSE SERPL-MCNC: 107 MG/DL (ref 70–110)
HCT VFR BLD AUTO: 24.4 % (ref 37–48.5)
HGB BLD-MCNC: 7.1 G/DL (ref 12–16)
HYPOCHROMIA BLD QL SMEAR: ABNORMAL
IMM GRANULOCYTES # BLD AUTO: ABNORMAL K/UL (ref 0–0.04)
IMM GRANULOCYTES NFR BLD AUTO: ABNORMAL % (ref 0–0.5)
LYMPHOCYTES NFR BLD: 9 % (ref 18–48)
MAGNESIUM SERPL-MCNC: 2.3 MG/DL (ref 1.6–2.6)
MCH RBC QN AUTO: 27.2 PG (ref 27–31)
MCHC RBC AUTO-ENTMCNC: 29.1 G/DL (ref 32–36)
MCV RBC AUTO: 94 FL (ref 82–98)
METAMYELOCYTES NFR BLD MANUAL: 2 %
MONOCYTES NFR BLD: 8 % (ref 4–15)
MYELOCYTES NFR BLD MANUAL: 1 %
NEUTROPHILS NFR BLD: 67 % (ref 38–73)
NEUTS BAND NFR BLD MANUAL: 2 %
NRBC BLD-RTO: 0 /100 WBC
NUM UNITS TRANS FFP: NORMAL
NUM UNITS TRANS FFP: NORMAL
OVALOCYTES BLD QL SMEAR: ABNORMAL
PHOSPHATE SERPL-MCNC: 4.8 MG/DL (ref 2.7–4.5)
PLATELET # BLD AUTO: 463 K/UL (ref 150–350)
PLATELET BLD QL SMEAR: ABNORMAL
PMV BLD AUTO: 9.6 FL (ref 9.2–12.9)
POCT GLUCOSE: 95 MG/DL (ref 70–110)
POIKILOCYTOSIS BLD QL SMEAR: SLIGHT
POLYCHROMASIA BLD QL SMEAR: ABNORMAL
POTASSIUM SERPL-SCNC: 4 MMOL/L (ref 3.5–5.1)
RBC # BLD AUTO: 2.61 M/UL (ref 4–5.4)
SODIUM SERPL-SCNC: 131 MMOL/L (ref 136–145)
STOMATOCYTES BLD QL SMEAR: PRESENT
TRANS ERYTHROCYTES VOL PATIENT: NORMAL ML
WBC # BLD AUTO: 18.38 K/UL (ref 3.9–12.7)

## 2020-02-01 PROCEDURE — P9021 RED BLOOD CELLS UNIT: HCPCS

## 2020-02-01 PROCEDURE — 27000221 HC OXYGEN, UP TO 24 HOURS

## 2020-02-01 PROCEDURE — 85027 COMPLETE CBC AUTOMATED: CPT

## 2020-02-01 PROCEDURE — 94761 N-INVAS EAR/PLS OXIMETRY MLT: CPT

## 2020-02-01 PROCEDURE — 36430 TRANSFUSION BLD/BLD COMPNT: CPT

## 2020-02-01 PROCEDURE — 63600175 PHARM REV CODE 636 W HCPCS: Performed by: STUDENT IN AN ORGANIZED HEALTH CARE EDUCATION/TRAINING PROGRAM

## 2020-02-01 PROCEDURE — 80048 BASIC METABOLIC PNL TOTAL CA: CPT

## 2020-02-01 PROCEDURE — 27100171 HC OXYGEN HIGH FLOW UP TO 24 HOURS

## 2020-02-01 PROCEDURE — 25000003 PHARM REV CODE 250: Performed by: STUDENT IN AN ORGANIZED HEALTH CARE EDUCATION/TRAINING PROGRAM

## 2020-02-01 PROCEDURE — 99900026 HC AIRWAY MAINTENANCE (STAT)

## 2020-02-01 PROCEDURE — 94003 VENT MGMT INPAT SUBQ DAY: CPT

## 2020-02-01 PROCEDURE — 99291 PR CRITICAL CARE, E/M 30-74 MINUTES: ICD-10-PCS | Mod: 24,,, | Performed by: SURGERY

## 2020-02-01 PROCEDURE — 86920 COMPATIBILITY TEST SPIN: CPT

## 2020-02-01 PROCEDURE — 84100 ASSAY OF PHOSPHORUS: CPT

## 2020-02-01 PROCEDURE — 94668 MNPJ CHEST WALL SBSQ: CPT

## 2020-02-01 PROCEDURE — 86901 BLOOD TYPING SEROLOGIC RH(D): CPT

## 2020-02-01 PROCEDURE — 83735 ASSAY OF MAGNESIUM: CPT

## 2020-02-01 PROCEDURE — 99900035 HC TECH TIME PER 15 MIN (STAT)

## 2020-02-01 PROCEDURE — 99291 CRITICAL CARE FIRST HOUR: CPT | Mod: 24,,, | Performed by: SURGERY

## 2020-02-01 PROCEDURE — 25000242 PHARM REV CODE 250 ALT 637 W/ HCPCS: Performed by: STUDENT IN AN ORGANIZED HEALTH CARE EDUCATION/TRAINING PROGRAM

## 2020-02-01 PROCEDURE — 94640 AIRWAY INHALATION TREATMENT: CPT

## 2020-02-01 PROCEDURE — 85007 BL SMEAR W/DIFF WBC COUNT: CPT

## 2020-02-01 PROCEDURE — 20000000 HC ICU ROOM

## 2020-02-01 RX ORDER — HYDROCODONE BITARTRATE AND ACETAMINOPHEN 500; 5 MG/1; MG/1
TABLET ORAL
Status: DISCONTINUED | OUTPATIENT
Start: 2020-02-01 | End: 2020-02-05

## 2020-02-01 RX ORDER — FUROSEMIDE 10 MG/ML
40 INJECTION INTRAMUSCULAR; INTRAVENOUS ONCE
Status: COMPLETED | OUTPATIENT
Start: 2020-02-01 | End: 2020-02-01

## 2020-02-01 RX ORDER — SODIUM CHLORIDE 9 MG/ML
INJECTION, SOLUTION INTRAVENOUS CONTINUOUS
Status: DISCONTINUED | OUTPATIENT
Start: 2020-02-01 | End: 2020-02-01

## 2020-02-01 RX ADMIN — SODIUM CHLORIDE 500 ML: 0.9 INJECTION, SOLUTION INTRAVENOUS at 12:02

## 2020-02-01 RX ADMIN — FUROSEMIDE 40 MG: 10 INJECTION, SOLUTION INTRAMUSCULAR; INTRAVENOUS at 10:02

## 2020-02-01 RX ADMIN — DOCUSATE SODIUM - SENNOSIDES 1 TABLET: 50; 8.6 TABLET, FILM COATED ORAL at 08:02

## 2020-02-01 RX ADMIN — SODIUM CHLORIDE: 0.9 INJECTION, SOLUTION INTRAVENOUS at 10:02

## 2020-02-01 RX ADMIN — CELECOXIB 200 MG: 200 CAPSULE ORAL at 10:02

## 2020-02-01 RX ADMIN — IPRATROPIUM BROMIDE AND ALBUTEROL SULFATE 3 ML: .5; 3 SOLUTION RESPIRATORY (INHALATION) at 03:02

## 2020-02-01 RX ADMIN — IPRATROPIUM BROMIDE AND ALBUTEROL SULFATE 3 ML: .5; 3 SOLUTION RESPIRATORY (INHALATION) at 08:02

## 2020-02-01 RX ADMIN — SULFAMETHOXAZOLE AND TRIMETHOPRIM 1 TABLET: 800; 160 TABLET ORAL at 10:02

## 2020-02-01 RX ADMIN — MUPIROCIN 1 G: 20 OINTMENT TOPICAL at 10:02

## 2020-02-01 RX ADMIN — CISATRACURIUM BESYLATE 8 MCG/KG/MIN: 10 INJECTION INTRAVENOUS at 12:02

## 2020-02-01 RX ADMIN — IPRATROPIUM BROMIDE AND ALBUTEROL SULFATE 3 ML: .5; 3 SOLUTION RESPIRATORY (INHALATION) at 11:02

## 2020-02-01 RX ADMIN — IPRATROPIUM BROMIDE AND ALBUTEROL SULFATE 3 ML: .5; 3 SOLUTION RESPIRATORY (INHALATION) at 07:02

## 2020-02-01 RX ADMIN — CHLOROTHIAZIDE SODIUM 500 MG: 500 INJECTION, POWDER, LYOPHILIZED, FOR SOLUTION INTRAVENOUS at 11:02

## 2020-02-01 RX ADMIN — PROPOFOL 30 MCG/KG/MIN: 10 INJECTION, EMULSION INTRAVENOUS at 12:02

## 2020-02-01 RX ADMIN — POLYETHYLENE GLYCOL 3350 17 G: 17 POWDER, FOR SOLUTION ORAL at 10:02

## 2020-02-01 RX ADMIN — SULFAMETHOXAZOLE AND TRIMETHOPRIM 1 TABLET: 800; 160 TABLET ORAL at 08:02

## 2020-02-01 RX ADMIN — MUPIROCIN 1 G: 20 OINTMENT TOPICAL at 08:02

## 2020-02-01 RX ADMIN — CISATRACURIUM BESYLATE 6.5 MCG/KG/MIN: 10 INJECTION INTRAVENOUS at 04:02

## 2020-02-01 RX ADMIN — METHOCARBAMOL TABLETS 750 MG: 750 TABLET, COATED ORAL at 10:02

## 2020-02-01 RX ADMIN — ACETYLCYSTEINE 4 ML: 100 SOLUTION ORAL; RESPIRATORY (INHALATION) at 08:02

## 2020-02-01 RX ADMIN — ENOXAPARIN SODIUM 40 MG: 100 INJECTION SUBCUTANEOUS at 10:02

## 2020-02-01 RX ADMIN — CHLORHEXIDINE GLUCONATE 0.12% ORAL RINSE 15 ML: 1.2 LIQUID ORAL at 10:02

## 2020-02-01 RX ADMIN — FUROSEMIDE 20 MG: 10 INJECTION, SOLUTION INTRAVENOUS at 10:02

## 2020-02-01 RX ADMIN — METHOCARBAMOL TABLETS 750 MG: 750 TABLET, COATED ORAL at 08:02

## 2020-02-01 RX ADMIN — FUROSEMIDE 20 MG: 10 INJECTION, SOLUTION INTRAVENOUS at 06:02

## 2020-02-01 RX ADMIN — ACETYLCYSTEINE 4 ML: 100 SOLUTION ORAL; RESPIRATORY (INHALATION) at 03:02

## 2020-02-01 RX ADMIN — ACETYLCYSTEINE 4 ML: 100 SOLUTION ORAL; RESPIRATORY (INHALATION) at 11:02

## 2020-02-01 RX ADMIN — PREGABALIN 75 MG: 75 CAPSULE ORAL at 08:02

## 2020-02-01 RX ADMIN — CHLORHEXIDINE GLUCONATE 0.12% ORAL RINSE 15 ML: 1.2 LIQUID ORAL at 08:02

## 2020-02-01 RX ADMIN — DOCUSATE SODIUM - SENNOSIDES 1 TABLET: 50; 8.6 TABLET, FILM COATED ORAL at 10:02

## 2020-02-01 RX ADMIN — ENOXAPARIN SODIUM 40 MG: 100 INJECTION SUBCUTANEOUS at 08:02

## 2020-02-01 NOTE — SUBJECTIVE & OBJECTIVE
Interval History/Significant Events: A-line placed yesterday, SBPs into 90s gave 500cc NS, UOP into the 20s/hr gave 500cc NS.    Follow-up For: Procedure(s) (LRB):  THORACOTOMY (Left)  RECONSTRUCTION, DIAPHRAGM (Left)  RECONSTRUCTION, CHEST WALL (Left)    Post-Operative Day: 3 Days Post-Op    Objective:     Vital Signs (Most Recent):  Temp: 97.8 °F (36.6 °C) (02/01/20 0715)  Pulse: 91 (02/01/20 0844)  Resp: (!) 21 (02/01/20 0844)  BP: (!) 108/53 (02/01/20 0805)  SpO2: 97 % (02/01/20 0844) Vital Signs (24h Range):  Temp:  [97.5 °F (36.4 °C)-98.2 °F (36.8 °C)] 97.8 °F (36.6 °C)  Pulse:  [73-93] 91  Resp:  [16-41] 21  SpO2:  [90 %-100 %] 97 %  BP: ()/(47-60) 108/53  Arterial Line BP: ()/(39-52) 90/40     Weight: 117 kg (257 lb 15 oz)  Body mass index is 48.74 kg/m².      Intake/Output Summary (Last 24 hours) at 2/1/2020 0959  Last data filed at 2/1/2020 0820  Gross per 24 hour   Intake 2767.2 ml   Output 1153 ml   Net 1614.2 ml       Physical Exam   HENT:   Head: Normocephalic and atraumatic.   Eyes: Pupils are equal, round, and reactive to light. EOM are normal.   Cardiovascular: Normal rate and regular rhythm.   Thoracotomy dressing CDI, chest tube an DREW in place   Pulmonary/Chest:   Intubated, on A/C 50/6   Abdominal:   Morbidly obese, laparoscopic incisions CDI   Genitourinary:   Genitourinary Comments: Hernandez in place   Neurological:   Sedated and medically paralyzed   Skin: Skin is warm and dry.       Vents:  Vent Mode: A/C (02/01/20 0844)  Ventilator Initiated: Yes (01/29/20 2023)  Set Rate: 16 BPM (02/01/20 0844)  Vt Set: 350 mL (02/01/20 0844)  Pressure Support: 8 cmH20 (01/24/20 0915)  PEEP/CPAP: 6 cmH20 (02/01/20 0844)  Oxygen Concentration (%): 50 (02/01/20 0844)  Peak Airway Pressure: 24 cmH2O (02/01/20 0844)  Plateau Pressure: 22 cmH20 (02/01/20 0844)  Total Ve: 8.39 mL (02/01/20 0844)  Negative Inspiratory Force (cm H2O): -30 (01/24/20 0854)  F/VT Ratio<105 (RSBI): (!) 56.76 (02/01/20  0844)    Lines/Drains/Airways     Drain                 NG/OG Tube Center mouth -- days         Chest Tube 01/29/20 1700 Left Pleural 2 days         Closed/Suction Drain 01/29/20 1918 Lateral LUQ Bulb 10 Fr. 2 days         Rectal Tube 01/29/20 1530 rectal tube w/ balloon (indicate number of mLs) 2 days         Urethral Catheter 01/29/20 1515 Non-latex 16 Fr. 2 days          Airway                 Airway - Non-Surgical 01/29/20 1512 2 days         Airway - Non-Surgical 01/29/20 2007 Endotracheal Tube 2 days          Epidural Line                 Perineural Analgesia/Anesthesia Assessment (using dermatomes) 01/29/20 1407 2 days          Arterial Line                 Arterial Line 01/31/20 1518 Left Radial less than 1 day          Peripheral Intravenous Line                 Peripheral IV - Single Lumen 01/29/20 0400 22 G Anterior;Left Forearm 3 days         Peripheral IV - Single Lumen 01/29/20 1450 18 G Left Forearm 2 days         Peripheral IV - Single Lumen 02/01/20 0255 18 G Anterior;Right Forearm less than 1 day                Significant Labs:    CBC/Anemia Profile:  Recent Labs   Lab 01/31/20  0315 02/01/20  0307   WBC 20.68* 18.38*   HGB 8.7* 7.1*   HCT 30.0* 24.4*   * 463*   MCV 94 94   RDW 14.4 14.5        Chemistries:  Recent Labs   Lab 01/31/20 0315 02/01/20  0307   * 131*   K 5.1 4.0   CL 93* 94*   CO2 27 29   BUN 21 24*   CREATININE 0.9 1.0   CALCIUM 8.2* 7.4*   ALBUMIN 1.8*  --    PROT 5.3*  --    BILITOT 0.3  --    ALKPHOS 91  --    ALT 14  --    AST 24  --    MG 2.5 2.3   PHOS 5.8* 4.8*       All pertinent labs within the past 24 hours have been reviewed.    Significant Imaging:  I have reviewed all pertinent imaging results/findings within the past 24 hours.

## 2020-02-01 NOTE — PLAN OF CARE
Plan of care reviewed with patient. Patient intubated, sedated, and paralyzed. Vent: AC 50%/6 PEEP. MAP > 65 SBP < 160. Gtts: Propofol @ 30 mcg/kg/min, Fentanyl at 100 mcg/hr, Nimbex at 6 mcg/kg/min, Ropivicain at 2 ml/hr via epidural. See flowsheet for details regarding BIS and TOF. TF at 10 ml/hr. DREW drain and CT with serosanguinous output, see flowsheet. UOP 30-35 ml/hr. Flexi output of 75 ml. A total of 1 L of NS given throughout the night due to decreased BP and UOPs, see notes for details. Turned Q2H. Skin remains free from further breakdown. VSS, will continue to monitor.

## 2020-02-01 NOTE — PLAN OF CARE
"Systolic BP greater than 90 maintained throughout shift. Afebrile. HR NSR 70s-80s. Sats greater than 94% on vent settings A/C 50%, PEEP of 5; weaned as tolerated. Current gtts infusing include propofol, fentanyl, and ropivacaine. Nimbex turned off this am. Rectal tube in place with stool noted and draining to gravity. UOP marginal throughout shift;lasix given with little to no response. DREW drain and CT output with minimal serosanguinous drainage noted. Tube feedings increased to a rate of 20 ml/hr; increasing by 10 ml Q6H. POC reviewed with son at bedside. See flowsheets for specific details. Will continue to monitor pt closely.            Problem: Adult Inpatient Plan of Care  Goal: Plan of Care Review  Outcome: Ongoing, Progressing   PMHx: CAD, HTN, TIA, GERD      Dx: diaphragmatic hernia     1/18: [facility] ED for cough x1 mth, intermittent fevers and dyspnea.  (1/15 w/ severe coughing episode felt a "pop" on left side, with associated severe pain & subsequent bruising of the left flank.  CT abd/pelvis- revealed a large left diaphragmatic hernia containing fat  and bowel loops with hernia of intra-abdomnial contents outside the thorax from 7th left ICS. Medium sized HH.  1/19: Transfer to [facility]  1/21: Respiratory distress Class-A to OR for Laparoscopic repair with goretex mesh and admit to SICU intubated, sedated, and [place] on arrival.   1/22: Resp Cx sent (+ MRSA)  1/24: Extubated; A-Line dc'ed. ST Eval; ok for Pureed/Nectar thick      1/27: pain increased, CT revealed mesh has tore  1/29: back to OR mesh repair and thoracotomy    1/30: paralyzed and sedated    1/31: remains paralyzed and tube feeds started at trickle rate  2/1: paralytic turned off          "

## 2020-02-01 NOTE — PROGRESS NOTES
[facility]  Critical Care - Surgery  Progress Note    Patient Name: Marisela Bunn  MRN: [id number]  Admission Date: 1/19/2020  Hospital Length of Stay: 13 days  Code Status: Full Code  Attending Provider: Lucho Garcia Jr.,*  Primary Care Provider: Primary Doctor No   Principal Problem: Diaphragmatic hernia    Subjective:     Hospital/ICU Course:  No notes on file    Interval History/Significant Events: A-line placed yesterday, SBPs into 90s gave 500cc NS, UOP into the 20s/hr gave 500cc NS.    Follow-up For: Procedure(s) (LRB):  THORACOTOMY (Left)  RECONSTRUCTION, DIAPHRAGM (Left)  RECONSTRUCTION, CHEST WALL (Left)    Post-Operative Day: 3 Days Post-Op    Objective:     Vital Signs (Most Recent):  Temp: 97.8 °F (36.6 °C) (02/01/20 0715)  Pulse: 91 (02/01/20 0844)  Resp: (!) 21 (02/01/20 0844)  BP: (!) 108/53 (02/01/20 0805)  SpO2: 97 % (02/01/20 0844) Vital Signs (24h Range):  Temp:  [97.5 °F (36.4 °C)-98.2 °F (36.8 °C)] 97.8 °F (36.6 °C)  Pulse:  [73-93] 91  Resp:  [16-41] 21  SpO2:  [90 %-100 %] 97 %  BP: ()/(47-60) 108/53  Arterial Line BP: ()/(39-52) 90/40     Weight: 117 kg (257 lb 15 oz)  Body mass index is 48.74 kg/m².      Intake/Output Summary (Last 24 hours) at 2/1/2020 0959  Last data filed at 2/1/2020 0820  Gross per 24 hour   Intake 2767.2 ml   Output 1153 ml   Net 1614.2 ml       Physical Exam   HENT:   Head: Normocephalic and atraumatic.   Eyes: Pupils are equal, round, and reactive to light. EOM are normal.   Cardiovascular: Normal rate and regular rhythm.   Thoracotomy dressing CDI, chest tube an DREW in place   Pulmonary/Chest:   Intubated, on A/C 50/6   Abdominal:   Morbidly obese, laparoscopic incisions CDI   Genitourinary:   Genitourinary Comments: Hernandez in place   Neurological:   Sedated and medically paralyzed   Skin: Skin is warm and dry.       Vents:  Vent Mode: A/C (02/01/20 0844)  Ventilator Initiated: Yes (01/29/20 2023)  Set Rate: 16 BPM (02/01/20  0844)  Vt Set: 350 mL (02/01/20 0844)  Pressure Support: 8 cmH20 (01/24/20 0915)  PEEP/CPAP: 6 cmH20 (02/01/20 0844)  Oxygen Concentration (%): 50 (02/01/20 0844)  Peak Airway Pressure: 24 cmH2O (02/01/20 0844)  Plateau Pressure: 22 cmH20 (02/01/20 0844)  Total Ve: 8.39 mL (02/01/20 0844)  Negative Inspiratory Force (cm H2O): -30 (01/24/20 0854)  F/VT Ratio<105 (RSBI): (!) 56.76 (02/01/20 0844)    Lines/Drains/Airways     Drain                 NG/OG Tube Center mouth -- days         Chest Tube 01/29/20 1700 Left Pleural 2 days         Closed/Suction Drain 01/29/20 1918 Lateral LUQ Bulb 10 Fr. 2 days         Rectal Tube 01/29/20 1530 rectal tube w/ balloon (indicate number of mLs) 2 days         Urethral Catheter 01/29/20 1515 Non-latex 16 Fr. 2 days          Airway                 Airway - Non-Surgical 01/29/20 1512 2 days         Airway - Non-Surgical 01/29/20 2007 Endotracheal Tube 2 days          Epidural Line                 Perineural Analgesia/Anesthesia Assessment (using dermatomes) 01/29/20 1407 2 days          Arterial Line                 Arterial Line 01/31/20 1518 Left Radial less than 1 day          Peripheral Intravenous Line                 Peripheral IV - Single Lumen 01/29/20 0400 22 G Anterior;Left Forearm 3 days         Peripheral IV - Single Lumen 01/29/20 1450 18 G Left Forearm 2 days         Peripheral IV - Single Lumen 02/01/20 0255 18 G Anterior;Right Forearm less than 1 day                Significant Labs:    CBC/Anemia Profile:  Recent Labs   Lab 01/31/20  0315 02/01/20  0307   WBC 20.68* 18.38*   HGB 8.7* 7.1*   HCT 30.0* 24.4*   * 463*   MCV 94 94   RDW 14.4 14.5        Chemistries:  Recent Labs   Lab 01/31/20  0315 02/01/20  0307   * 131*   K 5.1 4.0   CL 93* 94*   CO2 27 29   BUN 21 24*   CREATININE 0.9 1.0   CALCIUM 8.2* 7.4*   ALBUMIN 1.8*  --    PROT 5.3*  --    BILITOT 0.3  --    ALKPHOS 91  --    ALT 14  --    AST 24  --    MG 2.5 2.3   PHOS 5.8* 4.8*       All  pertinent labs within the past 24 hours have been reviewed.    Significant Imaging:  I have reviewed all pertinent imaging results/findings within the past 24 hours.    Assessment/Plan:     * Diaphragmatic hernia  69F PMH CAD, HTN, TIA, GERD who presented with a diaphragmatic hernia, now s/p surgical repair on 1/23/19.    Surgical:  S/p thoracotomy, reduction of hernia, and placement of mesh over diaphragmatic and intercostal defects with placement of chest tube and DREW drain  Sedated and medically paralyzed    Neuro:  Sedated and paralyzed  Pain control: Tylenol, fentanyl, oxy  Sedation: propofol, fentanyl  Hold paralysis today, wean sedation    Resp:  Tesslon, dextromethorphan, codeine to suppress cough ordered but currently paralyzed  Nebs prn  Acapella, IS  Continue to monitor oxygen requirements  Respiratory cultures showed MRSA and candida. On Bactrim.  Decrease FiO2 from 70 to 50, continue to wean  CXR improved    CV:  HDS  No pressors  Borderline hypotensive but fluid overloaded, will give lasix 40 and 2u pRBC    Heme/ID:  RCx: MRSA, candida  Bactrim    Renal:  Strict I/Os  MIVF   Monitor Cr/BUN    FEN/GI:  Currently NPO  Replace lytes PRN  Rectal tube in place    Endo:  SSI    PPX:  Protonix  Lovenox    Dispo: Continue ICU care         Schuyler Lomax MD  Critical Care - Surgery  [facility]

## 2020-02-01 NOTE — NURSING
Called Dr. Brown in regards to clarify orders in attempt to diuresis; there were orders written to start MIVF and also give Lasix. Orders were received to do both at this time.

## 2020-02-01 NOTE — NURSING
Dr. Lomax notified of cuff pressures reading higher than arterial line BP. Orders received to obtain cuff pressures and attempt to keep systolic BP on arterial line greater than 90.

## 2020-02-01 NOTE — PROGRESS NOTES
Notified Dr. Lomax of patient's RR in the 20s after increasing nimbex, propofol and fentanyl gtts. Dr. Lomax stated a RR of 20 is acceptable.No other orders received. All other VSS, will continue to monitor patient.

## 2020-02-01 NOTE — ASSESSMENT & PLAN NOTE
69F PMH CAD, HTN, TIA, GERD who presented with a diaphragmatic hernia, now s/p surgical repair on 1/23/19.    Surgical:  S/p thoracotomy, reduction of hernia, and placement of mesh over diaphragmatic and intercostal defects with placement of chest tube and DREW drain  Sedated and medically paralyzed    Neuro:  Sedated and paralyzed  Pain control: Tylenol, fentanyl, oxy  Sedation: propofol, fentanyl  Hold paralysis today, wean sedation    Resp:  Tesslon, dextromethorphan, codeine to suppress cough ordered but currently paralyzed  Nebs prn  Acapella, IS  Continue to monitor oxygen requirements  Respiratory cultures showed MRSA and candida. On Bactrim.  Decrease FiO2 from 70 to 50, continue to wean  CXR improved    CV:  HDS  No pressors  Borderline hypotensive but fluid overloaded, will give lasix 40 and 2u pRBC    Heme/ID:  RCx: MRSA, candida  Bactrim    Renal:  Strict I/Os  MIVF   Monitor Cr/BUN    FEN/GI:  Currently NPO  Replace lytes PRN  Rectal tube in place    Endo:  SSI    PPX:  Protonix  Lovenox    Dispo: Continue ICU care

## 2020-02-01 NOTE — ASSESSMENT & PLAN NOTE
69 year old female admitted to SICU with recurrent left diaphragmatic hernia s/p failed laparoscopic repair now s/p diaphragm reconstruction and chest wall reconstruction.     Neuro:  Sedated and paralyzed  Pain control: erector spinae, Tylenol, fentanyl  Wean paralytics today     Resp:  Continue vent support today.   Nebs prn  Respiratory cultures showed MRSA and candida.     CV:  HDS  No pressors  Continue a-line     Heme/ID:  RCx: MRSA, candida  Bactrim     Renal:  Hernandez in place,   Strict I/Os     FEN/GI:  Replace lytes PRN  Rectal tube in place, can consider adding trickle tube feeds     Endo:  SSI     PPX:  Protonix  Lovenox      Dispo: Continue ICU care

## 2020-02-01 NOTE — PROGRESS NOTES
Notified Dr. Lomax of Patient's UOP below 30 for last hour after administration of NS bolus. MD stated to continue monitoring patient for now. No new orders received. Will continue to monitor.

## 2020-02-01 NOTE — SUBJECTIVE & OBJECTIVE
Interval History:   NAEON. On nimbex but still over breathing vent. Given lasix yesterday without effect, also bolused one liter NS with no change in urine output.     Medications:  Continuous Infusions:   cisatracurium (NIMBEX) infusion Stopped (02/01/20 0820)    fentanyl 100 mcg/hr (02/01/20 0800)    propofol 30 mcg/kg/min (02/01/20 0800)    ropivacaine (PF) 2 mg/ml (0.2%) 2 mL/hr (02/01/20 0800)     Scheduled Meds:   acetaminophen  1,000 mg Oral Q6H    acetylcysteine 100 mg/ml (10%)  4 mL Nebulization TID    albuterol-ipratropium  3 mL Nebulization Q4H    benzonatate  100 mg Oral Q8H    celecoxib  200 mg Oral Daily    chlorhexidine  15 mL Mouth/Throat BID    dextromethorphan-guaifenesin  mg/5 ml  5 mL Oral Q6H    enoxaparin  40 mg Subcutaneous BID    furosemide  20 mg Intravenous BID    furosemide  40 mg Intravenous Once    methocarbamol  750 mg Per NG tube QID    mupirocin  1 g Nasal BID    polyethylene glycol  17 g Per NG tube Daily    pregabalin  75 mg Oral Once    Followed by    pregabalin  75 mg Oral QHS    senna-docusate 8.6-50 mg  1 tablet Per NG tube BID    sulfamethoxazole-trimethoprim 800-160mg  1 tablet Oral BID     PRN Meds:sodium chloride, bisacodyL, calcium gluconate IVPB, calcium gluconate IVPB, calcium gluconate IVPB, Dextrose 10% Bolus, glucagon (human recombinant), hydrALAZINE, HYDROmorphone, insulin aspart U-100, labetalol, magnesium sulfate IVPB, magnesium sulfate IVPB, melatonin, metoclopramide HCl, morphine, ondansetron, ondansetron, potassium chloride in water **AND** potassium chloride in water **AND** potassium chloride in water, promethazine (PHENERGAN) IVPB, sodium chloride 0.9%, sodium chloride 0.9%, sodium phosphate IVPB, sodium phosphate IVPB, sodium phosphate IVPB     Review of patient's allergies indicates:   Allergen Reactions    Erythromycin      Stomach pains    Rosuvastatin      Hives, muscle/joint pains    Zolpidem      Confusion      Objective:      Vital Signs (Most Recent):  Temp: 97.8 °F (36.6 °C) (02/01/20 0715)  Pulse: 91 (02/01/20 0844)  Resp: (!) 21 (02/01/20 0844)  BP: (!) 108/53 (02/01/20 0805)  SpO2: 97 % (02/01/20 0844) Vital Signs (24h Range):  Temp:  [97.5 °F (36.4 °C)-98.2 °F (36.8 °C)] 97.8 °F (36.6 °C)  Pulse:  [73-93] 91  Resp:  [16-41] 21  SpO2:  [90 %-100 %] 97 %  BP: ()/(47-60) 108/53  Arterial Line BP: ()/(39-52) 90/40     Intake/Output - Last 3 Shifts       01/30 0700 - 01/31 0659 01/31 0700 - 02/01 0659 02/01 0700 - 02/02 0659    I.V. (mL/kg) 1569.9 (13.4) 1988.2 (17) 69 (0.6)    NG/ 415 20    IV Piggyback  500     Total Intake(mL/kg) 1869.9 (16) 2903.2 (24.8) 89 (0.8)    Urine (mL/kg/hr) 915 (0.3) 866 (0.3) 60 (0.3)    Drains 500 195 40    Stool 250 75     Blood       Chest Tube 60 64 8    Total Output 1725 1200 108    Net +144.9 +1703.2 -19                 SpO2: 97 %  O2 Device (Oxygen Therapy): ventilator    Physical Exam   Constitutional: She appears well-developed and well-nourished. No distress.   Cardiovascular: Normal rate and regular rhythm.   Pulmonary/Chest: Effort normal. No respiratory distress.   Bulb drain with serosang output  CT with serosang output   Abdominal: Soft. She exhibits no distension. There is no guarding.   Skin: Skin is warm and dry.       Significant Labs:  CBC:   Recent Labs   Lab 02/01/20  0307   WBC 18.38*   RBC 2.61*   HGB 7.1*   HCT 24.4*   *   MCV 94   MCH 27.2   MCHC 29.1*     CMP:   Recent Labs   Lab 01/31/20  0315 02/01/20  0307    107   CALCIUM 8.2* 7.4*   ALBUMIN 1.8*  --    PROT 5.3*  --    * 131*   K 5.1 4.0   CO2 27 29   CL 93* 94*   BUN 21 24*   CREATININE 0.9 1.0   ALKPHOS 91  --    ALT 14  --    AST 24  --    BILITOT 0.3  --        Significant Diagnostics:  I have reviewed all pertinent imaging results/findings within the past 24 hours.    VTE Risk Mitigation (From admission, onward)         Ordered     enoxaparin injection 40 mg  2 times daily       01/29/20 2000     Place sequential compression device  Until discontinued      01/23/20 1002     Place sequential compression device  Until discontinued      01/19/20 2022     IP VTE LOW RISK PATIENT  Once      01/19/20 2022

## 2020-02-01 NOTE — SUBJECTIVE & OBJECTIVE
Interval History: AF overnight. She remains intubated, sedated, paralyzed. Continues on the vent.     Medications:  Continuous Infusions:   cisatracurium (NIMBEX) infusion Stopped (02/01/20 0820)    fentanyl 100 mcg/hr (02/01/20 0800)    propofol 30 mcg/kg/min (02/01/20 0800)    ropivacaine (PF) 2 mg/ml (0.2%) 2 mL/hr (02/01/20 0800)     Scheduled Meds:   acetaminophen  1,000 mg Oral Q6H    acetylcysteine 100 mg/ml (10%)  4 mL Nebulization TID    albuterol-ipratropium  3 mL Nebulization Q4H    benzonatate  100 mg Oral Q8H    celecoxib  200 mg Oral Daily    chlorhexidine  15 mL Mouth/Throat BID    chlorothiazide (DIURIL) IVPB  500 mg Intravenous Once    dextromethorphan-guaifenesin  mg/5 ml  5 mL Oral Q6H    enoxaparin  40 mg Subcutaneous BID    furosemide  20 mg Intravenous BID    furosemide  40 mg Intravenous Once    methocarbamol  750 mg Per NG tube QID    mupirocin  1 g Nasal BID    polyethylene glycol  17 g Per NG tube Daily    pregabalin  75 mg Oral Once    Followed by    pregabalin  75 mg Oral QHS    senna-docusate 8.6-50 mg  1 tablet Per NG tube BID    sulfamethoxazole-trimethoprim 800-160mg  1 tablet Oral BID     PRN Meds:sodium chloride, bisacodyL, calcium gluconate IVPB, calcium gluconate IVPB, calcium gluconate IVPB, Dextrose 10% Bolus, glucagon (human recombinant), hydrALAZINE, HYDROmorphone, insulin aspart U-100, labetalol, magnesium sulfate IVPB, magnesium sulfate IVPB, melatonin, metoclopramide HCl, morphine, ondansetron, ondansetron, potassium chloride in water **AND** potassium chloride in water **AND** potassium chloride in water, promethazine (PHENERGAN) IVPB, sodium chloride 0.9%, sodium chloride 0.9%, sodium phosphate IVPB, sodium phosphate IVPB, sodium phosphate IVPB     Review of patient's allergies indicates:   Allergen Reactions    Erythromycin      Stomach pains    Rosuvastatin      Hives, muscle/joint pains    Zolpidem      Confusion      Objective:     Vital  Signs (Most Recent):  Temp: 97.8 °F (36.6 °C) (02/01/20 0715)  Pulse: 91 (02/01/20 0844)  Resp: (!) 21 (02/01/20 0844)  BP: (!) 108/53 (02/01/20 0805)  SpO2: 97 % (02/01/20 0844) Vital Signs (24h Range):  Temp:  [97.5 °F (36.4 °C)-98.2 °F (36.8 °C)] 97.8 °F (36.6 °C)  Pulse:  [73-93] 91  Resp:  [16-41] 21  SpO2:  [90 %-100 %] 97 %  BP: ()/(47-60) 108/53  Arterial Line BP: ()/(39-52) 90/40     Weight: 117 kg (257 lb 15 oz)  Body mass index is 48.74 kg/m².    Intake/Output - Last 3 Shifts       01/30 0700 - 01/31 0659 01/31 0700 - 02/01 0659 02/01 0700 - 02/02 0659    I.V. (mL/kg) 1569.9 (13.4) 1988.2 (17) 69 (0.6)    NG/ 415 20    IV Piggyback  500     Total Intake(mL/kg) 1869.9 (16) 2903.2 (24.8) 89 (0.8)    Urine (mL/kg/hr) 915 (0.3) 866 (0.3) 60 (0.2)    Drains 500 195 40    Stool 250 75     Blood       Chest Tube 60 64 8    Total Output 1725 1200 108    Net +144.9 +1703.2 -19                 Physical Exam   Constitutional: She appears well-developed and well-nourished. No distress.   Intubated, sedated, paralyzed   HENT:   Head: Normocephalic and atraumatic.   ET tube in place   Eyes: Conjunctivae and EOM are normal. Right eye exhibits no discharge. Left eye exhibits no discharge.   Cardiovascular: Normal rate and regular rhythm.   Pulmonary/Chest:   Intubated  Vent Mode: A/C  Oxygen Concentration (%):  (50) 50  Resp Rate Total:  (16 br/min-23 br/min) 23 br/min  Vt Set:  (350 mL) 350 mL  PEEP/CPAP:  (6 cmH20) 6 cmH20  Mean Airway Pressure:  (9.5 uzS02-72 cmH20) 11 cmH20   Abdominal: Soft. She exhibits no distension.   Lap incisions are c/d/i   Musculoskeletal: Normal range of motion. She exhibits no deformity.   Neurological:   Sedated and paralyzed   Skin: Skin is warm and dry.         Significant Labs:  CBC:   Recent Labs   Lab 02/01/20  0307   WBC 18.38*   RBC 2.61*   HGB 7.1*   HCT 24.4*   *   MCV 94   MCH 27.2   MCHC 29.1*     CMP:   Recent Labs   Lab 01/31/20  0315 02/01/20  0307     107   CALCIUM 8.2* 7.4*   ALBUMIN 1.8*  --    PROT 5.3*  --    * 131*   K 5.1 4.0   CO2 27 29   CL 93* 94*   BUN 21 24*   CREATININE 0.9 1.0   ALKPHOS 91  --    ALT 14  --    AST 24  --    BILITOT 0.3  --        Significant Diagnostics:  I have reviewed all pertinent imaging results/findings within the past 24 hours.

## 2020-02-01 NOTE — PROGRESS NOTES
Notified Dr. Lomax of Patient's UOP < 30 ml for last two hours. Order received for 500 ml NS bolus. Will continue to monitor patient.

## 2020-02-01 NOTE — ANESTHESIA POST-OP PAIN MANAGEMENT
Acute Pain Service Progress Note    Marisela Bunn is a 69 y.o., female, [id number].    Surgery:   THORACOTOMY (Left Chest)      RECONSTRUCTION, DIAPHRAGM (Left Chest) - Hernia reduction and repair      RECONSTRUCTION, CHEST WALL (Left Chest)    Post Op Day #: 3    Catheter type: perineural  MIGUEL    Infusion type: Ropivacaine 0.2%  2 mL/hr basal w/ 10 mL q1h IB    Problem List:    Active Hospital Problems    Diagnosis  POA    *Diaphragmatic hernia [K44.9]  Yes    Alteration in skin integrity [R23.9]  Yes    Pneumonia [J18.9]  Yes    Cough [R05]  Yes    Leukocytosis [D72.829]  Yes      Resolved Hospital Problems    Diagnosis Date Resolved POA    Pre-op chest exam [Z01.811] 01/29/2020 Not Applicable       Subjective:     General appearance of sedated, paralyzed, intubated   Pain with rest: n/a   Pain with movement: n/a    Objective:     Catheter site difficult to visualize given patient's position and sensitivity to repositioning.      Vitals   Vitals:    02/01/20 1110   BP:    Pulse: 85   Resp: 20   Temp:         Labs    No results displayed because visit has over 200 results.           Meds   Current Facility-Administered Medications   Medication Dose Route Frequency Provider Last Rate Last Dose    0.9%  NaCl infusion (for blood administration)   Intravenous Q24H PRN Schuyler Lomax MD        0.9%  NaCl infusion   Intravenous Continuous Schuyler Lomax MD 75 mL/hr at 02/01/20 1030      acetaminophen tablet 1,000 mg  1,000 mg Oral Q6H Shruthi Prescott MD   1,000 mg at 01/31/20 2356    acetylcysteine 100 mg/ml (10%) solution 4 mL  4 mL Nebulization TID Shruthi Prescott MD   4 mL at 02/01/20 1110    albuterol-ipratropium 2.5 mg-0.5 mg/3 mL nebulizer solution 3 mL  3 mL Nebulization Q4H Shruthi Prescott MD   3 mL at 02/01/20 1110    benzonatate capsule 100 mg  100 mg Oral Q8H Shruthi Prescott MD   Stopped at 01/29/20 2200    bisacodyl suppository 10 mg  10 mg Rectal Daily PRN Shruthi  Karley Prescott MD        calcium gluconate 1g in dextrose 5% 100mL (ready to mix system)  1 g Intravenous PRN Shruthi Prescott MD        calcium gluconate 1g in dextrose 5% 100mL (ready to mix system)  1 g Intravenous PRN Shruthi Prescott MD        calcium gluconate 1g in dextrose 5% 100mL (ready to mix system)  1 g Intravenous PRN Shruthi Prescott MD        celecoxib capsule 200 mg  200 mg Oral Daily Shruthi Prescott MD   200 mg at 02/01/20 1000    chlorhexidine 0.12 % solution 15 mL  15 mL Mouth/Throat BID Kesha Brito MD   15 mL at 02/01/20 1000    chlorothiazide (DIURIL) 500 mg in dextrose 5 % 50 mL IVPB  500 mg Intravenous Once Shruthi Prescott  mL/hr at 02/01/20 1107 500 mg at 02/01/20 1107    cisatracurium (NIMBEX) 200 mg in dextrose 5 % 100 mL infusion  1 mcg/kg/min Intravenous Continuous Viky Wilson MD   Stopped at 02/01/20 0820    dextromethorphan-guaifenesin  mg/5 ml liquid 5 mL  5 mL Oral Q6H Shruthi Prescott MD   5 mL at 01/31/20 2356    dextrose 10% (D10W) Bolus  12.5 g Intravenous PRN Shruthi Prescott MD        enoxaparin injection 40 mg  40 mg Subcutaneous BID Shruthi Prescott MD   40 mg at 02/01/20 1000    fentaNYL 2500 mcg in 0.9% sodium chloride 250 mL infusion premix (titrating)  25 mcg/hr Intravenous Continuous Viky Wilson MD 10 mL/hr at 02/01/20 0800 100 mcg/hr at 02/01/20 0800    furosemide injection 20 mg  20 mg Intravenous BID Shruthi Prescott MD   20 mg at 02/01/20 1000    glucagon (human recombinant) injection 1 mg  1 mg Intramuscular PRN Shruthi Prescott MD        hydrALAZINE injection 10 mg  10 mg Intravenous Q4H PRN Shruthi Prescott MD   10 mg at 01/29/20 2101    HYDROmorphone injection 1 mg  1 mg Intravenous Q4H PRN Desmond Green MD        insulin aspart U-100 pen 0-5 Units  0-5 Units Subcutaneous Q6H PRN Shruthi Prescott MD        labetalol 20 mg/4 mL (5  mg/mL) IV syring  10 mg Intravenous Q4H PRN Shruthi Prescott MD        magnesium sulfate 2g in water 50mL IVPB (premix)  2 g Intravenous PRN Shruthi Prescott MD   2 g at 01/28/20 0918    magnesium sulfate 2g in water 50mL IVPB (premix)  4 g Intravenous PRN Shruthi Prescott MD        melatonin tablet 6 mg  6 mg Oral Nightly PRN Shruthi Prescott MD   6 mg at 01/25/20 2215    methocarbamol tablet 750 mg  750 mg Per NG tube QID Shruthi Prescott MD   750 mg at 02/01/20 1000    metoclopramide HCl injection 5 mg  5 mg Intravenous Q6H PRN Shruthi Prescott MD        morphine injection 2 mg  2 mg Intravenous Q4H PRN Shruthi Prescott MD        mupirocin 2 % ointment 1 g  1 g Nasal BID Shruthi Prescott MD   1 g at 02/01/20 1000    ondansetron disintegrating tablet 8 mg  8 mg Oral Q8H PRN Shruthi Prescott MD        ondansetron injection 4 mg  4 mg Intravenous Q12H PRN Shruthi Prescott MD        polyethylene glycol packet 17 g  17 g Per NG tube Daily Shruthi Prescott MD   17 g at 02/01/20 1000    potassium chloride 10 mEq in 100 mL IVPB  40 mEq Intravenous PRN Shruthi Prescott  mL/hr at 01/29/20 0723 40 mEq at 01/29/20 0723    And    potassium chloride 10 mEq in 100 mL IVPB  60 mEq Intravenous PRN Shruthi Prescott MD        And    potassium chloride 10 mEq in 100 mL IVPB  80 mEq Intravenous PRN Shruthi Prescott MD        pregabalin capsule 75 mg  75 mg Oral Once Shruthi Prescott MD        Followed by    pregabalin capsule 75 mg  75 mg Oral QHS Shruthi Prescott MD   75 mg at 01/31/20 2008    promethazine (PHENERGAN) 6.25 mg in dextrose 5 % 50 mL IVPB  6.25 mg Intravenous Q6H PRN Shruthi Prescott MD        propofol (DIPRIVAN) 10 mg/mL infusion  5 mcg/kg/min Intravenous Continuous Viky Jovana Steve, MD 20.6 mL/hr at 02/01/20 0800 30 mcg/kg/min at 02/01/20 0800    ropivacaine (PF) 2 mg/ml (0.2%) infusion  2 mL/hr  Perineural Continuous Shruthi Prescott MD 2 mL/hr at 02/01/20 0800 2 mL/hr at 02/01/20 0800    senna-docusate 8.6-50 mg per tablet 1 tablet  1 tablet Per NG tube BID Shruthi Prescott MD   1 tablet at 02/01/20 1000    sodium chloride 0.9% flush 10 mL  10 mL Intravenous PRN Shruthi Prescott MD        sodium chloride 0.9% flush 10 mL  10 mL Intravenous PRN Shruthi Prescott MD        sodium phosphate 15 mmol in dextrose 5 % 250 mL IVPB  15 mmol Intravenous PRN Shruthi Prescott MD        sodium phosphate 20.01 mmol in dextrose 5 % 250 mL IVPB  20.01 mmol Intravenous PRN Shruthi Prescott MD        sodium phosphate 30 mmol in dextrose 5 % 250 mL IVPB  30 mmol Intravenous PRN Shruthi Prescott MD        sulfamethoxazole-trimethoprim 800-160mg per tablet 1 tablet  1 tablet Oral BID Kesha Brito MD   1 tablet at 02/01/20 1000       Assessment:      Patient remains intubated, sedated on propofol and fentanyl ggt, and paralyzed following chest wall reconstruction.    Plan:     Patient doing well, continue present treatment.    Would like to pause and pull PNC once extubated.   If patient spikes a fever or has elevated WBC, Perineural catheter should be considered as a potential source of  infection and may need to be removed.     Evaluator Neel Archuleta

## 2020-02-01 NOTE — PROGRESS NOTES
"[facility]  Thoracic Surgery  Progress Note    Subjective:     History of Present Illness:  Patient is a 69 year old female with PMH of CAD, HTN, TIA, OA, COPD and GERD admitted to SICU after presenting to outside ED on 1/18/20 for worsening SOB, cough and left flank ecchymosis. She reports that on 1/15 after an particularly severe coughing episode she felt a "pop" on left side, with associated severe pain and development of bruising. CT C/A/P showed large left hemidiaphragm hernia with associated widening of 7th intercostal space allowing for bowel to herniate outside of ribs. Transferred to [facility] on 1/19/20 on 2LNC. However, she rapidly declined from a respiratory standpoint. Underwent a laparoscopic reduction and repair of diaphragmatic hernia on 1/21/20. Per the op note, defect itself measured approximately 10 x 15 cm requiring a Dodson-Rajendra patch to close the defect. Remained intubated, sedated and paralyzed after the procedure. Extubated and drain removed on 1/24/20. Over the weekend her O2 requirements again increased and had worsening leukocytosis. Chest CT today showed recurrent diaphragmatic hernia. Today she is on 7L comfort flow with decent saturations at rest. Afebrile. Hemodynamically stable. Tolerating a soft diet.     PSH of laparoscopic cholecystectomy, EMILIANO with BSO and right TKR   Never smoker. Denies EtOH use.    Post-Op Info:  Procedure(s) (LRB):  THORACOTOMY (Left)  RECONSTRUCTION, DIAPHRAGM (Left)  RECONSTRUCTION, CHEST WALL (Left)   3 Days Post-Op     Interval History:   NAEON. On nimbex but still over breathing vent. Given lasix yesterday without effect, also bolused one liter NS with no change in urine output.     Medications:  Continuous Infusions:   cisatracurium (NIMBEX) infusion Stopped (02/01/20 0820)    fentanyl 100 mcg/hr (02/01/20 0800)    propofol 30 mcg/kg/min (02/01/20 0800)    ropivacaine (PF) 2 mg/ml (0.2%) 2 mL/hr (02/01/20 0800)     Scheduled " Meds:   acetaminophen  1,000 mg Oral Q6H    acetylcysteine 100 mg/ml (10%)  4 mL Nebulization TID    albuterol-ipratropium  3 mL Nebulization Q4H    benzonatate  100 mg Oral Q8H    celecoxib  200 mg Oral Daily    chlorhexidine  15 mL Mouth/Throat BID    dextromethorphan-guaifenesin  mg/5 ml  5 mL Oral Q6H    enoxaparin  40 mg Subcutaneous BID    furosemide  20 mg Intravenous BID    furosemide  40 mg Intravenous Once    methocarbamol  750 mg Per NG tube QID    mupirocin  1 g Nasal BID    polyethylene glycol  17 g Per NG tube Daily    pregabalin  75 mg Oral Once    Followed by    pregabalin  75 mg Oral QHS    senna-docusate 8.6-50 mg  1 tablet Per NG tube BID    sulfamethoxazole-trimethoprim 800-160mg  1 tablet Oral BID     PRN Meds:sodium chloride, bisacodyL, calcium gluconate IVPB, calcium gluconate IVPB, calcium gluconate IVPB, Dextrose 10% Bolus, glucagon (human recombinant), hydrALAZINE, HYDROmorphone, insulin aspart U-100, labetalol, magnesium sulfate IVPB, magnesium sulfate IVPB, melatonin, metoclopramide HCl, morphine, ondansetron, ondansetron, potassium chloride in water **AND** potassium chloride in water **AND** potassium chloride in water, promethazine (PHENERGAN) IVPB, sodium chloride 0.9%, sodium chloride 0.9%, sodium phosphate IVPB, sodium phosphate IVPB, sodium phosphate IVPB     Review of patient's allergies indicates:   Allergen Reactions    Erythromycin      Stomach pains    Rosuvastatin      Hives, muscle/joint pains    Zolpidem      Confusion      Objective:     Vital Signs (Most Recent):  Temp: 97.8 °F (36.6 °C) (02/01/20 0715)  Pulse: 91 (02/01/20 0844)  Resp: (!) 21 (02/01/20 0844)  BP: (!) 108/53 (02/01/20 0805)  SpO2: 97 % (02/01/20 0844) Vital Signs (24h Range):  Temp:  [97.5 °F (36.4 °C)-98.2 °F (36.8 °C)] 97.8 °F (36.6 °C)  Pulse:  [73-93] 91  Resp:  [16-41] 21  SpO2:  [90 %-100 %] 97 %  BP: ()/(47-60) 108/53  Arterial Line BP: ()/(39-52) 90/40      Intake/Output - Last 3 Shifts       01/30 0700 - 01/31 0659 01/31 0700 - 02/01 0659 02/01 0700 - 02/02 0659    I.V. (mL/kg) 1569.9 (13.4) 1988.2 (17) 69 (0.6)    NG/ 415 20    IV Piggyback  500     Total Intake(mL/kg) 1869.9 (16) 2903.2 (24.8) 89 (0.8)    Urine (mL/kg/hr) 915 (0.3) 866 (0.3) 60 (0.3)    Drains 500 195 40    Stool 250 75     Blood       Chest Tube 60 64 8    Total Output 1725 1200 108    Net +144.9 +1703.2 -19                 SpO2: 97 %  O2 Device (Oxygen Therapy): ventilator    Physical Exam   Constitutional: She appears well-developed and well-nourished. No distress.   Cardiovascular: Normal rate and regular rhythm.   Pulmonary/Chest: Effort normal. No respiratory distress.   Bulb drain with serosang output  CT with serosang output   Abdominal: Soft. She exhibits no distension. There is no guarding.   Skin: Skin is warm and dry.       Significant Labs:  CBC:   Recent Labs   Lab 02/01/20  0307   WBC 18.38*   RBC 2.61*   HGB 7.1*   HCT 24.4*   *   MCV 94   MCH 27.2   MCHC 29.1*     CMP:   Recent Labs   Lab 01/31/20  0315 02/01/20  0307    107   CALCIUM 8.2* 7.4*   ALBUMIN 1.8*  --    PROT 5.3*  --    * 131*   K 5.1 4.0   CO2 27 29   CL 93* 94*   BUN 21 24*   CREATININE 0.9 1.0   ALKPHOS 91  --    ALT 14  --    AST 24  --    BILITOT 0.3  --        Significant Diagnostics:  I have reviewed all pertinent imaging results/findings within the past 24 hours.    VTE Risk Mitigation (From admission, onward)         Ordered     enoxaparin injection 40 mg  2 times daily      01/29/20 2000     Place sequential compression device  Until discontinued      01/23/20 1002     Place sequential compression device  Until discontinued      01/19/20 2022     IP VTE LOW RISK PATIENT  Once      01/19/20 2022              Assessment/Plan:     * Diaphragmatic hernia  69 year old female admitted to SICU with recurrent left diaphragmatic hernia s/p failed laparoscopic repair now s/p diaphragm  reconstruction and chest wall reconstruction.     Neuro:  Sedated and paralyzed  Pain control: erector spinae, Tylenol, fentanyl  Wean paralytics today     Resp:  Continue vent support today.   Nebs prn  Respiratory cultures showed MRSA and candida.     CV:  HDS  No pressors  Continue a-line     Heme/ID:  RCx: MRSA, candida  Bactrim     Renal:  Hernandez in place,   Strict I/Os     FEN/GI:  Replace lytes PRN  Rectal tube in place, can consider adding trickle tube feeds     Endo:  SSI     PPX:  Protonix  Lovenox      Dispo: Continue ICU care        Arminda Perez MD  Thoracic Surgery  [facility]

## 2020-02-01 NOTE — PROGRESS NOTES
"[facility]  General Surgery  Progress Note    Subjective:     History of Present Illness:  Marisela Bunn is a 69 y.o. female with PMH CAD, HTN, TIA, GERD presents to the hospital as a direct admission for evaluation of a newly diagnosed diaphragmatic hernia. She initially presented to [place] ED on 1/18/20 for a 1-month history of a cough, intermittent fevers and dyspnea.  She had been seen by 2 urgent care physicians in the last month for the cough, with 2 different antibiotic courses given without improvement in cough. She reports that on 1/15 after an particularly severe coughing episode she felt a "pop" on left side, with associated severe pain and  Subsequent bruising of the left flank. She reports constant severe pain since that time. She is on daily ASA 81mg,     On ED presentation, patient was hemodynamically stable, H/H 12.4/40.0, breathing on RA. Labs revealed leukocytosis (19.2), lactic acid 2.5 (repeat lactic acid1.6), BMP wnl. CXR revealed left lower lobe pneumonia with possible associated pleural effusion. CT abdomen/pelvis revealed a large left diaphragmatic hernia containing fat  and bowel loops with hernia of intra-abdomnial contents outside the thorax from 7th left ICS. Medium sized HH. CTA revealed large Bochdalek hernia through defect in left hemo-diaphragm, with associated widening of 7th intercostal space. She was started on IV hydration and antibiotics (levofloxacin, zosyn). She reports she has not passed a bowel movement of flatus in 2 days. Intermittent lower quadrant "spasms" while coughing, otherwise no abdominal pain. She reports no other symptoms.    Post-Op Info:  Procedure(s) (LRB):  THORACOTOMY (Left)  RECONSTRUCTION, DIAPHRAGM (Left)  RECONSTRUCTION, CHEST WALL (Left)   3 Days Post-Op     Interval History: AF overnight. She remains intubated, sedated, paralyzed. Continues on the vent.     Medications:  Continuous Infusions:   cisatracurium (NIMBEX) infusion " Stopped (02/01/20 0820)    fentanyl 100 mcg/hr (02/01/20 0800)    propofol 30 mcg/kg/min (02/01/20 0800)    ropivacaine (PF) 2 mg/ml (0.2%) 2 mL/hr (02/01/20 0800)     Scheduled Meds:   acetaminophen  1,000 mg Oral Q6H    acetylcysteine 100 mg/ml (10%)  4 mL Nebulization TID    albuterol-ipratropium  3 mL Nebulization Q4H    benzonatate  100 mg Oral Q8H    celecoxib  200 mg Oral Daily    chlorhexidine  15 mL Mouth/Throat BID    chlorothiazide (DIURIL) IVPB  500 mg Intravenous Once    dextromethorphan-guaifenesin  mg/5 ml  5 mL Oral Q6H    enoxaparin  40 mg Subcutaneous BID    furosemide  20 mg Intravenous BID    furosemide  40 mg Intravenous Once    methocarbamol  750 mg Per NG tube QID    mupirocin  1 g Nasal BID    polyethylene glycol  17 g Per NG tube Daily    pregabalin  75 mg Oral Once    Followed by    pregabalin  75 mg Oral QHS    senna-docusate 8.6-50 mg  1 tablet Per NG tube BID    sulfamethoxazole-trimethoprim 800-160mg  1 tablet Oral BID     PRN Meds:sodium chloride, bisacodyL, calcium gluconate IVPB, calcium gluconate IVPB, calcium gluconate IVPB, Dextrose 10% Bolus, glucagon (human recombinant), hydrALAZINE, HYDROmorphone, insulin aspart U-100, labetalol, magnesium sulfate IVPB, magnesium sulfate IVPB, melatonin, metoclopramide HCl, morphine, ondansetron, ondansetron, potassium chloride in water **AND** potassium chloride in water **AND** potassium chloride in water, promethazine (PHENERGAN) IVPB, sodium chloride 0.9%, sodium chloride 0.9%, sodium phosphate IVPB, sodium phosphate IVPB, sodium phosphate IVPB     Review of patient's allergies indicates:   Allergen Reactions    Erythromycin      Stomach pains    Rosuvastatin      Hives, muscle/joint pains    Zolpidem      Confusion      Objective:     Vital Signs (Most Recent):  Temp: 97.8 °F (36.6 °C) (02/01/20 0715)  Pulse: 91 (02/01/20 0844)  Resp: (!) 21 (02/01/20 0844)  BP: (!) 108/53 (02/01/20 0805)  SpO2: 97 % (02/01/20  0844) Vital Signs (24h Range):  Temp:  [97.5 °F (36.4 °C)-98.2 °F (36.8 °C)] 97.8 °F (36.6 °C)  Pulse:  [73-93] 91  Resp:  [16-41] 21  SpO2:  [90 %-100 %] 97 %  BP: ()/(47-60) 108/53  Arterial Line BP: ()/(39-52) 90/40     Weight: 117 kg (257 lb 15 oz)  Body mass index is 48.74 kg/m².    Intake/Output - Last 3 Shifts       01/30 0700 - 01/31 0659 01/31 0700 - 02/01 0659 02/01 0700 - 02/02 0659    I.V. (mL/kg) 1569.9 (13.4) 1988.2 (17) 69 (0.6)    NG/ 415 20    IV Piggyback  500     Total Intake(mL/kg) 1869.9 (16) 2903.2 (24.8) 89 (0.8)    Urine (mL/kg/hr) 915 (0.3) 866 (0.3) 60 (0.2)    Drains 500 195 40    Stool 250 75     Blood       Chest Tube 60 64 8    Total Output 1725 1200 108    Net +144.9 +1703.2 -19                 Physical Exam   Constitutional: She appears well-developed and well-nourished. No distress.   Intubated, sedated, paralyzed   HENT:   Head: Normocephalic and atraumatic.   ET tube in place   Eyes: Conjunctivae and EOM are normal. Right eye exhibits no discharge. Left eye exhibits no discharge.   Cardiovascular: Normal rate and regular rhythm.   Pulmonary/Chest:   Intubated  Vent Mode: A/C  Oxygen Concentration (%):  (50) 50  Resp Rate Total:  (16 br/min-23 br/min) 23 br/min  Vt Set:  (350 mL) 350 mL  PEEP/CPAP:  (6 cmH20) 6 cmH20  Mean Airway Pressure:  (9.5 ayS91-08 cmH20) 11 cmH20   Abdominal: Soft. She exhibits no distension.   Lap incisions are c/d/i   Musculoskeletal: Normal range of motion. She exhibits no deformity.   Neurological:   Sedated and paralyzed   Skin: Skin is warm and dry.         Significant Labs:  CBC:   Recent Labs   Lab 02/01/20 0307   WBC 18.38*   RBC 2.61*   HGB 7.1*   HCT 24.4*   *   MCV 94   MCH 27.2   MCHC 29.1*     CMP:   Recent Labs   Lab 01/31/20 0315 02/01/20 0307    107   CALCIUM 8.2* 7.4*   ALBUMIN 1.8*  --    PROT 5.3*  --    * 131*   K 5.1 4.0   CO2 27 29   CL 93* 94*   BUN 21 24*   CREATININE 0.9 1.0   ALKPHOS 91  --     ALT 14  --    AST 24  --    BILITOT 0.3  --        Significant Diagnostics:  I have reviewed all pertinent imaging results/findings within the past 24 hours.    Assessment/Plan:     * Diaphragmatic hernia  Marisela Bunn is a 69 y.o. female with PMH COPD (not on home oxygen), HTN, HLD (not on anticoagulation) received as direct admission for large diaphragmatic hernia. Patient is symptomatic from hernia with constipation and dyspnea with overlying bruise on left flank. She underwent lap diaphragmatic hernia repair with mesh on 1/21/20.   Evidence of recurrent hernia on CT 1/27. Underwent recurrent L diaphragmatic hernia repair with L chestwall recon with thoracic sx on 1/29.     - H/H dropped to 7.1/24.2 (from 8.7/30.0 yesterday); will discuss with thoracic and SICU  - Continue Bactrim for pneumonia, (restarted 1/26)  - Aggressive pulm toilet with IS, CPT, DuoNebs, and Tessalon when able  - PT/OT when able  - Daily labs  - Drains and chest tube per thoracic surgery  - Please call with questions or concerns    Pneumonia  See principle         Onesimo Raza MD  General Surgery  [facility]

## 2020-02-02 PROBLEM — J18.9 PNEUMONIA: Status: RESOLVED | Noted: 2020-01-21 | Resolved: 2020-02-02

## 2020-02-02 LAB
ALBUMIN SERPL BCP-MCNC: 1.6 G/DL (ref 3.5–5.2)
ALP SERPL-CCNC: 88 U/L (ref 55–135)
ALT SERPL W/O P-5'-P-CCNC: 14 U/L (ref 10–44)
ANION GAP SERPL CALC-SCNC: 11 MMOL/L (ref 8–16)
AST SERPL-CCNC: 37 U/L (ref 10–40)
BASOPHILS NFR BLD: 1 % (ref 0–1.9)
BILIRUB SERPL-MCNC: 0.2 MG/DL (ref 0.1–1)
BUN SERPL-MCNC: 33 MG/DL (ref 8–23)
CALCIUM SERPL-MCNC: 7.9 MG/DL (ref 8.7–10.5)
CHLORIDE SERPL-SCNC: 94 MMOL/L (ref 95–110)
CO2 SERPL-SCNC: 27 MMOL/L (ref 23–29)
CREAT SERPL-MCNC: 1.2 MG/DL (ref 0.5–1.4)
DIFFERENTIAL METHOD: ABNORMAL
EOSINOPHIL NFR BLD: 12 % (ref 0–8)
ERYTHROCYTE [DISTWIDTH] IN BLOOD BY AUTOMATED COUNT: 14.6 % (ref 11.5–14.5)
EST. GFR  (AFRICAN AMERICAN): 53.7 ML/MIN/1.73 M^2
EST. GFR  (NON AFRICAN AMERICAN): 46.5 ML/MIN/1.73 M^2
GLUCOSE SERPL-MCNC: 111 MG/DL (ref 70–110)
HCT VFR BLD AUTO: 30 % (ref 37–48.5)
HGB BLD-MCNC: 9.4 G/DL (ref 12–16)
IMM GRANULOCYTES # BLD AUTO: ABNORMAL K/UL (ref 0–0.04)
IMM GRANULOCYTES NFR BLD AUTO: ABNORMAL % (ref 0–0.5)
LYMPHOCYTES NFR BLD: 6 % (ref 18–48)
MAGNESIUM SERPL-MCNC: 2.3 MG/DL (ref 1.6–2.6)
MCH RBC QN AUTO: 28.4 PG (ref 27–31)
MCHC RBC AUTO-ENTMCNC: 31.3 G/DL (ref 32–36)
MCV RBC AUTO: 91 FL (ref 82–98)
METAMYELOCYTES NFR BLD MANUAL: 1 %
MONOCYTES NFR BLD: 6 % (ref 4–15)
MYELOCYTES NFR BLD MANUAL: 1 %
NEUTROPHILS NFR BLD: 71 % (ref 38–73)
NEUTS BAND NFR BLD MANUAL: 2 %
NRBC BLD-RTO: 0 /100 WBC
PHOSPHATE SERPL-MCNC: 3.5 MG/DL (ref 2.7–4.5)
PLATELET # BLD AUTO: 483 K/UL (ref 150–350)
PLATELET BLD QL SMEAR: ABNORMAL
PMV BLD AUTO: 9.6 FL (ref 9.2–12.9)
POCT GLUCOSE: 113 MG/DL (ref 70–110)
POCT GLUCOSE: 127 MG/DL (ref 70–110)
POCT GLUCOSE: 83 MG/DL (ref 70–110)
POCT GLUCOSE: 95 MG/DL (ref 70–110)
POTASSIUM SERPL-SCNC: 3.8 MMOL/L (ref 3.5–5.1)
PROT SERPL-MCNC: 5.3 G/DL (ref 6–8.4)
RBC # BLD AUTO: 3.31 M/UL (ref 4–5.4)
SODIUM SERPL-SCNC: 132 MMOL/L (ref 136–145)
TOXIC GRANULES BLD QL SMEAR: PRESENT
WBC # BLD AUTO: 14.21 K/UL (ref 3.9–12.7)

## 2020-02-02 PROCEDURE — 84100 ASSAY OF PHOSPHORUS: CPT

## 2020-02-02 PROCEDURE — 25000003 PHARM REV CODE 250: Performed by: STUDENT IN AN ORGANIZED HEALTH CARE EDUCATION/TRAINING PROGRAM

## 2020-02-02 PROCEDURE — 94668 MNPJ CHEST WALL SBSQ: CPT

## 2020-02-02 PROCEDURE — 25000242 PHARM REV CODE 250 ALT 637 W/ HCPCS: Performed by: STUDENT IN AN ORGANIZED HEALTH CARE EDUCATION/TRAINING PROGRAM

## 2020-02-02 PROCEDURE — 83735 ASSAY OF MAGNESIUM: CPT

## 2020-02-02 PROCEDURE — 63600175 PHARM REV CODE 636 W HCPCS: Performed by: STUDENT IN AN ORGANIZED HEALTH CARE EDUCATION/TRAINING PROGRAM

## 2020-02-02 PROCEDURE — 20000000 HC ICU ROOM

## 2020-02-02 PROCEDURE — 94150 VITAL CAPACITY TEST: CPT

## 2020-02-02 PROCEDURE — 27000221 HC OXYGEN, UP TO 24 HOURS

## 2020-02-02 PROCEDURE — 80053 COMPREHEN METABOLIC PANEL: CPT

## 2020-02-02 PROCEDURE — 99900017 HC EXTUBATION W/PARAMETERS (STAT)

## 2020-02-02 PROCEDURE — 99900035 HC TECH TIME PER 15 MIN (STAT)

## 2020-02-02 PROCEDURE — 85007 BL SMEAR W/DIFF WBC COUNT: CPT

## 2020-02-02 PROCEDURE — 94761 N-INVAS EAR/PLS OXIMETRY MLT: CPT

## 2020-02-02 PROCEDURE — 99291 CRITICAL CARE FIRST HOUR: CPT | Mod: 24,,, | Performed by: SURGERY

## 2020-02-02 PROCEDURE — 99900026 HC AIRWAY MAINTENANCE (STAT)

## 2020-02-02 PROCEDURE — 94003 VENT MGMT INPAT SUBQ DAY: CPT

## 2020-02-02 PROCEDURE — 99291 PR CRITICAL CARE, E/M 30-74 MINUTES: ICD-10-PCS | Mod: 24,,, | Performed by: SURGERY

## 2020-02-02 PROCEDURE — 94640 AIRWAY INHALATION TREATMENT: CPT

## 2020-02-02 PROCEDURE — 85027 COMPLETE CBC AUTOMATED: CPT

## 2020-02-02 RX ORDER — FUROSEMIDE 10 MG/ML
40 INJECTION INTRAMUSCULAR; INTRAVENOUS 2 TIMES DAILY
Status: DISCONTINUED | OUTPATIENT
Start: 2020-02-02 | End: 2020-02-03

## 2020-02-02 RX ORDER — FUROSEMIDE 10 MG/ML
20 INJECTION INTRAMUSCULAR; INTRAVENOUS ONCE
Status: COMPLETED | OUTPATIENT
Start: 2020-02-02 | End: 2020-02-02

## 2020-02-02 RX ADMIN — IPRATROPIUM BROMIDE AND ALBUTEROL SULFATE 3 ML: .5; 3 SOLUTION RESPIRATORY (INHALATION) at 12:02

## 2020-02-02 RX ADMIN — IPRATROPIUM BROMIDE AND ALBUTEROL SULFATE 3 ML: .5; 3 SOLUTION RESPIRATORY (INHALATION) at 07:02

## 2020-02-02 RX ADMIN — CHLORHEXIDINE GLUCONATE 0.12% ORAL RINSE 15 ML: 1.2 LIQUID ORAL at 08:02

## 2020-02-02 RX ADMIN — FUROSEMIDE 40 MG: 10 INJECTION, SOLUTION INTRAMUSCULAR; INTRAVENOUS at 06:02

## 2020-02-02 RX ADMIN — POTASSIUM CHLORIDE 40 MEQ: 7.46 INJECTION, SOLUTION INTRAVENOUS at 05:02

## 2020-02-02 RX ADMIN — DOCUSATE SODIUM - SENNOSIDES 1 TABLET: 50; 8.6 TABLET, FILM COATED ORAL at 08:02

## 2020-02-02 RX ADMIN — PROPOFOL 40 MCG/KG/MIN: 10 INJECTION, EMULSION INTRAVENOUS at 08:02

## 2020-02-02 RX ADMIN — METHOCARBAMOL TABLETS 750 MG: 750 TABLET, COATED ORAL at 12:02

## 2020-02-02 RX ADMIN — IPRATROPIUM BROMIDE AND ALBUTEROL SULFATE 3 ML: .5; 3 SOLUTION RESPIRATORY (INHALATION) at 02:02

## 2020-02-02 RX ADMIN — ENOXAPARIN SODIUM 40 MG: 100 INJECTION SUBCUTANEOUS at 08:02

## 2020-02-02 RX ADMIN — ACETYLCYSTEINE 4 ML: 100 SOLUTION ORAL; RESPIRATORY (INHALATION) at 02:02

## 2020-02-02 RX ADMIN — GUAIFENESIN AND DEXTROMETHORPHAN 5 ML: 100; 10 SYRUP ORAL at 12:02

## 2020-02-02 RX ADMIN — CELECOXIB 200 MG: 200 CAPSULE ORAL at 08:02

## 2020-02-02 RX ADMIN — ACETYLCYSTEINE 4 ML: 100 SOLUTION ORAL; RESPIRATORY (INHALATION) at 07:02

## 2020-02-02 RX ADMIN — FUROSEMIDE 20 MG: 10 INJECTION, SOLUTION INTRAMUSCULAR; INTRAVENOUS at 08:02

## 2020-02-02 RX ADMIN — METHOCARBAMOL TABLETS 750 MG: 750 TABLET, COATED ORAL at 08:02

## 2020-02-02 RX ADMIN — ACETYLCYSTEINE 4 ML: 100 SOLUTION ORAL; RESPIRATORY (INHALATION) at 12:02

## 2020-02-02 RX ADMIN — FUROSEMIDE 20 MG: 10 INJECTION, SOLUTION INTRAVENOUS at 08:02

## 2020-02-02 RX ADMIN — MUPIROCIN 1 G: 20 OINTMENT TOPICAL at 08:02

## 2020-02-02 RX ADMIN — POLYETHYLENE GLYCOL 3350 17 G: 17 POWDER, FOR SOLUTION ORAL at 08:02

## 2020-02-02 RX ADMIN — GUAIFENESIN AND DEXTROMETHORPHAN 5 ML: 100; 10 SYRUP ORAL at 05:02

## 2020-02-02 RX ADMIN — Medication 100 MCG/HR: at 08:02

## 2020-02-02 RX ADMIN — IPRATROPIUM BROMIDE AND ALBUTEROL SULFATE 3 ML: .5; 3 SOLUTION RESPIRATORY (INHALATION) at 03:02

## 2020-02-02 NOTE — PROGRESS NOTES
[facility]  Critical Care - Surgery  Progress Note    Patient Name: Marisela Bunn  MRN: [id number]  Admission Date: 1/19/2020  Hospital Length of Stay: 14 days  Code Status: Full Code  Attending Provider: Lucoh Garcia Jr.,*  Primary Care Provider: Primary Doctor No   Principal Problem: Diaphragmatic hernia    Subjective:     Hospital/ICU Course:  2/2 - Received 2u pRBCs overnight. Hgb increased to 9.4. Dc'd paralytics. Increased TF    Interval History/Significant Events: Received 2u pRBCs overnight. Hgb increased to 9.4. Dc'd paralytics     Follow-up For: Procedure(s) (LRB):  THORACOTOMY (Left)  RECONSTRUCTION, DIAPHRAGM (Left)  RECONSTRUCTION, CHEST WALL (Left)    Post-Operative Day: 4 Days Post-Op    Objective:     Vital Signs (Most Recent):  Temp: 98.7 °F (37.1 °C) (02/02/20 0830)  Pulse: 85 (02/02/20 0900)  Resp: (!) 21 (02/02/20 0900)  BP: (!) 121/56 (02/02/20 0900)  SpO2: (!) 93 % (02/02/20 0900) Vital Signs (24h Range):  Temp:  [97.9 °F (36.6 °C)-99.4 °F (37.4 °C)] 98.7 °F (37.1 °C)  Pulse:  [80-93] 85  Resp:  [16-37] 21  SpO2:  [88 %-100 %] 93 %  BP: ()/(46-96) 121/56  Arterial Line BP: ()/(39-46) 88/39     Weight: 120 kg (264 lb 8.8 oz)  Body mass index is 49.99 kg/m².      Intake/Output Summary (Last 24 hours) at 2/2/2020 0904  Last data filed at 2/2/2020 0900  Gross per 24 hour   Intake 3207.25 ml   Output 1670 ml   Net 1537.25 ml       Physical Exam   HENT:   Head: Normocephalic and atraumatic.   Eyes: Pupils are equal, round, and reactive to light. EOM are normal.   Cardiovascular: Normal rate and regular rhythm.   Thoracotomy dressing CDI, chest tube an DREW in place   Pulmonary/Chest:   Intubated, on A/C 50/6   Abdominal:   Morbidly obese, laparoscopic incisions CDI   Genitourinary:   Genitourinary Comments: Hernandez in place   Neurological:   Sedated and medically paralyzed   Skin: Skin is warm and dry.       Vents:  Vent Mode: A/C (02/02/20 0707)  Ventilator  Initiated: Yes (01/29/20 2023)  Set Rate: 16 BPM (02/02/20 0707)  Vt Set: 350 mL (02/02/20 0707)  Pressure Support: 8 cmH20 (01/24/20 0915)  PEEP/CPAP: 5 cmH20 (02/02/20 0707)  Oxygen Concentration (%): 60 (02/02/20 0900)  Peak Airway Pressure: 18 cmH2O (02/02/20 0707)  Plateau Pressure: 22 cmH20 (02/02/20 0707)  Total Ve: 9.08 mL (02/02/20 0707)  Negative Inspiratory Force (cm H2O): -30 (01/24/20 0854)  F/VT Ratio<105 (RSBI): (!) 38.02 (02/02/20 0707)    Lines/Drains/Airways     Drain                 NG/OG Tube Center mouth -- days         Chest Tube 01/29/20 1700 Left Pleural 3 days         Closed/Suction Drain 01/29/20 1918 Lateral LUQ Bulb 10 Fr. 3 days         Rectal Tube 01/29/20 1530 rectal tube w/ balloon (indicate number of mLs) 3 days         Urethral Catheter 01/29/20 1515 Non-latex 16 Fr. 3 days          Airway                 Airway - Non-Surgical 01/29/20 1512 3 days         Airway - Non-Surgical 01/29/20 2007 Endotracheal Tube 3 days          Epidural Line                 Perineural Analgesia/Anesthesia Assessment (using dermatomes) 01/29/20 1407 3 days          Peripheral Intravenous Line                 Peripheral IV - Single Lumen 02/01/20 0255 18 G Anterior;Right Forearm 1 day         Peripheral IV - Single Lumen 02/01/20 2300 20 G Anterior;Proximal;Right Forearm less than 1 day                Significant Labs:    CBC/Anemia Profile:  Recent Labs   Lab 02/01/20  0307 02/02/20  0348   WBC 18.38* 14.21*   HGB 7.1* 9.4*   HCT 24.4* 30.0*   * 483*   MCV 94 91   RDW 14.5 14.6*        Chemistries:  Recent Labs   Lab 02/01/20  0307 02/02/20  0348   * 132*   K 4.0 3.8   CL 94* 94*   CO2 29 27   BUN 24* 33*   CREATININE 1.0 1.2   CALCIUM 7.4* 7.9*   ALBUMIN  --  1.6*   PROT  --  5.3*   BILITOT  --  0.2   ALKPHOS  --  88   ALT  --  14   AST  --  37   MG 2.3 2.3   PHOS 4.8* 3.5         Significant Imaging:  CXR: I have reviewed all pertinent results/findings within the past 24 hours and my  personal findings are:  Stable    Assessment/Plan:     * Diaphragmatic hernia  69F PMH CAD, HTN, TIA, GERD who presented with a diaphragmatic hernia, now s/p surgical repair on 1/23/19.    Surgical:  S/p thoracotomy, reduction of hernia, and placement of mesh over diaphragmatic and intercostal defects with placement of chest tube and DREW drain  Sedated. No longer paralyzed.     Neuro:  Sedated. Dc'd Nimbex  Pain control: Tylenol, fentanyl, oxy  Sedation: propofol, fentanyl  Hold paralysis today, wean sedation    Resp:  Tesslon, dextromethorphan, codeine to suppress cough ordered but currently paralyzed  Nebs prn  Acapella, IS  Continue to monitor oxygen requirements  Respiratory cultures showed MRSA and candida. On Bactrim.  Decrease FiO2 from 70 to 50, continue to wean  CXR stable    CV:  HDS  No pressors  Borderline hypotensive but fluid overloaded, will give lasix 40 and 2u pRBC  - Repeat Hgb improved to 9.4    Heme/ID:  RCx: MRSA, candida  Bactrim    Renal:  Strict I/Os  MIVF   Monitor Cr/BUN  Increased Lasix to 40mg IV BID    FEN/GI:  Currently NPO  Replace lytes PRN  Rectal tube in place    Endo:  SSI    PPX:  Protonix  Lovenox    Dispo: Continue ICU care          Critical care was time spent personally by me on the following activities: development of treatment plan with patient or surrogate and bedside caregivers, discussions with consultants, evaluation of patient's response to treatment, examination of patient, ordering and performing treatments and interventions, ordering and review of laboratory studies, ordering and review of radiographic studies, pulse oximetry, re-evaluation of patient's condition.  This critical care time did not overlap with that of any other provider or involve time for any procedures.     Evangelista Mar MD  Critical Care - Surgery  [facility]

## 2020-02-02 NOTE — PLAN OF CARE
"      SICU PLAN OF CARE NOTE    Dx: Diaphragmatic hernia    Shift Events: VSS throughout shift. Np acute events occurred.  A line removed.     Goals of Care: Will continue to monitor patient and maintain SBP > 90.     Neuro: Arouses to Voice, Follows Commands and Moves All Extremities sedated    Vital Signs: BP (!) 144/64 (BP Location: Right arm, Patient Position: Lying)   Pulse 85   Temp 99 °F (37.2 °C) (Oral)   Resp 20   Ht 5' 1" (1.549 m)   Wt 120 kg (264 lb 8.8 oz)   SpO2 (!) 92%   Breastfeeding? No   BMI 49.99 kg/m²     Respiratory: Ventilator AC VC, PEEP 5, FIO2 50%    Diet: NPO and Tube Feeds    Gtts: Propfol, fentanyl    Urine Output: Urinary Catheter 450 cc/shift    Drains: DREW Drain, total output 60 cc / shift  Chest tube, total output 50 cc / shift     Restraints Pt repositioned frequently.  Will continue to monitor and remove when appropriate. No injuries noted.       Labs/Accuchecks: Labs monitored and replaced as needed. Accuchecks Q6H.     Skin: See flowsheets. Pt repositioned frequently.  Heel and sacral foams in place.  No pressure injuries noted.        "

## 2020-02-02 NOTE — ASSESSMENT & PLAN NOTE
Marisela Bunn is a 69 y.o. female with PMH COPD (not on home oxygen), HTN, HLD (not on anticoagulation) received as direct admission for large diaphragmatic hernia. Patient is symptomatic from hernia with constipation and dyspnea with overlying bruise on left flank. She underwent lap diaphragmatic hernia repair with mesh on 1/21/20.   Evidence of recurrent hernia on CT 1/27. Underwent recurrent L diaphragmatic hernia repair with L chestwall recon with thoracic sx on 1/29.     - Wean vent per SICU protocol  - Transfused 2u pRBC yesterday, H/H up this AM to 9.4/30.0 (from 7.1/24.4)  - Bactrim completed  - Aggressive pulm toilet with IS, CPT, DuoNebs, and Tessalon when able  - PT/OT when able  - Daily labs  - Drains and chest tube per thoracic surgery  - Please call with questions or concerns

## 2020-02-02 NOTE — ASSESSMENT & PLAN NOTE
69F PMH CAD, HTN, TIA, GERD who presented with a diaphragmatic hernia, now s/p surgical repair on 1/23/19.    Surgical:  S/p thoracotomy, reduction of hernia, and placement of mesh over diaphragmatic and intercostal defects with placement of chest tube and DREW drain  Sedated. No longer paralyzed.     Neuro:  Sedated. Dc'd Nimbex  Pain control: Tylenol, fentanyl, oxy  Sedation: propofol, fentanyl  Hold paralysis today, wean sedation    Resp:  Tesslon, dextromethorphan, codeine to suppress cough ordered but currently paralyzed  Nebs prn  Acapella, IS  Continue to monitor oxygen requirements  Respiratory cultures showed MRSA and candida. On Bactrim.  Decrease FiO2 from 70 to 50, continue to wean  CXR stable    CV:  HDS  No pressors  Borderline hypotensive but fluid overloaded, will give lasix 40 and 2u pRBC  - Repeat Hgb improved to 9.4    Heme/ID:  RCx: MRSA, candida  Bactrim    Renal:  Strict I/Os  MIVF   Monitor Cr/BUN  Increased Lasix to 40mg IV BID    FEN/GI:  Currently NPO  Replace lytes PRN  Rectal tube in place    Endo:  SSI    PPX:  Protonix  Lovenox    Dispo: Continue ICU care

## 2020-02-02 NOTE — NURSING
Per MD verbal order, Propofol and Fentanyl discontinued. Pt to wake up for SBT and possible extubation.

## 2020-02-02 NOTE — SUBJECTIVE & OBJECTIVE
Interval History: AF overnight. The paralytic was weaned off yesterday. She remains intubated and sedated.     Medications:  Continuous Infusions:   fentanyl 100 mcg/hr (02/02/20 0836)    propofol 40 mcg/kg/min (02/02/20 0835)    ropivacaine (PF) 2 mg/ml (0.2%) 2 mL/hr (02/02/20 0700)     Scheduled Meds:   acetylcysteine 100 mg/ml (10%)  4 mL Nebulization TID    albuterol-ipratropium  3 mL Nebulization Q4H    benzonatate  100 mg Oral Q8H    celecoxib  200 mg Oral Daily    chlorhexidine  15 mL Mouth/Throat BID    dextromethorphan-guaifenesin  mg/5 ml  5 mL Oral Q6H    enoxaparin  40 mg Subcutaneous BID    furosemide  20 mg Intravenous Once    furosemide  40 mg Intravenous BID    methocarbamol  750 mg Per NG tube QID    mupirocin  1 g Nasal BID    polyethylene glycol  17 g Per NG tube Daily    pregabalin  75 mg Oral Once    Followed by    pregabalin  75 mg Oral QHS    senna-docusate 8.6-50 mg  1 tablet Per NG tube BID     PRN Meds:sodium chloride, bisacodyL, calcium gluconate IVPB, calcium gluconate IVPB, calcium gluconate IVPB, Dextrose 10% Bolus, glucagon (human recombinant), hydrALAZINE, HYDROmorphone, insulin aspart U-100, labetalol, magnesium sulfate IVPB, magnesium sulfate IVPB, melatonin, metoclopramide HCl, morphine, ondansetron, ondansetron, potassium chloride in water **AND** potassium chloride in water **AND** potassium chloride in water, promethazine (PHENERGAN) IVPB, sodium chloride 0.9%, sodium chloride 0.9%, sodium phosphate IVPB, sodium phosphate IVPB, sodium phosphate IVPB     Review of patient's allergies indicates:   Allergen Reactions    Erythromycin      Stomach pains    Rosuvastatin      Hives, muscle/joint pains    Zolpidem      Confusion      Objective:     Vital Signs (Most Recent):  Temp: 99 °F (37.2 °C) (02/02/20 0300)  Pulse: 82 (02/02/20 0707)  Resp: 20 (02/02/20 0707)  BP: 134/61 (02/02/20 0707)  SpO2: 95 % (02/02/20 0707) Vital Signs (24h Range):  Temp:  [97.9  °F (36.6 °C)-99.4 °F (37.4 °C)] 99 °F (37.2 °C)  Pulse:  [80-93] 82  Resp:  [16-37] 20  SpO2:  [88 %-100 %] 95 %  BP: ()/(46-96) 134/61  Arterial Line BP: ()/(39-46) 88/39     Weight: 120 kg (264 lb 8.8 oz)  Body mass index is 49.99 kg/m².    Intake/Output - Last 3 Shifts       01/31 0700 - 02/01 0659 02/01 0700 - 02/02 0659 02/02 0700 - 02/03 0659    I.V. (mL/kg) 1988.2 (17) 1408.8 (11.7) 39.4 (0.3)    Blood  739.3     NG/ 695 45    IV Piggyback 500  100    Total Intake(mL/kg) 2903.2 (24.8) 2843.1 (23.7) 184.4 (1.5)    Urine (mL/kg/hr) 866 (0.3) 1160 (0.4) 125 (0.6)    Drains 195 130     Stool 75 0     Chest Tube 64 88     Total Output 1200 1378 125    Net +1703.2 +1465.1 +59.4                 Physical Exam   Constitutional: She appears well-developed and well-nourished. No distress.   Intubated, sedated   HENT:   Head: Normocephalic and atraumatic.   ET tube in place   Eyes: Conjunctivae and EOM are normal. Right eye exhibits no discharge. Left eye exhibits no discharge.   Cardiovascular: Normal rate and regular rhythm.   Pulmonary/Chest:   Intubated  Vent Mode: A/C  Oxygen Concentration (%):  (50) 50  Resp Rate Total:  (16 br/min-24 br/min) 24 br/min  Vt Set:  (350 mL) 350 mL  PEEP/CPAP:  (5 cmH20-6 cmH20) 5 cmH20  Mean Airway Pressure:  (8.9 weX76-87 cmH20) 10 cmH20   Abdominal: Soft. She exhibits no distension.   Lap incisions are c/d/i   Musculoskeletal: Normal range of motion. She exhibits no deformity.   Neurological:   Sedated   Skin: Skin is warm and dry.         Significant Labs:  CBC:   Recent Labs   Lab 02/02/20  0348   WBC 14.21*   RBC 3.31*   HGB 9.4*   HCT 30.0*   *   MCV 91   MCH 28.4   MCHC 31.3*     CMP:   Recent Labs   Lab 02/02/20  0348   *   CALCIUM 7.9*   ALBUMIN 1.6*   PROT 5.3*   *   K 3.8   CO2 27   CL 94*   BUN 33*   CREATININE 1.2   ALKPHOS 88   ALT 14   AST 37   BILITOT 0.2       Significant Diagnostics:  I have reviewed all pertinent imaging  results/findings within the past 24 hours.

## 2020-02-02 NOTE — PROGRESS NOTES
"[facility]  General Surgery  Progress Note    Subjective:     History of Present Illness:  Marisela Bunn is a 69 y.o. female with PMH CAD, HTN, TIA, GERD presents to the hospital as a direct admission for evaluation of a newly diagnosed diaphragmatic hernia. She initially presented to [facility] ED on 1/18/20 for a 1-month history of a cough, intermittent fevers and dyspnea.  She had been seen by 2 urgent care physicians in the last month for the cough, with 2 different antibiotic courses given without improvement in cough. She reports that on 1/15 after an particularly severe coughing episode she felt a "pop" on left side, with associated severe pain and  Subsequent bruising of the left flank. She reports constant severe pain since that time. She is on daily ASA 81mg,     On ED presentation, patient was hemodynamically stable, H/H 12.4/40.0, breathing on RA. Labs revealed leukocytosis (19.2), lactic acid 2.5 (repeat lactic acid1.6), BMP wnl. CXR revealed left lower lobe pneumonia with possible associated pleural effusion. CT abdomen/pelvis revealed a large left diaphragmatic hernia containing fat  and bowel loops with hernia of intra-abdomnial contents outside the thorax from 7th left ICS. Medium sized HH. CTA revealed large Bochdalek hernia through defect in left hemo-diaphragm, with associated widening of 7th intercostal space. She was started on IV hydration and antibiotics (levofloxacin, zosyn). She reports she has not passed a bowel movement of flatus in 2 days. Intermittent lower quadrant "spasms" while coughing, otherwise no abdominal pain. She reports no other symptoms.    Post-Op Info:  Procedure(s) (LRB):  THORACOTOMY (Left)  RECONSTRUCTION, DIAPHRAGM (Left)  RECONSTRUCTION, CHEST WALL (Left)   4 Days Post-Op     Interval History: AF overnight. The paralytic was weaned off yesterday. She remains intubated and sedated.     Medications:  Continuous Infusions:   fentanyl 100 mcg/hr " (02/02/20 0836)    propofol 40 mcg/kg/min (02/02/20 0835)    ropivacaine (PF) 2 mg/ml (0.2%) 2 mL/hr (02/02/20 0700)     Scheduled Meds:   acetylcysteine 100 mg/ml (10%)  4 mL Nebulization TID    albuterol-ipratropium  3 mL Nebulization Q4H    benzonatate  100 mg Oral Q8H    celecoxib  200 mg Oral Daily    chlorhexidine  15 mL Mouth/Throat BID    dextromethorphan-guaifenesin  mg/5 ml  5 mL Oral Q6H    enoxaparin  40 mg Subcutaneous BID    furosemide  20 mg Intravenous Once    furosemide  40 mg Intravenous BID    methocarbamol  750 mg Per NG tube QID    mupirocin  1 g Nasal BID    polyethylene glycol  17 g Per NG tube Daily    pregabalin  75 mg Oral Once    Followed by    pregabalin  75 mg Oral QHS    senna-docusate 8.6-50 mg  1 tablet Per NG tube BID     PRN Meds:sodium chloride, bisacodyL, calcium gluconate IVPB, calcium gluconate IVPB, calcium gluconate IVPB, Dextrose 10% Bolus, glucagon (human recombinant), hydrALAZINE, HYDROmorphone, insulin aspart U-100, labetalol, magnesium sulfate IVPB, magnesium sulfate IVPB, melatonin, metoclopramide HCl, morphine, ondansetron, ondansetron, potassium chloride in water **AND** potassium chloride in water **AND** potassium chloride in water, promethazine (PHENERGAN) IVPB, sodium chloride 0.9%, sodium chloride 0.9%, sodium phosphate IVPB, sodium phosphate IVPB, sodium phosphate IVPB     Review of patient's allergies indicates:   Allergen Reactions    Erythromycin      Stomach pains    Rosuvastatin      Hives, muscle/joint pains    Zolpidem      Confusion      Objective:     Vital Signs (Most Recent):  Temp: 99 °F (37.2 °C) (02/02/20 0300)  Pulse: 82 (02/02/20 0707)  Resp: 20 (02/02/20 0707)  BP: 134/61 (02/02/20 0707)  SpO2: 95 % (02/02/20 0707) Vital Signs (24h Range):  Temp:  [97.9 °F (36.6 °C)-99.4 °F (37.4 °C)] 99 °F (37.2 °C)  Pulse:  [80-93] 82  Resp:  [16-37] 20  SpO2:  [88 %-100 %] 95 %  BP: ()/(46-96) 134/61  Arterial Line BP:  ()/(39-46) 88/39     Weight: 120 kg (264 lb 8.8 oz)  Body mass index is 49.99 kg/m².    Intake/Output - Last 3 Shifts       01/31 0700 - 02/01 0659 02/01 0700 - 02/02 0659 02/02 0700 - 02/03 0659    I.V. (mL/kg) 1988.2 (17) 1408.8 (11.7) 39.4 (0.3)    Blood  739.3     NG/ 695 45    IV Piggyback 500  100    Total Intake(mL/kg) 2903.2 (24.8) 2843.1 (23.7) 184.4 (1.5)    Urine (mL/kg/hr) 866 (0.3) 1160 (0.4) 125 (0.6)    Drains 195 130     Stool 75 0     Chest Tube 64 88     Total Output 1200 1378 125    Net +1703.2 +1465.1 +59.4                 Physical Exam   Constitutional: She appears well-developed and well-nourished. No distress.   Intubated, sedated   HENT:   Head: Normocephalic and atraumatic.   ET tube in place   Eyes: Conjunctivae and EOM are normal. Right eye exhibits no discharge. Left eye exhibits no discharge.   Cardiovascular: Normal rate and regular rhythm.   Pulmonary/Chest:   Intubated  Vent Mode: A/C  Oxygen Concentration (%):  (50) 50  Resp Rate Total:  (16 br/min-24 br/min) 24 br/min  Vt Set:  (350 mL) 350 mL  PEEP/CPAP:  (5 cmH20-6 cmH20) 5 cmH20  Mean Airway Pressure:  (8.9 acS19-70 cmH20) 10 cmH20   Abdominal: Soft. She exhibits no distension.   Lap incisions are c/d/i   Musculoskeletal: Normal range of motion. She exhibits no deformity.   Neurological:   Sedated   Skin: Skin is warm and dry.         Significant Labs:  CBC:   Recent Labs   Lab 02/02/20  0348   WBC 14.21*   RBC 3.31*   HGB 9.4*   HCT 30.0*   *   MCV 91   MCH 28.4   MCHC 31.3*     CMP:   Recent Labs   Lab 02/02/20  0348   *   CALCIUM 7.9*   ALBUMIN 1.6*   PROT 5.3*   *   K 3.8   CO2 27   CL 94*   BUN 33*   CREATININE 1.2   ALKPHOS 88   ALT 14   AST 37   BILITOT 0.2       Significant Diagnostics:  I have reviewed all pertinent imaging results/findings within the past 24 hours.    Assessment/Plan:     * Diaphragmatic hernia  Marisela Bunn is a 69 y.o. female with PMH COPD (not on home oxygen), HTN,  HLD (not on anticoagulation) received as direct admission for large diaphragmatic hernia. Patient is symptomatic from hernia with constipation and dyspnea with overlying bruise on left flank. She underwent lap diaphragmatic hernia repair with mesh on 1/21/20.   Evidence of recurrent hernia on CT 1/27. Underwent recurrent L diaphragmatic hernia repair with L chestwall recon with thoracic sx on 1/29.     - Wean vent per SICU protocol  - Transfused 2u pRBC yesterday, H/H up this AM to 9.4/30.0 (from 7.1/24.4)  - Bactrim completed  - Aggressive pulm toilet with IS, CPT, DuoNebs, and Tessalon when able  - PT/OT when able  - Daily labs  - Drains and chest tube per thoracic surgery  - Please call with questions or concerns        Onesimo Raza MD  General Surgery  [facility]

## 2020-02-02 NOTE — ADDENDUM NOTE
Addendum  created 02/02/20 1639 by Shefali Hendricks MD    Charge Capture section accepted, Cosign clinical note with attestation

## 2020-02-02 NOTE — ANESTHESIA POST-OP PAIN MANAGEMENT
Acute Pain Service Progress Note    Marisela Bunn is a 69 y.o., female, [id number].    Surgery:   THORACOTOMY (Left Chest)      RECONSTRUCTION, DIAPHRAGM (Left Chest) - Hernia reduction and repair      RECONSTRUCTION, CHEST WALL (Left Chest)     Post Op Day #: 4     Catheter type: perineural  MIGUEL     Infusion type: Ropivacaine 0.2%  2 mL/hr basal w/ 10 mL q1h IB    Problem List:    Active Hospital Problems    Diagnosis  POA    *Diaphragmatic hernia [K44.9]  Yes    Alteration in skin integrity [R23.9]  Yes    Pneumonia [J18.9]  Yes    Cough [R05]  Yes    Leukocytosis [D72.829]  Yes      Resolved Hospital Problems    Diagnosis Date Resolved POA    Pre-op chest exam [Z01.811] 01/29/2020 Not Applicable       Subjective:                General appearance of sedated, intubated              Pain with rest: n/a              Pain with movement: n/a    Objective:      Vitals   Vitals:    02/02/20 0600   BP: (!) 144/64   Pulse: 85   Resp: 20   Temp:         Labs    No results displayed because visit has over 200 results.           Meds   Current Facility-Administered Medications   Medication Dose Route Frequency Provider Last Rate Last Dose    0.9%  NaCl infusion (for blood administration)   Intravenous Q24H PRN Schuyler Lomax MD        acetylcysteine 100 mg/ml (10%) solution 4 mL  4 mL Nebulization TID Shruthi Prescott MD   4 mL at 02/01/20 1500    albuterol-ipratropium 2.5 mg-0.5 mg/3 mL nebulizer solution 3 mL  3 mL Nebulization Q4H Shruthi Prescott MD   3 mL at 02/02/20 0337    benzonatate capsule 100 mg  100 mg Oral Q8H Shruthi Prescott MD   Stopped at 01/29/20 2200    bisacodyl suppository 10 mg  10 mg Rectal Daily PRN Shruthi Prescott MD        calcium gluconate 1g in dextrose 5% 100mL (ready to mix system)  1 g Intravenous PRN Shruthi Prescott MD        calcium gluconate 1g in dextrose 5% 100mL (ready to mix system)  1 g Intravenous PRN Shruthi Prescott MD        calcium  gluconate 1g in dextrose 5% 100mL (ready to mix system)  1 g Intravenous PRN Shruthi Prescott MD        celecoxib capsule 200 mg  200 mg Oral Daily Shrutih Prescott MD   200 mg at 02/01/20 1000    chlorhexidine 0.12 % solution 15 mL  15 mL Mouth/Throat BID Kesha Brito MD   15 mL at 02/01/20 2029    dextromethorphan-guaifenesin  mg/5 ml liquid 5 mL  5 mL Oral Q6H Shruthi Prescott MD   5 mL at 02/02/20 0535    dextrose 10% (D10W) Bolus  12.5 g Intravenous PRN Shruthi Prescott MD        enoxaparin injection 40 mg  40 mg Subcutaneous BID Shruthi Prescott MD   40 mg at 02/01/20 2030    fentaNYL 2500 mcg in 0.9% sodium chloride 250 mL infusion premix (titrating)  25 mcg/hr Intravenous Continuous Viky Wilson MD 10 mL/hr at 02/02/20 0600 100 mcg/hr at 02/02/20 0600    furosemide injection 20 mg  20 mg Intravenous BID Shruthi Prescott MD   20 mg at 02/01/20 1800    glucagon (human recombinant) injection 1 mg  1 mg Intramuscular PRN Shruthi Prescott MD        hydrALAZINE injection 10 mg  10 mg Intravenous Q4H PRN Shruthi Prescott MD   10 mg at 01/29/20 2101    HYDROmorphone injection 1 mg  1 mg Intravenous Q4H PRN Desmond Green MD        insulin aspart U-100 pen 0-5 Units  0-5 Units Subcutaneous Q6H PRN Shruthi Prescott MD        labetalol 20 mg/4 mL (5 mg/mL) IV syring  10 mg Intravenous Q4H PRN Shruthi Prescott MD        magnesium sulfate 2g in water 50mL IVPB (premix)  2 g Intravenous PRN Shruthi Prescott MD   2 g at 01/28/20 0918    magnesium sulfate 2g in water 50mL IVPB (premix)  4 g Intravenous PRN Shruthi Prescott MD        melatonin tablet 6 mg  6 mg Oral Nightly PRN Shruthi Prescott MD   6 mg at 01/25/20 2215    methocarbamol tablet 750 mg  750 mg Per NG tube QID Shruthi Prescott MD   750 mg at 02/01/20 2030    metoclopramide HCl injection 5 mg  5 mg Intravenous Q6H PRN Shruthi Prescott MD         morphine injection 2 mg  2 mg Intravenous Q4H PRN Shruthi Prescott MD        mupirocin 2 % ointment 1 g  1 g Nasal BID Shruthi Prescott MD   1 g at 02/01/20 2030    ondansetron disintegrating tablet 8 mg  8 mg Oral Q8H PRN Shruthi Prescott MD        ondansetron injection 4 mg  4 mg Intravenous Q12H PRN Shruthi Prescott MD        polyethylene glycol packet 17 g  17 g Per NG tube Daily Shruthi Prescott MD   17 g at 02/01/20 1000    potassium chloride 10 mEq in 100 mL IVPB  40 mEq Intravenous PRN Shruthi Prescott  mL/hr at 02/02/20 0551 40 mEq at 02/02/20 0551    And    potassium chloride 10 mEq in 100 mL IVPB  60 mEq Intravenous PRN Shruthi Prescott MD        And    potassium chloride 10 mEq in 100 mL IVPB  80 mEq Intravenous PRN Shruthi Prescott MD        pregabalin capsule 75 mg  75 mg Oral Once Shruthi Prescott MD        Followed by    pregabalin capsule 75 mg  75 mg Oral QHS Shruthi Prescott MD   75 mg at 02/01/20 2033    promethazine (PHENERGAN) 6.25 mg in dextrose 5 % 50 mL IVPB  6.25 mg Intravenous Q6H PRN Shruthi Prescott MD        propofol (DIPRIVAN) 10 mg/mL infusion  5 mcg/kg/min Intravenous Continuous Viky Wilson MD 27.4 mL/hr at 02/02/20 0600 40 mcg/kg/min at 02/02/20 0600    ropivacaine (PF) 2 mg/ml (0.2%) infusion  2 mL/hr Perineural Continuous Shruthi Prescott MD 2 mL/hr at 02/02/20 0600 2 mL/hr at 02/02/20 0600    senna-docusate 8.6-50 mg per tablet 1 tablet  1 tablet Per NG tube BID Shruthi Prescott MD   1 tablet at 02/01/20 2032    sodium chloride 0.9% flush 10 mL  10 mL Intravenous PRN Shruthi Prescott MD        sodium chloride 0.9% flush 10 mL  10 mL Intravenous PRN Shruthi Prescott MD        sodium phosphate 15 mmol in dextrose 5 % 250 mL IVPB  15 mmol Intravenous PRN Shruthi Prescott MD        sodium phosphate 20.01 mmol in dextrose 5 % 250 mL IVPB  20.01 mmol Intravenous PRN  Shruthi Prescott MD        sodium phosphate 30 mmol in dextrose 5 % 250 mL IVPB  30 mmol Intravenous PRN Shruthi Prescott MD           Assessment:  - Patient remains intubated, sedated on propofol and fentanyl ggt.     Plan:  - Continue present treatment.   - Would like to pause and pull PNC once extubated.  - If patient spikes a fever or has elevated WBC, Perineural catheter should be considered as a potential source of   infection and may need to be removed.    Evaluator Stacey Rodas

## 2020-02-02 NOTE — SUBJECTIVE & OBJECTIVE
Interval History:   Of nimbex. BP improved. Given two units of blood, did not respond to lasix or diuril yesterday.     Medications:  Continuous Infusions:   fentanyl 100 mcg/hr (02/02/20 0700)    propofol 40 mcg/kg/min (02/02/20 0700)    ropivacaine (PF) 2 mg/ml (0.2%) 2 mL/hr (02/02/20 0700)     Scheduled Meds:   acetylcysteine 100 mg/ml (10%)  4 mL Nebulization TID    albuterol-ipratropium  3 mL Nebulization Q4H    benzonatate  100 mg Oral Q8H    celecoxib  200 mg Oral Daily    chlorhexidine  15 mL Mouth/Throat BID    dextromethorphan-guaifenesin  mg/5 ml  5 mL Oral Q6H    enoxaparin  40 mg Subcutaneous BID    furosemide  20 mg Intravenous BID    methocarbamol  750 mg Per NG tube QID    mupirocin  1 g Nasal BID    polyethylene glycol  17 g Per NG tube Daily    pregabalin  75 mg Oral Once    Followed by    pregabalin  75 mg Oral QHS    senna-docusate 8.6-50 mg  1 tablet Per NG tube BID     PRN Meds:sodium chloride, bisacodyL, calcium gluconate IVPB, calcium gluconate IVPB, calcium gluconate IVPB, Dextrose 10% Bolus, glucagon (human recombinant), hydrALAZINE, HYDROmorphone, insulin aspart U-100, labetalol, magnesium sulfate IVPB, magnesium sulfate IVPB, melatonin, metoclopramide HCl, morphine, ondansetron, ondansetron, potassium chloride in water **AND** potassium chloride in water **AND** potassium chloride in water, promethazine (PHENERGAN) IVPB, sodium chloride 0.9%, sodium chloride 0.9%, sodium phosphate IVPB, sodium phosphate IVPB, sodium phosphate IVPB     Review of patient's allergies indicates:   Allergen Reactions    Erythromycin      Stomach pains    Rosuvastatin      Hives, muscle/joint pains    Zolpidem      Confusion      Objective:     Vital Signs (Most Recent):  Temp: 99 °F (37.2 °C) (02/02/20 0300)  Pulse: 82 (02/02/20 0707)  Resp: 20 (02/02/20 0707)  BP: 134/61 (02/02/20 0707)  SpO2: 95 % (02/02/20 0707) Vital Signs (24h Range):  Temp:  [97.9 °F (36.6 °C)-99.4 °F (37.4  °C)] 99 °F (37.2 °C)  Pulse:  [80-93] 82  Resp:  [16-37] 20  SpO2:  [88 %-100 %] 95 %  BP: ()/(46-96) 134/61  Arterial Line BP: ()/(39-46) 88/39     Intake/Output - Last 3 Shifts       01/31 0700 - 02/01 0659 02/01 0700 - 02/02 0659 02/02 0700 - 02/03 0659    I.V. (mL/kg) 1988.2 (17) 1408.8 (11.7) 39.4 (0.3)    Blood  739.3     NG/ 695 45    IV Piggyback 500  100    Total Intake(mL/kg) 2903.2 (24.8) 2843.1 (23.7) 184.4 (1.5)    Urine (mL/kg/hr) 866 (0.3) 1160 (0.4) 125 (1)    Drains 195 130     Stool 75 0     Chest Tube 64 88     Total Output 1200 1378 125    Net +1703.2 +1465.1 +59.4                 SpO2: 95 %  O2 Device (Oxygen Therapy): ventilator    Physical Exam   Constitutional: She appears well-developed and well-nourished. No distress.   Cardiovascular: Normal rate and regular rhythm.   Pulmonary/Chest: Effort normal. No respiratory distress.   Bulb with serosang output  CT with serous output   Abdominal: Soft. She exhibits no distension. There is no tenderness.   Incisions covered with steristrips   Skin: Skin is warm and dry.       Significant Labs:  CBC:   Recent Labs   Lab 02/02/20  0348   WBC 14.21*   RBC 3.31*   HGB 9.4*   HCT 30.0*   *   MCV 91   MCH 28.4   MCHC 31.3*     CMP:   Recent Labs   Lab 02/02/20  0348   *   CALCIUM 7.9*   ALBUMIN 1.6*   PROT 5.3*   *   K 3.8   CO2 27   CL 94*   BUN 33*   CREATININE 1.2   ALKPHOS 88   ALT 14   AST 37   BILITOT 0.2       Significant Diagnostics:  I have reviewed all pertinent imaging results/findings within the past 24 hours.    VTE Risk Mitigation (From admission, onward)         Ordered     enoxaparin injection 40 mg  2 times daily      01/29/20 2000     Place sequential compression device  Until discontinued      01/23/20 1002     Place sequential compression device  Until discontinued      01/19/20 2022     IP VTE LOW RISK PATIENT  Once      01/19/20 2022

## 2020-02-02 NOTE — NURSING
Pt successfully extubated. Pt placed on 4L NC to keep SaO2 > 88%. Pt is awake and alert and denies any shortness of breath or dyspnea. Pt able to suction oral cavity with Yankauer.

## 2020-02-02 NOTE — PROGRESS NOTES
"[facility]  Thoracic Surgery  Progress Note    Subjective:     History of Present Illness:  Patient is a 69 year old female with PMH of CAD, HTN, TIA, OA, COPD and GERD admitted to SICU after presenting to outside ED on 1/18/20 for worsening SOB, cough and left flank ecchymosis. She reports that on 1/15 after an particularly severe coughing episode she felt a "pop" on left side, with associated severe pain and development of bruising. CT C/A/P showed large left hemidiaphragm hernia with associated widening of 7th intercostal space allowing for bowel to herniate outside of ribs. Transferred to [facility] on 1/19/20 on 2LNC. However, she rapidly declined from a respiratory standpoint. Underwent a laparoscopic reduction and repair of diaphragmatic hernia on 1/21/20. Per the op note, defect itself measured approximately 10 x 15 cm requiring a Sunbright-Rajendra patch to close the defect. Remained intubated, sedated and paralyzed after the procedure. Extubated and drain removed on 1/24/20. Over the weekend her O2 requirements again increased and had worsening leukocytosis. Chest CT today showed recurrent diaphragmatic hernia. Today she is on 7L comfort flow with decent saturations at rest. Afebrile. Hemodynamically stable. Tolerating a soft diet.     PSH of laparoscopic cholecystectomy, EMILIANO with BSO and right TKR   Never smoker. Denies EtOH use.    Post-Op Info:  Procedure(s) (LRB):  THORACOTOMY (Left)  RECONSTRUCTION, DIAPHRAGM (Left)  RECONSTRUCTION, CHEST WALL (Left)   4 Days Post-Op     Interval History:   Of nimbex. BP improved. Given two units of blood, did not respond to lasix or diuril yesterday.     Medications:  Continuous Infusions:   fentanyl 100 mcg/hr (02/02/20 0700)    propofol 40 mcg/kg/min (02/02/20 0700)    ropivacaine (PF) 2 mg/ml (0.2%) 2 mL/hr (02/02/20 0700)     Scheduled Meds:   acetylcysteine 100 mg/ml (10%)  4 mL Nebulization TID    albuterol-ipratropium  3 mL Nebulization Q4H    " benzonatate  100 mg Oral Q8H    celecoxib  200 mg Oral Daily    chlorhexidine  15 mL Mouth/Throat BID    dextromethorphan-guaifenesin  mg/5 ml  5 mL Oral Q6H    enoxaparin  40 mg Subcutaneous BID    furosemide  20 mg Intravenous BID    methocarbamol  750 mg Per NG tube QID    mupirocin  1 g Nasal BID    polyethylene glycol  17 g Per NG tube Daily    pregabalin  75 mg Oral Once    Followed by    pregabalin  75 mg Oral QHS    senna-docusate 8.6-50 mg  1 tablet Per NG tube BID     PRN Meds:sodium chloride, bisacodyL, calcium gluconate IVPB, calcium gluconate IVPB, calcium gluconate IVPB, Dextrose 10% Bolus, glucagon (human recombinant), hydrALAZINE, HYDROmorphone, insulin aspart U-100, labetalol, magnesium sulfate IVPB, magnesium sulfate IVPB, melatonin, metoclopramide HCl, morphine, ondansetron, ondansetron, potassium chloride in water **AND** potassium chloride in water **AND** potassium chloride in water, promethazine (PHENERGAN) IVPB, sodium chloride 0.9%, sodium chloride 0.9%, sodium phosphate IVPB, sodium phosphate IVPB, sodium phosphate IVPB     Review of patient's allergies indicates:   Allergen Reactions    Erythromycin      Stomach pains    Rosuvastatin      Hives, muscle/joint pains    Zolpidem      Confusion      Objective:     Vital Signs (Most Recent):  Temp: 99 °F (37.2 °C) (02/02/20 0300)  Pulse: 82 (02/02/20 0707)  Resp: 20 (02/02/20 0707)  BP: 134/61 (02/02/20 0707)  SpO2: 95 % (02/02/20 0707) Vital Signs (24h Range):  Temp:  [97.9 °F (36.6 °C)-99.4 °F (37.4 °C)] 99 °F (37.2 °C)  Pulse:  [80-93] 82  Resp:  [16-37] 20  SpO2:  [88 %-100 %] 95 %  BP: ()/(46-96) 134/61  Arterial Line BP: ()/(39-46) 88/39     Intake/Output - Last 3 Shifts       01/31 0700 - 02/01 0659 02/01 0700 - 02/02 0659 02/02 0700 - 02/03 0659    I.V. (mL/kg) 1988.2 (17) 1408.8 (11.7) 39.4 (0.3)    Blood  739.3     NG/ 695 45    IV Piggyback 500  100    Total Intake(mL/kg) 2903.2 (24.8) 2843.1  (23.7) 184.4 (1.5)    Urine (mL/kg/hr) 866 (0.3) 1160 (0.4) 125 (1)    Drains 195 130     Stool 75 0     Chest Tube 64 88     Total Output 1200 1378 125    Net +1703.2 +1465.1 +59.4                 SpO2: 95 %  O2 Device (Oxygen Therapy): ventilator    Physical Exam   Constitutional: She appears well-developed and well-nourished. No distress.   Cardiovascular: Normal rate and regular rhythm.   Pulmonary/Chest: Effort normal. No respiratory distress.   Bulb with serosang output  CT with serous output   Abdominal: Soft. She exhibits no distension. There is no tenderness.   Incisions covered with steristrips   Skin: Skin is warm and dry.       Significant Labs:  CBC:   Recent Labs   Lab 02/02/20  0348   WBC 14.21*   RBC 3.31*   HGB 9.4*   HCT 30.0*   *   MCV 91   MCH 28.4   MCHC 31.3*     CMP:   Recent Labs   Lab 02/02/20  0348   *   CALCIUM 7.9*   ALBUMIN 1.6*   PROT 5.3*   *   K 3.8   CO2 27   CL 94*   BUN 33*   CREATININE 1.2   ALKPHOS 88   ALT 14   AST 37   BILITOT 0.2       Significant Diagnostics:  I have reviewed all pertinent imaging results/findings within the past 24 hours.    VTE Risk Mitigation (From admission, onward)         Ordered     enoxaparin injection 40 mg  2 times daily      01/29/20 2000     Place sequential compression device  Until discontinued      01/23/20 1002     Place sequential compression device  Until discontinued      01/19/20 2022     IP VTE LOW RISK PATIENT  Once      01/19/20 2022              Assessment/Plan:     * Diaphragmatic hernia  69 year old female admitted to SICU with recurrent left diaphragmatic hernia s/p failed laparoscopic repair now s/p diaphragm reconstruction and chest wall reconstruction.     Neuro:  Sedated with propofol and fentanyl  Pain control: erector spinae, Tylenol, fentanyl     Resp:  Wean vent as tolerated  Nebs prn  Respiratory cultures showed MRSA and candida, currently on bactrim  CXR suggests patient may need more diuresis; however,  not responding to lasix or diuril, possibly due to low oncotic pressure     CV:  HDS  No pressors     Heme/ID:  RCx: MRSA, candida  Bactrim     Renal:  Hernandez in place,   Strict I/Os  BUN and Cr up today after diuresis     FEN/GI:  Replace lytes PRN  Rectal tube in place  Tube feeds at goal     Endo:  SSI     PPX:  Protonix  Lovenox      Dispo: Continue ICU care        Arminda Perez MD  Thoracic Surgery  [facility]

## 2020-02-02 NOTE — ADDENDUM NOTE
Addendum  created 02/01/20 2103 by Shefali Hendricks MD    Charge Capture section accepted, Cosign clinical note with attestation

## 2020-02-02 NOTE — ASSESSMENT & PLAN NOTE
69 year old female admitted to SICU with recurrent left diaphragmatic hernia s/p failed laparoscopic repair now s/p diaphragm reconstruction and chest wall reconstruction.     Neuro:  Sedated with propofol and fentanyl  Pain control: erector spinae, Tylenol, fentanyl     Resp:  Wean vent as tolerated  Nebs prn  Respiratory cultures showed MRSA and candida, currently on bactrim     CV:  HDS  No pressors     Heme/ID:  RCx: MRSA, candida  Bactrim     Renal:  Hernandez in place,   Strict I/Os  BUN and Cr up today after diuresis     FEN/GI:  Replace lytes PRN  Rectal tube in place  Tube feeds at goal     Endo:  SSI     PPX:  Protonix  Lovenox      Dispo: Continue ICU care

## 2020-02-02 NOTE — CARE UPDATE
Patient extubated per order and placed on 4L NC to achieve SpO2 88-92%. Patient tolerating well. Will continue to monitor.

## 2020-02-02 NOTE — SUBJECTIVE & OBJECTIVE
Interval History/Significant Events: Received 2u pRBCs overnight. Hgb increased to 9.4. Dc'd paralytics     Follow-up For: Procedure(s) (LRB):  THORACOTOMY (Left)  RECONSTRUCTION, DIAPHRAGM (Left)  RECONSTRUCTION, CHEST WALL (Left)    Post-Operative Day: 4 Days Post-Op    Objective:     Vital Signs (Most Recent):  Temp: 98.7 °F (37.1 °C) (02/02/20 0830)  Pulse: 85 (02/02/20 0900)  Resp: (!) 21 (02/02/20 0900)  BP: (!) 121/56 (02/02/20 0900)  SpO2: (!) 93 % (02/02/20 0900) Vital Signs (24h Range):  Temp:  [97.9 °F (36.6 °C)-99.4 °F (37.4 °C)] 98.7 °F (37.1 °C)  Pulse:  [80-93] 85  Resp:  [16-37] 21  SpO2:  [88 %-100 %] 93 %  BP: ()/(46-96) 121/56  Arterial Line BP: ()/(39-46) 88/39     Weight: 120 kg (264 lb 8.8 oz)  Body mass index is 49.99 kg/m².      Intake/Output Summary (Last 24 hours) at 2/2/2020 0904  Last data filed at 2/2/2020 0900  Gross per 24 hour   Intake 3207.25 ml   Output 1670 ml   Net 1537.25 ml       Physical Exam   HENT:   Head: Normocephalic and atraumatic.   Eyes: Pupils are equal, round, and reactive to light. EOM are normal.   Cardiovascular: Normal rate and regular rhythm.   Thoracotomy dressing CDI, chest tube an DREW in place   Pulmonary/Chest:   Intubated, on A/C 50/6   Abdominal:   Morbidly obese, laparoscopic incisions CDI   Genitourinary:   Genitourinary Comments: Hernandez in place   Neurological:   Sedated and medically paralyzed   Skin: Skin is warm and dry.       Vents:  Vent Mode: A/C (02/02/20 0707)  Ventilator Initiated: Yes (01/29/20 2023)  Set Rate: 16 BPM (02/02/20 0707)  Vt Set: 350 mL (02/02/20 0707)  Pressure Support: 8 cmH20 (01/24/20 0915)  PEEP/CPAP: 5 cmH20 (02/02/20 0707)  Oxygen Concentration (%): 60 (02/02/20 0900)  Peak Airway Pressure: 18 cmH2O (02/02/20 0707)  Plateau Pressure: 22 cmH20 (02/02/20 0707)  Total Ve: 9.08 mL (02/02/20 0707)  Negative Inspiratory Force (cm H2O): -30 (01/24/20 0854)  F/VT Ratio<105 (RSBI): (!) 38.02 (02/02/20  0707)    Lines/Drains/Airways     Drain                 NG/OG Tube Center mouth -- days         Chest Tube 01/29/20 1700 Left Pleural 3 days         Closed/Suction Drain 01/29/20 1918 Lateral LUQ Bulb 10 Fr. 3 days         Rectal Tube 01/29/20 1530 rectal tube w/ balloon (indicate number of mLs) 3 days         Urethral Catheter 01/29/20 1515 Non-latex 16 Fr. 3 days          Airway                 Airway - Non-Surgical 01/29/20 1512 3 days         Airway - Non-Surgical 01/29/20 2007 Endotracheal Tube 3 days          Epidural Line                 Perineural Analgesia/Anesthesia Assessment (using dermatomes) 01/29/20 1407 3 days          Peripheral Intravenous Line                 Peripheral IV - Single Lumen 02/01/20 0255 18 G Anterior;Right Forearm 1 day         Peripheral IV - Single Lumen 02/01/20 2300 20 G Anterior;Proximal;Right Forearm less than 1 day                Significant Labs:    CBC/Anemia Profile:  Recent Labs   Lab 02/01/20  0307 02/02/20  0348   WBC 18.38* 14.21*   HGB 7.1* 9.4*   HCT 24.4* 30.0*   * 483*   MCV 94 91   RDW 14.5 14.6*        Chemistries:  Recent Labs   Lab 02/01/20 0307 02/02/20  0348   * 132*   K 4.0 3.8   CL 94* 94*   CO2 29 27   BUN 24* 33*   CREATININE 1.0 1.2   CALCIUM 7.4* 7.9*   ALBUMIN  --  1.6*   PROT  --  5.3*   BILITOT  --  0.2   ALKPHOS  --  88   ALT  --  14   AST  --  37   MG 2.3 2.3   PHOS 4.8* 3.5         Significant Imaging:  CXR: I have reviewed all pertinent results/findings within the past 24 hours and my personal findings are:  Stable

## 2020-02-03 LAB
ALBUMIN SERPL BCP-MCNC: 1.7 G/DL (ref 3.5–5.2)
ALP SERPL-CCNC: 92 U/L (ref 55–135)
ALT SERPL W/O P-5'-P-CCNC: 20 U/L (ref 10–44)
ANION GAP SERPL CALC-SCNC: 10 MMOL/L (ref 8–16)
AST SERPL-CCNC: 45 U/L (ref 10–40)
BASOPHILS # BLD AUTO: ABNORMAL K/UL (ref 0–0.2)
BASOPHILS NFR BLD: 0 % (ref 0–1.9)
BILIRUB SERPL-MCNC: 0.3 MG/DL (ref 0.1–1)
BUN SERPL-MCNC: 24 MG/DL (ref 8–23)
CALCIUM SERPL-MCNC: 8.5 MG/DL (ref 8.7–10.5)
CHLORIDE SERPL-SCNC: 98 MMOL/L (ref 95–110)
CO2 SERPL-SCNC: 28 MMOL/L (ref 23–29)
CREAT SERPL-MCNC: 0.8 MG/DL (ref 0.5–1.4)
DIFFERENTIAL METHOD: ABNORMAL
EOSINOPHIL # BLD AUTO: ABNORMAL K/UL (ref 0–0.5)
EOSINOPHIL NFR BLD: 16 % (ref 0–8)
ERYTHROCYTE [DISTWIDTH] IN BLOOD BY AUTOMATED COUNT: 14.6 % (ref 11.5–14.5)
EST. GFR  (AFRICAN AMERICAN): >60 ML/MIN/1.73 M^2
EST. GFR  (NON AFRICAN AMERICAN): >60 ML/MIN/1.73 M^2
GLUCOSE SERPL-MCNC: 80 MG/DL (ref 70–110)
HCT VFR BLD AUTO: 32.4 % (ref 37–48.5)
HGB BLD-MCNC: 9.8 G/DL (ref 12–16)
IMM GRANULOCYTES # BLD AUTO: ABNORMAL K/UL (ref 0–0.04)
IMM GRANULOCYTES NFR BLD AUTO: ABNORMAL % (ref 0–0.5)
LYMPHOCYTES # BLD AUTO: ABNORMAL K/UL (ref 1–4.8)
LYMPHOCYTES NFR BLD: 11 % (ref 18–48)
MAGNESIUM SERPL-MCNC: 2.1 MG/DL (ref 1.6–2.6)
MCH RBC QN AUTO: 27.5 PG (ref 27–31)
MCHC RBC AUTO-ENTMCNC: 30.2 G/DL (ref 32–36)
MCV RBC AUTO: 91 FL (ref 82–98)
METAMYELOCYTES NFR BLD MANUAL: 2 %
MONOCYTES # BLD AUTO: ABNORMAL K/UL (ref 0.3–1)
MONOCYTES NFR BLD: 2 % (ref 4–15)
MYELOCYTES NFR BLD MANUAL: 1 %
NEUTROPHILS NFR BLD: 68 % (ref 38–73)
NRBC BLD-RTO: 0 /100 WBC
PHOSPHATE SERPL-MCNC: 1.8 MG/DL (ref 2.7–4.5)
PLATELET # BLD AUTO: 566 K/UL (ref 150–350)
PLATELET BLD QL SMEAR: ABNORMAL
PMV BLD AUTO: 9.1 FL (ref 9.2–12.9)
POCT GLUCOSE: 126 MG/DL (ref 70–110)
POCT GLUCOSE: 144 MG/DL (ref 70–110)
POCT GLUCOSE: 96 MG/DL (ref 70–110)
POLYCHROMASIA BLD QL SMEAR: ABNORMAL
POTASSIUM SERPL-SCNC: 3.6 MMOL/L (ref 3.5–5.1)
PROT SERPL-MCNC: 5.7 G/DL (ref 6–8.4)
RBC # BLD AUTO: 3.57 M/UL (ref 4–5.4)
SODIUM SERPL-SCNC: 136 MMOL/L (ref 136–145)
WBC # BLD AUTO: 11.56 K/UL (ref 3.9–12.7)

## 2020-02-03 PROCEDURE — 92610 EVALUATE SWALLOWING FUNCTION: CPT

## 2020-02-03 PROCEDURE — 27000221 HC OXYGEN, UP TO 24 HOURS

## 2020-02-03 PROCEDURE — 97535 SELF CARE MNGMENT TRAINING: CPT

## 2020-02-03 PROCEDURE — 85027 COMPLETE CBC AUTOMATED: CPT

## 2020-02-03 PROCEDURE — 25000003 PHARM REV CODE 250: Performed by: STUDENT IN AN ORGANIZED HEALTH CARE EDUCATION/TRAINING PROGRAM

## 2020-02-03 PROCEDURE — 63600175 PHARM REV CODE 636 W HCPCS: Performed by: STUDENT IN AN ORGANIZED HEALTH CARE EDUCATION/TRAINING PROGRAM

## 2020-02-03 PROCEDURE — 94668 MNPJ CHEST WALL SBSQ: CPT

## 2020-02-03 PROCEDURE — 83735 ASSAY OF MAGNESIUM: CPT

## 2020-02-03 PROCEDURE — 94640 AIRWAY INHALATION TREATMENT: CPT

## 2020-02-03 PROCEDURE — 99291 PR CRITICAL CARE, E/M 30-74 MINUTES: ICD-10-PCS | Mod: ,,, | Performed by: ANESTHESIOLOGY

## 2020-02-03 PROCEDURE — 84100 ASSAY OF PHOSPHORUS: CPT

## 2020-02-03 PROCEDURE — 20000000 HC ICU ROOM

## 2020-02-03 PROCEDURE — 99291 CRITICAL CARE FIRST HOUR: CPT | Mod: ,,, | Performed by: ANESTHESIOLOGY

## 2020-02-03 PROCEDURE — 85007 BL SMEAR W/DIFF WBC COUNT: CPT

## 2020-02-03 PROCEDURE — 94761 N-INVAS EAR/PLS OXIMETRY MLT: CPT

## 2020-02-03 PROCEDURE — 94799 UNLISTED PULMONARY SVC/PX: CPT

## 2020-02-03 PROCEDURE — 80053 COMPREHEN METABOLIC PANEL: CPT

## 2020-02-03 PROCEDURE — 99900035 HC TECH TIME PER 15 MIN (STAT)

## 2020-02-03 PROCEDURE — 63600175 PHARM REV CODE 636 W HCPCS: Performed by: SURGERY

## 2020-02-03 PROCEDURE — 25000242 PHARM REV CODE 250 ALT 637 W/ HCPCS: Performed by: STUDENT IN AN ORGANIZED HEALTH CARE EDUCATION/TRAINING PROGRAM

## 2020-02-03 RX ORDER — MICONAZOLE NITRATE 2 %
POWDER (GRAM) TOPICAL 2 TIMES DAILY
Status: DISCONTINUED | OUTPATIENT
Start: 2020-02-03 | End: 2020-02-06

## 2020-02-03 RX ORDER — DEXAMETHASONE SODIUM PHOSPHATE 4 MG/ML
8 INJECTION, SOLUTION INTRA-ARTICULAR; INTRALESIONAL; INTRAMUSCULAR; INTRAVENOUS; SOFT TISSUE ONCE
Status: COMPLETED | OUTPATIENT
Start: 2020-02-03 | End: 2020-02-03

## 2020-02-03 RX ORDER — NICARDIPINE HYDROCHLORIDE 0.2 MG/ML
1 INJECTION INTRAVENOUS CONTINUOUS
Status: DISCONTINUED | OUTPATIENT
Start: 2020-02-03 | End: 2020-02-03

## 2020-02-03 RX ORDER — KETOROLAC TROMETHAMINE 30 MG/ML
30 INJECTION, SOLUTION INTRAMUSCULAR; INTRAVENOUS DAILY
Status: DISCONTINUED | OUTPATIENT
Start: 2020-02-03 | End: 2020-02-04

## 2020-02-03 RX ORDER — DOXYLAMINE SUCCINATE 25 MG
TABLET ORAL 2 TIMES DAILY
Status: DISCONTINUED | OUTPATIENT
Start: 2020-02-03 | End: 2020-02-06

## 2020-02-03 RX ORDER — ACETAMINOPHEN 10 MG/ML
1000 INJECTION, SOLUTION INTRAVENOUS EVERY 8 HOURS
Status: COMPLETED | OUTPATIENT
Start: 2020-02-03 | End: 2020-02-04

## 2020-02-03 RX ADMIN — MICONAZOLE NITRATE: 20 POWDER TOPICAL at 08:02

## 2020-02-03 RX ADMIN — DEXAMETHASONE SODIUM PHOSPHATE 8 MG: 4 INJECTION, SOLUTION INTRAMUSCULAR; INTRAVENOUS at 12:02

## 2020-02-03 RX ADMIN — MICONAZOLE NITRATE: 20 POWDER TOPICAL at 10:02

## 2020-02-03 RX ADMIN — BENZONATATE 100 MG: 100 CAPSULE ORAL at 01:02

## 2020-02-03 RX ADMIN — PREGABALIN 75 MG: 75 CAPSULE ORAL at 08:02

## 2020-02-03 RX ADMIN — KETOROLAC TROMETHAMINE 30 MG: 30 INJECTION, SOLUTION INTRAMUSCULAR; INTRAVENOUS at 12:02

## 2020-02-03 RX ADMIN — SODIUM PHOSPHATE, MONOBASIC, MONOHYDRATE 20.01 MMOL: 276; 142 INJECTION, SOLUTION INTRAVENOUS at 04:02

## 2020-02-03 RX ADMIN — ENOXAPARIN SODIUM 40 MG: 100 INJECTION SUBCUTANEOUS at 08:02

## 2020-02-03 RX ADMIN — IPRATROPIUM BROMIDE AND ALBUTEROL SULFATE 3 ML: .5; 3 SOLUTION RESPIRATORY (INHALATION) at 04:02

## 2020-02-03 RX ADMIN — HYDROMORPHONE HYDROCHLORIDE 1 MG: 1 INJECTION, SOLUTION INTRAMUSCULAR; INTRAVENOUS; SUBCUTANEOUS at 11:02

## 2020-02-03 RX ADMIN — HYDRALAZINE HYDROCHLORIDE 10 MG: 20 INJECTION INTRAMUSCULAR; INTRAVENOUS at 04:02

## 2020-02-03 RX ADMIN — DOCUSATE SODIUM - SENNOSIDES 1 TABLET: 50; 8.6 TABLET, FILM COATED ORAL at 08:02

## 2020-02-03 RX ADMIN — ACETAMINOPHEN 1000 MG: 10 INJECTION, SOLUTION INTRAVENOUS at 09:02

## 2020-02-03 RX ADMIN — ENOXAPARIN SODIUM 40 MG: 100 INJECTION SUBCUTANEOUS at 10:02

## 2020-02-03 RX ADMIN — MUPIROCIN 1 G: 20 OINTMENT TOPICAL at 10:02

## 2020-02-03 RX ADMIN — GUAIFENESIN AND DEXTROMETHORPHAN 5 ML: 100; 10 SYRUP ORAL at 05:02

## 2020-02-03 RX ADMIN — METHOCARBAMOL TABLETS 750 MG: 750 TABLET, COATED ORAL at 12:02

## 2020-02-03 RX ADMIN — GUAIFENESIN AND DEXTROMETHORPHAN 5 ML: 100; 10 SYRUP ORAL at 11:02

## 2020-02-03 RX ADMIN — CHLORHEXIDINE GLUCONATE 0.12% ORAL RINSE 15 ML: 1.2 LIQUID ORAL at 10:02

## 2020-02-03 RX ADMIN — IPRATROPIUM BROMIDE AND ALBUTEROL SULFATE 3 ML: .5; 3 SOLUTION RESPIRATORY (INHALATION) at 03:02

## 2020-02-03 RX ADMIN — POTASSIUM CHLORIDE 40 MEQ: 7.46 INJECTION, SOLUTION INTRAVENOUS at 04:02

## 2020-02-03 RX ADMIN — IPRATROPIUM BROMIDE AND ALBUTEROL SULFATE 3 ML: .5; 3 SOLUTION RESPIRATORY (INHALATION) at 07:02

## 2020-02-03 RX ADMIN — ACETYLCYSTEINE 4 ML: 100 SOLUTION ORAL; RESPIRATORY (INHALATION) at 11:02

## 2020-02-03 RX ADMIN — LABETALOL HCL IV SOLN PREFILLED SYRINGE 20 MG/4ML (5 MG/ML) 10 MG: 20/4 SOLUTION PREFILLED SYRINGE at 06:02

## 2020-02-03 RX ADMIN — IPRATROPIUM BROMIDE AND ALBUTEROL SULFATE 3 ML: .5; 3 SOLUTION RESPIRATORY (INHALATION) at 11:02

## 2020-02-03 RX ADMIN — IPRATROPIUM BROMIDE AND ALBUTEROL SULFATE 3 ML: .5; 3 SOLUTION RESPIRATORY (INHALATION) at 08:02

## 2020-02-03 RX ADMIN — BENZONATATE 100 MG: 100 CAPSULE ORAL at 09:02

## 2020-02-03 RX ADMIN — HYDRALAZINE HYDROCHLORIDE 10 MG: 20 INJECTION INTRAMUSCULAR; INTRAVENOUS at 05:02

## 2020-02-03 RX ADMIN — FUROSEMIDE 40 MG: 10 INJECTION, SOLUTION INTRAMUSCULAR; INTRAVENOUS at 10:02

## 2020-02-03 RX ADMIN — ACETAMINOPHEN 1000 MG: 10 INJECTION, SOLUTION INTRAVENOUS at 01:02

## 2020-02-03 RX ADMIN — GUAIFENESIN AND DEXTROMETHORPHAN 5 ML: 100; 10 SYRUP ORAL at 12:02

## 2020-02-03 RX ADMIN — ACETYLCYSTEINE 4 ML: 100 SOLUTION ORAL; RESPIRATORY (INHALATION) at 07:02

## 2020-02-03 RX ADMIN — ROPIVACAINE HYDROCHLORIDE 2 ML/HR: 2 INJECTION, SOLUTION EPIDURAL; INFILTRATION at 05:02

## 2020-02-03 NOTE — SUBJECTIVE & OBJECTIVE
Interval History/Significant Events: Extubated, hypertensive into the 200s requiring hydralazine and labetalol, now controlled in the 150s.    Follow-up For: Procedure(s) (LRB):  THORACOTOMY (Left)  RECONSTRUCTION, DIAPHRAGM (Left)  RECONSTRUCTION, CHEST WALL (Left)    Post-Operative Day: 5 Days Post-Op    Objective:     Vital Signs (Most Recent):  Temp: 98.9 °F (37.2 °C) (02/03/20 0700)  Pulse: 91 (02/03/20 1130)  Resp: 17 (02/03/20 1130)  BP: (!) 152/66 (02/03/20 1100)  SpO2: (!) 92 % (02/03/20 1130) Vital Signs (24h Range):  Temp:  [98.2 °F (36.8 °C)-98.9 °F (37.2 °C)] 98.9 °F (37.2 °C)  Pulse:  [] 91  Resp:  [14-31] 17  SpO2:  [89 %-96 %] 92 %  BP: (105-189)/(56-78) 152/66     Weight: 114.5 kg (252 lb 6.8 oz)  Body mass index is 47.7 kg/m².      Intake/Output Summary (Last 24 hours) at 2/3/2020 1231  Last data filed at 2/3/2020 0700  Gross per 24 hour   Intake 63.9 ml   Output 2170 ml   Net -2106.1 ml       Physical Exam   Constitutional: She is oriented to person, place, and time.   HENT:   Head: Normocephalic and atraumatic.   Eyes: Pupils are equal, round, and reactive to light. EOM are normal.   Cardiovascular: Normal rate and regular rhythm.   Thoracotomy dressing CDI, chest tube an DREW in place   Pulmonary/Chest:   Extubated on NC, satting well   Abdominal:   Morbidly obese, laparoscopic incisions CDI   Genitourinary:   Genitourinary Comments: Hernandez in place, rectal tube in place   Neurological: She is alert and oriented to person, place, and time.   Skin: Skin is warm and dry.       Vents:  Vent Mode: Spont (02/02/20 1436)  Ventilator Initiated: Yes (01/29/20 2023)  Set Rate: 16 BPM (02/02/20 1300)  Vt Set: 350 mL (02/02/20 1300)  Pressure Support: 5 cmH20 (02/02/20 1436)  PEEP/CPAP: 5 cmH20 (02/02/20 1436)  Oxygen Concentration (%): 0 (02/02/20 1436)  Peak Airway Pressure: 11 cmH2O (02/02/20 1436)  Plateau Pressure: 22 cmH20 (02/02/20 1436)  Total Ve: 7.34 mL (02/02/20 1436)  Negative Inspiratory  Force (cm H2O): -30 (01/24/20 0854)  F/VT Ratio<105 (RSBI): (!) 24.29 (02/02/20 1435)    Lines/Drains/Airways     Drain                 Chest Tube 01/29/20 1700 Left Pleural 4 days         Closed/Suction Drain 01/29/20 1918 Lateral LUQ Bulb 10 Fr. 4 days          Epidural Line                 Perineural Analgesia/Anesthesia Assessment (using dermatomes) 01/29/20 1407 4 days          Peripheral Intravenous Line                 Peripheral IV - Single Lumen 02/01/20 0255 18 G Anterior;Right Forearm 2 days         Peripheral IV - Single Lumen 02/01/20 2300 20 G Anterior;Proximal;Right Forearm 1 day                Significant Labs:    CBC/Anemia Profile:  Recent Labs   Lab 02/02/20  0348 02/03/20  0200   WBC 14.21* 11.56   HGB 9.4* 9.8*   HCT 30.0* 32.4*   * 566*   MCV 91 91   RDW 14.6* 14.6*        Chemistries:  Recent Labs   Lab 02/02/20  0348 02/03/20  0200   * 136   K 3.8 3.6   CL 94* 98   CO2 27 28   BUN 33* 24*   CREATININE 1.2 0.8   CALCIUM 7.9* 8.5*   ALBUMIN 1.6* 1.7*   PROT 5.3* 5.7*   BILITOT 0.2 0.3   ALKPHOS 88 92   ALT 14 20   AST 37 45*   MG 2.3 2.1   PHOS 3.5 1.8*       All pertinent labs within the past 24 hours have been reviewed.    Significant Imaging:  I have reviewed all pertinent imaging results/findings within the past 24 hours.

## 2020-02-03 NOTE — PT/OT/SLP EVAL
Speech Language Pathology Evaluation  Bedside Swallow    Patient Name:  Marisela Bunn   MRN:  [id number]  Admitting Diagnosis: Diaphragmatic hernia    Recommendations:                 General Recommendations:  ENT evaluation and ongoing swallow assessment  Diet recommendations:  Dental Soft, [place] Thick   Aspiration Precautions: 1 bite/sip at a time, Assistance with meals, Eliminate distractions, Feed only when awake/alert, Meds whole 1 at a time, Remain upright 30 minutes post meal, Small bites/sips and Standard aspiration precautions   General Precautions: Standard, aspiration, contact, fall  Communication strategies:  none    History:     Past Medical History:   Diagnosis Date    CAD (coronary artery disease)     Diaphragmatic hernia     GERD (gastroesophageal reflux disease)     Hypertension     TIA (transient ischemic attack)        Past Surgical History:   Procedure Laterality Date    RECONSTRUCTION OF CHEST WALL Left 1/29/2020    Procedure: RECONSTRUCTION, CHEST WALL;  Surgeon: Primo Soni MD;  Location: [facility] OR [address] [place] [address];  Service: Thoracic;  Laterality: Left;    RECONSTRUCTION OF DIAPHRAGM Left 1/29/2020    Procedure: RECONSTRUCTION, DIAPHRAGM;  Surgeon: Primo Soni MD;  Location: [facility] OR [address] [place] [address];  Service: Thoracic;  Laterality: Left;  Hernia reduction and repair    REPAIR OF DIAPHRAGMATIC HERNIA Left 1/21/2020    Procedure: REPAIR, HERNIA, DIAPHRAGMATIC, Laparoscopic;  Surgeon: Lucho Garcia Jr., MD;  Location: [facility] OR [address] [place] [address];  Service: General;  Laterality: Left;    REPAIR OF DIAPHRAGMATIC HERNIA Left 1/23/2020    Procedure: REPAIR, HERNIA, DIAPHRAGMATIC;  Surgeon: Lucho Garica Jr., MD;  Location: [facility] OR [address] [place] [address];  Service: General;  Laterality: Left;     Prior Intubation HX:  1/21-1/24; 1/27-2/2    Prior diet: Per SLP recommendations 1/28, mechanical soft diet and thin liquids.     Subjective     Patient awake;alert. RN present for session    Pain/Comfort:  · Pain  Rating 1: 0/10    Objective:     Oral Musculature Evaluation  · Oral Musculature: WFL  · Dentition: upper dentures  · Secretion Management: adequate  · Mucosal Quality: adequate  · Mandibular Strength and Mobility: WFL  · Oral Labial Strength and Mobility: WFL  · Lingual Strength and Mobility: WFL  · Velar Elevation: WFL  · Buccal Strength and Mobility: WFL  · Volitional Cough: Weak  · Volitional Swallow: Elicited  · Voice Prior to PO Intake: [place], breathy, dysphonic    Bedside Swallow Eval:   Consistencies Assessed:  · Thin liquids via tsp and cup sip  · Nectar thick liquids via cup and straw  · Puree via full teaspoons x3  · Solids via 1/4 cracker     Oral Phase:   · Prolonged mastication with regular solids    Pharyngeal Phase:   · Immediate coughing/choking with thin liquids trials  · No s/s airway compromise with nectar thick liquids, puree, and regular solids.     Compensatory Strategies  · None    Treatment: Patient appears appropriate to begin dental soft consistency diet with nectar thickened liquids. Skilled education was provided to patient re: diet recs, standard aspiration precautions of which to follow, and ongoing ST plan of care. Patient verbalized understanding. Recipe for nectar thickened liquids updated on patient's whiteboard.       Assessment:     Marisela Bunn is a 69 y.o. female with an SLP diagnosis of Dysphagia s/p multiple intubations.     Goals:   Multidisciplinary Problems     SLP Goals        Problem: SLP Goal    Goal Priority Disciplines Outcome   SLP Goal     SLP Ongoing, Progressing   Description:  Speech Language Pathology Goals  Goals expected to be met by 2/10:  1. Pt will tolerate mechanical soft diet and nectar thick liquids without s/s of aspiration.  2. Pt will participate in ongoing swallow assessment to ensure least restrictive diet recommendations.                        Plan:     · Patient to be seen:  4 x/week   · Plan of Care expires:  03/03/20  · Plan of Care reviewed  with:  patient   · SLP Follow-Up:  Yes       Discharge recommendations:  nursing facility, skilled   Barriers to Discharge:  None    Time Tracking:     SLP Treatment Date:   02/03/20  Speech Start Time:  1010  Speech Stop Time:  1026     Speech Total Time (min):  16 min    Billable Minutes: Eval Swallow and Oral Function 8 and Seld Care/Home Management Training 8    Emily Abadie, CCC-SLP  02/03/2020

## 2020-02-03 NOTE — ASSESSMENT & PLAN NOTE
69 year old female admitted to SICU with recurrent left diaphragmatic hernia s/p laparoscopic repair 1 week ago. Now s/p L thoracotomy, patch reconstruction L hemidiaphragm, chest wall reconstruction with [place]-Rajendra patch 1/29/2020.     - d/c rectal tube, add enema  - SLP consult  - wean O2 as able  - out of bed to cardiac chair  - PT.OT  - continue erector spinae block  - Remainder of care per general surgery and ICU teams.

## 2020-02-03 NOTE — PLAN OF CARE
02/03/20 1158   Discharge Reassessment   Assessment Type Discharge Planning Reassessment   Provided patient/caregiver education on the expected discharge date and the discharge plan Yes   Discharge Plan A Skilled Nursing Facility   Discharge Plan B Home Health   DME Needed Upon Discharge  other (see comments)  (TBD)   Post-Acute Status   Discharge Delays None known at this time       Anastasiia Wagner MPH, RN, CM  Ext. [phone]

## 2020-02-03 NOTE — ADDENDUM NOTE
Addendum  created 02/03/20 1137 by Chula Irizarry MD    Order list changed, Order sets accessed, Sign clinical note

## 2020-02-03 NOTE — PLAN OF CARE
Plan of care reviewed with pt and pt's son. All questions and concerns addressed. See flow sheet for assessments, vital signs, I/Os and LDAs. All orders for 7a-7p fulfilled.

## 2020-02-03 NOTE — ADDENDUM NOTE
Addendum  created 02/03/20 1143 by Ambar Nicole MD    Charge Capture section accepted, Sign clinical note

## 2020-02-03 NOTE — ASSESSMENT & PLAN NOTE
Marisela Bunn is a 69 y.o. female with PMH COPD (not on home oxygen), HTN, HLD (not on anticoagulation) received as direct admission for large diaphragmatic hernia. Patient is symptomatic from hernia with constipation and dyspnea with overlying bruise on left flank. She underwent lap diaphragmatic hernia repair with mesh on 1/21/20.   Evidence of recurrent hernia on CT 1/27. Underwent recurrent L diaphragmatic hernia repair with L chestwall recon with thoracic sx on 1/29. Extubated on 2/2/20.     - Wean O2 as tolerated per SICU protocol  - Discontinue garcia cath today  - Bactrim completed  - Aggressive pulm toilet with IS, CPT, DuoNebs, and Tessalon when able  - PT/OT when able  - Daily labs  - Drains and chest tube per thoracic surgery  - Please call with questions or concerns

## 2020-02-03 NOTE — PROGRESS NOTES
[facility]  Cardiothoracic Surgery  Progress Note    Patient Name: Marisela Bunn  MRN: [id number]  Admission Date: 1/19/2020  Hospital Length of Stay: 15 days  Code Status: Full Code   Attending Physician: Lucho Garcia Jr.,*   Referring Provider: Notinsystem, Provider  Principal Problem:Diaphragmatic hernia    Subjective:     Post-Op Info:  Procedure(s) (LRB):  THORACOTOMY (Left)  RECONSTRUCTION, DIAPHRAGM (Left)  RECONSTRUCTION, CHEST WALL (Left)   5 Days Post-Op     Interval History:   Extubated. On 4L NC. No BM. Weak/hoarse voice.    Medications:  Continuous Infusions:   fentanyl Stopped (02/02/20 1000)    propofol Stopped (02/02/20 1000)    ropivacaine (PF) 2 mg/ml (0.2%) 2 mL/hr (02/03/20 0600)     Scheduled Meds:   acetylcysteine 100 mg/ml (10%)  4 mL Nebulization TID    albuterol-ipratropium  3 mL Nebulization Q4H    benzonatate  100 mg Oral Q8H    celecoxib  200 mg Oral Daily    chlorhexidine  15 mL Mouth/Throat BID    dextromethorphan-guaifenesin  mg/5 ml  5 mL Oral Q6H    enoxaparin  40 mg Subcutaneous BID    furosemide  40 mg Intravenous BID    methocarbamol  750 mg Per NG tube QID    mupirocin  1 g Nasal BID    polyethylene glycol  17 g Per NG tube Daily    pregabalin  75 mg Oral Once    Followed by    pregabalin  75 mg Oral QHS    senna-docusate 8.6-50 mg  1 tablet Per NG tube BID     PRN Meds:sodium chloride, bisacodyL, calcium gluconate IVPB, calcium gluconate IVPB, calcium gluconate IVPB, Dextrose 10% Bolus, glucagon (human recombinant), hydrALAZINE, HYDROmorphone, insulin aspart U-100, labetalol, magnesium sulfate IVPB, magnesium sulfate IVPB, melatonin, metoclopramide HCl, morphine, ondansetron, ondansetron, potassium chloride in water **AND** potassium chloride in water **AND** potassium chloride in water, promethazine (PHENERGAN) IVPB, sodium chloride 0.9%, sodium chloride 0.9%, sodium phosphate IVPB, sodium phosphate IVPB, sodium phosphate IVPB      Review of patient's allergies indicates:   Allergen Reactions    Erythromycin      Stomach pains    Rosuvastatin      Hives, muscle/joint pains    Zolpidem      Confusion      Objective:     Vital Signs (Most Recent):  Temp: 98.2 °F (36.8 °C) (02/03/20 0300)  Pulse: 91 (02/03/20 0715)  Resp: (!) 28 (02/03/20 0715)  BP: (!) 189/78 (02/03/20 0600)  SpO2: (!) 89 % (02/03/20 0715) Vital Signs (24h Range):  Temp:  [98.2 °F (36.8 °C)-98.7 °F (37.1 °C)] 98.2 °F (36.8 °C)  Pulse:  [] 91  Resp:  [14-31] 28  SpO2:  [89 %-96 %] 89 %  BP: (105-189)/(56-78) 189/78     Intake/Output - Last 3 Shifts       02/01 0700 - 02/02 0659 02/02 0700 - 02/03 0659 02/03 0700 - 02/04 0659    I.V. (mL/kg) 1408.8 (11.7) 188.1 (1.6)     Blood 739.3      NG/ 315     IV Piggyback  300     Total Intake(mL/kg) 2843.1 (23.7) 803.1 (7)     Urine (mL/kg/hr) 1160 (0.4) 2425 (0.9)     Drains 130 210     Stool 0 0     Chest Tube 88 110     Total Output 1378 2745     Net +1465.1 -1941.9                  SpO2: (!) 89 %  O2 Device (Oxygen Therapy): nasal cannula    Physical Exam   Constitutional: She appears well-developed and well-nourished. No distress.   Cardiovascular: Normal rate and regular rhythm.   Pulmonary/Chest: Effort normal. No respiratory distress.   Bulb with serosang output  CT with serous output   Abdominal: Soft. She exhibits no distension. There is no tenderness.   Incisions covered with steristrips   Skin: Skin is warm and dry.       Significant Labs:  CBC:   Recent Labs   Lab 02/03/20  0200   WBC 11.56   RBC 3.57*   HGB 9.8*   HCT 32.4*   *   MCV 91   MCH 27.5   MCHC 30.2*     CMP:   Recent Labs   Lab 02/03/20  0200   GLU 80   CALCIUM 8.5*   ALBUMIN 1.7*   PROT 5.7*      K 3.6   CO2 28   CL 98   BUN 24*   CREATININE 0.8   ALKPHOS 92   ALT 20   AST 45*   BILITOT 0.3       Significant Diagnostics:  I have reviewed all pertinent imaging results/findings within the past 24 hours.    VTE Risk Mitigation (From  admission, onward)         Ordered     enoxaparin injection 40 mg  2 times daily      01/29/20 2000     Place sequential compression device  Until discontinued      01/23/20 1002     Place sequential compression device  Until discontinued      01/19/20 2022     IP VTE LOW RISK PATIENT  Once      01/19/20 2022                  Assessment/Plan:     * Diaphragmatic hernia  69 year old female admitted to SICU with recurrent left diaphragmatic hernia s/p laparoscopic repair 1 week ago. Now s/p L thoracotomy, patch reconstruction L hemidiaphragm, chest wall reconstruction with [place]-Rajendra patch 1/29/2020.     - d/c rectal tube, add enema  - SLP consult  - wean O2 as able  - out of bed to cardiac chair  - PT.OT  - continue erector spinae block  - Remainder of care per general surgery and ICU teams.        Radha Wilson MD  Cardiothoracic Surgery  [facility]

## 2020-02-03 NOTE — PROGRESS NOTES
[facility]  Critical Care - Surgery  Progress Note    Patient Name: Marisela Bunn  MRN: [id number]  Admission Date: 1/19/2020  Hospital Length of Stay: 15 days  Code Status: Full Code  Attending Provider: Lucho Garcia Jr.,*  Primary Care Provider: Primary Doctor No   Principal Problem: Diaphragmatic hernia    Subjective:     Hospital/ICU Course:  2/2 - Received 2u pRBCs overnight. Hgb increased to 9.4. Dc'd paralytics. Increased TF      Interval History/Significant Events: Extubated, hypertensive into the 200s requiring hydralazine and labetalol, now controlled in the 150s.    Follow-up For: Procedure(s) (LRB):  THORACOTOMY (Left)  RECONSTRUCTION, DIAPHRAGM (Left)  RECONSTRUCTION, CHEST WALL (Left)    Post-Operative Day: 5 Days Post-Op    Objective:     Vital Signs (Most Recent):  Temp: 98.9 °F (37.2 °C) (02/03/20 0700)  Pulse: 91 (02/03/20 1130)  Resp: 17 (02/03/20 1130)  BP: (!) 152/66 (02/03/20 1100)  SpO2: (!) 92 % (02/03/20 1130) Vital Signs (24h Range):  Temp:  [98.2 °F (36.8 °C)-98.9 °F (37.2 °C)] 98.9 °F (37.2 °C)  Pulse:  [] 91  Resp:  [14-31] 17  SpO2:  [89 %-96 %] 92 %  BP: (105-189)/(56-78) 152/66     Weight: 114.5 kg (252 lb 6.8 oz)  Body mass index is 47.7 kg/m².      Intake/Output Summary (Last 24 hours) at 2/3/2020 1231  Last data filed at 2/3/2020 0700  Gross per 24 hour   Intake 63.9 ml   Output 2170 ml   Net -2106.1 ml       Physical Exam   Constitutional: She is oriented to person, place, and time.   HENT:   Head: Normocephalic and atraumatic.   Eyes: Pupils are equal, round, and reactive to light. EOM are normal.   Cardiovascular: Normal rate and regular rhythm.   Thoracotomy dressing CDI, chest tube an DREW in place   Pulmonary/Chest:   Extubated on NC, satting well   Abdominal:   Morbidly obese, laparoscopic incisions CDI   Genitourinary:   Genitourinary Comments: Hernandez in place, rectal tube in place   Neurological: She is alert and oriented to person, place, and  time.   Skin: Skin is warm and dry.       Vents:  Vent Mode: Spont (02/02/20 1436)  Ventilator Initiated: Yes (01/29/20 2023)  Set Rate: 16 BPM (02/02/20 1300)  Vt Set: 350 mL (02/02/20 1300)  Pressure Support: 5 cmH20 (02/02/20 1436)  PEEP/CPAP: 5 cmH20 (02/02/20 1436)  Oxygen Concentration (%): 0 (02/02/20 1436)  Peak Airway Pressure: 11 cmH2O (02/02/20 1436)  Plateau Pressure: 22 cmH20 (02/02/20 1436)  Total Ve: 7.34 mL (02/02/20 1436)  Negative Inspiratory Force (cm H2O): -30 (01/24/20 0854)  F/VT Ratio<105 (RSBI): (!) 24.29 (02/02/20 1435)    Lines/Drains/Airways     Drain                 Chest Tube 01/29/20 1700 Left Pleural 4 days         Closed/Suction Drain 01/29/20 1918 Lateral LUQ Bulb 10 Fr. 4 days          Epidural Line                 Perineural Analgesia/Anesthesia Assessment (using dermatomes) 01/29/20 1407 4 days          Peripheral Intravenous Line                 Peripheral IV - Single Lumen 02/01/20 0255 18 G Anterior;Right Forearm 2 days         Peripheral IV - Single Lumen 02/01/20 2300 20 G Anterior;Proximal;Right Forearm 1 day                Significant Labs:    CBC/Anemia Profile:  Recent Labs   Lab 02/02/20  0348 02/03/20  0200   WBC 14.21* 11.56   HGB 9.4* 9.8*   HCT 30.0* 32.4*   * 566*   MCV 91 91   RDW 14.6* 14.6*        Chemistries:  Recent Labs   Lab 02/02/20  0348 02/03/20  0200   * 136   K 3.8 3.6   CL 94* 98   CO2 27 28   BUN 33* 24*   CREATININE 1.2 0.8   CALCIUM 7.9* 8.5*   ALBUMIN 1.6* 1.7*   PROT 5.3* 5.7*   BILITOT 0.2 0.3   ALKPHOS 88 92   ALT 14 20   AST 37 45*   MG 2.3 2.1   PHOS 3.5 1.8*       All pertinent labs within the past 24 hours have been reviewed.    Significant Imaging:  I have reviewed all pertinent imaging results/findings within the past 24 hours.    Assessment/Plan:     * Diaphragmatic hernia  69F PMH CAD, HTN, TIA, GERD who presented with a diaphragmatic hernia, now s/p surgical repair on 1/23/19.    Surgical - Diaphragmatic Hernia  S/p  thoracotomy, reduction of hernia, and placement of mesh over diaphragmatic and intercostal defects with placement of chest tube and DREW drain  Thoracic surgery considering removing one or both drains today    Neuro:  Pain control: Celecoxib, Erector Spinae Block with Ropivicaine  Off sedation/paralysis    Resp:  Tesslon, dextromethorphan, codeine to suppress cough ordered but currently paralyzed  Nebs prn  Acapella, IS  Continue to monitor oxygen requirements  Respiratory cultures showed MRSA and candida. On Bactrim.  Satting well on NC  CXR stable    CV - Hypertension:  Hydralazine PRN  Labetalol PRN    Heme/ID:  RCx: MRSA, candida  Bactrim - completed    Renal:  Strict I/Os  MIVF   Monitor Cr/BUN  Well diuresed, hold lasix  Remove garcia    FEN/GI:  Passed swallow, Nectar thick diet  Replace lytes PRN  Remove rectal tube  Fleets enema  Daily KUB    Endo:  SSI    ICU Checklist:  Feeds-Nectar thick diet  Analgesia-Celecoxib, Erector Spinae block with ropivicaine  Sedation-None  T(DVT PPx)-Lovenox  HOB-N/A  Ulcer PPx-Protonix  Glucose-SSI  Bowel Reg-Doc-Senna, Miralax  Invasive Lines-Chest Tube, DREW Drain, Erector Spinae Catheter, PIV  Deescalate-N/A    Dispo: Continue ICU care         Schuyler Lomax MD  Critical Care - Surgery  [facility]     16

## 2020-02-03 NOTE — PLAN OF CARE
No acute events. AAOX4 with intermittent episodes of confusion upon awakening. 4.5L NC sats 93%. NSR. Ropivacaine @ 2ml/hr with 10cc bolus qh.  See flowsheet for chest tube and DREW drain output. External catheter utilized. See flowsheet for output. Pt turned q2h and miconazole applied to affected areas. No new skin breakdown noted. Pain regimen adequate for pain control. PRN [id number] given for SBP>160. Plan to restart home HTN medications in the AM per MD. Plan of care reviewed with patient and son at bedside. Questions and concerns addressed. Will continue to monitor.

## 2020-02-03 NOTE — ASSESSMENT & PLAN NOTE
69F PMH CAD, HTN, TIA, GERD who presented with a diaphragmatic hernia, now s/p surgical repair on 1/23/19.    Surgical - Diaphragmatic Hernia  S/p thoracotomy, reduction of hernia, and placement of mesh over diaphragmatic and intercostal defects with placement of chest tube and DREW drain  Thoracic surgery considering removing one or both drains today    Neuro:  Pain control: Celecoxib, Erector Spinae Block with Ropivicaine  Off sedation/paralysis    Resp:  Tesslon, dextromethorphan, codeine to suppress cough ordered but currently paralyzed  Nebs prn  Acapella, IS  Continue to monitor oxygen requirements  Respiratory cultures showed MRSA and candida. On Bactrim.  Satting well on NC  CXR stable    CV - Hypertension:  Hydralazine PRN  Labetalol PRN    Heme/ID:  RCx: MRSA, candida  Bactrim - completed    Renal:  Strict I/Os  MIVF   Monitor Cr/BUN  Well diuresed, hold lasix  Remove garcia    FEN/GI:  Passed swallow, Nectar thick diet  Replace lytes PRN  Remove rectal tube  Fleets enema  Daily KUB    Endo:  SSI    ICU Checklist:  Feeds-Nectar thick diet  Analgesia-Celecoxib, Erector Spinae block with ropivicaine  Sedation-None  T(DVT PPx)-Lovenox  HOB-N/A  Ulcer PPx-Protonix  Glucose-SSI  Bowel Reg-Doc-Senna, Miralax  Invasive Lines-Chest Tube, DREW Drain, Erector Spinae Catheter, PIV  Deescalate-N/A    Dispo: Continue ICU care

## 2020-02-03 NOTE — SUBJECTIVE & OBJECTIVE
Interval History:   Extubated. On 4L NC. No BM. Weak/hoarse voice.    Medications:  Continuous Infusions:   fentanyl Stopped (02/02/20 1000)    propofol Stopped (02/02/20 1000)    ropivacaine (PF) 2 mg/ml (0.2%) 2 mL/hr (02/03/20 0600)     Scheduled Meds:   acetylcysteine 100 mg/ml (10%)  4 mL Nebulization TID    albuterol-ipratropium  3 mL Nebulization Q4H    benzonatate  100 mg Oral Q8H    celecoxib  200 mg Oral Daily    chlorhexidine  15 mL Mouth/Throat BID    dextromethorphan-guaifenesin  mg/5 ml  5 mL Oral Q6H    enoxaparin  40 mg Subcutaneous BID    furosemide  40 mg Intravenous BID    methocarbamol  750 mg Per NG tube QID    mupirocin  1 g Nasal BID    polyethylene glycol  17 g Per NG tube Daily    pregabalin  75 mg Oral Once    Followed by    pregabalin  75 mg Oral QHS    senna-docusate 8.6-50 mg  1 tablet Per NG tube BID     PRN Meds:sodium chloride, bisacodyL, calcium gluconate IVPB, calcium gluconate IVPB, calcium gluconate IVPB, Dextrose 10% Bolus, glucagon (human recombinant), hydrALAZINE, HYDROmorphone, insulin aspart U-100, labetalol, magnesium sulfate IVPB, magnesium sulfate IVPB, melatonin, metoclopramide HCl, morphine, ondansetron, ondansetron, potassium chloride in water **AND** potassium chloride in water **AND** potassium chloride in water, promethazine (PHENERGAN) IVPB, sodium chloride 0.9%, sodium chloride 0.9%, sodium phosphate IVPB, sodium phosphate IVPB, sodium phosphate IVPB     Review of patient's allergies indicates:   Allergen Reactions    Erythromycin      Stomach pains    Rosuvastatin      Hives, muscle/joint pains    Zolpidem      Confusion      Objective:     Vital Signs (Most Recent):  Temp: 98.2 °F (36.8 °C) (02/03/20 0300)  Pulse: 91 (02/03/20 0715)  Resp: (!) 28 (02/03/20 0715)  BP: (!) 189/78 (02/03/20 0600)  SpO2: (!) 89 % (02/03/20 0715) Vital Signs (24h Range):  Temp:  [98.2 °F (36.8 °C)-98.7 °F (37.1 °C)] 98.2 °F (36.8 °C)  Pulse:  []  91  Resp:  [14-31] 28  SpO2:  [89 %-96 %] 89 %  BP: (105-189)/(56-78) 189/78     Intake/Output - Last 3 Shifts       02/01 0700 - 02/02 0659 02/02 0700 - 02/03 0659 02/03 0700 - 02/04 0659    I.V. (mL/kg) 1408.8 (11.7) 188.1 (1.6)     Blood 739.3      NG/ 315     IV Piggyback  300     Total Intake(mL/kg) 2843.1 (23.7) 803.1 (7)     Urine (mL/kg/hr) 1160 (0.4) 2425 (0.9)     Drains 130 210     Stool 0 0     Chest Tube 88 110     Total Output 1378 2745     Net +1465.1 -1941.9                  SpO2: (!) 89 %  O2 Device (Oxygen Therapy): nasal cannula    Physical Exam   Constitutional: She appears well-developed and well-nourished. No distress.   Cardiovascular: Normal rate and regular rhythm.   Pulmonary/Chest: Effort normal. No respiratory distress.   Bulb with serosang output  CT with serous output   Abdominal: Soft. She exhibits no distension. There is no tenderness.   Incisions covered with steristrips   Skin: Skin is warm and dry.       Significant Labs:  CBC:   Recent Labs   Lab 02/03/20  0200   WBC 11.56   RBC 3.57*   HGB 9.8*   HCT 32.4*   *   MCV 91   MCH 27.5   MCHC 30.2*     CMP:   Recent Labs   Lab 02/03/20  0200   GLU 80   CALCIUM 8.5*   ALBUMIN 1.7*   PROT 5.7*      K 3.6   CO2 28   CL 98   BUN 24*   CREATININE 0.8   ALKPHOS 92   ALT 20   AST 45*   BILITOT 0.3       Significant Diagnostics:  I have reviewed all pertinent imaging results/findings within the past 24 hours.    VTE Risk Mitigation (From admission, onward)         Ordered     enoxaparin injection 40 mg  2 times daily      01/29/20 2000     Place sequential compression device  Until discontinued      01/23/20 1002     Place sequential compression device  Until discontinued      01/19/20 2022     IP VTE LOW RISK PATIENT  Once      01/19/20 2022

## 2020-02-03 NOTE — PROGRESS NOTES
"[facility]  General Surgery  Progress Note    Subjective:     History of Present Illness:  Marisela Bunn is a 69 y.o. female with PMH CAD, HTN, TIA, GERD presents to the hospital as a direct admission for evaluation of a newly diagnosed diaphragmatic hernia. She initially presented to [place] ED on 1/18/20 for a 1-month history of a cough, intermittent fevers and dyspnea.  She had been seen by 2 urgent care physicians in the last month for the cough, with 2 different antibiotic courses given without improvement in cough. She reports that on 1/15 after an particularly severe coughing episode she felt a "pop" on left side, with associated severe pain and  Subsequent bruising of the left flank. She reports constant severe pain since that time. She is on daily ASA 81mg,     On ED presentation, patient was hemodynamically stable, H/H 12.4/40.0, breathing on RA. Labs revealed leukocytosis (19.2), lactic acid 2.5 (repeat lactic acid1.6), BMP wnl. CXR revealed left lower lobe pneumonia with possible associated pleural effusion. CT abdomen/pelvis revealed a large left diaphragmatic hernia containing fat  and bowel loops with hernia of intra-abdomnial contents outside the thorax from 7th left ICS. Medium sized HH. CTA revealed large Bochdalek hernia through defect in left hemo-diaphragm, with associated widening of 7th intercostal space. She was started on IV hydration and antibiotics (levofloxacin, zosyn). She reports she has not passed a bowel movement of flatus in 2 days. Intermittent lower quadrant "spasms" while coughing, otherwise no abdominal pain. She reports no other symptoms.    Post-Op Info:  Procedure(s) (LRB):  THORACOTOMY (Left)  RECONSTRUCTION, DIAPHRAGM (Left)  RECONSTRUCTION, CHEST WALL (Left)   5 Days Post-Op     Interval History: AF overnight. She was extubated yesterday. Has been sating 91-92% on 4L. She feels fatigued this AM.     Medications:  Continuous Infusions:   fentanyl " Stopped (02/02/20 1000)    propofol Stopped (02/02/20 1000)    ropivacaine (PF) 2 mg/ml (0.2%) 2 mL/hr (02/03/20 0600)     Scheduled Meds:   acetylcysteine 100 mg/ml (10%)  4 mL Nebulization TID    albuterol-ipratropium  3 mL Nebulization Q4H    benzonatate  100 mg Oral Q8H    celecoxib  200 mg Oral Daily    chlorhexidine  15 mL Mouth/Throat BID    dextromethorphan-guaifenesin  mg/5 ml  5 mL Oral Q6H    enoxaparin  40 mg Subcutaneous BID    furosemide  40 mg Intravenous BID    methocarbamol  750 mg Per NG tube QID    mupirocin  1 g Nasal BID    polyethylene glycol  17 g Per NG tube Daily    pregabalin  75 mg Oral Once    Followed by    pregabalin  75 mg Oral QHS    senna-docusate 8.6-50 mg  1 tablet Per NG tube BID     PRN Meds:sodium chloride, bisacodyL, calcium gluconate IVPB, calcium gluconate IVPB, calcium gluconate IVPB, Dextrose 10% Bolus, glucagon (human recombinant), hydrALAZINE, HYDROmorphone, insulin aspart U-100, labetalol, magnesium sulfate IVPB, magnesium sulfate IVPB, melatonin, metoclopramide HCl, morphine, ondansetron, ondansetron, potassium chloride in water **AND** potassium chloride in water **AND** potassium chloride in water, promethazine (PHENERGAN) IVPB, sodium chloride 0.9%, sodium chloride 0.9%, sodium phosphate IVPB, sodium phosphate IVPB, sodium phosphate IVPB     Review of patient's allergies indicates:   Allergen Reactions    Erythromycin      Stomach pains    Rosuvastatin      Hives, muscle/joint pains    Zolpidem      Confusion      Objective:     Vital Signs (Most Recent):  Temp: 98.2 °F (36.8 °C) (02/03/20 0300)  Pulse: 103 (02/03/20 0600)  Resp: (!) 28 (02/03/20 0600)  BP: (!) 189/78 (02/03/20 0600)  SpO2: (!) 92 % (02/03/20 0600) Vital Signs (24h Range):  Temp:  [98.2 °F (36.8 °C)-98.7 °F (37.1 °C)] 98.2 °F (36.8 °C)  Pulse:  [] 103  Resp:  [14-31] 28  SpO2:  [89 %-96 %] 92 %  BP: (105-189)/(56-78) 189/78     Weight: 114.5 kg (252 lb 6.8 oz)  Body  mass index is 47.7 kg/m².    Intake/Output - Last 3 Shifts       02/01 0700 - 02/02 0659 02/02 0700 - 02/03 0659    I.V. (mL/kg) 1408.8 (11.7) 188.1 (1.6)    Blood 739.3     NG/ 315    IV Piggyback  300    Total Intake(mL/kg) 2843.1 (23.7) 803.1 (7)    Urine (mL/kg/hr) 1160 (0.4) 2425 (0.9)    Drains 130 210    Stool 0 0    Chest Tube 88 110    Total Output 1378 2745    Net +1465.1 -1941.9                Physical Exam   Constitutional: She is oriented to person, place, and time. She appears well-developed and well-nourished. No distress.   HENT:   Head: Normocephalic and atraumatic.   Eyes: Conjunctivae and EOM are normal. Right eye exhibits no discharge. Left eye exhibits no discharge.   Cardiovascular: Normal rate and regular rhythm.   Pulmonary/Chest: Effort normal and breath sounds normal. No respiratory distress.   Abdominal: Soft. She exhibits no distension. There is no tenderness.   Lap incisions are c/d/i   Musculoskeletal: Normal range of motion. She exhibits no deformity.   Neurological: She is alert and oriented to person, place, and time.   Skin: Skin is warm and dry.         Significant Labs:  CBC:   Recent Labs   Lab 02/03/20  0200   WBC 11.56   RBC 3.57*   HGB 9.8*   HCT 32.4*   *   MCV 91   MCH 27.5   MCHC 30.2*     CMP:   Recent Labs   Lab 02/03/20  0200   GLU 80   CALCIUM 8.5*   ALBUMIN 1.7*   PROT 5.7*      K 3.6   CO2 28   CL 98   BUN 24*   CREATININE 0.8   ALKPHOS 92   ALT 20   AST 45*   BILITOT 0.3       Significant Diagnostics:  I have reviewed all pertinent imaging results/findings within the past 24 hours.    Assessment/Plan:     * Diaphragmatic hernia  Marisela Bunn is a 69 y.o. female with PMH COPD (not on home oxygen), HTN, HLD (not on anticoagulation) received as direct admission for large diaphragmatic hernia. Patient is symptomatic from hernia with constipation and dyspnea with overlying bruise on left flank. She underwent lap diaphragmatic hernia repair with mesh on  1/21/20.   Evidence of recurrent hernia on CT 1/27. Underwent recurrent L diaphragmatic hernia repair with L chestwall recon with thoracic sx on 1/29. Extubated on 2/2/20.     - Wean O2 as tolerated per SICU protocol  - Discontinue garcia cath today  - Bactrim completed  - Aggressive pulm toilet with IS, CPT, DuoNebs, and Tessalon when able  - PT/OT when able  - Daily labs  - Drains and chest tube per thoracic surgery  - Please call with questions or concerns        Onesimo Raza MD  General Surgery  [facility]

## 2020-02-03 NOTE — PLAN OF CARE
"      SICU PLAN OF CARE NOTE    Dx: Diaphragmatic hernia    Shift Events: VSS throughout shift. No acute events occurred.     Goals of Care: Will continue to monitor and maintain MAPS > 65 and SBP < 160    Neuro: AAO x4, Arouses to Voice, Follows Commands and Moves All Extremities    Vital Signs: BP (!) 189/78 (BP Location: Right arm, Patient Position: Lying)   Pulse 91   Temp 98.2 °F (36.8 °C) (Oral)   Resp (!) 28   Ht 5' 1" (1.549 m)   Wt 114.5 kg (252 lb 6.8 oz)   SpO2 (!) 89%   Breastfeeding? No   BMI 47.70 kg/m²     Respiratory: Nasal Cannula 4L    Diet: NPO    Gtts: None    Urine Output: Urinary Catheter 645 cc/shift    Drains: DREW Drain, total output 80 cc / shift  Chest tube, total output 110 cc / shift     Labs/Accuchecks: Labs monitored and replaced as needed. Accuchecks Q6H.     Skin: See flowsheets. Pt repositioned frequently.  Heel and sacral foams in place.  Cream applied to high moisture areas per wound care.  Moisturizer applied to lips per wound care.  No pressure injuries noted.         "

## 2020-02-03 NOTE — PLAN OF CARE
Problem: SLP Goal  Goal: SLP Goal  Description  Speech Language Pathology Goals  Goals expected to be met by 2/10:  1. Pt will tolerate mechanical soft diet and nectar thick liquids without s/s of aspiration.  2. Pt will participate in ongoing swallow assessment to ensure least restrictive diet recommendations.       2/3/2020 1255 by Emily Abadie, CCC-SLP  Outcome: Ongoing, Progressing     Clinical swallow evaluation completed with implemented plan of care.   Emily P. Abadie M.S., CCC-SLP  Speech Language Pathologist  [phone]  02/03/2020

## 2020-02-03 NOTE — ANESTHESIA POST-OP PAIN MANAGEMENT
Acute Pain Service Progress Note    Marisela Bunn is a 69 y.o., female, [id number]. PMHx of CAD, COPD, HTN, TIA, GERD presented with large diaphragmatic hernia. Patient with SOB. S/p repair on 1/21/20 and reconstruction on 1/29/20.    Surgery:  THORACOTOMY (Left Chest)      RECONSTRUCTION, DIAPHRAGM (Left Chest) - Hernia reduction and repair      RECONSTRUCTION, CHEST WALL (Left Chest)    Post Op Day #: 5    Catheter type: perineural  MIGUEL L    Infusion type: Ropivacaine 0.2%  2ml/hr basal with 10ml/hr IB    Problem List:    Active Hospital Problems    Diagnosis  POA    *Diaphragmatic hernia [K44.9]  Yes    Alteration in skin integrity [R23.9]  Yes    Cough [R05]  Yes    Leukocytosis [D72.829]  Yes      Resolved Hospital Problems    Diagnosis Date Resolved POA    Pneumonia [J18.9] 02/02/2020 Yes    Pre-op chest exam [Z01.811] 01/29/2020 Not Applicable       Subjective:     General appearance of alert, oriented, no complaints   Pain with rest: 3    Faces   Pain with movement: 3    Faces   Side Effects    1. Pruritis No    2. Nausea No    3. Sedation No, 1=awake and alert    Objective:     Catheter level MIGUEL L    Catheter site clean, dry, intact         Vitals   Vitals:    02/03/20 1100   BP: (!) 152/66   Pulse: 89   Resp: 19   Temp:         Labs    No results displayed because visit has over 200 results.           Meds   Current Facility-Administered Medications   Medication Dose Route Frequency Provider Last Rate Last Dose    0.9%  NaCl infusion (for blood administration)   Intravenous Q24H PRN Schuyler Lomax MD        acetylcysteine 100 mg/ml (10%) solution 4 mL  4 mL Nebulization TID Shruthi Prescott MD   4 mL at 02/03/20 0715    albuterol-ipratropium 2.5 mg-0.5 mg/3 mL nebulizer solution 3 mL  3 mL Nebulization Q4H Shruthi Prescott MD   3 mL at 02/03/20 0715    benzonatate capsule 100 mg  100 mg Oral Q8H Shruthi Prescott MD   Stopped at 01/29/20 2200    bisacodyl suppository 10 mg  10  mg Rectal Daily PRN Shruthi Prescott MD        calcium gluconate 1g in dextrose 5% 100mL (ready to mix system)  1 g Intravenous PRN Shruthi Prescott MD        calcium gluconate 1g in dextrose 5% 100mL (ready to mix system)  1 g Intravenous PRN Shruthi Prescott MD        calcium gluconate 1g in dextrose 5% 100mL (ready to mix system)  1 g Intravenous PRN Shruthi Prescott MD        celecoxib capsule 200 mg  200 mg Oral Daily Shruthi Prescott MD   Stopped at 02/03/20 0900    dextromethorphan-guaifenesin  mg/5 ml liquid 5 mL  5 mL Oral Q6H Shruthi Prescott MD   5 mL at 02/02/20 1241    dextrose 10% (D10W) Bolus  12.5 g Intravenous PRN Shruthi Prescott MD        enoxaparin injection 40 mg  40 mg Subcutaneous BID Shruthi Prescott MD   40 mg at 02/03/20 1000    furosemide injection 40 mg  40 mg Intravenous BID Evangelista Mar MD   40 mg at 02/03/20 1000    glucagon (human recombinant) injection 1 mg  1 mg Intramuscular PRN Shruthi Prescott MD        hydrALAZINE injection 10 mg  10 mg Intravenous Q4H PRN Shruthi Prescott MD   10 mg at 02/03/20 0435    HYDROmorphone injection 1 mg  1 mg Intravenous Q4H PRN Desmond Green MD        insulin aspart U-100 pen 0-5 Units  0-5 Units Subcutaneous Q6H PRN Shruthi Prescott MD        labetalol 20 mg/4 mL (5 mg/mL) IV syring  10 mg Intravenous Q4H PRN Shruthi Prescott MD   10 mg at 02/03/20 0636    magnesium sulfate 2g in water 50mL IVPB (premix)  2 g Intravenous PRN Shruthi Prescott MD   2 g at 01/28/20 0918    magnesium sulfate 2g in water 50mL IVPB (premix)  4 g Intravenous PRN Shruthi Prescott MD        melatonin tablet 6 mg  6 mg Oral Nightly PRN Shruthi Prescott MD   6 mg at 01/25/20 2215    methocarbamol tablet 750 mg  750 mg Per NG tube QID Shruthi Prescott MD   750 mg at 02/02/20 1241    metoclopramide HCl injection 5 mg  5 mg Intravenous Q6H PRN Shruthi Crandall  MD [place]        miconazole NITRATE 2 % top powder   Topical (Top) BID Shruthi Prescott MD        morphine injection 2 mg  2 mg Intravenous Q4H PRN Shruthi Prescott MD        ondansetron disintegrating tablet 8 mg  8 mg Oral Q8H PRN Shruthi Prescott MD        ondansetron injection 4 mg  4 mg Intravenous Q12H PRN Shruthi Prescott MD        polyethylene glycol packet 17 g  17 g Per NG tube Daily Shruthi Prescott MD   Stopped at 02/03/20 0900    potassium chloride 10 mEq in 100 mL IVPB  40 mEq Intravenous PRN Shruthi Prescott  mL/hr at 02/03/20 0405 40 mEq at 02/03/20 0405    And    potassium chloride 10 mEq in 100 mL IVPB  60 mEq Intravenous PRN Shruthi Prescott MD        And    potassium chloride 10 mEq in 100 mL IVPB  80 mEq Intravenous PRN Shruthi Prescott MD        pregabalin capsule 75 mg  75 mg Oral Once Shruthi Prescott MD        Followed by    pregabalin capsule 75 mg  75 mg Oral QHS Shruthi Prescott MD   75 mg at 02/01/20 2033    promethazine (PHENERGAN) 6.25 mg in dextrose 5 % 50 mL IVPB  6.25 mg Intravenous Q6H PRN Shruthi Prescott MD        ropivacaine (PF) 2 mg/ml (0.2%) infusion  2 mL/hr Perineural Continuous Shruthi Prescott MD 2 mL/hr at 02/03/20 0900 2 mL/hr at 02/03/20 0900    senna-docusate 8.6-50 mg per tablet 1 tablet  1 tablet Per NG tube BID Shruthi Prescott MD   Stopped at 02/03/20 0900    sodium chloride 0.9% flush 10 mL  10 mL Intravenous PRN Shruthi Prescott MD        sodium chloride 0.9% flush 10 mL  10 mL Intravenous PRN Shruthi Prescott MD        sodium phosphate 15 mmol in dextrose 5 % 250 mL IVPB  15 mmol Intravenous PRN Shruthi Prescott MD        sodium phosphate 20.01 mmol in dextrose 5 % 250 mL IVPB  20.01 mmol Intravenous PRN Shruthi Prescott MD 62.5 mL/hr at 02/03/20 0405 20.01 mmol at 02/03/20 0405    sodium phosphate 30 mmol in dextrose 5 % 250 mL IVPB  30 mmol  Intravenous PRN Shruthi Prescott MD            Anticoagulant lovenox    Assessment:     Pain control adequate . Pt now extubated. C/o sore throat and chest soreness.   Plan:     -Added IV tylenol 1g q8h due to pt's NPO status and no NG tube.   -added 8mg IV decadron once    -added toradol 30mg qd   -will consider pausing catheter tomorrow if pt able to tolerate PO and respiratory status improved.   - If patient spikes a fever or has elevated WBC, Perineural catheter should be considered as a potential source of   infection and may need to be removed.    Evaluator Chula Irizarry      I have reviewed and concur with the resident's history, physical, assessment, and plan.  I have personally interviewed and examined the patient at bedside.  See below addendum for my evaluation and additional findings.    See above for assessment and plan - Due to tenuous respiratory status and NPO status, will continue PNC for now and add acetaminophen, ketorolac and dexamethasone for pain.  Consider pausing tomorrow and removing.

## 2020-02-04 LAB
ANISOCYTOSIS BLD QL SMEAR: SLIGHT
BASOPHILS # BLD AUTO: ABNORMAL K/UL (ref 0–0.2)
BASOPHILS NFR BLD: 0 % (ref 0–1.9)
DIFFERENTIAL METHOD: ABNORMAL
EOSINOPHIL # BLD AUTO: ABNORMAL K/UL (ref 0–0.5)
EOSINOPHIL NFR BLD: 0 % (ref 0–8)
ERYTHROCYTE [DISTWIDTH] IN BLOOD BY AUTOMATED COUNT: 14.9 % (ref 11.5–14.5)
HCT VFR BLD AUTO: 34.3 % (ref 37–48.5)
HGB BLD-MCNC: 10.5 G/DL (ref 12–16)
HYPOCHROMIA BLD QL SMEAR: ABNORMAL
IMM GRANULOCYTES # BLD AUTO: ABNORMAL K/UL (ref 0–0.04)
IMM GRANULOCYTES NFR BLD AUTO: ABNORMAL % (ref 0–0.5)
LYMPHOCYTES # BLD AUTO: ABNORMAL K/UL (ref 1–4.8)
LYMPHOCYTES NFR BLD: 6 % (ref 18–48)
MAGNESIUM SERPL-MCNC: 2 MG/DL (ref 1.6–2.6)
MCH RBC QN AUTO: 27.8 PG (ref 27–31)
MCHC RBC AUTO-ENTMCNC: 30.6 G/DL (ref 32–36)
MCV RBC AUTO: 91 FL (ref 82–98)
METAMYELOCYTES NFR BLD MANUAL: 2 %
MONOCYTES # BLD AUTO: ABNORMAL K/UL (ref 0.3–1)
MONOCYTES NFR BLD: 4 % (ref 4–15)
MYELOCYTES NFR BLD MANUAL: 1 %
NEUTROPHILS NFR BLD: 85 % (ref 38–73)
NEUTS BAND NFR BLD MANUAL: 2 %
NRBC BLD-RTO: 0 /100 WBC
OVALOCYTES BLD QL SMEAR: ABNORMAL
PHOSPHATE SERPL-MCNC: 2.8 MG/DL (ref 2.7–4.5)
PLATELET # BLD AUTO: 595 K/UL (ref 150–350)
PMV BLD AUTO: 9.1 FL (ref 9.2–12.9)
POCT GLUCOSE: 100 MG/DL (ref 70–110)
POCT GLUCOSE: 104 MG/DL (ref 70–110)
POCT GLUCOSE: 87 MG/DL (ref 70–110)
POIKILOCYTOSIS BLD QL SMEAR: SLIGHT
POLYCHROMASIA BLD QL SMEAR: ABNORMAL
RBC # BLD AUTO: 3.78 M/UL (ref 4–5.4)
WBC # BLD AUTO: 9.62 K/UL (ref 3.9–12.7)

## 2020-02-04 PROCEDURE — 85027 COMPLETE CBC AUTOMATED: CPT

## 2020-02-04 PROCEDURE — 97116 GAIT TRAINING THERAPY: CPT

## 2020-02-04 PROCEDURE — 92526 ORAL FUNCTION THERAPY: CPT

## 2020-02-04 PROCEDURE — 94761 N-INVAS EAR/PLS OXIMETRY MLT: CPT

## 2020-02-04 PROCEDURE — 27000221 HC OXYGEN, UP TO 24 HOURS

## 2020-02-04 PROCEDURE — 63600175 PHARM REV CODE 636 W HCPCS: Performed by: STUDENT IN AN ORGANIZED HEALTH CARE EDUCATION/TRAINING PROGRAM

## 2020-02-04 PROCEDURE — 25000003 PHARM REV CODE 250: Performed by: STUDENT IN AN ORGANIZED HEALTH CARE EDUCATION/TRAINING PROGRAM

## 2020-02-04 PROCEDURE — 97168 OT RE-EVAL EST PLAN CARE: CPT

## 2020-02-04 PROCEDURE — 94668 MNPJ CHEST WALL SBSQ: CPT

## 2020-02-04 PROCEDURE — 97164 PT RE-EVAL EST PLAN CARE: CPT

## 2020-02-04 PROCEDURE — 25000242 PHARM REV CODE 250 ALT 637 W/ HCPCS: Performed by: STUDENT IN AN ORGANIZED HEALTH CARE EDUCATION/TRAINING PROGRAM

## 2020-02-04 PROCEDURE — 85007 BL SMEAR W/DIFF WBC COUNT: CPT

## 2020-02-04 PROCEDURE — 99900035 HC TECH TIME PER 15 MIN (STAT)

## 2020-02-04 PROCEDURE — 97535 SELF CARE MNGMENT TRAINING: CPT

## 2020-02-04 PROCEDURE — 84100 ASSAY OF PHOSPHORUS: CPT

## 2020-02-04 PROCEDURE — 97530 THERAPEUTIC ACTIVITIES: CPT

## 2020-02-04 PROCEDURE — 83735 ASSAY OF MAGNESIUM: CPT

## 2020-02-04 PROCEDURE — 99233 PR SUBSEQUENT HOSPITAL CARE,LEVL III: ICD-10-PCS | Mod: ,,, | Performed by: ANESTHESIOLOGY

## 2020-02-04 PROCEDURE — 99233 SBSQ HOSP IP/OBS HIGH 50: CPT | Mod: ,,, | Performed by: ANESTHESIOLOGY

## 2020-02-04 PROCEDURE — 94640 AIRWAY INHALATION TREATMENT: CPT

## 2020-02-04 PROCEDURE — 20000000 HC ICU ROOM

## 2020-02-04 PROCEDURE — 97803 MED NUTRITION INDIV SUBSEQ: CPT

## 2020-02-04 RX ORDER — OXYCODONE HYDROCHLORIDE 5 MG/1
5 TABLET ORAL EVERY 4 HOURS PRN
Status: DISCONTINUED | OUTPATIENT
Start: 2020-02-04 | End: 2020-02-12 | Stop reason: HOSPADM

## 2020-02-04 RX ORDER — AMLODIPINE BESYLATE 10 MG/1
10 TABLET ORAL DAILY
Status: DISCONTINUED | OUTPATIENT
Start: 2020-02-04 | End: 2020-02-12 | Stop reason: HOSPADM

## 2020-02-04 RX ADMIN — IPRATROPIUM BROMIDE AND ALBUTEROL SULFATE 3 ML: .5; 3 SOLUTION RESPIRATORY (INHALATION) at 08:02

## 2020-02-04 RX ADMIN — ACETYLCYSTEINE 4 ML: 100 SOLUTION ORAL; RESPIRATORY (INHALATION) at 12:02

## 2020-02-04 RX ADMIN — BENZONATATE 100 MG: 100 CAPSULE ORAL at 05:02

## 2020-02-04 RX ADMIN — ACETAMINOPHEN 1000 MG: 10 INJECTION, SOLUTION INTRAVENOUS at 05:02

## 2020-02-04 RX ADMIN — BENZONATATE 100 MG: 100 CAPSULE ORAL at 02:02

## 2020-02-04 RX ADMIN — CELECOXIB 200 MG: 200 CAPSULE ORAL at 09:02

## 2020-02-04 RX ADMIN — MICONAZOLE NITRATE: 20 CREAM TOPICAL at 09:02

## 2020-02-04 RX ADMIN — ENOXAPARIN SODIUM 40 MG: 100 INJECTION SUBCUTANEOUS at 09:02

## 2020-02-04 RX ADMIN — ACETYLCYSTEINE 4 ML: 100 SOLUTION ORAL; RESPIRATORY (INHALATION) at 04:02

## 2020-02-04 RX ADMIN — IPRATROPIUM BROMIDE AND ALBUTEROL SULFATE 3 ML: .5; 3 SOLUTION RESPIRATORY (INHALATION) at 04:02

## 2020-02-04 RX ADMIN — IPRATROPIUM BROMIDE AND ALBUTEROL SULFATE 3 ML: .5; 3 SOLUTION RESPIRATORY (INHALATION) at 12:02

## 2020-02-04 RX ADMIN — DOCUSATE SODIUM - SENNOSIDES 1 TABLET: 50; 8.6 TABLET, FILM COATED ORAL at 09:02

## 2020-02-04 RX ADMIN — MICONAZOLE NITRATE: 20 POWDER TOPICAL at 09:02

## 2020-02-04 RX ADMIN — HYDRALAZINE HYDROCHLORIDE 10 MG: 20 INJECTION INTRAMUSCULAR; INTRAVENOUS at 11:02

## 2020-02-04 RX ADMIN — GUAIFENESIN AND DEXTROMETHORPHAN 5 ML: 100; 10 SYRUP ORAL at 11:02

## 2020-02-04 RX ADMIN — BENZONATATE 100 MG: 100 CAPSULE ORAL at 09:02

## 2020-02-04 RX ADMIN — GUAIFENESIN AND DEXTROMETHORPHAN 5 ML: 100; 10 SYRUP ORAL at 05:02

## 2020-02-04 RX ADMIN — AMLODIPINE BESYLATE 10 MG: 10 TABLET ORAL at 12:02

## 2020-02-04 RX ADMIN — ACETYLCYSTEINE 4 ML: 100 SOLUTION ORAL; RESPIRATORY (INHALATION) at 08:02

## 2020-02-04 RX ADMIN — PREGABALIN 75 MG: 75 CAPSULE ORAL at 09:02

## 2020-02-04 RX ADMIN — ENOXAPARIN SODIUM 40 MG: 100 INJECTION SUBCUTANEOUS at 08:02

## 2020-02-04 NOTE — SUBJECTIVE & OBJECTIVE
Interval History:   NAEON. On 4.5L NC. Doing well overall this morning     Medications:  Continuous Infusions:   ropivacaine (PF) 2 mg/ml (0.2%) 2 mL/hr (02/04/20 0700)     Scheduled Meds:   acetylcysteine 100 mg/ml (10%)  4 mL Nebulization TID    albuterol-ipratropium  3 mL Nebulization Q4H    benzonatate  100 mg Oral Q8H    celecoxib  200 mg Oral Daily    dextromethorphan-guaifenesin  mg/5 ml  5 mL Oral Q6H    enoxaparin  40 mg Subcutaneous BID    miconazole   Topical (Top) BID    miconazole NITRATE 2 %   Topical (Top) BID    polyethylene glycol  17 g Per NG tube Daily    pregabalin  75 mg Oral Once    Followed by    pregabalin  75 mg Oral QHS    senna-docusate 8.6-50 mg  1 tablet Per NG tube BID     PRN Meds:sodium chloride, bisacodyL, calcium gluconate IVPB, calcium gluconate IVPB, calcium gluconate IVPB, Dextrose 10% Bolus, glucagon (human recombinant), hydrALAZINE, insulin aspart U-100, labetalol, magnesium sulfate IVPB, magnesium sulfate IVPB, melatonin, metoclopramide HCl, morphine, ondansetron, ondansetron, potassium chloride in water **AND** potassium chloride in water **AND** potassium chloride in water, promethazine (PHENERGAN) IVPB, sodium chloride 0.9%, sodium chloride 0.9%, sodium phosphate IVPB, sodium phosphate IVPB, sodium phosphate IVPB     Review of patient's allergies indicates:   Allergen Reactions    Erythromycin      Stomach pains    Rosuvastatin      Hives, muscle/joint pains    Zolpidem      Confusion      Objective:     Vital Signs (Most Recent):  Temp: 98.3 °F (36.8 °C) (02/04/20 0400)  Pulse: 79 (02/04/20 0700)  Resp: 18 (02/04/20 0700)  BP: (!) 157/73 (02/04/20 0700)  SpO2: 96 % (02/04/20 0700) Vital Signs (24h Range):  Temp:  [98.1 °F (36.7 °C)-98.8 °F (37.1 °C)] 98.3 °F (36.8 °C)  Pulse:  [] 79  Resp:  [15-29] 18  SpO2:  [90 %-96 %] 96 %  BP: (123-189)/() 157/73     Weight: 118 kg (260 lb 2.3 oz)  Body mass index is 49.15 kg/m².    Intake/Output -  Last 3 Shifts       02/02 0700 - 02/03 0659 02/03 0700 - 02/04 0659 02/04 0700 - 02/05 0659    P.O.  240     I.V. (mL/kg) 188.1 (1.6) 414.2 (3.5) 2 (0)    Blood       NG/      IV Piggyback 300 200     Total Intake(mL/kg) 803.1 (7) 854.2 (7.2) 2 (0)    Urine (mL/kg/hr) 2425 (0.9) 1900 (0.7)     Drains 210 150     Stool 0 0     Chest Tube 110 20     Total Output 2745 2070     Net -1941.9 -1215.8 +2           Urine Occurrence  1 x     Stool Occurrence  1 x           Physical Exam   Constitutional: She is oriented to person, place, and time. She appears well-developed and well-nourished. No distress.   HENT:   Head: Normocephalic and atraumatic.   Eyes: Conjunctivae and EOM are normal. Right eye exhibits no discharge. Left eye exhibits no discharge.   Cardiovascular: Normal rate and regular rhythm.   Pulmonary/Chest: Effort normal and breath sounds normal. No respiratory distress.   Abdominal: Soft. She exhibits no distension. There is no tenderness.   Lap incisions are c/d/i   Musculoskeletal: Normal range of motion. She exhibits no deformity.   Neurological: She is alert and oriented to person, place, and time.   Skin: Skin is warm and dry.         Significant Labs:  CBC:   Recent Labs   Lab 02/04/20  0326   WBC 9.62   RBC 3.78*   HGB 10.5*   HCT 34.3*   *   MCV 91   MCH 27.8   MCHC 30.6*     CMP:   Recent Labs   Lab 02/03/20  0200   GLU 80   CALCIUM 8.5*   ALBUMIN 1.7*   PROT 5.7*      K 3.6   CO2 28   CL 98   BUN 24*   CREATININE 0.8   ALKPHOS 92   ALT 20   AST 45*   BILITOT 0.3       Significant Diagnostics:  I have reviewed all pertinent imaging results/findings within the past 24 hours.

## 2020-02-04 NOTE — PT/OT/SLP PROGRESS
"Speech Language Pathology Treatment    Patient Name:  Marisela Bunn   MRN:  [id number]  Admitting Diagnosis: Diaphragmatic hernia    Recommendations:                 General Recommendations:  Dysphagia therapy  Diet recommendations:  Mechanical soft, Liquid Diet Level: Nectar Thick   Aspiration Precautions: Assistance with thickening liquids, HOB to 90 degrees, Small bites/sips and Strict aspiration precautions   General Precautions: Standard, aspiration, fall, contact  Communication strategies:  none    Subjective     "It feels like there is fluid behind my ear" nursing was notified of pt's complaint  Patient goals: to eat solids    Pain/Comfort:  · Pain Rating 1: 0/10  · Pain Rating Post-Intervention 1: 0/10    Objective:     Has the patient been evaluated by SLP for swallowing?   Yes  Keep patient NPO? No   Current Respiratory Status: nasal cannula      Pt currently on full liquid with nectar thick liquids. Pt observed taking pills with nectar thick liquid and a straw. Pt , son and nursing denied any difficulty with thickened liquids. Pt able to complete effortful swallow with fair ability. Pt with no overt s/s of aspiration noted following any nectar thick liquids. Pt was noted to cough and use suction upon entering the room. Vocal quality was hoarse with decreased intensity but pt and son reported improvement from yesterday. Pt able to improve vocal intensity and quality at end of session. Pt reported she wears upper dentures to eat but lost them. She did agree to trial a cracker. Pt ate only small bites and said she was worried she would bruise her mouth. Discussed options for solids when MD agrees to advance diet. Agreed to initiate [facility] soft diet since pt does not have her dentures. Education provided at length re: swallowing precautions, s/s of aspiration, use of thickener,  Swallowing exercises and voice strategies. Pt and son with good understanding. White board updated.     Assessment:     Marisela Bunn is " a 69 y.o. female with an SLP diagnosis of Dysphagia and Dysphonia.  She presents with progress towards goals.    Goals:   Multidisciplinary Problems     SLP Goals        Problem: SLP Goal    Goal Priority Disciplines Outcome   SLP Goal     SLP Ongoing, Progressing   Description:  Speech Language Pathology Goals  Goals expected to be met by 2/10:  1. Pt will tolerate mechanical soft diet and nectar thick liquids without s/s of aspiration.  2. Pt will participate in ongoing swallow assessment to ensure least restrictive diet recommendations.                        Plan:     · Patient to be seen:  4 x/week   · Plan of Care expires:  03/03/20  · Plan of Care reviewed with:  patient, son   · SLP Follow-Up:  Yes       Discharge recommendations:  nursing facility, skilled       Time Tracking:     SLP Treatment Date:   02/04/20  Speech Start Time:  1238  Speech Stop Time:  1254     Speech Total Time (min):  16 min    Billable Minutes: Treatment Swallowing Dysfunction 8 and Seld Care/Home Management Training 8    CHINO Zuñiga, CCC-SLP  02/04/2020

## 2020-02-04 NOTE — PLAN OF CARE
Problem: Physical Therapy Goal  Goal: Physical Therapy Goal  Description  Goals to be met by:20  Pt re-evaluated and goals updated    Patient will increase functional independence with mobility by performin. Supine to sit with Contact Guard Assistance.  2. Sit to stand transfer with Contact Guard Assistance.  3. Bed to chair with Contact Guard Assistance using Rolling Walker or LRAD.  4. Gait  x 100 feet with Contact Guard Assistance using Rolling Walker or LRAD.  5. Lower extremity exercise program x15 reps  with supervision.      Outcome: Ongoing, Progressing   Pt re-evaluated and goals updated. Lesli Gutierrez, SPT

## 2020-02-04 NOTE — PROGRESS NOTES
"[facility]  Adult Nutrition  Progress Note    SUMMARY       Recommendations  1. Recommend advancing diet to Regular with texture per SLP.   2. If poor PO intake, recommend adding Optisource OS to all meals.   RD to monitor.    Goals: Patient to meet > 85% EEN and EPN by RD follow-up  Nutrition Goal Status: goal not met  Communication of RD Recs: discussed on rounds    Reason for Assessment  Reason For Assessment: RD follow-up  Diagnosis: surgery/postoperative complications(diaphragmatic hernia s/p multiple surgeries)  Relevant Medical History: CAD, HTN, TIA, GERD  Interdisciplinary Rounds: attended  General Information Comments: Extubated 2/2. SLP cleared patient for dental soft diet and nectar thick liquids. Started on CL diet. Patient with intermittent confusion. NFPE completed 1/29, patient continues with mild age appropriate wasting. At risk for acute malnutrition if continues on only CL diet.  Nutrition Discharge Planning: Adequate nutrition via PO intake.    Nutrition Risk Screen  Nutrition Risk Screen: no indicators present    Nutrition/Diet History  Spiritual, Cultural Beliefs, Denominational Practices, Values that Affect Care: no  Food Allergies: NKFA  Factors Affecting Nutritional Intake: clear liquid diet    Anthropometrics  Temp: 98.3 °F (36.8 °C)  Height: 5' 1" (154.9 cm)  Height (inches): 61 in  Weight Method: Bed Scale  Weight: 118 kg (260 lb 2.3 oz)  Weight (lb): 260.15 lb  Ideal Body Weight (IBW), Female: 105 lb  % Ideal Body Weight, Female (lb): 239.78 %  BMI (Calculated): 49.2  BMI Grade: greater than 40 - morbid obesity    Lab/Procedures/Meds  Pertinent Labs Reviewed: reviewed  Pertinent Labs Comments: BUN 24, Ca 8.5, Alb 1.7  Pertinent Medications Reviewed: reviewed  Pertinent Medications Comments: senna-docusate    Estimated/Assessed Needs  Weight Used For Calorie Calculations: 108.4 kg (238 lb 15.7 oz)  Energy Calorie Requirements (kcal): 1551 kcal/day  Energy Need Method: " TwilaSt Narayanna(no AF 2/2 obesity)  Protein Requirements:  g/day(2.0-2.5 g/kg)  Weight Used For Protein Calculations: 47.7 kg (105 lb 2.6 oz)(IBW)  Fluid Requirements (mL): 1 mL/kcal or per MD  Estimated Fluid Requirement Method: RDA Method  RDA Method (mL): 1551    Nutrition Prescription Ordered  Current Diet Order: Clear Liquid  Nutrition Order Comments: nectar thick liquids    Evaluation of Received Nutrient/Fluid Intake  I/O: Noted  Comments: LBM 2/3  % Intake of Estimated Energy Needs: 0 - 25 %  % Meal Intake: Other: CL diet    Nutrition Risk  Level of Risk/Frequency of Follow-up: high(2x/week)     Assessment and Plan  Nutrition Problem  Inadequate energy intake     Related to (etiology):   Decreased ability to consume sufficient energy     Signs and Symptoms (as evidenced by):   NPO/CL diet with no alternative means of nutrition at this time     Interventions (treatment strategy):  Collaboration of nutrition care with other providers     Nutrition Diagnosis Status:   Continues    Monitor and Evaluation  Food and Nutrient Intake: energy intake, food and beverage intake  Food and Nutrient Adminstration: diet order  Physical Activity and Function: nutrition-related ADLs and IADLs  Anthropometric Measurements: weight, weight change  Biochemical Data, Medical Tests and Procedures: electrolyte and renal panel, gastrointestinal profile, inflammatory profile  Nutrition-Focused Physical Findings: overall appearance     Malnutrition Assessment  Orbital Region (Subcutaneous Fat Loss): well nourished  Upper Arm Region (Subcutaneous Fat Loss): well nourished   [place] Region (Muscle Loss): mild depletion(2/2 age)  Clavicle Bone Region (Muscle Loss): well nourished  Clavicle and Acromion Bone Region (Muscle Loss): well nourished  Dorsal Hand (Muscle Loss): well nourished  Patellar Region (Muscle Loss): well nourished  Anterior Thigh Region (Muscle Loss): well nourished  Posterior Calf Region (Muscle Loss): well  nourished   Edema (Fluid Accumulation): 1-->trace   Subcutaneous Fat Loss (Final Summary): well nourished  Muscle Loss Evaluation (Final Summary): mild protein-calorie malnutrition  Fluid Accumulation Evaluation: mild      Nutrition Follow-Up  RD Follow-up?: Yes

## 2020-02-04 NOTE — PLAN OF CARE
Problem: SLP Goal  Goal: SLP Goal  Description  Speech Language Pathology Goals  Goals expected to be met by 2/10:  1. Pt will tolerate mechanical soft diet and nectar thick liquids without s/s of aspiration.  2. Pt will participate in ongoing swallow assessment to ensure least restrictive diet recommendations.       Outcome: Ongoing, Progressing     Pt tolerating nectar thick liquids. When MD ready to advance diet, recommend upgrade to [facility] soft. Strict aspiration precautions.     CHINO Zuñiga, CCC-SLP    2/4/2020
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
## 2020-02-04 NOTE — PLAN OF CARE
following for DC needs.  in communication with .    SW spoke to the patient's son, Miguel ([phone]), to discuss the current recommendation for SNF. KAYLEE explained what a SNF is and answered all questions Miguel asked. Miguel is not at the hospital at this time so KAYLEE emailed the SNF list to [email or web address].     KAYLEE will continue to follow.     02/04/20 1518   Post-Acute Status   Post-Acute Authorization Placement   Post-Acute Placement Status Patient List Provided     Rosalie Hirsch LCSW  [facility]  [id number]

## 2020-02-04 NOTE — PLAN OF CARE
No acute events. VSS. AAOX4 with intermittent episodes of confusion. Pt on 3.5L NC, sats 94%. No gtts. DREW, chest tube, and pain pump removed per MDs. 2 BMs this shift. 2 urine occurrences this shift. No new skin breakdown noted. Pt sat edge of bed for 5 min with PT today. Plan of care reviewed with patient and son at bedside. Questions and concerns addressed. Will continue to monitor.

## 2020-02-04 NOTE — ASSESSMENT & PLAN NOTE
Marisela Bunn is a 69 y.o. female with PMH COPD (not on home oxygen), HTN, HLD (not on anticoagulation) received as direct admission for large diaphragmatic hernia. Patient is symptomatic from hernia with constipation and dyspnea with overlying bruise on left flank. She underwent lap diaphragmatic hernia repair with mesh on 1/21/20.   Evidence of recurrent hernia on CT 1/27. Underwent recurrent L diaphragmatic hernia repair with L chestwall recon with thoracic sx on 1/29. Extubated on 2/2/20.     - Wean O2 as tolerated per SICU protocol  - Bactrim completed  - Aggressive pulm toilet with IS, CPT, DuoNebs, and Tessalon when able  - PT/OT when able  - Daily labs  - Drains and chest tube per thoracic surgery  - Please call with questions or concerns
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
## 2020-02-04 NOTE — ANESTHESIA POST-OP PAIN MANAGEMENT
Acute Pain Service Progress Note    Marisela Bunn is a 69 y.o., female, [id number]. PMHx of CAD, COPD, HTN, TIA, GERD presented with large diaphragmatic hernia. Patient with SOB. S/p repair on 1/21/20 and reconstruction on 1/29/20.     Surgery:  THORACOTOMY (Left Chest)      RECONSTRUCTION, DIAPHRAGM (Left Chest) - Hernia reduction and repair      RECONSTRUCTION, CHEST WALL (Left Chest)     Post Op Day #: 6     Catheter type: perineural  MIGUEL L     Infusion type: Ropivacaine 0.2%  2ml/hr basal with 10ml/hr IB    Problem List:    Active Hospital Problems    Diagnosis  POA    *Diaphragmatic hernia [K44.9]  Yes    Alteration in skin integrity [R23.9]  Yes    Cough [R05]  Yes    Leukocytosis [D72.829]  Yes      Resolved Hospital Problems    Diagnosis Date Resolved POA    Pneumonia [J18.9] 02/02/2020 Yes    Pre-op chest exam [Z01.811] 01/29/2020 Not Applicable       Subjective:      General appearance of alert, oriented, no complaints              Pain with rest: 3    Faces              Pain with movement: 3    Faces              Side Effects                          1. Pruritis No                          2. Nausea No                          3. Sedation No, 1=awake and alert    Objective:     Catheter level MIGUEL L    Catheter site clean, dry, intact        Vitals   Vitals:    02/04/20 0630   BP: (!) 146/67   Pulse: 82   Resp: (!) 29   Temp:         Labs    No results displayed because visit has over 200 results.           Meds   Current Facility-Administered Medications   Medication Dose Route Frequency Provider Last Rate Last Dose    0.9%  NaCl infusion (for blood administration)   Intravenous Q24H PRN Schuyler Lomax MD        acetylcysteine 100 mg/ml (10%) solution 4 mL  4 mL Nebulization TID Shruthi Prescott MD   4 mL at 02/03/20 1130    albuterol-ipratropium 2.5 mg-0.5 mg/3 mL nebulizer solution 3 mL  3 mL Nebulization Q4H Shruthi Prescott MD   3 mL at 02/04/20 0405    benzonatate capsule 100  mg  100 mg Oral Q8H Shruthi Prescott MD   100 mg at 02/04/20 0523    bisacodyl suppository 10 mg  10 mg Rectal Daily PRN Shruthi Prescott MD        calcium gluconate 1g in dextrose 5% 100mL (ready to mix system)  1 g Intravenous PRN Shruthi Prescott MD        calcium gluconate 1g in dextrose 5% 100mL (ready to mix system)  1 g Intravenous PRN Shruthi Prescott MD        calcium gluconate 1g in dextrose 5% 100mL (ready to mix system)  1 g Intravenous PRN Shruthi Prescott MD        celecoxib capsule 200 mg  200 mg Oral Daily Shruthi Prescott MD   Stopped at 02/03/20 0900    dextromethorphan-guaifenesin  mg/5 ml liquid 5 mL  5 mL Oral Q6H Shruthi Prescott MD   5 mL at 02/04/20 0523    dextrose 10% (D10W) Bolus  12.5 g Intravenous PRN Shruthi Prescott MD        enoxaparin injection 40 mg  40 mg Subcutaneous BID Shruthi Prescott MD   40 mg at 02/03/20 2000    glucagon (human recombinant) injection 1 mg  1 mg Intramuscular PRN Shruthi Prescott MD        hydrALAZINE injection 10 mg  10 mg Intravenous Q4H PRN Shruthi Prescott MD   10 mg at 02/03/20 1727    insulin aspart U-100 pen 0-5 Units  0-5 Units Subcutaneous Q6H PRN Shruthi Prescott MD        labetalol 20 mg/4 mL (5 mg/mL) IV syring  10 mg Intravenous Q4H PRN Shruthi Prescott MD   10 mg at 02/03/20 0636    magnesium sulfate 2g in water 50mL IVPB (premix)  2 g Intravenous PRN Shruthi Prescott MD   2 g at 01/28/20 0918    magnesium sulfate 2g in water 50mL IVPB (premix)  4 g Intravenous PRN Shruthi Prescott MD        melatonin tablet 6 mg  6 mg Oral Nightly PRN Shruthi Prescott MD   6 mg at 01/25/20 2215    metoclopramide HCl injection 5 mg  5 mg Intravenous Q6H PRN Shruthi Prescott MD        miconazole 2 % cream   Topical (Top) BID Schuyler Lomax MD        miconazole NITRATE 2 % top powder   Topical (Top) BID Shruthi Prescott MD        morphine  injection 2 mg  2 mg Intravenous Q4H PRN Shruthi Prescott MD        ondansetron disintegrating tablet 8 mg  8 mg Oral Q8H PRN Shruthi Prescott MD        ondansetron injection 4 mg  4 mg Intravenous Q12H PRN Shruthi Prescott MD        polyethylene glycol packet 17 g  17 g Per NG tube Daily Shruthi Prescott MD   Stopped at 02/03/20 0900    potassium chloride 10 mEq in 100 mL IVPB  40 mEq Intravenous PRN Shruthi Prescott  mL/hr at 02/03/20 0405 40 mEq at 02/03/20 0405    And    potassium chloride 10 mEq in 100 mL IVPB  60 mEq Intravenous PRN Shruthi Prescott MD        And    potassium chloride 10 mEq in 100 mL IVPB  80 mEq Intravenous PRN Shruthi Prescott MD        pregabalin capsule 75 mg  75 mg Oral Once Shruthi Prescott MD        Followed by    pregabalin capsule 75 mg  75 mg Oral QHS Shruthi Prescott MD   75 mg at 02/03/20 2002    promethazine (PHENERGAN) 6.25 mg in dextrose 5 % 50 mL IVPB  6.25 mg Intravenous Q6H PRN Shruthi Prescott MD        ropivacaine (PF) 2 mg/ml (0.2%) infusion  2 mL/hr Perineural Continuous Shruthi Prescott MD 2 mL/hr at 02/04/20 0600 2 mL/hr at 02/04/20 0600    senna-docusate 8.6-50 mg per tablet 1 tablet  1 tablet Per NG tube BID Shruthi Prescott MD   1 tablet at 02/03/20 2002    sodium chloride 0.9% flush 10 mL  10 mL Intravenous PRN Shruthi Prescott MD        sodium chloride 0.9% flush 10 mL  10 mL Intravenous PRN Shruthi Prescott MD        sodium phosphate 15 mmol in dextrose 5 % 250 mL IVPB  15 mmol Intravenous PRN Shruthi Prescott MD        sodium phosphate 20.01 mmol in dextrose 5 % 250 mL IVPB  20.01 mmol Intravenous PRN Shruthi Prescott MD 62.5 mL/hr at 02/03/20 0405 20.01 mmol at 02/03/20 0405    sodium phosphate 30 mmol in dextrose 5 % 250 mL IVPB  30 mmol Intravenous PRN Shruthi Prescott MD            Anticoagulant lovenox     Assessment:     Pain control  adequate.    Plan:    -Catheter paused in am. Will remove today if pt tolerates.   -continue multimodals (Decadron, toradol, tylenol).  -we will sign off. Thank you for your consult.     Evaluator Chula Irizarry      I have reviewed and concur with the resident's history, physical, assessment, and plan.  I have personally interviewed and examined the patient at bedside.  See below addendum for my evaluation and additional findings.    Patient doing well after PNC paused - some delirium present - continue multimodals and pain control per primary team at this point

## 2020-02-04 NOTE — PLAN OF CARE
SICU Staff Addendum--> please link to resident progress note 2/4/2020  I have reviewed and concur with the resident's history, physical, assessment, and plan.  I have personally interviewed and examined the patient at bedside.  See below for any additional findings.    Reason for admission:  Diaphragmatic hernia  Present on Admission:   Diaphragmatic hernia   Cough   Leukocytosis   (Resolved) Pneumonia   Alteration in skin integrity      Goals for Today:   - MIGUEL catheter transitioning off today  - Resume home BP medications after appropriate medication reconciliation  - Stable for stepdown out of ICU today    Villa Smith MD  Anesthesia Critical Care  Spectra [phone]

## 2020-02-04 NOTE — PLAN OF CARE
Plan of care reviewed with pt and pt's son. All questions and concerns addressed. See flow sheet for assessments, I/Os, vital signs and LDAs. All orders for 7p to 7a fulfilled.

## 2020-02-04 NOTE — ASSESSMENT & PLAN NOTE
69 year old female admitted to SICU with recurrent left diaphragmatic hernia s/p laparoscopic repair 1 week ago. Now s/p L thoracotomy, patch reconstruction L hemidiaphragm, chest wall reconstruction with Weiser-Rajendra patch 1/29/2020.     - diet as able  - wean O2 as able  - out of bed to cardiac chair  - PT.OT  - continue erector spinae block - ask Anesthesia about plans  - Remainder of care per general surgery  - probably ready for step down  - Provena for thoracotomy incision  - D/C chest tubes

## 2020-02-04 NOTE — SUBJECTIVE & OBJECTIVE
Interval History/Significant Events: 2 BMs yesterday    Follow-up For: Procedure(s) (LRB):  THORACOTOMY (Left)  RECONSTRUCTION, DIAPHRAGM (Left)  RECONSTRUCTION, CHEST WALL (Left)    Post-Operative Day: 6 Days Post-Op    Objective:     Vital Signs (Most Recent):  Temp: 98.7 °F (37.1 °C) (02/04/20 1500)  Pulse: 85 (02/04/20 1500)  Resp: (!) 21 (02/04/20 1500)  BP: (!) 141/65 (02/04/20 1500)  SpO2: 96 % (02/04/20 1500) Vital Signs (24h Range):  Temp:  [98.1 °F (36.7 °C)-98.7 °F (37.1 °C)] 98.7 °F (37.1 °C)  Pulse:  [] 85  Resp:  [15-32] 21  SpO2:  [91 %-99 %] 96 %  BP: (141-189)/(65-78) 141/65     Weight: 118 kg (260 lb 2.3 oz)  Body mass index is 49.15 kg/m².      Intake/Output Summary (Last 24 hours) at 2/4/2020 1521  Last data filed at 2/4/2020 0900  Gross per 24 hour   Intake 910.2 ml   Output 850 ml   Net 60.2 ml       Physical Exam   Constitutional: She is oriented to person, place, and time.   HENT:   Head: Normocephalic and atraumatic.   Eyes: Pupils are equal, round, and reactive to light. EOM are normal.   Cardiovascular: Normal rate and regular rhythm.   Thoracotomy dressing CDI, chest tube an DREW in place   Pulmonary/Chest:   Extubated on NC, satting well   Abdominal:   Morbidly obese, laparoscopic incisions CDI   Neurological: She is alert and oriented to person, place, and time.   Skin: Skin is warm and dry.       Vents:  Vent Mode: Spont (02/02/20 1436)  Ventilator Initiated: Yes (01/29/20 2023)  Set Rate: 16 BPM (02/02/20 1300)  Vt Set: 350 mL (02/02/20 1300)  Pressure Support: 5 cmH20 (02/02/20 1436)  PEEP/CPAP: 5 cmH20 (02/02/20 1436)  Oxygen Concentration (%): 0 (02/03/20 2100)  Peak Airway Pressure: 11 cmH2O (02/02/20 1436)  Plateau Pressure: 22 cmH20 (02/02/20 1436)  Total Ve: 7.34 mL (02/02/20 1436)  Negative Inspiratory Force (cm H2O): -30 (01/24/20 0854)  F/VT Ratio<105 (RSBI): (!) 24.29 (02/02/20 1435)    Lines/Drains/Airways     Drain            Female External Urinary Catheter 02/03/20  0800 1 day          Peripheral Intravenous Line                 Peripheral IV - Single Lumen 02/01/20 2300 20 G Anterior;Proximal;Right Forearm 2 days                Significant Labs:    CBC/Anemia Profile:  Recent Labs   Lab 02/03/20 0200 02/04/20  0326   WBC 11.56 9.62   HGB 9.8* 10.5*   HCT 32.4* 34.3*   * 595*   MCV 91 91   RDW 14.6* 14.9*        Chemistries:  Recent Labs   Lab 02/03/20 0200 02/04/20 0326     --    K 3.6  --    CL 98  --    CO2 28  --    BUN 24*  --    CREATININE 0.8  --    CALCIUM 8.5*  --    ALBUMIN 1.7*  --    PROT 5.7*  --    BILITOT 0.3  --    ALKPHOS 92  --    ALT 20  --    AST 45*  --    MG 2.1 2.0   PHOS 1.8* 2.8       All pertinent labs within the past 24 hours have been reviewed.    Significant Imaging:  I have reviewed all pertinent imaging results/findings within the past 24 hours.

## 2020-02-04 NOTE — PLAN OF CARE
Problem: Occupational Therapy Goal  Goal: Occupational Therapy Goal  Description  Goals to be met by: 2/18/20    Patient will increase functional independence with ADLs by performing:    UE Dressing with SBA.  LE Dressing with  CGA .  Grooming while standing with CGA  Toileting from bedside commode with Min A for hygiene and clothing management.   Toilet transfer to bedside commode with CGA        Outcome: Ongoing, Progressing   OT re-eval completed, and above goals established. JAROCHO Cabrera  2/4/2020

## 2020-02-04 NOTE — ADDENDUM NOTE
Addendum  created 02/04/20 1115 by Ambar Nicole MD    Charge Capture section accepted, Intraprocedure Event edited, Sign clinical note

## 2020-02-04 NOTE — ASSESSMENT & PLAN NOTE
69F PMH CAD, HTN, TIA, GERD who presented with a diaphragmatic hernia, now s/p surgical repair on 1/23/19.    Surgical - Diaphragmatic Hernia  S/p thoracotomy, reduction of hernia, and placement of mesh over diaphragmatic and intercostal defects with placement of chest tube and DREW drain  Thoracic surgery planning to remove chest tube and DREW drain today    Neuro:  Pain control: Celecoxib, Erector Spinae Block with Ropivicaine held  Off sedation/paralysis    Resp:  Tesslon, dextromethorphan, codeine to suppress cough ordered but currently paralyzed  Nebs prn  Acapella, IS  Continue to monitor oxygen requirements  Respiratory cultures showed MRSA and candida. On Bactrim.  Satting well on NC  CXR stable    CV - Hypertension:  Hydralazine PRN  Labetalol PRN  Adding home amlodipine 10 QD  Continue attempting to get in touch with PCP Dr. Traore at [phone] for home medications, including other blood pressure medications    Heme/ID:  RCx: MRSA, candida  Bactrim - completed    Renal:  Strict I/Os  MIVF   Monitor Cr/BUN  Well diuresed, hold lasix    FEN/GI:  Passed swallow, Nectar thick diet  Replace lytes PRN  Daily KUB    Endo:  SSI    ICU Checklist:  Feeds-Nectar thick diet  Analgesia-Celecoxib, Erector Spinae block with ropivicaine held  Sedation-None  T(DVT PPx)-Lovenox  HOB-N/A  Ulcer PPx-Protonix  Glucose-SSI  Bowel Reg-Doc-Senna, Miralax  Invasive Lines-Chest Tube, DREW Drain, Erector Spinae Catheter, PIV  Deescalate-Held Erector Spinae Block    Dispo: Step down to floor

## 2020-02-04 NOTE — PROGRESS NOTES
"[facility]  General Surgery  Progress Note    Subjective:     History of Present Illness:  Marisela Bunn is a 69 y.o. female with PMH CAD, HTN, TIA, GERD presents to the hospital as a direct admission for evaluation of a newly diagnosed diaphragmatic hernia. She initially presented to [place] ED on 1/18/20 for a 1-month history of a cough, intermittent fevers and dyspnea.  She had been seen by 2 urgent care physicians in the last month for the cough, with 2 different antibiotic courses given without improvement in cough. She reports that on 1/15 after an particularly severe coughing episode she felt a "pop" on left side, with associated severe pain and  Subsequent bruising of the left flank. She reports constant severe pain since that time. She is on daily ASA 81mg,     On ED presentation, patient was hemodynamically stable, H/H 12.4/40.0, breathing on RA. Labs revealed leukocytosis (19.2), lactic acid 2.5 (repeat lactic acid1.6), BMP wnl. CXR revealed left lower lobe pneumonia with possible associated pleural effusion. CT abdomen/pelvis revealed a large left diaphragmatic hernia containing fat  and bowel loops with hernia of intra-abdomnial contents outside the thorax from 7th left ICS. Medium sized HH. CTA revealed large Bochdalek hernia through defect in left hemo-diaphragm, with associated widening of 7th intercostal space. She was started on IV hydration and antibiotics (levofloxacin, zosyn). She reports she has not passed a bowel movement of flatus in 2 days. Intermittent lower quadrant "spasms" while coughing, otherwise no abdominal pain. She reports no other symptoms.    Post-Op Info:  Procedure(s) (LRB):  THORACOTOMY (Left)  RECONSTRUCTION, DIAPHRAGM (Left)  RECONSTRUCTION, CHEST WALL (Left)   6 Days Post-Op     Interval History:   NAEON. On 4.5L NC. Doing well overall this morning     Medications:  Continuous Infusions:   ropivacaine (PF) 2 mg/ml (0.2%) 2 mL/hr (02/04/20 0700) "     Scheduled Meds:   acetylcysteine 100 mg/ml (10%)  4 mL Nebulization TID    albuterol-ipratropium  3 mL Nebulization Q4H    benzonatate  100 mg Oral Q8H    celecoxib  200 mg Oral Daily    dextromethorphan-guaifenesin  mg/5 ml  5 mL Oral Q6H    enoxaparin  40 mg Subcutaneous BID    miconazole   Topical (Top) BID    miconazole NITRATE 2 %   Topical (Top) BID    polyethylene glycol  17 g Per NG tube Daily    pregabalin  75 mg Oral Once    Followed by    pregabalin  75 mg Oral QHS    senna-docusate 8.6-50 mg  1 tablet Per NG tube BID     PRN Meds:sodium chloride, bisacodyL, calcium gluconate IVPB, calcium gluconate IVPB, calcium gluconate IVPB, Dextrose 10% Bolus, glucagon (human recombinant), hydrALAZINE, insulin aspart U-100, labetalol, magnesium sulfate IVPB, magnesium sulfate IVPB, melatonin, metoclopramide HCl, morphine, ondansetron, ondansetron, potassium chloride in water **AND** potassium chloride in water **AND** potassium chloride in water, promethazine (PHENERGAN) IVPB, sodium chloride 0.9%, sodium chloride 0.9%, sodium phosphate IVPB, sodium phosphate IVPB, sodium phosphate IVPB     Review of patient's allergies indicates:   Allergen Reactions    Erythromycin      Stomach pains    Rosuvastatin      Hives, muscle/joint pains    Zolpidem      Confusion      Objective:     Vital Signs (Most Recent):  Temp: 98.3 °F (36.8 °C) (02/04/20 0400)  Pulse: 79 (02/04/20 0700)  Resp: 18 (02/04/20 0700)  BP: (!) 157/73 (02/04/20 0700)  SpO2: 96 % (02/04/20 0700) Vital Signs (24h Range):  Temp:  [98.1 °F (36.7 °C)-98.8 °F (37.1 °C)] 98.3 °F (36.8 °C)  Pulse:  [] 79  Resp:  [15-29] 18  SpO2:  [90 %-96 %] 96 %  BP: (123-189)/() 157/73     Weight: 118 kg (260 lb 2.3 oz)  Body mass index is 49.15 kg/m².    Intake/Output - Last 3 Shifts       02/02 0700 - 02/03 0659 02/03 0700 - 02/04 0659 02/04 0700 - 02/05 0659    P.O.  240     I.V. (mL/kg) 188.1 (1.6) 414.2 (3.5) 2 (0)    Blood       NG/GT  315      IV Piggyback 300 200     Total Intake(mL/kg) 803.1 (7) 854.2 (7.2) 2 (0)    Urine (mL/kg/hr) 2425 (0.9) 1900 (0.7)     Drains 210 150     Stool 0 0     Chest Tube 110 20     Total Output 2745 2070     Net -1941.9 -1215.8 +2           Urine Occurrence  1 x     Stool Occurrence  1 x           Physical Exam   Constitutional: She is oriented to person, place, and time. She appears well-developed and well-nourished. No distress.   HENT:   Head: Normocephalic and atraumatic.   Eyes: Conjunctivae and EOM are normal. Right eye exhibits no discharge. Left eye exhibits no discharge.   Cardiovascular: Normal rate and regular rhythm.   Pulmonary/Chest: Effort normal and breath sounds normal. No respiratory distress.   Abdominal: Soft. She exhibits no distension. There is no tenderness.   Lap incisions are c/d/i   Musculoskeletal: Normal range of motion. She exhibits no deformity.   Neurological: She is alert and oriented to person, place, and time.   Skin: Skin is warm and dry.         Significant Labs:  CBC:   Recent Labs   Lab 02/04/20  0326   WBC 9.62   RBC 3.78*   HGB 10.5*   HCT 34.3*   *   MCV 91   MCH 27.8   MCHC 30.6*     CMP:   Recent Labs   Lab 02/03/20  0200   GLU 80   CALCIUM 8.5*   ALBUMIN 1.7*   PROT 5.7*      K 3.6   CO2 28   CL 98   BUN 24*   CREATININE 0.8   ALKPHOS 92   ALT 20   AST 45*   BILITOT 0.3       Significant Diagnostics:  I have reviewed all pertinent imaging results/findings within the past 24 hours.    Assessment/Plan:     * Diaphragmatic hernia  Marisela Bunn is a 69 y.o. female with PMH COPD (not on home oxygen), HTN, HLD (not on anticoagulation) received as direct admission for large diaphragmatic hernia. Patient is symptomatic from hernia with constipation and dyspnea with overlying bruise on left flank. She underwent lap diaphragmatic hernia repair with mesh on 1/21/20.   Evidence of recurrent hernia on CT 1/27. Underwent recurrent L diaphragmatic hernia repair with L  chestwall recon with thoracic sx on 1/29. Extubated on 2/2/20.     - Wean O2 as tolerated per SICU protocol  - Bactrim completed  - Aggressive pulm toilet with IS, CPT, DuoNebs, and Tessalon when able  - PT/OT when able  - Daily labs  - Drains and chest tube per thoracic surgery  - Please call with questions or concerns        Sanjeev Murcia MD  General Surgery  [facility]

## 2020-02-04 NOTE — SUBJECTIVE & OBJECTIVE
Interval History:   No acute events. On 4.5L NC. One BM charted. Passed her SLP swallow yesterday. Drain with 110 cc out over past 24h, CT with nothing recorded.    Medications:  Continuous Infusions:   ropivacaine (PF) 2 mg/ml (0.2%) 2 mL/hr (02/04/20 0700)     Scheduled Meds:   acetylcysteine 100 mg/ml (10%)  4 mL Nebulization TID    albuterol-ipratropium  3 mL Nebulization Q4H    benzonatate  100 mg Oral Q8H    celecoxib  200 mg Oral Daily    dextromethorphan-guaifenesin  mg/5 ml  5 mL Oral Q6H    enoxaparin  40 mg Subcutaneous BID    miconazole   Topical (Top) BID    miconazole NITRATE 2 %   Topical (Top) BID    polyethylene glycol  17 g Per NG tube Daily    pregabalin  75 mg Oral Once    Followed by    pregabalin  75 mg Oral QHS    senna-docusate 8.6-50 mg  1 tablet Per NG tube BID     PRN Meds:sodium chloride, bisacodyL, calcium gluconate IVPB, calcium gluconate IVPB, calcium gluconate IVPB, Dextrose 10% Bolus, glucagon (human recombinant), hydrALAZINE, insulin aspart U-100, labetalol, magnesium sulfate IVPB, magnesium sulfate IVPB, melatonin, metoclopramide HCl, morphine, ondansetron, ondansetron, potassium chloride in water **AND** potassium chloride in water **AND** potassium chloride in water, promethazine (PHENERGAN) IVPB, sodium chloride 0.9%, sodium chloride 0.9%, sodium phosphate IVPB, sodium phosphate IVPB, sodium phosphate IVPB     Review of patient's allergies indicates:   Allergen Reactions    Erythromycin      Stomach pains    Rosuvastatin      Hives, muscle/joint pains    Zolpidem      Confusion      Objective:     Vital Signs (Most Recent):  Temp: 98.3 °F (36.8 °C) (02/04/20 0400)  Pulse: 79 (02/04/20 0700)  Resp: 18 (02/04/20 0700)  BP: (!) 157/73 (02/04/20 0700)  SpO2: 96 % (02/04/20 0700) Vital Signs (24h Range):  Temp:  [98.1 °F (36.7 °C)-98.8 °F (37.1 °C)] 98.3 °F (36.8 °C)  Pulse:  [] 79  Resp:  [15-29] 18  SpO2:  [90 %-96 %] 96 %  BP: (123-189)/() 157/73      Intake/Output - Last 3 Shifts       02/02 0700 - 02/03 0659 02/03 0700 - 02/04 0659 02/04 0700 - 02/05 0659    P.O.  240     I.V. (mL/kg) 188.1 (1.6) 414.2 (3.5) 2 (0)    Blood       NG/      IV Piggyback 300 200     Total Intake(mL/kg) 803.1 (7) 854.2 (7.2) 2 (0)    Urine (mL/kg/hr) 2425 (0.9) 1900 (0.7)     Drains 210 150     Stool 0 0     Chest Tube 110 20     Total Output 2745 2070     Net -1941.9 -1215.8 +2           Urine Occurrence  1 x     Stool Occurrence  1 x           SpO2: 96 %  O2 Device (Oxygen Therapy): nasal cannula    Physical Exam   Constitutional: She appears well-developed and well-nourished. No distress.   Cardiovascular: Normal rate and regular rhythm.   Pulmonary/Chest: Effort normal. No respiratory distress.   Bulb with serosang output  CT with serous output   Abdominal: Soft. She exhibits no distension. There is no tenderness.   Incisions covered with steristrips   Skin: Skin is warm and dry.       Significant Labs:  CBC:   Recent Labs   Lab 02/04/20  0326   WBC 9.62   RBC 3.78*   HGB 10.5*   HCT 34.3*   *   MCV 91   MCH 27.8   MCHC 30.6*     CMP:   Recent Labs   Lab 02/03/20  0200   GLU 80   CALCIUM 8.5*   ALBUMIN 1.7*   PROT 5.7*      K 3.6   CO2 28   CL 98   BUN 24*   CREATININE 0.8   ALKPHOS 92   ALT 20   AST 45*   BILITOT 0.3       Significant Diagnostics:  I have reviewed all pertinent imaging results/findings within the past 24 hours.    VTE Risk Mitigation (From admission, onward)         Ordered     enoxaparin injection 40 mg  2 times daily      01/29/20 2000     Place sequential compression device  Until discontinued      01/23/20 1002     IP VTE LOW RISK PATIENT  Once      01/19/20 2022                SAFIA

## 2020-02-04 NOTE — PROGRESS NOTES
[facility]  Critical Care - Surgery  Progress Note    Patient Name: Marisela Bunn  MRN: [id number]  Admission Date: 1/19/2020  Hospital Length of Stay: 16 days  Code Status: Full Code  Attending Provider: Lucho Garcia Jr.,*  Primary Care Provider: Primary Doctor No   Principal Problem: Diaphragmatic hernia    Subjective:     Hospital/ICU Course:  2/2 - Received 2u pRBCs overnight. Hgb increased to 9.4. Dc'd paralytics. Increased TF      Interval History/Significant Events: 2 BMs yesterday    Follow-up For: Procedure(s) (LRB):  THORACOTOMY (Left)  RECONSTRUCTION, DIAPHRAGM (Left)  RECONSTRUCTION, CHEST WALL (Left)    Post-Operative Day: 6 Days Post-Op    Objective:     Vital Signs (Most Recent):  Temp: 98.7 °F (37.1 °C) (02/04/20 1500)  Pulse: 85 (02/04/20 1500)  Resp: (!) 21 (02/04/20 1500)  BP: (!) 141/65 (02/04/20 1500)  SpO2: 96 % (02/04/20 1500) Vital Signs (24h Range):  Temp:  [98.1 °F (36.7 °C)-98.7 °F (37.1 °C)] 98.7 °F (37.1 °C)  Pulse:  [] 85  Resp:  [15-32] 21  SpO2:  [91 %-99 %] 96 %  BP: (141-189)/(65-78) 141/65     Weight: 118 kg (260 lb 2.3 oz)  Body mass index is 49.15 kg/m².      Intake/Output Summary (Last 24 hours) at 2/4/2020 1521  Last data filed at 2/4/2020 0900  Gross per 24 hour   Intake 910.2 ml   Output 850 ml   Net 60.2 ml       Physical Exam   Constitutional: She is oriented to person, place, and time.   HENT:   Head: Normocephalic and atraumatic.   Eyes: Pupils are equal, round, and reactive to light. EOM are normal.   Cardiovascular: Normal rate and regular rhythm.   Thoracotomy dressing CDI, chest tube an DREW in place   Pulmonary/Chest:   Extubated on NC, satting well   Abdominal:   Morbidly obese, laparoscopic incisions CDI   Neurological: She is alert and oriented to person, place, and time.   Skin: Skin is warm and dry.       Vents:  Vent Mode: Spont (02/02/20 1436)  Ventilator Initiated: Yes (01/29/20 2023)  Set Rate: 16 BPM (02/02/20 1300)  Vt Set: 350  mL (02/02/20 1300)  Pressure Support: 5 cmH20 (02/02/20 1436)  PEEP/CPAP: 5 cmH20 (02/02/20 1436)  Oxygen Concentration (%): 0 (02/03/20 2100)  Peak Airway Pressure: 11 cmH2O (02/02/20 1436)  Plateau Pressure: 22 cmH20 (02/02/20 1436)  Total Ve: 7.34 mL (02/02/20 1436)  Negative Inspiratory Force (cm H2O): -30 (01/24/20 0854)  F/VT Ratio<105 (RSBI): (!) 24.29 (02/02/20 1435)    Lines/Drains/Airways     Drain            Female External Urinary Catheter 02/03/20 0800 1 day          Peripheral Intravenous Line                 Peripheral IV - Single Lumen 02/01/20 2300 20 G Anterior;Proximal;Right Forearm 2 days                Significant Labs:    CBC/Anemia Profile:  Recent Labs   Lab 02/03/20  0200 02/04/20  0326   WBC 11.56 9.62   HGB 9.8* 10.5*   HCT 32.4* 34.3*   * 595*   MCV 91 91   RDW 14.6* 14.9*        Chemistries:  Recent Labs   Lab 02/03/20 0200 02/04/20  0326     --    K 3.6  --    CL 98  --    CO2 28  --    BUN 24*  --    CREATININE 0.8  --    CALCIUM 8.5*  --    ALBUMIN 1.7*  --    PROT 5.7*  --    BILITOT 0.3  --    ALKPHOS 92  --    ALT 20  --    AST 45*  --    MG 2.1 2.0   PHOS 1.8* 2.8       All pertinent labs within the past 24 hours have been reviewed.    Significant Imaging:  I have reviewed all pertinent imaging results/findings within the past 24 hours.    Assessment/Plan:     * Diaphragmatic hernia  69F PMH CAD, HTN, TIA, GERD who presented with a diaphragmatic hernia, now s/p surgical repair on 1/23/19.    Surgical - Diaphragmatic Hernia  S/p thoracotomy, reduction of hernia, and placement of mesh over diaphragmatic and intercostal defects with placement of chest tube and DREW drain  Thoracic surgery planning to remove chest tube and DREW drain today    Neuro:  Pain control: Celecoxib, Erector Spinae Block with Ropivicaine held  Off sedation/paralysis    Resp:  Tesslon, dextromethorphan, codeine to suppress cough ordered but currently paralyzed  Nebs prn  Acapella, IS  Continue to  monitor oxygen requirements  Respiratory cultures showed MRSA and candida. On Bactrim.  Satting well on NC  CXR stable    CV - Hypertension:  Hydralazine PRN  Labetalol PRN  Adding home amlodipine 10 QD  Continue attempting to get in touch with PCP Dr. Traore at [phone] for home medications, including other blood pressure medications    Heme/ID:  RCx: MRSA, candida  Bactrim - completed    Renal:  Strict I/Os  MIVF   Monitor Cr/BUN  Well diuresed, hold lasix    FEN/GI:  Passed swallow, Nectar thick diet  Replace lytes PRN  Daily KUB    Endo:  SSI    ICU Checklist:  Feeds-Nectar thick diet  Analgesia-Celecoxib, Erector Spinae block with ropivicaine held  Sedation-None  T(DVT PPx)-Lovenox  HOB-N/A  Ulcer PPx-Protonix  Glucose-SSI  Bowel Reg-Doc-Senna, Miralax  Invasive Lines-Chest Tube, DREW Drain, Erector Spinae Catheter, PIV  Deescalate-Held Erector Spinae Block    Dispo: Step down to floor        Schuyler Lomax MD  Critical Care - Surgery  [facility]

## 2020-02-04 NOTE — PT/OT/SLP RE-EVAL
Physical Therapy Re-evaluation    Patient Name:  Marisela Bunn   MRN:  [id number]    Recommendations:     Discharge Recommendations:  nursing facility, skilled   Discharge Equipment Recommendations: none(continue to reassess closer to D/D)   Barriers to discharge: Decreased caregiver support    Assessment:     Marisela Bunn is a 69 y.o. female admitted with a medical diagnosis of Diaphragmatic hernia.  She presents with the following impairments/functional limitations:  gait instability, impaired functional mobilty, decreased safety awareness, impaired balance, impaired cardiopulmonary response to activity, decreased lower extremity function, weakness, impaired self care skills . Pt pleasant and motivated to participate in therapy today. Pt was able to perform bed mobility of modA of 2 and transfers with Joni of 2 persons. Pt would benefit from use of RW to improve independence and safety with mobility. Pt was able to take 4 side steps at EOB with Joni of 2 persons. Pt would continue to benefit from acute skilled PT services in order to maximize safety and mobility.    Rehab Prognosis:  Good; patient would benefit from acute skilled PT services to address these deficits and reach maximum level of function.      Recent Surgery: Procedure(s) (LRB):  THORACOTOMY (Left)  RECONSTRUCTION, DIAPHRAGM (Left)  RECONSTRUCTION, CHEST WALL (Left) 6 Days Post-Op    Plan:     During this hospitalization, patient to be seen 4 x/week to address the above listed problems via gait training, therapeutic activities, therapeutic exercises, neuromuscular re-education  · Plan of Care Expires:  03/05/20   Plan of Care Reviewed with: patient    Subjective     Communicated with nurse prior to session.  Patient found HOB elevated with telemetry, blood pressure cuff, pulse ox (continuous), SCD, PureWick, oxygen upon PT entry to room, agreeable to evaluation.      Chief Complaint: Weakness  Patient comments/goals: Return to  [facility]  Pain/Comfort:  · Pain Rating 1: 0/10  · Location - Side 1: Left  · Location - Orientation 1: generalized  · Location 1: other (see comments)(low back and left rib cage)  · Pain Addressed 1: Pre-medicate for activity, Reposition, Distraction  · Pain Rating Post-Intervention 1: 0/10    Patients cultural, spiritual, Rastafari conflicts given the current situation: no      Objective:     Patient found with: telemetry, blood pressure cuff, pulse ox (continuous), SCD, PureWick, oxygen     General Precautions: Standard, aspiration, contact, fall   Orthopedic Precautions:N/A   Braces: N/A     Exams:  · Cognitive Exam:  Patient is oriented to Person, Place, Time and Situation  · Gross Motor Coordination:  WFL  · RLE ROM: WFL  · RLE Strength: WFL except hip flexion 2+/5  · LLE ROM: WFL  · LLE Strength: WFL except hip flexion 2+/5    Functional Mobility:  · Bed Mobility:     · Supine to Sit: moderate assistance and of 2 persons  · Sit to Supine: moderate assistance and of 2 persons  · Transfers:     · Sit to Stand:  minimum assistance and of 2 persons with hand-held assist  · Toilet Transfer: minimum assistance and of 2 persons with  hand-held assist  using  Step Transfer to bedside commode  · Gait: Pt took 4 side steps to the left at the EOB, followed by 4 steps to transfer back and forth to bedside commode after seated rest break. Min A of 2 persons provided with no AD. V/c to stand up straight. Pt ambulates with forward flexed posture.   · Balance: Static sitting: SBA; dynamic sitting: SBA; static standing: Joni of 2 persons; dynamic standing: Joni of 2 persons    AM-PAC 6 CLICK MOBILITY  Total Score:13       Therapeutic Activities and Exercises:  Pt sat EOB for approximately 5 minutes with SBA, sat on bedside commode for approximately 10 minutes with SBA. Pt stood for approximately 2 minutes with Joni of 2 persons for pericare.    Assisted pt with perineal cleaning in standing.     Pt educated on PT  role/POC.    Patient left HOB elevated with all lines intact, call button in reach and nurse notified.    GOALS:   Multidisciplinary Problems     Physical Therapy Goals        Problem: Physical Therapy Goal    Goal Priority Disciplines Outcome Goal Variances Interventions   Physical Therapy Goal     PT, PT/OT Ongoing, Progressing     Description:  Goals to be met by:20  Pt re-evaluated and goals updated    Patient will increase functional independence with mobility by performin. Supine to sit with Contact Guard Assistance.  2. Sit to stand transfer with Contact Guard Assistance.  3. Bed to chair with Contact Guard Assistance using Rolling Walker or LRAD.  4. Gait  x 100 feet with Contact Guard Assistance using Rolling Walker or LRAD.  5. Lower extremity exercise program x15 reps  with supervision.                       History:     Past Medical History:   Diagnosis Date    CAD (coronary artery disease)     Diaphragmatic hernia     GERD (gastroesophageal reflux disease)     Hypertension     TIA (transient ischemic attack)        Past Surgical History:   Procedure Laterality Date    RECONSTRUCTION OF CHEST WALL Left 2020    Procedure: RECONSTRUCTION, CHEST WALL;  Surgeon: Primo Soni MD;  Location: [address];  Service: Thoracic;  Laterality: Left;    RECONSTRUCTION OF DIAPHRAGM Left 2020    Procedure: RECONSTRUCTION, DIAPHRAGM;  Surgeon: Primo Soni MD;  Location: [address];  Service: Thoracic;  Laterality: Left;  Hernia reduction and repair    REPAIR OF DIAPHRAGMATIC HERNIA Left 2020    Procedure: REPAIR, HERNIA, DIAPHRAGMATIC, Laparoscopic;  Surgeon: Lucho Garcia Jr., MD;  Location: [address];  Service: General;  Laterality: Left;    REPAIR OF DIAPHRAGMATIC HERNIA Left 2020    Procedure: REPAIR, HERNIA, DIAPHRAGMATIC;  Surgeon: Lucho Garcia Jr., MD;  Location: [address];  Service: General;  Laterality: Left;       Time  Tracking:     PT Received On: 02/04/20  PT Start Time: 1139     PT Stop Time: 1217  PT Total Time (min): 38 min     Billable Minutes: Evaluation 10, Gait Training 13 and Therapeutic Activity 15      ULYSSES Marquez  02/04/2020

## 2020-02-04 NOTE — PROGRESS NOTES
[facility]  Cardiothoracic Surgery  Progress Note    Patient Name: Marisela Bunn  MRN: [id number]  Admission Date: 1/19/2020  Hospital Length of Stay: 16 days  Code Status: Full Code   Attending Physician: Lucho Garcia Jr.,*   Referring Provider: Notinsystem, Provider  Principal Problem:Diaphragmatic hernia    Subjective:     Post-Op Info:  Procedure(s) (LRB):  THORACOTOMY (Left)  RECONSTRUCTION, DIAPHRAGM (Left)  RECONSTRUCTION, CHEST WALL (Left)   6 Days Post-Op     Interval History:   No acute events. On 4.5L NC. One BM charted. Passed her SLP swallow yesterday. Drain with 110 cc out over past 24h, CT with nothing recorded.    Medications:  Continuous Infusions:   ropivacaine (PF) 2 mg/ml (0.2%) 2 mL/hr (02/04/20 0700)     Scheduled Meds:   acetylcysteine 100 mg/ml (10%)  4 mL Nebulization TID    albuterol-ipratropium  3 mL Nebulization Q4H    benzonatate  100 mg Oral Q8H    celecoxib  200 mg Oral Daily    dextromethorphan-guaifenesin  mg/5 ml  5 mL Oral Q6H    enoxaparin  40 mg Subcutaneous BID    miconazole   Topical (Top) BID    miconazole NITRATE 2 %   Topical (Top) BID    polyethylene glycol  17 g Per NG tube Daily    pregabalin  75 mg Oral Once    Followed by    pregabalin  75 mg Oral QHS    senna-docusate 8.6-50 mg  1 tablet Per NG tube BID     PRN Meds:sodium chloride, bisacodyL, calcium gluconate IVPB, calcium gluconate IVPB, calcium gluconate IVPB, Dextrose 10% Bolus, glucagon (human recombinant), hydrALAZINE, insulin aspart U-100, labetalol, magnesium sulfate IVPB, magnesium sulfate IVPB, melatonin, metoclopramide HCl, morphine, ondansetron, ondansetron, potassium chloride in water **AND** potassium chloride in water **AND** potassium chloride in water, promethazine (PHENERGAN) IVPB, sodium chloride 0.9%, sodium chloride 0.9%, sodium phosphate IVPB, sodium phosphate IVPB, sodium phosphate IVPB     Review of patient's allergies indicates:   Allergen Reactions     Erythromycin      Stomach pains    Rosuvastatin      Hives, muscle/joint pains    Zolpidem      Confusion      Objective:     Vital Signs (Most Recent):  Temp: 98.3 °F (36.8 °C) (02/04/20 0400)  Pulse: 79 (02/04/20 0700)  Resp: 18 (02/04/20 0700)  BP: (!) 157/73 (02/04/20 0700)  SpO2: 96 % (02/04/20 0700) Vital Signs (24h Range):  Temp:  [98.1 °F (36.7 °C)-98.8 °F (37.1 °C)] 98.3 °F (36.8 °C)  Pulse:  [] 79  Resp:  [15-29] 18  SpO2:  [90 %-96 %] 96 %  BP: (123-189)/() 157/73     Intake/Output - Last 3 Shifts       02/02 0700 - 02/03 0659 02/03 0700 - 02/04 0659 02/04 0700 - 02/05 0659    P.O.  240     I.V. (mL/kg) 188.1 (1.6) 414.2 (3.5) 2 (0)    Blood       NG/      IV Piggyback 300 200     Total Intake(mL/kg) 803.1 (7) 854.2 (7.2) 2 (0)    Urine (mL/kg/hr) 2425 (0.9) 1900 (0.7)     Drains 210 150     Stool 0 0     Chest Tube 110 20     Total Output 2745 2070     Net -1941.9 -1215.8 +2           Urine Occurrence  1 x     Stool Occurrence  1 x           SpO2: 96 %  O2 Device (Oxygen Therapy): nasal cannula    Physical Exam   Constitutional: She appears well-developed and well-nourished. No distress.   Cardiovascular: Normal rate and regular rhythm.   Pulmonary/Chest: Effort normal. No respiratory distress.   Bulb with serosang output  CT with serous output   Abdominal: Soft. She exhibits no distension. There is no tenderness.   Incisions covered with steristrips   Skin: Skin is warm and dry.       Significant Labs:  CBC:   Recent Labs   Lab 02/04/20  0326   WBC 9.62   RBC 3.78*   HGB 10.5*   HCT 34.3*   *   MCV 91   MCH 27.8   MCHC 30.6*     CMP:   Recent Labs   Lab 02/03/20  0200   GLU 80   CALCIUM 8.5*   ALBUMIN 1.7*   PROT 5.7*      K 3.6   CO2 28   CL 98   BUN 24*   CREATININE 0.8   ALKPHOS 92   ALT 20   AST 45*   BILITOT 0.3       Significant Diagnostics:  I have reviewed all pertinent imaging results/findings within the past 24 hours.    VTE Risk Mitigation (From admission,  onward)         Ordered     enoxaparin injection 40 mg  2 times daily      01/29/20 2000     Place sequential compression device  Until discontinued      01/23/20 1002     IP VTE LOW RISK PATIENT  Once      01/19/20 2022                ROS        Assessment/Plan:     * Diaphragmatic hernia  69 year old female admitted to SICU with recurrent left diaphragmatic hernia s/p laparoscopic repair 1 week ago. Now s/p L thoracotomy, patch reconstruction L hemidiaphragm, chest wall reconstruction with Whitehouse-Rajendra patch 1/29/2020.     - diet as able  - wean O2 as able  - out of bed to cardiac chair  - PT.OT  - continue erector spinae block - ask Anesthesia about plans  - Remainder of care per general surgery  - probably ready for step down  - Provena for thoracotomy incision  - D/C chest tubes        Radha Wilson MD  Cardiothoracic Surgery  [facility]

## 2020-02-05 LAB
ANISOCYTOSIS BLD QL SMEAR: SLIGHT
BASOPHILS # BLD AUTO: ABNORMAL K/UL (ref 0–0.2)
BASOPHILS NFR BLD: 0 % (ref 0–1.9)
DIFFERENTIAL METHOD: ABNORMAL
EOSINOPHIL # BLD AUTO: ABNORMAL K/UL (ref 0–0.5)
EOSINOPHIL NFR BLD: 18 % (ref 0–8)
ERYTHROCYTE [DISTWIDTH] IN BLOOD BY AUTOMATED COUNT: 15.4 % (ref 11.5–14.5)
HCT VFR BLD AUTO: 33 % (ref 37–48.5)
HGB BLD-MCNC: 9.8 G/DL (ref 12–16)
HYPOCHROMIA BLD QL SMEAR: ABNORMAL
IMM GRANULOCYTES # BLD AUTO: ABNORMAL K/UL (ref 0–0.04)
IMM GRANULOCYTES NFR BLD AUTO: ABNORMAL % (ref 0–0.5)
LYMPHOCYTES # BLD AUTO: ABNORMAL K/UL (ref 1–4.8)
LYMPHOCYTES NFR BLD: 19 % (ref 18–48)
MAGNESIUM SERPL-MCNC: 1.7 MG/DL (ref 1.6–2.6)
MCH RBC QN AUTO: 27.5 PG (ref 27–31)
MCHC RBC AUTO-ENTMCNC: 29.7 G/DL (ref 32–36)
MCV RBC AUTO: 92 FL (ref 82–98)
METAMYELOCYTES NFR BLD MANUAL: 1 %
MONOCYTES # BLD AUTO: ABNORMAL K/UL (ref 0.3–1)
MONOCYTES NFR BLD: 7 % (ref 4–15)
MYELOCYTES NFR BLD MANUAL: 3 %
NEUTROPHILS NFR BLD: 52 % (ref 38–73)
NRBC BLD-RTO: 0 /100 WBC
OVALOCYTES BLD QL SMEAR: ABNORMAL
PHOSPHATE SERPL-MCNC: 2.5 MG/DL (ref 2.7–4.5)
PLATELET # BLD AUTO: 638 K/UL (ref 150–350)
PLATELET BLD QL SMEAR: ABNORMAL
PMV BLD AUTO: 9.2 FL (ref 9.2–12.9)
POCT GLUCOSE: 127 MG/DL (ref 70–110)
POIKILOCYTOSIS BLD QL SMEAR: SLIGHT
POLYCHROMASIA BLD QL SMEAR: ABNORMAL
RBC # BLD AUTO: 3.57 M/UL (ref 4–5.4)
WBC # BLD AUTO: 8.38 K/UL (ref 3.9–12.7)

## 2020-02-05 PROCEDURE — 25000003 PHARM REV CODE 250: Performed by: STUDENT IN AN ORGANIZED HEALTH CARE EDUCATION/TRAINING PROGRAM

## 2020-02-05 PROCEDURE — 85007 BL SMEAR W/DIFF WBC COUNT: CPT

## 2020-02-05 PROCEDURE — 20600001 HC STEP DOWN PRIVATE ROOM

## 2020-02-05 PROCEDURE — 36415 COLL VENOUS BLD VENIPUNCTURE: CPT

## 2020-02-05 PROCEDURE — 97530 THERAPEUTIC ACTIVITIES: CPT

## 2020-02-05 PROCEDURE — 94668 MNPJ CHEST WALL SBSQ: CPT

## 2020-02-05 PROCEDURE — 99900035 HC TECH TIME PER 15 MIN (STAT)

## 2020-02-05 PROCEDURE — 25000242 PHARM REV CODE 250 ALT 637 W/ HCPCS: Performed by: STUDENT IN AN ORGANIZED HEALTH CARE EDUCATION/TRAINING PROGRAM

## 2020-02-05 PROCEDURE — 63600175 PHARM REV CODE 636 W HCPCS: Performed by: STUDENT IN AN ORGANIZED HEALTH CARE EDUCATION/TRAINING PROGRAM

## 2020-02-05 PROCEDURE — 25000003 PHARM REV CODE 250: Performed by: ANESTHESIOLOGY

## 2020-02-05 PROCEDURE — 84100 ASSAY OF PHOSPHORUS: CPT

## 2020-02-05 PROCEDURE — 99900026 HC AIRWAY MAINTENANCE (STAT)

## 2020-02-05 PROCEDURE — 94761 N-INVAS EAR/PLS OXIMETRY MLT: CPT

## 2020-02-05 PROCEDURE — 83735 ASSAY OF MAGNESIUM: CPT

## 2020-02-05 PROCEDURE — 25000003 PHARM REV CODE 250: Performed by: PHYSICIAN ASSISTANT

## 2020-02-05 PROCEDURE — 94640 AIRWAY INHALATION TREATMENT: CPT

## 2020-02-05 PROCEDURE — 85027 COMPLETE CBC AUTOMATED: CPT

## 2020-02-05 PROCEDURE — 27000221 HC OXYGEN, UP TO 24 HOURS

## 2020-02-05 RX ORDER — PREGABALIN 75 MG/1
75 CAPSULE ORAL NIGHTLY
Status: DISCONTINUED | OUTPATIENT
Start: 2020-02-05 | End: 2020-02-12 | Stop reason: HOSPADM

## 2020-02-05 RX ADMIN — ENOXAPARIN SODIUM 40 MG: 100 INJECTION SUBCUTANEOUS at 10:02

## 2020-02-05 RX ADMIN — BENZONATATE 100 MG: 100 CAPSULE ORAL at 06:02

## 2020-02-05 RX ADMIN — IPRATROPIUM BROMIDE AND ALBUTEROL SULFATE 3 ML: .5; 3 SOLUTION RESPIRATORY (INHALATION) at 12:02

## 2020-02-05 RX ADMIN — CELECOXIB 200 MG: 200 CAPSULE ORAL at 10:02

## 2020-02-05 RX ADMIN — MICONAZOLE NITRATE: 20 CREAM TOPICAL at 10:02

## 2020-02-05 RX ADMIN — IPRATROPIUM BROMIDE AND ALBUTEROL SULFATE 3 ML: .5; 3 SOLUTION RESPIRATORY (INHALATION) at 03:02

## 2020-02-05 RX ADMIN — OXYCODONE HYDROCHLORIDE 5 MG: 5 TABLET ORAL at 11:02

## 2020-02-05 RX ADMIN — ACETYLCYSTEINE 4 ML: 100 SOLUTION ORAL; RESPIRATORY (INHALATION) at 03:02

## 2020-02-05 RX ADMIN — GUAIFENESIN AND DEXTROMETHORPHAN 5 ML: 100; 10 SYRUP ORAL at 01:02

## 2020-02-05 RX ADMIN — PREGABALIN 75 MG: 75 CAPSULE ORAL at 10:02

## 2020-02-05 RX ADMIN — BENZONATATE 100 MG: 100 CAPSULE ORAL at 02:02

## 2020-02-05 RX ADMIN — BENZONATATE 100 MG: 100 CAPSULE ORAL at 10:02

## 2020-02-05 RX ADMIN — AMLODIPINE BESYLATE 10 MG: 10 TABLET ORAL at 10:02

## 2020-02-05 RX ADMIN — IPRATROPIUM BROMIDE AND ALBUTEROL SULFATE 3 ML: .5; 3 SOLUTION RESPIRATORY (INHALATION) at 11:02

## 2020-02-05 RX ADMIN — DOCUSATE SODIUM - SENNOSIDES 1 TABLET: 50; 8.6 TABLET, FILM COATED ORAL at 10:02

## 2020-02-05 RX ADMIN — GUAIFENESIN AND DEXTROMETHORPHAN 5 ML: 100; 10 SYRUP ORAL at 06:02

## 2020-02-05 RX ADMIN — ACETYLCYSTEINE 4 ML: 100 SOLUTION ORAL; RESPIRATORY (INHALATION) at 07:02

## 2020-02-05 RX ADMIN — POLYETHYLENE GLYCOL 3350 17 G: 17 POWDER, FOR SOLUTION ORAL at 10:02

## 2020-02-05 RX ADMIN — IPRATROPIUM BROMIDE AND ALBUTEROL SULFATE 3 ML: .5; 3 SOLUTION RESPIRATORY (INHALATION) at 07:02

## 2020-02-05 RX ADMIN — ACETYLCYSTEINE 4 ML: 100 SOLUTION ORAL; RESPIRATORY (INHALATION) at 11:02

## 2020-02-05 RX ADMIN — MICONAZOLE NITRATE: 20 POWDER TOPICAL at 10:02

## 2020-02-05 NOTE — ASSESSMENT & PLAN NOTE
Marisela Bunn is a 69 y.o. female with PMH COPD (not on home oxygen), HTN, HLD (not on anticoagulation) received as direct admission for large diaphragmatic hernia. Patient is symptomatic from hernia with constipation and dyspnea with overlying bruise on left flank. She underwent lap diaphragmatic hernia repair with mesh on 1/21/20.   Evidence of recurrent hernia on CT 1/27. Underwent recurrent L diaphragmatic hernia repair with L chestwall recon with thoracic sx on 1/29. Extubated on 2/2/20.     - Wean O2 as tolerated   - Bactrim completed  - Aggressive pulm toilet with IS, CPT, DuoNebs, and Tessalon   - PT/OT consults refreshed for the floor  - Advanced diet to mechanical soft per Speech recs, reconsult to speech placed for the floor  - Daily labs  - Drains and chest tube per thoracic surgery  - Please call with questions or concerns    DISPO: OT recs SNF, pending placement and d/c clearance per thoracic team

## 2020-02-05 NOTE — PT/OT/SLP PROGRESS
Occupational Therapy   Treatment    Name: Marisela Bunn  MRN: [id number]  Admitting Diagnosis:  Diaphragmatic hernia  7 Days Post-Op   THORACOTOMY (Left)  RECONSTRUCTION, DIAPHRAGM (Left)  RECONSTRUCTION, CHEST WALL (Left)     Recommendations:     Discharge Recommendations: nursing facility, skilled  Discharge Equipment Recommendations:  (TBD)  Barriers to discharge:  None    Assessment:     Marisela Bunn is a 69 y.o. female with a medical diagnosis of Diaphragmatic hernia.  She is agreeable to participate and tolerates session well. Performance deficits affecting function are weakness, impaired endurance, impaired self care skills, impaired functional mobilty, gait instability, impaired balance, impaired cardiopulmonary response to activity.     Rehab Prognosis:  Good; patient would benefit from acute skilled OT services to address these deficits and reach maximum level of function.       Plan:     Patient to be seen 4 x/week to address the above listed problems via self-care/home management, therapeutic activities, therapeutic exercises  · Plan of Care Expires: 03/05/20  · Plan of Care Reviewed with: patient    Subjective     Pain/Comfort:  · Pain Rating 1: 0/10    Objective:     Communicated with: RN prior to session.  Patient found supine with PureWick, telemetry, SCD, peripheral IV, pressure relief boots upon OT entry to room.    General Precautions: Standard, aspiration, fall, contact   Orthopedic Precautions:N/A   Braces: N/A     Occupational Performance:     Bed Mobility:    · Patient completed Supine to Sit to L side EOB with minimum assistance for trunk advancement off bed  · Patient completed Scooting anteriorly to EOB for foot placement on floor with minimum assistance  · Patient completed Sit to Supine with minimum assistance for BLE placement onto bed  · Patient completed supine rolling to Left with minimum assistance for placement of bed pan    Functional Mobility/Transfers:  · Patient completed Sit <>  Stand Transfer with moderate assistance  with  hand-held assist   · Functional Mobility: Patient took ~4 steps L<>R with mod assist x2/HHA. Patient requested seated rest break.    Activities of Daily Living:  · Patient reported need to have a BM deferring further participation. Unable to find an available BSC so patient returned to supine (HOB elevated) with bed pan placed. Patient reported needing extra time.      AMPAC 6 Click ADL: 12    Treatment & Education:  Role of OT/POC  Call button for assistance    Patient left HOB elevated with all lines intact, call button in reach, RN notified and on bedpanEducation:      GOALS:   Multidisciplinary Problems     Occupational Therapy Goals        Problem: Occupational Therapy Goal    Goal Priority Disciplines Outcome Interventions   Occupational Therapy Goal     OT, PT/OT Ongoing, Progressing    Description:  Goals to be met by: 2/18/20    Patient will increase functional independence with ADLs by performing:    UE Dressing with SBA.  LE Dressing with  CGA .  Grooming while standing with CGA  Toileting from bedside commode with Min A for hygiene and clothing management.   Toilet transfer to bedside commode with CGA                         Time Tracking:     OT Date of Treatment: 02/05/20  OT Start Time: 1439  OT Stop Time: 1459  OT Total Time (min): 20 min    Billable Minutes:Therapeutic Activity 10 minutes    Yamile Carpio OT  2/5/2020

## 2020-02-05 NOTE — PLAN OF CARE
Patient transferred from ICU to [facility] last night.  Patient is POD #7  from Left Thoracotomy, Left Diaphragm and Chest Wall Reconstruction.  Patient's son was sent a SNF list yesterday.  Patient should be ready to discharge to SNF +/- 2/6/2020.  Will continue to follow for needs.       02/05/20 1234   Discharge Reassessment   Assessment Type Discharge Planning Reassessment   Discharge Plan A Skilled Nursing Facility   Discharge Plan B Home with family;Home Health   Post-Acute Status   Post-Acute Authorization Placement   Post-Acute Placement Status Patient List Provided

## 2020-02-05 NOTE — PLAN OF CARE
02/05/20 1607   Post-Acute Status   Post-Acute Authorization Placement   Post-Acute Placement Status Patient List Provided   Sw spoke with pt's daughter in law to obtain choices: 1. [facility] 2. [facility] 3. [facility]. Pt's SSN not listed in chart. She reported she will be able to get this when she gets home this evening and will call SW back. SW unable to complete LOCET due to needing this information.    Chula Ram LMSW  [facility]  [phone]

## 2020-02-05 NOTE — PLAN OF CARE
Plan of care reviewed with patient who verbalized understanding. AAOx4. VSS on 3.5 L of O2. Clear liquid/ nectar thick diet. No complaints of nausea or pain. Call light within reach. Bed in the lowest position. Patient mostly slept in between care. No acute events this shift. WCTM.

## 2020-02-05 NOTE — PT/OT/SLP PROGRESS
"Physical Therapy Treatment    Patient Name:  Marisela Bunn   MRN:  [id number]    Recommendations:     Discharge Recommendations:  nursing facility, skilled   Discharge Equipment Recommendations: (TBD)   Barriers to discharge: decreased functional mobility    Assessment:     Marisela Bunn is a 69 y.o. female admitted with a medical diagnosis of Diaphragmatic hernia.  She presents with the following impairments/functional limitations:  weakness, impaired functional mobilty, impaired endurance, gait instability, impaired balance, impaired self care skills, impaired cardiopulmonary response to activity.  Tolerated session c c/o fatigue.  Session limited as pt needing to have BM.  Therapy attempted to retreive BSC for voiding but no clean BSC on unit.  RN notified and requested to order BSC for pt's use.  Pt performed mobility c min-mod A.  Pt able to amb few steps along EOB c HHA - demo shuffled gait, increased lateral trunk lean and unsteadiness.  Pt safe to transfer to/from BSC c assistance of 1x person + RW. Pt would benefit from continued skilled acute PT 3x/wk to improve functional mobility.      Rehab Prognosis: Good; patient would benefit from acute skilled PT services to address these deficits and reach maximum level of function.    Recent Surgery: Procedure(s) (LRB):  THORACOTOMY (Left)  RECONSTRUCTION, DIAPHRAGM (Left)  RECONSTRUCTION, CHEST WALL (Left) 7 Days Post-Op    Plan:     During this hospitalization, patient to be seen 4 x/week to address the identified rehab impairments via gait training, therapeutic activities, therapeutic exercises, neuromuscular re-education and progress toward the following goals:    · Plan of Care Expires:  03/05/20    Subjective     Chief Complaint: fatigue  Patient/Family Comments/goals: "I need to get stronger"  Pain/Comfort:  · Pain Rating 1: 0/10      Objective:     Communicated with RN and OT prior to session.  Patient found HOB elevated with peripheral IV, PureWick, " telemetry, SCD, pressure relief boots upon PT entry to room.     General Precautions: Standard, fall, contact, aspiration   Orthopedic Precautions:N/A   Braces: N/A     Functional Mobility:  · Bed Mobility:     · Rolling Left:  minimum assistance  · Scooting: minimum assistance  · Supine to Sit: minimum assistance  · Sit to Supine: minimum assistance  · Transfers:     · Sit to Stand:  moderate assistance with hand-held assist  · Gait: 4 steps L/R c HHAx2 mod A   · demo shuffled gait, increased lateral trunk lean and unsteadiness  · Balance: sitting (S); standing (min-mod A)      AM-PAC 6 CLICK MOBILITY  Turning over in bed (including adjusting bedclothes, sheets and blankets)?: 3  Sitting down on and standing up from a chair with arms (e.g., wheelchair, bedside commode, etc.): 2  Moving from lying on back to sitting on the side of the bed?: 3  Moving to and from a bed to a chair (including a wheelchair)?: 2  Need to walk in hospital room?: 2  Climbing 3-5 steps with a railing?: 2  Basic Mobility Total Score: 14       Therapeutic Activities and Exercises:  Pt educated on: PT role/POC; safety c mobility; benefits of OOB activities; performing therex; d/c recs - v/u  -sat EOB x5mins  -sit<>stand 2x  -therex (LAQ, hip flex, AP)  -repositioned in bed c bedpan for voiding    Patient left HOB elevated with all lines intact, call button in reach and RN notified..    GOALS:   Multidisciplinary Problems     Physical Therapy Goals        Problem: Physical Therapy Goal    Goal Priority Disciplines Outcome Goal Variances Interventions   Physical Therapy Goal     PT, PT/OT Ongoing, Progressing     Description:  Goals to be met by:20  Pt re-evaluated and goals updated    Patient will increase functional independence with mobility by performin. Supine to sit with Contact Guard Assistance.  2. Sit to stand transfer with Contact Guard Assistance.  3. Bed to chair with Contact Guard Assistance using Rolling Walker or  LRAD.  4. Gait  x 100 feet with Contact Guard Assistance using Rolling Walker or LRAD.  5. Lower extremity exercise program x15 reps  with supervision.                       Time Tracking:     PT Received On: 02/05/20  PT Start Time: 1439     PT Stop Time: 1457  PT Total Time (min): 18 min     Billable Minutes: Therapeutic Activity 10 min    Treatment Type: Treatment  PT/PTA: PT     PTA Visit Number: 0     Dillon Pascual, PT  02/05/2020

## 2020-02-05 NOTE — SUBJECTIVE & OBJECTIVE
Interval History: NAEON. Stepped down overnight. Breathing comfortably on 3.5LNC. Walking with PT. Good UOP. BMx2.     Medications:  Continuous Infusions:   ropivacaine (PF) 2 mg/ml (0.2%) Stopped (02/04/20 0900)     Scheduled Meds:   acetylcysteine 100 mg/ml (10%)  4 mL Nebulization TID    albuterol-ipratropium  3 mL Nebulization Q4H    amLODIPine  10 mg Oral Daily    benzonatate  100 mg Oral Q8H    celecoxib  200 mg Oral Daily    dextromethorphan-guaifenesin  mg/5 ml  5 mL Oral Q6H    enoxaparin  40 mg Subcutaneous BID    miconazole   Topical (Top) BID    miconazole NITRATE 2 %   Topical (Top) BID    polyethylene glycol  17 g Per NG tube Daily    pregabalin  75 mg Oral Once    Followed by    pregabalin  75 mg Oral QHS    senna-docusate 8.6-50 mg  1 tablet Per NG tube BID     PRN Meds:sodium chloride, bisacodyL, calcium gluconate IVPB, calcium gluconate IVPB, calcium gluconate IVPB, Dextrose 10% Bolus, glucagon (human recombinant), hydrALAZINE, insulin aspart U-100, labetalol, magnesium sulfate IVPB, magnesium sulfate IVPB, melatonin, metoclopramide HCl, ondansetron, ondansetron, oxyCODONE, potassium chloride in water **AND** potassium chloride in water **AND** potassium chloride in water, promethazine (PHENERGAN) IVPB, sodium chloride 0.9%, sodium chloride 0.9%, sodium phosphate IVPB, sodium phosphate IVPB, sodium phosphate IVPB     Review of patient's allergies indicates:   Allergen Reactions    Erythromycin      Stomach pains    Rosuvastatin      Hives, muscle/joint pains    Zolpidem      Confusion      Objective:     Vital Signs (Most Recent):  Temp: 98.2 °F (36.8 °C) (02/05/20 0603)  Pulse: 97 (02/05/20 0732)  Resp: 14 (02/05/20 0732)  BP: 132/61 (02/05/20 0623)  SpO2: (!) 94 % (02/05/20 0732) Vital Signs (24h Range):  Temp:  [98.1 °F (36.7 °C)-99 °F (37.2 °C)] 98.2 °F (36.8 °C)  Pulse:  [81-98] 97  Resp:  [14-32] 14  SpO2:  [92 %-99 %] 94 %  BP: (124-175)/() 132/61      Intake/Output - Last 3 Shifts       02/03 0700 - 02/04 0659 02/04 0700 - 02/05 0659 02/05 0700 - 02/06 0659    P.O. 240 170     I.V. (mL/kg) 414.2 (3.5) 6 (0.1)     NG/GT       IV Piggyback 200      Total Intake(mL/kg) 854.2 (7.2) 176 (1.5)     Urine (mL/kg/hr) 1900 (0.7) 300 (0.1)     Drains 150      Stool 0 0     Chest Tube 20 0     Total Output 2070 300     Net -1215.8 -124            Urine Occurrence 1 x 2 x     Stool Occurrence 2 x 2 x           SpO2: (!) 94 %  O2 Device (Oxygen Therapy): nasal cannula    Physical Exam   Constitutional: She appears well-developed and well-nourished. No distress.   Cardiovascular: Normal rate and regular rhythm.   Pulmonary/Chest: Effort normal. No respiratory distress.   Thoracotomy incision c/d/i. On 3.5LNC.   Abdominal: Soft. She exhibits no distension. There is no tenderness.   Incisions covered with steristrips   Skin: Skin is warm and dry.     Significant Labs:  BMP:   Recent Labs   Lab 02/05/20  0450   MG 1.7     CBC:   Recent Labs   Lab 02/05/20  0450   WBC 8.38   RBC 3.57*   HGB 9.8*   HCT 33.0*   *   MCV 92   MCH 27.5   MCHC 29.7*     CMP: No results for input(s): GLU, CALCIUM, ALBUMIN, PROT, NA, K, CO2, CL, BUN, CREATININE, ALKPHOS, ALT, AST, BILITOT in the last 48 hours.    Significant Diagnostics:  CXR: I have reviewed all pertinent results/findings within the past 24 hours    VTE Risk Mitigation (From admission, onward)         Ordered     enoxaparin injection 40 mg  2 times daily      01/29/20 2000     Place sequential compression device  Until discontinued      01/23/20 1002     IP VTE LOW RISK PATIENT  Once      01/19/20 2022

## 2020-02-05 NOTE — PLAN OF CARE
Problem: Physical Therapy Goal  Goal: Physical Therapy Goal  Description  Goals to be met by:20  Pt re-evaluated and goals updated    Patient will increase functional independence with mobility by performin. Supine to sit with Contact Guard Assistance.  2. Sit to stand transfer with Contact Guard Assistance.  3. Bed to chair with Contact Guard Assistance using Rolling Walker or LRAD.  4. Gait  x 100 feet with Contact Guard Assistance using Rolling Walker or LRAD.  5. Lower extremity exercise program x15 reps  with supervision.      Outcome: Ongoing, Progressing   Dillon Pascual, PT,DPT  2020

## 2020-02-05 NOTE — PLAN OF CARE
Problem: Occupational Therapy Goal  Goal: Occupational Therapy Goal  Description  Goals to be met by: 2/18/20    Patient will increase functional independence with ADLs by performing:    UE Dressing with SBA.  LE Dressing with  CGA .  Grooming while standing with CGA  Toileting from bedside commode with Min A for hygiene and clothing management.   Toilet transfer to bedside commode with CGA        Outcome: Ongoing, Progressing    Continue with POC.  Yamile Carpio OT  2/5/2020

## 2020-02-05 NOTE — PROGRESS NOTES
"[facility]  Thoracic Surgery  Progress Note    Subjective:     History of Present Illness:  Patient is a 69 year old female with PMH of CAD, HTN, TIA, OA, COPD and GERD admitted to SICU after presenting to outside ED on 1/18/20 for worsening SOB, cough and left flank ecchymosis. She reports that on 1/15 after an particularly severe coughing episode she felt a "pop" on left side, with associated severe pain and development of bruising. CT C/A/P showed large left hemidiaphragm hernia with associated widening of 7th intercostal space allowing for bowel to herniate outside of ribs. Transferred to [facility] on 1/19/20 on 2LNC. However, she rapidly declined from a respiratory standpoint. Underwent a laparoscopic reduction and repair of diaphragmatic hernia on 1/21/20. Per the op note, defect itself measured approximately 10 x 15 cm requiring a Peel-Rajendra patch to close the defect. Remained intubated, sedated and paralyzed after the procedure. Extubated and drain removed on 1/24/20. Over the weekend her O2 requirements again increased and had worsening leukocytosis. Chest CT today showed recurrent diaphragmatic hernia. Today she is on 7L comfort flow with decent saturations at rest. Afebrile. Hemodynamically stable. Tolerating a soft diet.     PSH of laparoscopic cholecystectomy, EMILIANO with BSO and right TKR   Never smoker. Denies EtOH use.    Post-Op Info:  Procedure(s) (LRB):  THORACOTOMY (Left)  RECONSTRUCTION, DIAPHRAGM (Left)  RECONSTRUCTION, CHEST WALL (Left)   7 Days Post-Op     Interval History: NAEON. Stepped down overnight. Breathing comfortably on 3.5LNC. Walking with PT. Good UOP. BMx2.     Medications:  Continuous Infusions:   ropivacaine (PF) 2 mg/ml (0.2%) Stopped (02/04/20 0900)     Scheduled Meds:   acetylcysteine 100 mg/ml (10%)  4 mL Nebulization TID    albuterol-ipratropium  3 mL Nebulization Q4H    amLODIPine  10 mg Oral Daily    benzonatate  100 mg Oral Q8H    celecoxib  200 mg " Oral Daily    dextromethorphan-guaifenesin  mg/5 ml  5 mL Oral Q6H    enoxaparin  40 mg Subcutaneous BID    miconazole   Topical (Top) BID    miconazole NITRATE 2 %   Topical (Top) BID    polyethylene glycol  17 g Per NG tube Daily    pregabalin  75 mg Oral Once    Followed by    pregabalin  75 mg Oral QHS    senna-docusate 8.6-50 mg  1 tablet Per NG tube BID     PRN Meds:sodium chloride, bisacodyL, calcium gluconate IVPB, calcium gluconate IVPB, calcium gluconate IVPB, Dextrose 10% Bolus, glucagon (human recombinant), hydrALAZINE, insulin aspart U-100, labetalol, magnesium sulfate IVPB, magnesium sulfate IVPB, melatonin, metoclopramide HCl, ondansetron, ondansetron, oxyCODONE, potassium chloride in water **AND** potassium chloride in water **AND** potassium chloride in water, promethazine (PHENERGAN) IVPB, sodium chloride 0.9%, sodium chloride 0.9%, sodium phosphate IVPB, sodium phosphate IVPB, sodium phosphate IVPB     Review of patient's allergies indicates:   Allergen Reactions    Erythromycin      Stomach pains    Rosuvastatin      Hives, muscle/joint pains    Zolpidem      Confusion      Objective:     Vital Signs (Most Recent):  Temp: 98.2 °F (36.8 °C) (02/05/20 0603)  Pulse: 97 (02/05/20 0732)  Resp: 14 (02/05/20 0732)  BP: 132/61 (02/05/20 0623)  SpO2: (!) 94 % (02/05/20 0732) Vital Signs (24h Range):  Temp:  [98.1 °F (36.7 °C)-99 °F (37.2 °C)] 98.2 °F (36.8 °C)  Pulse:  [81-98] 97  Resp:  [14-32] 14  SpO2:  [92 %-99 %] 94 %  BP: (124-175)/() 132/61     Intake/Output - Last 3 Shifts       02/03 0700 - 02/04 0659 02/04 0700 - 02/05 0659 02/05 0700 - 02/06 0659    P.O. 240 170     I.V. (mL/kg) 414.2 (3.5) 6 (0.1)     NG/GT       IV Piggyback 200      Total Intake(mL/kg) 854.2 (7.2) 176 (1.5)     Urine (mL/kg/hr) 1900 (0.7) 300 (0.1)     Drains 150      Stool 0 0     Chest Tube 20 0     Total Output 2070 300     Net -1215.8 -124            Urine Occurrence 1 x 2 x     Stool Occurrence 2  x 2 x           SpO2: (!) 94 %  O2 Device (Oxygen Therapy): nasal cannula    Physical Exam   Constitutional: She appears well-developed and well-nourished. No distress.   Cardiovascular: Normal rate and regular rhythm.   Pulmonary/Chest: Effort normal. No respiratory distress.   Thoracotomy incision c/d/i. On 3.5LNC.   Abdominal: Soft. She exhibits no distension. There is no tenderness.   Incisions covered with steristrips   Skin: Skin is warm and dry.     Significant Labs:  BMP:   Recent Labs   Lab 02/05/20  0450   MG 1.7     CBC:   Recent Labs   Lab 02/05/20  0450   WBC 8.38   RBC 3.57*   HGB 9.8*   HCT 33.0*   *   MCV 92   MCH 27.5   MCHC 29.7*     CMP: No results for input(s): GLU, CALCIUM, ALBUMIN, PROT, NA, K, CO2, CL, BUN, CREATININE, ALKPHOS, ALT, AST, BILITOT in the last 48 hours.    Significant Diagnostics:  CXR: I have reviewed all pertinent results/findings within the past 24 hours    VTE Risk Mitigation (From admission, onward)         Ordered     enoxaparin injection 40 mg  2 times daily      01/29/20 2000     Place sequential compression device  Until discontinued      01/23/20 1002     IP VTE LOW RISK PATIENT  Once      01/19/20 2022              Assessment/Plan:     * Diaphragmatic hernia  69 year old female admitted to SICU with recurrent left diaphragmatic hernia s/p failed laparoscopic repair now s/p diaphragm reconstruction and chest wall reconstruction.     - CXR stable after tube removal yesterday.   - Pulmonary toilet- Duo Nebs, Acapella   - PRN anti-tussives  - Bowel regimen-Daily stool softener  - Multi-modal pain control- continue Celebrex, Lyrica and PRN narcotics   - PT/OT    Dispo- Awaiting SNF placement. Will schedule follow up visit with Dr. Soni 2-3 weeks after discharge with repeat CXR.            ANTONY Haider  Thoracic Surgery  [facility]

## 2020-02-05 NOTE — PROGRESS NOTES
"[facility]  General Surgery  Progress Note    Subjective:     History of Present Illness:  Marisela Bunn is a 69 y.o. female with PMH CAD, HTN, TIA, GERD presents to the hospital as a direct admission for evaluation of a newly diagnosed diaphragmatic hernia. She initially presented to [place] ED on 1/18/20 for a 1-month history of a cough, intermittent fevers and dyspnea.  She had been seen by 2 urgent care physicians in the last month for the cough, with 2 different antibiotic courses given without improvement in cough. She reports that on 1/15 after an particularly severe coughing episode she felt a "pop" on left side, with associated severe pain and  Subsequent bruising of the left flank. She reports constant severe pain since that time. She is on daily ASA 81mg,     On ED presentation, patient was hemodynamically stable, H/H 12.4/40.0, breathing on RA. Labs revealed leukocytosis (19.2), lactic acid 2.5 (repeat lactic acid1.6), BMP wnl. CXR revealed left lower lobe pneumonia with possible associated pleural effusion. CT abdomen/pelvis revealed a large left diaphragmatic hernia containing fat  and bowel loops with hernia of intra-abdomnial contents outside the thorax from 7th left ICS. Medium sized HH. CTA revealed large Bochdalek hernia through defect in left hemo-diaphragm, with associated widening of 7th intercostal space. She was started on IV hydration and antibiotics (levofloxacin, zosyn). She reports she has not passed a bowel movement of flatus in 2 days. Intermittent lower quadrant "spasms" while coughing, otherwise no abdominal pain. She reports no other symptoms.    Post-Op Info:  Procedure(s) (LRB):  THORACOTOMY (Left)  RECONSTRUCTION, DIAPHRAGM (Left)  RECONSTRUCTION, CHEST WALL (Left)   7 Days Post-Op     Interval History:   NAEON. AFVSS. 3.5L NC. Feeling pretty good today     Medications:  Continuous Infusions:   ropivacaine (PF) 2 mg/ml (0.2%) Stopped (02/04/20 0900) "     Scheduled Meds:   acetylcysteine 100 mg/ml (10%)  4 mL Nebulization TID    albuterol-ipratropium  3 mL Nebulization Q4H    amLODIPine  10 mg Oral Daily    benzonatate  100 mg Oral Q8H    celecoxib  200 mg Oral Daily    dextromethorphan-guaifenesin  mg/5 ml  5 mL Oral Q6H    enoxaparin  40 mg Subcutaneous BID    miconazole   Topical (Top) BID    miconazole NITRATE 2 %   Topical (Top) BID    polyethylene glycol  17 g Per NG tube Daily    pregabalin  75 mg Oral Once    Followed by    pregabalin  75 mg Oral QHS    senna-docusate 8.6-50 mg  1 tablet Per NG tube BID     PRN Meds:sodium chloride, bisacodyL, calcium gluconate IVPB, calcium gluconate IVPB, calcium gluconate IVPB, Dextrose 10% Bolus, glucagon (human recombinant), hydrALAZINE, insulin aspart U-100, labetalol, magnesium sulfate IVPB, magnesium sulfate IVPB, melatonin, metoclopramide HCl, ondansetron, ondansetron, oxyCODONE, potassium chloride in water **AND** potassium chloride in water **AND** potassium chloride in water, promethazine (PHENERGAN) IVPB, sodium chloride 0.9%, sodium chloride 0.9%, sodium phosphate IVPB, sodium phosphate IVPB, sodium phosphate IVPB     Review of patient's allergies indicates:   Allergen Reactions    Erythromycin      Stomach pains    Rosuvastatin      Hives, muscle/joint pains    Zolpidem      Confusion      Objective:     Vital Signs (Most Recent):  Temp: 98.2 °F (36.8 °C) (02/05/20 0603)  Pulse: 97 (02/05/20 0732)  Resp: 14 (02/05/20 0732)  BP: 132/61 (02/05/20 0623)  SpO2: (!) 94 % (02/05/20 0732) Vital Signs (24h Range):  Temp:  [98.1 °F (36.7 °C)-99 °F (37.2 °C)] 98.2 °F (36.8 °C)  Pulse:  [82-98] 97  Resp:  [14-24] 14  SpO2:  [93 %-99 %] 94 %  BP: (124-175)/() 132/61     Weight: 118 kg (260 lb 2.3 oz)  Body mass index is 49.15 kg/m².    Intake/Output - Last 3 Shifts       02/03 0700 - 02/04 0659 02/04 0700 - 02/05 0659 02/05 0700 - 02/06 0659    P.O. 240 170     I.V. (mL/kg) 414.2 (3.5) 6  (0.1)     NG/GT       IV Piggyback 200      Total Intake(mL/kg) 854.2 (7.2) 176 (1.5)     Urine (mL/kg/hr) 1900 (0.7) 300 (0.1)     Drains 150      Stool 0 0     Chest Tube 20 0     Total Output 2070 300     Net -1215.8 -124            Urine Occurrence 1 x 2 x     Stool Occurrence 2 x 2 x           Physical Exam   Constitutional: She is oriented to person, place, and time. She appears well-developed and well-nourished. No distress.   HENT:   Head: Normocephalic and atraumatic.   Eyes: Conjunctivae and EOM are normal. Right eye exhibits no discharge. Left eye exhibits no discharge.   Cardiovascular: Normal rate and regular rhythm.   Pulmonary/Chest: Effort normal and breath sounds normal. No respiratory distress.   Abdominal: Soft. She exhibits no distension. There is no tenderness.   Lap incisions are c/d/i   Musculoskeletal: Normal range of motion. She exhibits no deformity.   Neurological: She is alert and oriented to person, place, and time.   Skin: Skin is warm and dry.         Significant Labs:  CBC:   Recent Labs   Lab 02/05/20  0450   WBC 8.38   RBC 3.57*   HGB 9.8*   HCT 33.0*   *   MCV 92   MCH 27.5   MCHC 29.7*     CMP:   Recent Labs   Lab 02/03/20  0200   GLU 80   CALCIUM 8.5*   ALBUMIN 1.7*   PROT 5.7*      K 3.6   CO2 28   CL 98   BUN 24*   CREATININE 0.8   ALKPHOS 92   ALT 20   AST 45*   BILITOT 0.3       Significant Diagnostics:  I have reviewed all pertinent imaging results/findings within the past 24 hours.    Assessment/Plan:     * Diaphragmatic hernia  Marisela Bunn is a 69 y.o. female with PMH COPD (not on home oxygen), HTN, HLD (not on anticoagulation) received as direct admission for large diaphragmatic hernia. Patient is symptomatic from hernia with constipation and dyspnea with overlying bruise on left flank. She underwent lap diaphragmatic hernia repair with mesh on 1/21/20.   Evidence of recurrent hernia on CT 1/27. Underwent recurrent L diaphragmatic hernia repair with L  chestwall recon with thoracic sx on 1/29. Extubated on 2/2/20.     - Wean O2 as tolerated   - Bactrim completed  - Aggressive pulm toilet with IS, CPT, DuoNeángel, and Tessalon   - PT/OT consults refreshed for the floor  - Advanced diet to mechanical soft per Speech recs, reconsult to speech placed for the floor  - Daily labs  - Drains and chest tube per thoracic surgery  - Please call with questions or concerns    DISPO: OT recs SNF, pending placement and d/c clearance per thoracic team        Sanjeev Murcia MD  General Surgery  [facility]

## 2020-02-05 NOTE — NURSING TRANSFER
Nursing Transfer Note      2/5/2020     Transfer To: [facility] 1041    Transfer via bed    Transfer with 3.5 L nasal cannula to O2, cardiac monitoring    Transported by RN and student nurse    Medicines sent: yes; miconazole nitrate 2% powder and cream, bactroban    Chart send with patient: Yes    Notified: nasim Rivera    Patient reassessed at: 2/4/20 2300 (date, time)    Upon arrival to floor: cardiac monitor applied, patient oriented to room, call bell in reach and bed in lowest position and locked. Saumya at bedside for bedside report, assumed all care of patient.

## 2020-02-05 NOTE — SUBJECTIVE & OBJECTIVE
Interval History:   NAEON. AFVSS. 3.5L NC. Feeling pretty good today     Medications:  Continuous Infusions:   ropivacaine (PF) 2 mg/ml (0.2%) Stopped (02/04/20 0900)     Scheduled Meds:   acetylcysteine 100 mg/ml (10%)  4 mL Nebulization TID    albuterol-ipratropium  3 mL Nebulization Q4H    amLODIPine  10 mg Oral Daily    benzonatate  100 mg Oral Q8H    celecoxib  200 mg Oral Daily    dextromethorphan-guaifenesin  mg/5 ml  5 mL Oral Q6H    enoxaparin  40 mg Subcutaneous BID    miconazole   Topical (Top) BID    miconazole NITRATE 2 %   Topical (Top) BID    polyethylene glycol  17 g Per NG tube Daily    pregabalin  75 mg Oral Once    Followed by    pregabalin  75 mg Oral QHS    senna-docusate 8.6-50 mg  1 tablet Per NG tube BID     PRN Meds:sodium chloride, bisacodyL, calcium gluconate IVPB, calcium gluconate IVPB, calcium gluconate IVPB, Dextrose 10% Bolus, glucagon (human recombinant), hydrALAZINE, insulin aspart U-100, labetalol, magnesium sulfate IVPB, magnesium sulfate IVPB, melatonin, metoclopramide HCl, ondansetron, ondansetron, oxyCODONE, potassium chloride in water **AND** potassium chloride in water **AND** potassium chloride in water, promethazine (PHENERGAN) IVPB, sodium chloride 0.9%, sodium chloride 0.9%, sodium phosphate IVPB, sodium phosphate IVPB, sodium phosphate IVPB     Review of patient's allergies indicates:   Allergen Reactions    Erythromycin      Stomach pains    Rosuvastatin      Hives, muscle/joint pains    Zolpidem      Confusion      Objective:     Vital Signs (Most Recent):  Temp: 98.2 °F (36.8 °C) (02/05/20 0603)  Pulse: 97 (02/05/20 0732)  Resp: 14 (02/05/20 0732)  BP: 132/61 (02/05/20 0623)  SpO2: (!) 94 % (02/05/20 0732) Vital Signs (24h Range):  Temp:  [98.1 °F (36.7 °C)-99 °F (37.2 °C)] 98.2 °F (36.8 °C)  Pulse:  [82-98] 97  Resp:  [14-24] 14  SpO2:  [93 %-99 %] 94 %  BP: (124-175)/() 132/61     Weight: 118 kg (260 lb 2.3 oz)  Body mass index is 49.15  kg/m².    Intake/Output - Last 3 Shifts       02/03 0700 - 02/04 0659 02/04 0700 - 02/05 0659 02/05 0700 - 02/06 0659    P.O. 240 170     I.V. (mL/kg) 414.2 (3.5) 6 (0.1)     NG/GT       IV Piggyback 200      Total Intake(mL/kg) 854.2 (7.2) 176 (1.5)     Urine (mL/kg/hr) 1900 (0.7) 300 (0.1)     Drains 150      Stool 0 0     Chest Tube 20 0     Total Output 2070 300     Net -1215.8 -124            Urine Occurrence 1 x 2 x     Stool Occurrence 2 x 2 x           Physical Exam   Constitutional: She is oriented to person, place, and time. She appears well-developed and well-nourished. No distress.   HENT:   Head: Normocephalic and atraumatic.   Eyes: Conjunctivae and EOM are normal. Right eye exhibits no discharge. Left eye exhibits no discharge.   Cardiovascular: Normal rate and regular rhythm.   Pulmonary/Chest: Effort normal and breath sounds normal. No respiratory distress.   Abdominal: Soft. She exhibits no distension. There is no tenderness.   Lap incisions are c/d/i   Musculoskeletal: Normal range of motion. She exhibits no deformity.   Neurological: She is alert and oriented to person, place, and time.   Skin: Skin is warm and dry.         Significant Labs:  CBC:   Recent Labs   Lab 02/05/20  0450   WBC 8.38   RBC 3.57*   HGB 9.8*   HCT 33.0*   *   MCV 92   MCH 27.5   MCHC 29.7*     CMP:   Recent Labs   Lab 02/03/20  0200   GLU 80   CALCIUM 8.5*   ALBUMIN 1.7*   PROT 5.7*      K 3.6   CO2 28   CL 98   BUN 24*   CREATININE 0.8   ALKPHOS 92   ALT 20   AST 45*   BILITOT 0.3       Significant Diagnostics:  I have reviewed all pertinent imaging results/findings within the past 24 hours.

## 2020-02-05 NOTE — ASSESSMENT & PLAN NOTE
69 year old female admitted to SICU with recurrent left diaphragmatic hernia s/p failed laparoscopic repair now s/p diaphragm reconstruction and chest wall reconstruction.     - CXR stable after tube removal yesterday.   - Pulmonary toilet- Duo Nebs, Acapella   - PRN anti-tussives  - Bowel regimen-Daily stool softener  - Multi-modal pain control- continue Celebrex, Lyrica and PRN narcotics   - PT/OT    Dispo- Awaiting SNF placement. Will schedule follow up visit with Dr. Soni 2-3 weeks after discharge with repeat CXR.

## 2020-02-06 LAB
BASOPHILS # BLD AUTO: 0.1 K/UL (ref 0–0.2)
BASOPHILS NFR BLD: 1.1 % (ref 0–1.9)
DIFFERENTIAL METHOD: ABNORMAL
EOSINOPHIL # BLD AUTO: 1.2 K/UL (ref 0–0.5)
EOSINOPHIL NFR BLD: 12.7 % (ref 0–8)
ERYTHROCYTE [DISTWIDTH] IN BLOOD BY AUTOMATED COUNT: 15 % (ref 11.5–14.5)
HCT VFR BLD AUTO: 32.7 % (ref 37–48.5)
HGB BLD-MCNC: 9.8 G/DL (ref 12–16)
IMM GRANULOCYTES # BLD AUTO: 0.41 K/UL (ref 0–0.04)
IMM GRANULOCYTES NFR BLD AUTO: 4.5 % (ref 0–0.5)
LYMPHOCYTES # BLD AUTO: 1.8 K/UL (ref 1–4.8)
LYMPHOCYTES NFR BLD: 19.8 % (ref 18–48)
MAGNESIUM SERPL-MCNC: 1.6 MG/DL (ref 1.6–2.6)
MCH RBC QN AUTO: 27.5 PG (ref 27–31)
MCHC RBC AUTO-ENTMCNC: 30 G/DL (ref 32–36)
MCV RBC AUTO: 92 FL (ref 82–98)
MONOCYTES # BLD AUTO: 1.1 K/UL (ref 0.3–1)
MONOCYTES NFR BLD: 11.6 % (ref 4–15)
NEUTROPHILS # BLD AUTO: 4.6 K/UL (ref 1.8–7.7)
NEUTROPHILS NFR BLD: 50.3 % (ref 38–73)
NRBC BLD-RTO: 0 /100 WBC
PHOSPHATE SERPL-MCNC: 3.3 MG/DL (ref 2.7–4.5)
PLATELET # BLD AUTO: 596 K/UL (ref 150–350)
PMV BLD AUTO: 9.3 FL (ref 9.2–12.9)
POCT GLUCOSE: 109 MG/DL (ref 70–110)
POCT GLUCOSE: 122 MG/DL (ref 70–110)
RBC # BLD AUTO: 3.56 M/UL (ref 4–5.4)
WBC # BLD AUTO: 9.07 K/UL (ref 3.9–12.7)

## 2020-02-06 PROCEDURE — 27000221 HC OXYGEN, UP TO 24 HOURS

## 2020-02-06 PROCEDURE — 84100 ASSAY OF PHOSPHORUS: CPT

## 2020-02-06 PROCEDURE — 97535 SELF CARE MNGMENT TRAINING: CPT

## 2020-02-06 PROCEDURE — 25000003 PHARM REV CODE 250: Performed by: STUDENT IN AN ORGANIZED HEALTH CARE EDUCATION/TRAINING PROGRAM

## 2020-02-06 PROCEDURE — 94761 N-INVAS EAR/PLS OXIMETRY MLT: CPT

## 2020-02-06 PROCEDURE — 63600175 PHARM REV CODE 636 W HCPCS: Performed by: PHYSICIAN ASSISTANT

## 2020-02-06 PROCEDURE — 99900035 HC TECH TIME PER 15 MIN (STAT)

## 2020-02-06 PROCEDURE — 86580 TB INTRADERMAL TEST: CPT | Performed by: THORACIC SURGERY (CARDIOTHORACIC VASCULAR SURGERY)

## 2020-02-06 PROCEDURE — 25000242 PHARM REV CODE 250 ALT 637 W/ HCPCS: Performed by: STUDENT IN AN ORGANIZED HEALTH CARE EDUCATION/TRAINING PROGRAM

## 2020-02-06 PROCEDURE — 85025 COMPLETE CBC W/AUTO DIFF WBC: CPT

## 2020-02-06 PROCEDURE — 94668 MNPJ CHEST WALL SBSQ: CPT

## 2020-02-06 PROCEDURE — 36415 COLL VENOUS BLD VENIPUNCTURE: CPT

## 2020-02-06 PROCEDURE — 94640 AIRWAY INHALATION TREATMENT: CPT

## 2020-02-06 PROCEDURE — 25000003 PHARM REV CODE 250: Performed by: PHYSICIAN ASSISTANT

## 2020-02-06 PROCEDURE — 30200315 PPD INTRADERMAL TEST REV CODE 302: Performed by: THORACIC SURGERY (CARDIOTHORACIC VASCULAR SURGERY)

## 2020-02-06 PROCEDURE — 20600001 HC STEP DOWN PRIVATE ROOM

## 2020-02-06 PROCEDURE — 92526 ORAL FUNCTION THERAPY: CPT

## 2020-02-06 PROCEDURE — 63600175 PHARM REV CODE 636 W HCPCS: Performed by: STUDENT IN AN ORGANIZED HEALTH CARE EDUCATION/TRAINING PROGRAM

## 2020-02-06 PROCEDURE — 83735 ASSAY OF MAGNESIUM: CPT

## 2020-02-06 PROCEDURE — 25000003 PHARM REV CODE 250: Performed by: ANESTHESIOLOGY

## 2020-02-06 RX ORDER — LANOLIN ALCOHOL/MO/W.PET/CERES
800 CREAM (GRAM) TOPICAL 2 TIMES DAILY
Status: DISCONTINUED | OUTPATIENT
Start: 2020-02-06 | End: 2020-02-12 | Stop reason: HOSPADM

## 2020-02-06 RX ORDER — MAGNESIUM SULFATE HEPTAHYDRATE 40 MG/ML
2 INJECTION, SOLUTION INTRAVENOUS ONCE
Status: COMPLETED | OUTPATIENT
Start: 2020-02-06 | End: 2020-02-06

## 2020-02-06 RX ORDER — SIMETHICONE 80 MG
1 TABLET,CHEWABLE ORAL 3 TIMES DAILY PRN
Status: DISCONTINUED | OUTPATIENT
Start: 2020-02-07 | End: 2020-02-07

## 2020-02-06 RX ORDER — CALCIUM CARBONATE 200(500)MG
500 TABLET,CHEWABLE ORAL DAILY PRN
Status: DISCONTINUED | OUTPATIENT
Start: 2020-02-06 | End: 2020-02-12 | Stop reason: HOSPADM

## 2020-02-06 RX ORDER — CEPHALEXIN 500 MG/1
500 CAPSULE ORAL EVERY 6 HOURS
Status: DISCONTINUED | OUTPATIENT
Start: 2020-02-06 | End: 2020-02-11

## 2020-02-06 RX ORDER — PANTOPRAZOLE SODIUM 40 MG/1
40 TABLET, DELAYED RELEASE ORAL DAILY
Status: DISCONTINUED | OUTPATIENT
Start: 2020-02-07 | End: 2020-02-12 | Stop reason: HOSPADM

## 2020-02-06 RX ADMIN — CALCIUM CARBONATE (ANTACID) CHEW TAB 500 MG 500 MG: 500 CHEW TAB at 03:02

## 2020-02-06 RX ADMIN — PREGABALIN 75 MG: 75 CAPSULE ORAL at 09:02

## 2020-02-06 RX ADMIN — CEPHALEXIN 500 MG: 500 CAPSULE ORAL at 06:02

## 2020-02-06 RX ADMIN — ACETYLCYSTEINE 4 ML: 100 SOLUTION ORAL; RESPIRATORY (INHALATION) at 03:02

## 2020-02-06 RX ADMIN — ENOXAPARIN SODIUM 40 MG: 100 INJECTION SUBCUTANEOUS at 09:02

## 2020-02-06 RX ADMIN — OXYCODONE HYDROCHLORIDE 5 MG: 5 TABLET ORAL at 07:02

## 2020-02-06 RX ADMIN — CELECOXIB 200 MG: 200 CAPSULE ORAL at 10:02

## 2020-02-06 RX ADMIN — GUAIFENESIN AND DEXTROMETHORPHAN 5 ML: 100; 10 SYRUP ORAL at 12:02

## 2020-02-06 RX ADMIN — Medication 800 MG: at 09:02

## 2020-02-06 RX ADMIN — Medication 800 MG: at 11:02

## 2020-02-06 RX ADMIN — IPRATROPIUM BROMIDE AND ALBUTEROL SULFATE 3 ML: .5; 3 SOLUTION RESPIRATORY (INHALATION) at 12:02

## 2020-02-06 RX ADMIN — CEPHALEXIN 500 MG: 500 CAPSULE ORAL at 11:02

## 2020-02-06 RX ADMIN — BENZONATATE 100 MG: 100 CAPSULE ORAL at 03:02

## 2020-02-06 RX ADMIN — OXYCODONE HYDROCHLORIDE 5 MG: 5 TABLET ORAL at 11:02

## 2020-02-06 RX ADMIN — IPRATROPIUM BROMIDE AND ALBUTEROL SULFATE 3 ML: .5; 3 SOLUTION RESPIRATORY (INHALATION) at 11:02

## 2020-02-06 RX ADMIN — IPRATROPIUM BROMIDE AND ALBUTEROL SULFATE 3 ML: .5; 3 SOLUTION RESPIRATORY (INHALATION) at 08:02

## 2020-02-06 RX ADMIN — CALCIUM CARBONATE (ANTACID) CHEW TAB 500 MG 500 MG: 500 CHEW TAB at 09:02

## 2020-02-06 RX ADMIN — Medication 5 UNITS: at 03:02

## 2020-02-06 RX ADMIN — IPRATROPIUM BROMIDE AND ALBUTEROL SULFATE 3 ML: .5; 3 SOLUTION RESPIRATORY (INHALATION) at 07:02

## 2020-02-06 RX ADMIN — IPRATROPIUM BROMIDE AND ALBUTEROL SULFATE 3 ML: .5; 3 SOLUTION RESPIRATORY (INHALATION) at 03:02

## 2020-02-06 RX ADMIN — MICONAZOLE NITRATE: 20 OINTMENT TOPICAL at 09:02

## 2020-02-06 RX ADMIN — OXYCODONE HYDROCHLORIDE 5 MG: 5 TABLET ORAL at 06:02

## 2020-02-06 RX ADMIN — MAGNESIUM SULFATE IN WATER 2 G: 40 INJECTION, SOLUTION INTRAVENOUS at 11:02

## 2020-02-06 RX ADMIN — ENOXAPARIN SODIUM 40 MG: 100 INJECTION SUBCUTANEOUS at 10:02

## 2020-02-06 RX ADMIN — DOCUSATE SODIUM - SENNOSIDES 1 TABLET: 50; 8.6 TABLET, FILM COATED ORAL at 09:02

## 2020-02-06 RX ADMIN — GUAIFENESIN AND DEXTROMETHORPHAN 5 ML: 100; 10 SYRUP ORAL at 11:02

## 2020-02-06 RX ADMIN — SIMETHICONE CHEW TAB 80 MG 80 MG: 80 TABLET ORAL at 11:02

## 2020-02-06 RX ADMIN — GUAIFENESIN AND DEXTROMETHORPHAN 5 ML: 100; 10 SYRUP ORAL at 06:02

## 2020-02-06 RX ADMIN — BENZONATATE 100 MG: 100 CAPSULE ORAL at 06:02

## 2020-02-06 RX ADMIN — OXYCODONE HYDROCHLORIDE 5 MG: 5 TABLET ORAL at 12:02

## 2020-02-06 RX ADMIN — AMLODIPINE BESYLATE 10 MG: 10 TABLET ORAL at 10:02

## 2020-02-06 RX ADMIN — ACETYLCYSTEINE 4 ML: 100 SOLUTION ORAL; RESPIRATORY (INHALATION) at 11:02

## 2020-02-06 RX ADMIN — GUAIFENESIN AND DEXTROMETHORPHAN 5 ML: 100; 10 SYRUP ORAL at 07:02

## 2020-02-06 RX ADMIN — ACETYLCYSTEINE 4 ML: 100 SOLUTION ORAL; RESPIRATORY (INHALATION) at 08:02

## 2020-02-06 RX ADMIN — BENZONATATE 100 MG: 100 CAPSULE ORAL at 09:02

## 2020-02-06 NOTE — PROGRESS NOTES
Wound care consult for thoracotomy site.     Patient seen by wound care for lip wound which is now healed.     Patient on immerse EVE mattress.   Excoriation noted under breasts and groins - appears fungal in nature. Recommend barrier antifungal cream.    The left thoracotomy site appears erythemic at the edges. Incision approximated with staples with a few areas of purplish discoloration. Small drainage noted.  1x1 full thickness ulceration noted proximal to incision site.   Discussed with Dr Wilson.   Recommend cleansing the incision daily and covering the suture line with aquacel ag.   -barrier antifungal cream BID to the perineal area and under breasts  Wound care to follow PRN  Mckenna Grubbs RN CWCN CN   [phone]

## 2020-02-06 NOTE — PLAN OF CARE
KAYLEE followed up on referrals sent for patient's SNF referral.     Referral refaxed to patient's first choice, [facility].    Referral is under review at patient's second choice, [facility].    MAXIME Garg at patient's third choice, [facility].     Rosalie Hirsch, LCSW [facility]  [id number]

## 2020-02-06 NOTE — SUBJECTIVE & OBJECTIVE
Interval History: NAEON. Tolerating diet, on 3.5L NC. Pain well controlled.    Medications:  Continuous Infusions:    Scheduled Meds:   acetylcysteine 100 mg/ml (10%)  4 mL Nebulization TID    albuterol-ipratropium  3 mL Nebulization Q4H    amLODIPine  10 mg Oral Daily    benzonatate  100 mg Oral Q8H    celecoxib  200 mg Oral Daily    dextromethorphan-guaifenesin  mg/5 ml  5 mL Oral Q6H    enoxaparin  40 mg Subcutaneous BID    miconazole   Topical (Top) BID    miconazole NITRATE 2 %   Topical (Top) BID    polyethylene glycol  17 g Per NG tube Daily    pregabalin  75 mg Oral QHS    senna-docusate 8.6-50 mg  1 tablet Per NG tube BID     PRN Meds:bisacodyL, Dextrose 10% Bolus, glucagon (human recombinant), hydrALAZINE, insulin aspart U-100, labetalol, melatonin, metoclopramide HCl, ondansetron, ondansetron, oxyCODONE, potassium chloride in water **AND** [DISCONTINUED] potassium chloride in water **AND** [DISCONTINUED] potassium chloride in water, promethazine (PHENERGAN) IVPB, sodium chloride 0.9%     Review of patient's allergies indicates:   Allergen Reactions    Erythromycin      Stomach pains    Rosuvastatin      Hives, muscle/joint pains    Zolpidem      Confusion      Objective:     Vital Signs (Most Recent):  Temp: 98.7 °F (37.1 °C) (02/06/20 0500)  Pulse: 96 (02/06/20 0500)  Resp: 15 (02/06/20 0327)  BP: 139/61 (02/06/20 0500)  SpO2: 96 % (02/06/20 0500) Vital Signs (24h Range):  Temp:  [98.1 °F (36.7 °C)-98.7 °F (37.1 °C)] 98.7 °F (37.1 °C)  Pulse:  [] 96  Resp:  [13-20] 15  SpO2:  [94 %-97 %] 96 %  BP: (133-168)/(61-70) 139/61     Intake/Output - Last 3 Shifts       02/04 0700 - 02/05 0659 02/05 0700 - 02/06 0659    P.O. 170 240    I.V. (mL/kg) 6 (0.1)     Total Intake(mL/kg) 176 (1.5) 240 (2)    Urine (mL/kg/hr) 300 (0.1) 1500 (0.5)    Stool 0 0    Chest Tube 0     Total Output 300 1500    Net -124 -1260          Urine Occurrence 2 x 2 x    Stool Occurrence 2 x 0 x          SpO2:  96 %  O2 Device (Oxygen Therapy): nasal cannula    Physical Exam   Constitutional: She appears well-developed and well-nourished. No distress.   Cardiovascular: Normal rate and regular rhythm.   Pulmonary/Chest: Effort normal. No respiratory distress.   Thoracotomy incision in crease of body fat. Incision skin edges closed but there is some blanching erythema surrounding the incision, not worse from yesterday. On 3.5LNC.   Abdominal: Soft. She exhibits no distension. There is no tenderness.   Incisions covered with steristrips   Skin: Skin is warm and dry.     Significant Labs:  BMP:   Recent Labs   Lab 02/06/20  0427   MG 1.6     CBC:   Recent Labs   Lab 02/06/20 0427   WBC 9.07   RBC 3.56*   HGB 9.8*   HCT 32.7*   *   MCV 92   MCH 27.5   MCHC 30.0*     CMP: No results for input(s): GLU, CALCIUM, ALBUMIN, PROT, NA, K, CO2, CL, BUN, CREATININE, ALKPHOS, ALT, AST, BILITOT in the last 48 hours.    Significant Diagnostics:  CXR: I have reviewed all pertinent results/findings within the past 24 hours    VTE Risk Mitigation (From admission, onward)         Ordered     enoxaparin injection 40 mg  2 times daily      01/29/20 2000     Place sequential compression device  Until discontinued      01/23/20 1002     IP VTE LOW RISK PATIENT  Once      01/19/20 2022              SAFIA

## 2020-02-06 NOTE — PT/OT/SLP PROGRESS
"  Speech Language Pathology Treatment    Patient Name:  Marisela Bunn   MRN:  [id number]  Admitting Diagnosis: Diaphragmatic hernia    Recommendations:                 General Recommendations:  Dysphagia therapy  Diet recommendations:  Regular, Liquid Diet Level: Thin   Aspiration Precautions: Assistance with thickening liquids, HOB to 90 degrees, Small bites/sips and Strict aspiration precautions   General Precautions: Standard, aspiration, fall, contact  Communication strategies:  none    Subjective     Patient awake;alert in bed upon SLP entry to room. Thin liquids noted on bedside tray.  "I do not like nectar thickened liquids"    Pain/Comfort:  Pain Rating 1: 0/10    Objective:     Has the patient been evaluated by SLP for swallowing?   Yes  Keep patient NPO? No   Current Respiratory Status: nasal cannula      Patient seen for ongoing swallow assessment. Patient continues with hoarse vocal quality, though improved per this SLP from initial assessment.  Patient stated "I was hoarse before all of this when I was sick". SLP provided education regarding intubations and influence of vocal quality.  Patient tolerated thin liquids via cup and straw sips over 4 oz, patient with intermittent throat clears throughout trials. Regular solids tolerated well with no overt signs of airway compromise. Patient appropriate for advancement to regular solids at this time, recommend advancement to thin liquids given patient will likely consume thin liquids outside of SLP recommendations. Will monitor for signs of airway compromsie/chest x rays. Skilled education was provided to patient  re: diet recs, standard aspiration precautions of which to follow, and ongoing ST plan of care. Patient verbalized understanding.     Assessment:     Marisela Bunn is a 69 y.o. female with an SLP diagnosis of Dysphagia and Dysphonia.  She presents with progress towards goals.    Goals:   Multidisciplinary Problems     SLP Goals        Problem: SLP " Goal    Goal Priority Disciplines Outcome   SLP Goal     SLP Ongoing, Progressing   Description:  Speech Language Pathology Goals  Goals expected to be met by 2/10:  1. Pt will tolerate mechanical soft diet and nectar thick liquids without s/s of aspiration.  2. Pt will participate in ongoing swallow assessment to ensure least restrictive diet recommendations.                        Plan:     · Patient to be seen:  4 x/week   · Plan of Care expires:  03/03/20  · Plan of Care reviewed with:  patient   · SLP Follow-Up:  Yes       Discharge recommendations:  nursing facility, skilled       Time Tracking:     SLP Treatment Date:   02/06/20  Speech Start Time:  0940  Speech Stop Time:  0956     Speech Total Time (min):  16 min    Billable Minutes: Treatment Swallowing Dysfunction 8 and Seld Care/Home Management Training 8    Emily Abadie, CCC-SLP  02/06/2020

## 2020-02-06 NOTE — ASSESSMENT & PLAN NOTE
69 year old female admitted to SICU with recurrent left diaphragmatic hernia s/p laparoscopic repair 1 week ago. Now s/p L thoracotomy, patch reconstruction L hemidiaphragm, chest wall reconstruction with [place]-Rajendra patch 1/29/2020.     - diet as able  - wean O2 as able  - out of bed to cardiac chair  - PT.OT  - work on dispo

## 2020-02-06 NOTE — PLAN OF CARE
Plan of care reviewed with patient who verbalized understanding. AAOx4. VSS on 3.5 L of O2. Mechanical soft diet. Q6 accuchecks performed. No complaints of nausea or pain. Call light within reach. Bed in the lowest position. Patient mostly slept in between care. No acute events this shift. WCTM.

## 2020-02-06 NOTE — PLAN OF CARE
SW completed LOCET and faxed PASRR to state. Waiting on 142.     Rosalie Hirsch, LCSW [facility]  [id number]

## 2020-02-06 NOTE — PLAN OF CARE
Problem: SLP Goal  Goal: SLP Goal  Description  Speech Language Pathology Goals  Goals expected to be met by 2/10:  1. Pt will tolerate mechanical soft diet and nectar thick liquids without s/s of aspiration.  2. Pt will participate in ongoing swallow assessment to ensure least restrictive diet recommendations.       Outcome: Ongoing, Progressing    Goals remain appropriate.  Emily P. Abadie M.S., CCC-SLP  Speech Language Pathologist  [phone]  02/06/2020

## 2020-02-06 NOTE — PROGRESS NOTES
[facility]  Cardiothoracic Surgery  Progress Note    Patient Name: Marisela Bunn  MRN: [id number]  Admission Date: 1/19/2020  Hospital Length of Stay: 18 days  Code Status: Full Code   Attending Physician: Primo Soni MD   Referring Provider: Kelly, Provider  Principal Problem:Diaphragmatic hernia      Subjective:     Post-Op Info:  Procedure(s) (LRB):  THORACOTOMY (Left)  RECONSTRUCTION, DIAPHRAGM (Left)  RECONSTRUCTION, CHEST WALL (Left)   8 Days Post-Op     Interval History: NAEON. Tolerating diet, on 3.5L NC. Pain well controlled.    Medications:  Continuous Infusions:    Scheduled Meds:   acetylcysteine 100 mg/ml (10%)  4 mL Nebulization TID    albuterol-ipratropium  3 mL Nebulization Q4H    amLODIPine  10 mg Oral Daily    benzonatate  100 mg Oral Q8H    celecoxib  200 mg Oral Daily    dextromethorphan-guaifenesin  mg/5 ml  5 mL Oral Q6H    enoxaparin  40 mg Subcutaneous BID    miconazole   Topical (Top) BID    miconazole NITRATE 2 %   Topical (Top) BID    polyethylene glycol  17 g Per NG tube Daily    pregabalin  75 mg Oral QHS    senna-docusate 8.6-50 mg  1 tablet Per NG tube BID     PRN Meds:bisacodyL, Dextrose 10% Bolus, glucagon (human recombinant), hydrALAZINE, insulin aspart U-100, labetalol, melatonin, metoclopramide HCl, ondansetron, ondansetron, oxyCODONE, potassium chloride in water **AND** [DISCONTINUED] potassium chloride in water **AND** [DISCONTINUED] potassium chloride in water, promethazine (PHENERGAN) IVPB, sodium chloride 0.9%     Review of patient's allergies indicates:   Allergen Reactions    Erythromycin      Stomach pains    Rosuvastatin      Hives, muscle/joint pains    Zolpidem      Confusion      Objective:     Vital Signs (Most Recent):  Temp: 98.7 °F (37.1 °C) (02/06/20 0500)  Pulse: 96 (02/06/20 0500)  Resp: 15 (02/06/20 0327)  BP: 139/61 (02/06/20 0500)  SpO2: 96 % (02/06/20 0500) Vital Signs (24h Range):  Temp:  [98.1 °F (36.7  °C)-98.7 °F (37.1 °C)] 98.7 °F (37.1 °C)  Pulse:  [] 96  Resp:  [13-20] 15  SpO2:  [94 %-97 %] 96 %  BP: (133-168)/(61-70) 139/61     Intake/Output - Last 3 Shifts       02/04 0700 - 02/05 0659 02/05 0700 - 02/06 0659    P.O. 170 240    I.V. (mL/kg) 6 (0.1)     Total Intake(mL/kg) 176 (1.5) 240 (2)    Urine (mL/kg/hr) 300 (0.1) 1500 (0.5)    Stool 0 0    Chest Tube 0     Total Output 300 1500    Net -124 -1260          Urine Occurrence 2 x 2 x    Stool Occurrence 2 x 0 x          SpO2: 96 %  O2 Device (Oxygen Therapy): nasal cannula    Physical Exam   Constitutional: She appears well-developed and well-nourished. No distress.   Cardiovascular: Normal rate and regular rhythm.   Pulmonary/Chest: Effort normal. No respiratory distress.   Thoracotomy incision in crease of body fat. Incision skin edges closed but there is some blanching erythema surrounding the incision, not worse from yesterday. On 3.5LNC.   Abdominal: Soft. She exhibits no distension. There is no tenderness.   Incisions covered with steristrips   Skin: Skin is warm and dry.     Significant Labs:  BMP:   Recent Labs   Lab 02/06/20  0427   MG 1.6     CBC:   Recent Labs   Lab 02/06/20  0427   WBC 9.07   RBC 3.56*   HGB 9.8*   HCT 32.7*   *   MCV 92   MCH 27.5   MCHC 30.0*     CMP: No results for input(s): GLU, CALCIUM, ALBUMIN, PROT, NA, K, CO2, CL, BUN, CREATININE, ALKPHOS, ALT, AST, BILITOT in the last 48 hours.    Significant Diagnostics:  CXR: I have reviewed all pertinent results/findings within the past 24 hours    VTE Risk Mitigation (From admission, onward)         Ordered     enoxaparin injection 40 mg  2 times daily      01/29/20 2000     Place sequential compression device  Until discontinued      01/23/20 1002     IP VTE LOW RISK PATIENT  Once      01/19/20 2022              ROS        Assessment/Plan:     * Diaphragmatic hernia  69 year old female admitted to SICU with recurrent left diaphragmatic hernia s/p laparoscopic repair 1  week ago. Now s/p L thoracotomy, patch reconstruction L hemidiaphragm, chest wall reconstruction with [place]-Rajendra patch 1/29/2020.     - diet as able  - wean O2 as able  - out of bed to cardiac chair  - PT.OT  - work on dispo        Radha Wilson MD  Cardiothoracic Surgery  [facility]

## 2020-02-07 LAB
BASOPHILS # BLD AUTO: 0.09 K/UL (ref 0–0.2)
BASOPHILS NFR BLD: 1 % (ref 0–1.9)
DIFFERENTIAL METHOD: ABNORMAL
EOSINOPHIL # BLD AUTO: 0.7 K/UL (ref 0–0.5)
EOSINOPHIL NFR BLD: 7.3 % (ref 0–8)
ERYTHROCYTE [DISTWIDTH] IN BLOOD BY AUTOMATED COUNT: 15 % (ref 11.5–14.5)
HCT VFR BLD AUTO: 33.4 % (ref 37–48.5)
HGB BLD-MCNC: 10.6 G/DL (ref 12–16)
IMM GRANULOCYTES # BLD AUTO: 0.32 K/UL (ref 0–0.04)
IMM GRANULOCYTES NFR BLD AUTO: 3.6 % (ref 0–0.5)
LYMPHOCYTES # BLD AUTO: 1.7 K/UL (ref 1–4.8)
LYMPHOCYTES NFR BLD: 18.9 % (ref 18–48)
MAGNESIUM SERPL-MCNC: 1.8 MG/DL (ref 1.6–2.6)
MCH RBC QN AUTO: 28.2 PG (ref 27–31)
MCHC RBC AUTO-ENTMCNC: 31.7 G/DL (ref 32–36)
MCV RBC AUTO: 89 FL (ref 82–98)
MONOCYTES # BLD AUTO: 1 K/UL (ref 0.3–1)
MONOCYTES NFR BLD: 11.2 % (ref 4–15)
NEUTROPHILS # BLD AUTO: 5.2 K/UL (ref 1.8–7.7)
NEUTROPHILS NFR BLD: 58 % (ref 38–73)
NRBC BLD-RTO: 0 /100 WBC
PHOSPHATE SERPL-MCNC: 3.5 MG/DL (ref 2.7–4.5)
PLATELET # BLD AUTO: 577 K/UL (ref 150–350)
PLATELET BLD QL SMEAR: ABNORMAL
PMV BLD AUTO: 9.3 FL (ref 9.2–12.9)
POCT GLUCOSE: 116 MG/DL (ref 70–110)
POCT GLUCOSE: 123 MG/DL (ref 70–110)
POCT GLUCOSE: 140 MG/DL (ref 70–110)
POCT GLUCOSE: 141 MG/DL (ref 70–110)
POCT GLUCOSE: 145 MG/DL (ref 70–110)
POCT GLUCOSE: 309 MG/DL (ref 70–110)
RBC # BLD AUTO: 3.76 M/UL (ref 4–5.4)
WBC # BLD AUTO: 9.01 K/UL (ref 3.9–12.7)

## 2020-02-07 PROCEDURE — 83735 ASSAY OF MAGNESIUM: CPT

## 2020-02-07 PROCEDURE — 25000242 PHARM REV CODE 250 ALT 637 W/ HCPCS: Performed by: STUDENT IN AN ORGANIZED HEALTH CARE EDUCATION/TRAINING PROGRAM

## 2020-02-07 PROCEDURE — 85025 COMPLETE CBC W/AUTO DIFF WBC: CPT

## 2020-02-07 PROCEDURE — 97535 SELF CARE MNGMENT TRAINING: CPT

## 2020-02-07 PROCEDURE — 27000221 HC OXYGEN, UP TO 24 HOURS

## 2020-02-07 PROCEDURE — 97116 GAIT TRAINING THERAPY: CPT | Mod: CQ

## 2020-02-07 PROCEDURE — 84100 ASSAY OF PHOSPHORUS: CPT

## 2020-02-07 PROCEDURE — 25000003 PHARM REV CODE 250: Performed by: STUDENT IN AN ORGANIZED HEALTH CARE EDUCATION/TRAINING PROGRAM

## 2020-02-07 PROCEDURE — 94664 DEMO&/EVAL PT USE INHALER: CPT

## 2020-02-07 PROCEDURE — 25000003 PHARM REV CODE 250: Performed by: ANESTHESIOLOGY

## 2020-02-07 PROCEDURE — 94640 AIRWAY INHALATION TREATMENT: CPT

## 2020-02-07 PROCEDURE — 94761 N-INVAS EAR/PLS OXIMETRY MLT: CPT

## 2020-02-07 PROCEDURE — 99900035 HC TECH TIME PER 15 MIN (STAT)

## 2020-02-07 PROCEDURE — 36415 COLL VENOUS BLD VENIPUNCTURE: CPT

## 2020-02-07 PROCEDURE — 92526 ORAL FUNCTION THERAPY: CPT

## 2020-02-07 PROCEDURE — 97530 THERAPEUTIC ACTIVITIES: CPT | Mod: CQ

## 2020-02-07 PROCEDURE — 63600175 PHARM REV CODE 636 W HCPCS: Performed by: STUDENT IN AN ORGANIZED HEALTH CARE EDUCATION/TRAINING PROGRAM

## 2020-02-07 PROCEDURE — 20600001 HC STEP DOWN PRIVATE ROOM

## 2020-02-07 PROCEDURE — 25000003 PHARM REV CODE 250: Performed by: PHYSICIAN ASSISTANT

## 2020-02-07 PROCEDURE — 97803 MED NUTRITION INDIV SUBSEQ: CPT

## 2020-02-07 PROCEDURE — 97530 THERAPEUTIC ACTIVITIES: CPT

## 2020-02-07 RX ORDER — SIMETHICONE 80 MG
1 TABLET,CHEWABLE ORAL
Status: DISCONTINUED | OUTPATIENT
Start: 2020-02-07 | End: 2020-02-12 | Stop reason: HOSPADM

## 2020-02-07 RX ADMIN — MICONAZOLE NITRATE: 20 OINTMENT TOPICAL at 10:02

## 2020-02-07 RX ADMIN — GUAIFENESIN AND DEXTROMETHORPHAN 5 ML: 100; 10 SYRUP ORAL at 05:02

## 2020-02-07 RX ADMIN — Medication 800 MG: at 08:02

## 2020-02-07 RX ADMIN — GUAIFENESIN AND DEXTROMETHORPHAN 5 ML: 100; 10 SYRUP ORAL at 11:02

## 2020-02-07 RX ADMIN — IPRATROPIUM BROMIDE AND ALBUTEROL SULFATE 3 ML: .5; 3 SOLUTION RESPIRATORY (INHALATION) at 03:02

## 2020-02-07 RX ADMIN — SIMETHICONE CHEW TAB 80 MG 80 MG: 80 TABLET ORAL at 10:02

## 2020-02-07 RX ADMIN — OXYCODONE HYDROCHLORIDE 5 MG: 5 TABLET ORAL at 07:02

## 2020-02-07 RX ADMIN — OXYCODONE HYDROCHLORIDE 5 MG: 5 TABLET ORAL at 03:02

## 2020-02-07 RX ADMIN — CEPHALEXIN 500 MG: 500 CAPSULE ORAL at 10:02

## 2020-02-07 RX ADMIN — BENZONATATE 100 MG: 100 CAPSULE ORAL at 10:02

## 2020-02-07 RX ADMIN — ENOXAPARIN SODIUM 40 MG: 100 INJECTION SUBCUTANEOUS at 08:02

## 2020-02-07 RX ADMIN — CEPHALEXIN 500 MG: 500 CAPSULE ORAL at 05:02

## 2020-02-07 RX ADMIN — IPRATROPIUM BROMIDE AND ALBUTEROL SULFATE 3 ML: .5; 3 SOLUTION RESPIRATORY (INHALATION) at 12:02

## 2020-02-07 RX ADMIN — ACETYLCYSTEINE 4 ML: 100 SOLUTION ORAL; RESPIRATORY (INHALATION) at 07:02

## 2020-02-07 RX ADMIN — IPRATROPIUM BROMIDE AND ALBUTEROL SULFATE 3 ML: .5; 3 SOLUTION RESPIRATORY (INHALATION) at 08:02

## 2020-02-07 RX ADMIN — MICONAZOLE NITRATE: 20 OINTMENT TOPICAL at 08:02

## 2020-02-07 RX ADMIN — BENZONATATE 100 MG: 100 CAPSULE ORAL at 02:02

## 2020-02-07 RX ADMIN — AMLODIPINE BESYLATE 10 MG: 10 TABLET ORAL at 08:02

## 2020-02-07 RX ADMIN — IPRATROPIUM BROMIDE AND ALBUTEROL SULFATE 3 ML: .5; 3 SOLUTION RESPIRATORY (INHALATION) at 07:02

## 2020-02-07 RX ADMIN — SIMETHICONE CHEW TAB 80 MG 80 MG: 80 TABLET ORAL at 07:02

## 2020-02-07 RX ADMIN — PANTOPRAZOLE SODIUM 40 MG: 40 TABLET, DELAYED RELEASE ORAL at 08:02

## 2020-02-07 RX ADMIN — BENZONATATE 100 MG: 100 CAPSULE ORAL at 05:02

## 2020-02-07 RX ADMIN — CELECOXIB 200 MG: 200 CAPSULE ORAL at 08:02

## 2020-02-07 RX ADMIN — SIMETHICONE CHEW TAB 80 MG 80 MG: 80 TABLET ORAL at 02:02

## 2020-02-07 RX ADMIN — PREGABALIN 75 MG: 75 CAPSULE ORAL at 08:02

## 2020-02-07 RX ADMIN — GUAIFENESIN AND DEXTROMETHORPHAN 5 ML: 100; 10 SYRUP ORAL at 12:02

## 2020-02-07 NOTE — PLAN OF CARE
Problem: Oral Intake Inadequate  Goal: Improved Oral Intake  Outcome: Ongoing, Progressing     Recommendations    1. Continue current diet as tolerated with Boost Plus.   2. Encourage po and ONS intake.   3. RD following.     Goals: Patient to meet > 85% EEN and EPN by RD follow-up  Nutrition Goal Status: progressing towards goal

## 2020-02-07 NOTE — HOSPITAL COURSE
1/19/20- Transferred from outside facility for evaluation of newly diagnosed diaphragm and costal hernia. Initally stable but rapidly declined from a respiratory standpoint.   1/21/20- Laparoscopic reduction and repair of diaphragmatic hernia. Defect itself measured approximately 10 x 15 cm requiring a [place]-Rajendra patch to close the defect. Remained intubated, sedated and paralyzed after the procedure.   1/24/20- Extubated and drain removed. Cleared for dysphagia soft diet by SLP.   1/27/20- Over the weekend her O2 requirements again increased and had worsening leukocytosis. Chest CT showed recurrent diaphragmatic hernia. Thoracic surgery consulted.   1/28/20- Preop aggressive bowel regimen initiated.   1/29/20- Left lateral thoracotomy, mobilization and reduction of herniated intrathoracicand abdominal viscera, patch reconstruction of left hemidiaphragm, chest wall reconstruction with [place]-Rajendra patch, DREW drain insertion, left chest tube insertion. Erector spinae catheter placed for post- thoracotomy pain control.   1/30/20- Kept intubated, sedated and paralyzed. Diuresis. Weaned vent but kept PEEP somewhat high for lung recruitment.  2/1/20- Paralytic turned off. Marginal UOP. HDS. Afebrile. Drains to suction.  2/3/20- Extubated. Weak/hoarse voice. SLP re-consulted. OOB. Continued pain control with erector spinae block.   2/4/20- Cleared for dysphagia soft with aspiration precautions. NC O2 weaned to 4L. Return of bowel function. Chest tube and DREW drain removed.   2/5/20- Stepped down. Breathing comfortably on 3LNC.   2/6/20- Thoracic surgery took over as primary service. Wound care consulted for erythematous region below left thoracotomy and candidal prophylaxis.   2/7/20- Started of prophylactic antibiotics and topical antifungals . No evidence of incision or wound infection. PT/OT working with patient and recommending SNF.  2/10/20- Stable on 2LNC with saturations nearly 1.00%. Tolerating diet. Normal BM. Ambulating  with PT.   2/11/20- Changed antibiotics to Bactrim for prophylaxis and topical antifungals. No evidence of incision or wound infection Erythema greatly improved. Pain well controlled. On 2LNC. Stable for discharge to SNF in am.

## 2020-02-07 NOTE — PROGRESS NOTES
[facility] surgical intern on call, Dr. Donald Novak, about patient complaining about gas pain. MD put in order for patient to have Simethicone. Pt is stable. WCTM.

## 2020-02-07 NOTE — PLAN OF CARE
POC reviewed with pt, pt verbalized understanding. AAOx4. VSS on 2L NC. Encouraging IS use. Moves almost independently in bed. In low air loss bed. OOB into chair and uses bedside commode at times, otherwise has to be cleaned in bed. Dressing changed and assessed by WC RN today. Minimal po intake <25% meals. Pt trying to drink 1 boost a day. Accuchecks Q6H. Denies pain. Family at bedside during shift. Using call light appropriately. Sinus rhythm - sinus tach  bmp. Pt waiting for SNF placement. TB skin test to be read tomorrow - Saturday.

## 2020-02-07 NOTE — PT/OT/SLP PROGRESS
Speech Language Pathology Treatment  And Discharge Summary    Patient Name:  Marisela Bunn   MRN:  [id number]  Admitting Diagnosis: Diaphragmatic hernia    Recommendations:                 General Recommendations:  Dysphagia therapy  Diet recommendations:  Mechanical Soft, Liquid Diet Level: Thin   Aspiration Precautions: Assistance with thickening liquids, HOB to 90 degrees, Small bites/sips and Strict aspiration precautions   General Precautions: Standard, aspiration, fall, contact  Communication strategies:  none    Subjective     Patient awake;alert in bed upon SLP entry to room.     Objective:     Has the patient been evaluated by SLP for swallowing?   Yes  Keep patient NPO? No   Current Respiratory Status: nasal cannula      Patient seen for ongoing swallow assessment. Patient continues with hoarse vocal quality, though improved per this SLP from initial assessment.   Patient tolerated thin liquids via cup and straw sips over 4 oz along with Regular solids well with no overt signs of airway compromise.  Patient appropriate for advancement to thin liquids with continued mechanical soft textures (per patient request) at this time.  Skilled education was provided to patient  re: diet recs, standard aspiration precautions of which to follow, and ongoing ST plan of care. Patient verbalized understanding.     Assessment:     Marisela Bunn is a 69 y.o. female with an SLP diagnosis of resolved Dysphagia; Dysphonia.     Goals:   Multidisciplinary Problems     SLP Goals        Problem: SLP Goal    Goal Priority Disciplines Outcome   SLP Goal     SLP Ongoing, Progressing   Description:  Speech Language Pathology Goals  Goals expected to be met by 2/10:  1. Pt will tolerate mechanical soft diet and nectar thick liquids without s/s of aspiration.  2. Pt will participate in ongoing swallow assessment to ensure least restrictive diet recommendations.                        Plan:     · Patient to be seen:  4 x/week   · Plan  of Care expires:  03/03/20  · Plan of Care reviewed with:  patient   · SLP Follow-Up:  No       Discharge recommendations:  nursing facility, skilled       Time Tracking:     SLP Treatment Date:   02/07/20  Speech Start Time:  0940  Speech Stop Time:  0956     Speech Total Time (min):  16 min    Billable Minutes: Treatment Swallowing Dysfunction 8 and Seld Care/Home Management Training 8    Emily Abadie, CCC-SLP  02/07/2020

## 2020-02-07 NOTE — SUBJECTIVE & OBJECTIVE
Interval History:   NAEON.   2L O2  No N/V    Medications:  Continuous Infusions:    Scheduled Meds:   acetylcysteine 100 mg/ml (10%)  4 mL Nebulization TID    albuterol-ipratropium  3 mL Nebulization Q4H    amLODIPine  10 mg Oral Daily    benzonatate  100 mg Oral Q8H    celecoxib  200 mg Oral Daily    cephALEXin  500 mg Oral Q6H    dextromethorphan-guaifenesin  mg/5 ml  5 mL Oral Q6H    enoxaparin  40 mg Subcutaneous BID    magnesium oxide  800 mg Oral BID    miconazole nitrate 2%   Topical (Top) BID    pantoprazole  40 mg Oral Daily    polyethylene glycol  17 g Per NG tube Daily    pregabalin  75 mg Oral QHS    senna-docusate 8.6-50 mg  1 tablet Per NG tube BID    simethicone  1 tablet Oral TID PC     PRN Meds:bisacodyL, calcium carbonate, Dextrose 10% Bolus, glucagon (human recombinant), hydrALAZINE, insulin aspart U-100, labetalol, melatonin, metoclopramide HCl, ondansetron, ondansetron, oxyCODONE, potassium chloride in water **AND** [DISCONTINUED] potassium chloride in water **AND** [DISCONTINUED] potassium chloride in water, promethazine (PHENERGAN) IVPB, sodium chloride 0.9%     Review of patient's allergies indicates:   Allergen Reactions    Erythromycin      Stomach pains    Rosuvastatin      Hives, muscle/joint pains    Zolpidem      Confusion      Objective:     Vital Signs (Most Recent):  Temp: 98.4 °F (36.9 °C) (02/07/20 0424)  Pulse: 87 (02/07/20 0704)  Resp: 16 (02/07/20 0424)  BP: (!) 142/63 (02/07/20 0424)  SpO2: 96 % (02/07/20 0424) Vital Signs (24h Range):  Temp:  [97.6 °F (36.4 °C)-99.1 °F (37.3 °C)] 98.4 °F (36.9 °C)  Pulse:  [] 87  Resp:  [14-24] 16  SpO2:  [93 %-98 %] 96 %  BP: (142-177)/(63-76) 142/63     Weight: 118 kg (260 lb 2.3 oz)  Body mass index is 49.15 kg/m².    Intake/Output - Last 3 Shifts       02/05 0700 - 02/06 0659 02/06 0700 - 02/07 0659 02/07 0700 - 02/08 0659    P.O. 240 957     I.V. (mL/kg)  100 (0.8)     Total Intake(mL/kg) 240 (2) 1057 (9)      Urine (mL/kg/hr) 2500 (0.9) 1300 (0.5)     Stool 0      Chest Tube       Total Output 2500 1300     Net -2260 -243            Urine Occurrence 2 x 4 x     Stool Occurrence 1 x 2 x           Physical Exam   Constitutional: She is oriented to person, place, and time. She appears well-developed and well-nourished. No distress.   HENT:   Head: Normocephalic and atraumatic.   Eyes: Conjunctivae and EOM are normal. Right eye exhibits no discharge. Left eye exhibits no discharge.   Cardiovascular: Normal rate and regular rhythm.   Pulmonary/Chest: Effort normal and breath sounds normal. No respiratory distress.   Abdominal: Soft. She exhibits no distension. There is no tenderness.   Lap incisions are c/d/i   Musculoskeletal: Normal range of motion. She exhibits no deformity.   Neurological: She is alert and oriented to person, place, and time.   Skin: Skin is warm and dry.         Significant Labs:  CBC:   Recent Labs   Lab 02/06/20  0427   WBC 9.07   RBC 3.56*   HGB 9.8*   HCT 32.7*   *   MCV 92   MCH 27.5   MCHC 30.0*     CMP:   Recent Labs   Lab 02/03/20  0200   GLU 80   CALCIUM 8.5*   ALBUMIN 1.7*   PROT 5.7*      K 3.6   CO2 28   CL 98   BUN 24*   CREATININE 0.8   ALKPHOS 92   ALT 20   AST 45*   BILITOT 0.3       Significant Diagnostics:  I have reviewed all pertinent imaging results/findings within the past 24 hours.

## 2020-02-07 NOTE — PLAN OF CARE
Goals remain appropriate.     Problem: Physical Therapy Goal  Goal: Physical Therapy Goal  Description  Goals to be met by:20  Pt re-evaluated and goals updated    Patient will increase functional independence with mobility by performin. Supine to sit with Contact Guard Assistance.  2. Sit to stand transfer with Contact Guard Assistance.  3. Bed to chair with Contact Guard Assistance using Rolling Walker or LRAD.  4. Gait  x 100 feet with Contact Guard Assistance using Rolling Walker or LRAD.  5. Lower extremity exercise program x15 reps  with supervision.      Outcome: Ongoing, Progressing

## 2020-02-07 NOTE — PROGRESS NOTES
"[facility]  General Surgery  Progress Note    Subjective:     History of Present Illness:  Marisela Bunn is a 69 y.o. female with PMH CAD, HTN, TIA, GERD presents to the hospital as a direct admission for evaluation of a newly diagnosed diaphragmatic hernia. She initially presented to [place] ED on 1/18/20 for a 1-month history of a cough, intermittent fevers and dyspnea.  She had been seen by 2 urgent care physicians in the last month for the cough, with 2 different antibiotic courses given without improvement in cough. She reports that on 1/15 after an particularly severe coughing episode she felt a "pop" on left side, with associated severe pain and  Subsequent bruising of the left flank. She reports constant severe pain since that time. She is on daily ASA 81mg,     On ED presentation, patient was hemodynamically stable, H/H 12.4/40.0, breathing on RA. Labs revealed leukocytosis (19.2), lactic acid 2.5 (repeat lactic acid1.6), BMP wnl. CXR revealed left lower lobe pneumonia with possible associated pleural effusion. CT abdomen/pelvis revealed a large left diaphragmatic hernia containing fat  and bowel loops with hernia of intra-abdomnial contents outside the thorax from 7th left ICS. Medium sized HH. CTA revealed large Bochdalek hernia through defect in left hemo-diaphragm, with associated widening of 7th intercostal space. She was started on IV hydration and antibiotics (levofloxacin, zosyn). She reports she has not passed a bowel movement of flatus in 2 days. Intermittent lower quadrant "spasms" while coughing, otherwise no abdominal pain. She reports no other symptoms.    Post-Op Info:  Procedure(s) (LRB):  THORACOTOMY (Left)  RECONSTRUCTION, DIAPHRAGM (Left)  RECONSTRUCTION, CHEST WALL (Left)   9 Days Post-Op     Interval History:   NAEON.   2L O2  No N/V    Medications:  Continuous Infusions:    Scheduled Meds:   acetylcysteine 100 mg/ml (10%)  4 mL Nebulization TID    " albuterol-ipratropium  3 mL Nebulization Q4H    amLODIPine  10 mg Oral Daily    benzonatate  100 mg Oral Q8H    celecoxib  200 mg Oral Daily    cephALEXin  500 mg Oral Q6H    dextromethorphan-guaifenesin  mg/5 ml  5 mL Oral Q6H    enoxaparin  40 mg Subcutaneous BID    magnesium oxide  800 mg Oral BID    miconazole nitrate 2%   Topical (Top) BID    pantoprazole  40 mg Oral Daily    polyethylene glycol  17 g Per NG tube Daily    pregabalin  75 mg Oral QHS    senna-docusate 8.6-50 mg  1 tablet Per NG tube BID    simethicone  1 tablet Oral TID PC     PRN Meds:bisacodyL, calcium carbonate, Dextrose 10% Bolus, glucagon (human recombinant), hydrALAZINE, insulin aspart U-100, labetalol, melatonin, metoclopramide HCl, ondansetron, ondansetron, oxyCODONE, potassium chloride in water **AND** [DISCONTINUED] potassium chloride in water **AND** [DISCONTINUED] potassium chloride in water, promethazine (PHENERGAN) IVPB, sodium chloride 0.9%     Review of patient's allergies indicates:   Allergen Reactions    Erythromycin      Stomach pains    Rosuvastatin      Hives, muscle/joint pains    Zolpidem      Confusion      Objective:     Vital Signs (Most Recent):  Temp: 98.4 °F (36.9 °C) (02/07/20 0424)  Pulse: 87 (02/07/20 0704)  Resp: 16 (02/07/20 0424)  BP: (!) 142/63 (02/07/20 0424)  SpO2: 96 % (02/07/20 0424) Vital Signs (24h Range):  Temp:  [97.6 °F (36.4 °C)-99.1 °F (37.3 °C)] 98.4 °F (36.9 °C)  Pulse:  [] 87  Resp:  [14-24] 16  SpO2:  [93 %-98 %] 96 %  BP: (142-177)/(63-76) 142/63     Weight: 118 kg (260 lb 2.3 oz)  Body mass index is 49.15 kg/m².    Intake/Output - Last 3 Shifts       02/05 0700 - 02/06 0659 02/06 0700 - 02/07 0659 02/07 0700 - 02/08 0659    P.O. 240 957     I.V. (mL/kg)  100 (0.8)     Total Intake(mL/kg) 240 (2) 1057 (9)     Urine (mL/kg/hr) 2500 (0.9) 1300 (0.5)     Stool 0      Chest Tube       Total Output 2500 1300     Net -2260 -243            Urine Occurrence 2 x 4 x     Stool  Occurrence 1 x 2 x           Physical Exam   Constitutional: She is oriented to person, place, and time. She appears well-developed and well-nourished. No distress.   HENT:   Head: Normocephalic and atraumatic.   Eyes: Conjunctivae and EOM are normal. Right eye exhibits no discharge. Left eye exhibits no discharge.   Cardiovascular: Normal rate and regular rhythm.   Pulmonary/Chest: Effort normal and breath sounds normal. No respiratory distress.   Abdominal: Soft. She exhibits no distension. There is no tenderness.   Lap incisions are c/d/i   Musculoskeletal: Normal range of motion. She exhibits no deformity.   Neurological: She is alert and oriented to person, place, and time.   Skin: Skin is warm and dry.         Significant Labs:  CBC:   Recent Labs   Lab 02/06/20  0427   WBC 9.07   RBC 3.56*   HGB 9.8*   HCT 32.7*   *   MCV 92   MCH 27.5   MCHC 30.0*     CMP:   Recent Labs   Lab 02/03/20  0200   GLU 80   CALCIUM 8.5*   ALBUMIN 1.7*   PROT 5.7*      K 3.6   CO2 28   CL 98   BUN 24*   CREATININE 0.8   ALKPHOS 92   ALT 20   AST 45*   BILITOT 0.3       Significant Diagnostics:  I have reviewed all pertinent imaging results/findings within the past 24 hours.    Assessment/Plan:     * Diaphragmatic hernia  Marisela Bunn is a 69 y.o. female with PMH COPD (not on home oxygen), HTN, HLD (not on anticoagulation) received as direct admission for large diaphragmatic hernia. Patient is symptomatic from hernia with constipation and dyspnea with overlying bruise on left flank. She underwent lap diaphragmatic hernia repair with mesh on 1/21/20.   Evidence of recurrent hernia on CT 1/27. Underwent recurrent L diaphragmatic hernia repair with L chestwall recon with thoracic sx on 1/29. Extubated on 2/2/20.   Transferred to Thoracic team for further mgmt     - Wean O2 as tolerated   - Bactrim completed  - Aggressive pulm toilet with IS, CPT, DuoNebs, and Tessalon   - PT/OT consults refreshed for the floor  - Advanced  diet to mechanical soft per Speech recs, reconsult to speech placed for the floor  - Daily labs  - Drains and chest tube per thoracic surgery  - Please call with questions or concerns    DISPO: OT recs SNF, pending placement and d/c per thoracic team        Sanjeev Murcia MD  General Surgery  [facility]

## 2020-02-07 NOTE — PLAN OF CARE
KAYLEE followed up with [facility] regarding review of the patient's referrals.     [facility] stated that they have submitted authorization request to the patient's payor.     [facility] is still reviewing the request.        02/07/20 1331   Post-Acute Status   Post-Acute Authorization Placement   Post-Acute Placement Status Pending Payor Review     Kayley Montilla LMSW   - Case Management

## 2020-02-07 NOTE — PT/OT/SLP PROGRESS
Physical Therapy Treatment - cotx with OT    Patient Name:  Marisela Bunn   MRN:  [id number]    Recommendations:     Discharge Recommendations:  nursing facility, skilled   Discharge Equipment Recommendations: (TBD)   Barriers to discharge: Inaccessible home and Decreased caregiver support    Assessment:     Marisela Bunn is a 69 y.o. female admitted with a medical diagnosis of Diaphragmatic hernia.  She presents with the following impairments/functional limitations:  weakness, impaired endurance, impaired self care skills, impaired functional mobilty, gait instability, impaired balance, impaired cardiopulmonary response to activity.    Rehab Prognosis: Fair; patient would benefit from acute skilled PT services to address these deficits and reach maximum level of function.    Recent Surgery: Procedure(s) (LRB):  THORACOTOMY (Left)  RECONSTRUCTION, DIAPHRAGM (Left)  RECONSTRUCTION, CHEST WALL (Left) 9 Days Post-Op    Plan:     During this hospitalization, patient to be seen 4 x/week to address the identified rehab impairments via gait training, therapeutic activities, therapeutic exercises, neuromuscular re-education and progress toward the following goals:    · Plan of Care Expires:  03/05/20    Subjective     Chief Complaint: no c/o  Pain/Comfort:  · Pain Rating 1: other (see comments)(unrated)  · Pain Addressed 1: Pre-medicate for activity, Reposition, Distraction  · Pain Rating Post-Intervention 1: other (see comments)(unrated)      Objective:     Communicated with NSG prior to session.  Patient found supine with PureWick, telemetry, oxygen upon PTA entry to room.     General Precautions: Standard, aspiration, fall, contact   Orthopedic Precautions:N/A   Braces: N/A     Functional Mobility:  · Bed Mobility:     · Rolling Left:  contact guard assistance  · Rolling Right: contact guard assistance  · Supine to Sit: moderate assistance  · Transfers:     · Sit to Stand:  minimum assistance with hand-held assist and  "rolling walker  · Bed to Chair: minimum assistance with  rolling walker  using  Stand Pivot  · Gait: Pt ambulated ~4 ft with RW and Min A for stability and RW mgmt. Pt with extremely slow evan and FFP with reliance on RW support. Quick fatigue. R knee buckling requiring end of gait trial. Further gait trials deferred as pt reports B knee weakness "rubbery legs".       AM-PAC 6 CLICK MOBILITY  Turning over in bed (including adjusting bedclothes, sheets and blankets)?: 3  Sitting down on and standing up from a chair with arms (e.g., wheelchair, bedside commode, etc.): 3  Moving from lying on back to sitting on the side of the bed?: 2  Moving to and from a bed to a chair (including a wheelchair)?: 3  Need to walk in hospital room?: 3  Climbing 3-5 steps with a railing?: 2  Basic Mobility Total Score: 16       Therapeutic Activities and Exercises:   Pt assisted with functional mobility as noted above.   Pt assisted with rolling as OT assists pt with self care and perianal hygeine as pt found to be soiled upon initiating bed mobility.   Pt stands x 3 attempts with B HHA  and transfer of hands to RW. Min A required.   Pt educated on importance of increased mobilization with NSG staff. Pt safe and encouraged to transfer to bedside commode and bedside chair with NSG assist of 1 and RW.     Patient left up in chair with all lines intact and call button in reach..    GOALS:   Multidisciplinary Problems     Physical Therapy Goals        Problem: Physical Therapy Goal    Goal Priority Disciplines Outcome Goal Variances Interventions   Physical Therapy Goal     PT, PT/OT Ongoing, Progressing     Description:  Goals to be met by:20  Pt re-evaluated and goals updated    Patient will increase functional independence with mobility by performin. Supine to sit with Contact Guard Assistance.  2. Sit to stand transfer with Contact Guard Assistance.  3. Bed to chair with Contact Guard Assistance using Rolling Walker or " LRAD.  4. Gait  x 100 feet with Contact Guard Assistance using Rolling Walker or LRAD.  5. Lower extremity exercise program x15 reps  with supervision.                       Time Tracking:     PT Received On: 02/07/20  PT Start Time: 1040     PT Stop Time: 1118  PT Total Time (min): 38 min     Billable Minutes: Gait Training 14 and Therapeutic Activity 24    Treatment Type: Treatment  PT/PTA: PTA     PTA Visit Number: 1     Arsenio Castellanos, DURAN  02/07/2020

## 2020-02-07 NOTE — ASSESSMENT & PLAN NOTE
Marisela Bunn is a 69 y.o. female with PMH COPD (not on home oxygen), HTN, HLD (not on anticoagulation) received as direct admission for large diaphragmatic hernia. Patient is symptomatic from hernia with constipation and dyspnea with overlying bruise on left flank. She underwent lap diaphragmatic hernia repair with mesh on 1/21/20.   Evidence of recurrent hernia on CT 1/27. Underwent recurrent L diaphragmatic hernia repair with L chestwall recon with thoracic sx on 1/29. Extubated on 2/2/20.   Transferred to Thoracic team for further mgmt     - Wean O2 as tolerated   - Bactrim completed  - Aggressive pulm toilet with IS, CPT, DuoNebs, and Tessalon   - PT/OT consults refreshed for the floor  - Advanced diet to mechanical soft per Speech recs, reconsult to speech placed for the floor  - Daily labs  - Drains and chest tube per thoracic surgery  - Please call with questions or concerns    DISPO: OT recs SNF, pending placement and d/c per thoracic team

## 2020-02-07 NOTE — PROGRESS NOTES
"[facility]  Adult Nutrition  Progress Note    SUMMARY       Recommendations    1. Continue current diet as tolerated with Boost Plus.   2. Encourage po and ONS intake.   3. RD following.     Goals: Patient to meet > 85% EEN and EPN by RD follow-up  Nutrition Goal Status: progressing towards goal  Communication of RD Recs: (POC)    Reason for Assessment    Reason For Assessment: RD follow-up  Diagnosis: surgery/postoperative complications(diaphragmatic hernia s/p multiple surgeries)  Relevant Medical History: CAD, HTN, TIA, GERD  Interdisciplinary Rounds: attended  General Information Comments: Pt resting in bed with no family members at bedside. Pt reports good appetite, but does not eat too much. Drinks ~1 Boost/day. Per chart review, pt eating 25-50% of meals. Denies N/V/D/C. Noted no wt change. NFPE completed 1/29. Pt continues with mild muscle/fat wasting related to age. Pt continues to be a risk for acute malnutrition 2/2 decreased po intake. Will continue to monitor for energy intake and wt changes.  Nutrition Discharge Planning: Adequate nutrition via PO intake.    Nutrition Risk Screen    Nutrition Risk Screen: no indicators present    Nutrition/Diet History    Spiritual, Cultural Beliefs, Adventist Practices, Values that Affect Care: no  Food Allergies: NKFA  Factors Affecting Nutritional Intake: decreased appetite    Anthropometrics    Temp: 98 °F (36.7 °C)  Height: 5' 1" (154.9 cm)  Height (inches): 61 in  Weight Method: Bed Scale  Weight: 118 kg (260 lb 2.3 oz)  Weight (lb): 260.15 lb  Ideal Body Weight (IBW), Female: 105 lb  % Ideal Body Weight, Female (lb): 239.78 %  BMI (Calculated): 49.2  BMI Grade: greater than 40 - morbid obesity     Lab/Procedures/Meds    Pertinent Labs Reviewed: reviewed  Pertinent Labs Comments: noted  Pertinent Medications Reviewed: reviewed  Pertinent Medications Comments: amlodipine, pantoprazole, docusate    Estimated/Assessed Needs    Weight Used For " Calorie Calculations: 108.4 kg (238 lb 15.7 oz)  Energy Calorie Requirements (kcal): 1551 kcal/day  Energy Need Method: [place](no AF 2/2 obesity)  Protein Requirements:  g/day(2.0-2.5 g/kg)  Weight Used For Protein Calculations: 47.7 kg (105 lb 2.6 oz)(IBW)  Fluid Requirements (mL): 1 mL/kcal or per MD  Estimated Fluid Requirement Method: RDA Method  RDA Method (mL): 1551     Nutrition Prescription Ordered    Current Diet Order: Mechanical Soft  Nutrition Order Comments: Thin Liquids  Oral Nutrition Supplement: Boost Plus    Evaluation of Received Nutrient/Fluid Intake    I/O: -7.454L since 1/24  Comments: LBM 2/6  Tolerance: tolerating  % Intake of Estimated Energy Needs: 25 - 50 %  % Meal Intake: 25 - 50 %    Nutrition Risk    Level of Risk/Frequency of Follow-up: (f/u 1 x wk)     Assessment and Plan    Nutrition Problem  Inadequate Energy Intake     Related to (etiology):   Inadequate Oral Intake     Signs and Symptoms (as evidenced by):   Pt eating <50% of meals.      Interventions (treatment strategy):  Collaboration of nutrition care with other providers  Com[employer] Bever[employer] - Boost Plus      Nutrition Diagnosis Status:   Continues     Monitor and Evaluation    Food and Nutrient Intake: energy intake, food and beverage intake  Food and Nutrient Adminstration: diet order  Physical Activity and Function: nutrition-related ADLs and IADLs  Anthropometric Measurements: weight, weight change  Biochemical Data, Medical Tests and Procedures: electrolyte and renal panel, gastrointestinal profile, inflammatory profile  Nutrition-Focused Physical Findings: overall appearance     Malnutrition Assessment                 Orbital Region (Subcutaneous Fat Loss): well nourished  Upper Arm Region (Subcutaneous Fat Loss): well nourished   [place] Region (Muscle Loss): mild depletion(2/2 age)  Clavicle Bone Region (Muscle Loss): well nourished  Clavicle and Acromion Bone Region (Muscle Loss): well nourished  Dorsal  Hand (Muscle Loss): well nourished  Patellar Region (Muscle Loss): well nourished  Anterior Thigh Region (Muscle Loss): well nourished  Posterior Calf Region (Muscle Loss): well nourished   Edema (Fluid Accumulation): 1-->trace   Subcutaneous Fat Loss (Final Summary): well nourished  Muscle Loss Evaluation (Final Summary): mild protein-calorie malnutrition  Fluid Accumulation Evaluation: mild         Nutrition Follow-Up    RD Follow-up?: Yes

## 2020-02-07 NOTE — ASSESSMENT & PLAN NOTE
69 year old female admitted to SICU with recurrent left diaphragmatic hernia s/p laparoscopic repair 1/21/2020 with recurrence and s/p L thoracotomy, patch reconstruction L hemidiaphragm, chest wall reconstruction with Dacono-Rajendra patch 1/29/2020.     - diabetic diet as tolerated  - scheduling simethicone  - wean O2 as tolerated for SaO2 > 90%  - out of bed to chair  - PT/OT to start ambulating  - work on dispo

## 2020-02-07 NOTE — PLAN OF CARE
Plan of care reviewed with patient who verbalized understanding. AAOx4. VSS on 2L of oxygen. Mechanical soft diet. No complaints of nausea or vomiting. PRN Oxycodone given to manage pain. Patient using external cathter; clear yellow urine. Q6 accuchecks performed. Call light within reach. Bed in the lowest position. No acute events this shift. WCTM.

## 2020-02-07 NOTE — PLAN OF CARE
Problem: Occupational Therapy Goal  Goal: Occupational Therapy Goal  Description  Goals to be met by: 2/18/20    Patient will increase functional independence with ADLs by performing:    UE Dressing with SBA.  LE Dressing with  CGA .  Grooming while standing with CGA  Toileting from bedside commode with Min A for hygiene and clothing management.   Toilet transfer to bedside commode with CGA        Outcome: Ongoing, Progressing    Progressing with POC.  Yamile Carpio OT  2/7/2020

## 2020-02-07 NOTE — PT/OT/SLP PROGRESS
"Occupational Therapy   Treatment    Name: Marisela Bunn  MRN: [id number]  Admitting Diagnosis:  Diaphragmatic hernia  9 Days Post-Op   THORACOTOMY (Left)  RECONSTRUCTION, DIAPHRAGM (Left)  RECONSTRUCTION, CHEST WALL (Left)     Recommendations:     Discharge Recommendations: nursing facility, skilled  Discharge Equipment Recommendations:  (TBD)  Barriers to discharge:  None    Assessment:     Marisela Bunn is a 69 y.o. female with a medical diagnosis of Diaphragmatic hernia.  She presents with good effort this day and tolerates session well. Performance deficits affecting function are weakness, impaired endurance, impaired self care skills, impaired functional mobilty, gait instability, impaired balance, impaired cardiopulmonary response to activity.     Rehab Prognosis:  Good; patient would benefit from acute skilled OT services to address these deficits and reach maximum level of function.       Plan:     Patient to be seen 4 x/week to address the above listed problems via self-care/home management, therapeutic activities, therapeutic exercises  · Plan of Care Expires: 03/05/20  · Plan of Care Reviewed with: patient, son    Subjective     "I feel ok today."    Pain/Comfort:  · Pain Rating 1: other (see comments)(Did not rate)    Objective:     Communicated with: RN prior to session.  Patient found HOB elevated with PureWick, telemetry, oxygen(hep lock IV) upon OT entry to room.    General Precautions: Standard, aspiration, fall, contact   Orthopedic Precautions:N/A   Braces: N/A     Occupational Performance:     Bed Mobility:    · Patient completed supine rolling L<>R with contact guard assistance for bre-care  · Patient completed Supine to Sit to L side EOB with moderate assistance for trunk advancement off bed    Functional Mobility/Transfers:  · Patient completed Sit <> Stand Transfer x3 trials with minimum assistance with rolling walker (2 times from EOB, 1 time from bedside chair)  · Patient completed Bed > " Chair Transfer using Step Transfer technique (~4 lateral steps) with minimum assistance with rolling walker  · Functional Mobility: Patient completed functional mobility ~4' within her room with min A, RW, and chair follow. Patient exhibited weakness in BLE.    Activities of Daily Living:  · Grooming: set-up assistance Patient participated in face wash with a washcloth, oral hygiene, and brushed her hair while sitting [facility] with set-up assist.  · Toileting: total assistance Patient completed toileting while supine with total assist for bre-care.      [facility] 6 Click ADL: 14    Treatment & Education:  Role of OT/POC  Call button for assistance    Patient left up in chair with all lines intact, call button in reach, RN notified and son presentEducation:      GOALS:   Multidisciplinary Problems     Occupational Therapy Goals        Problem: Occupational Therapy Goal    Goal Priority Disciplines Outcome Interventions   Occupational Therapy Goal     OT, PT/OT Ongoing, Progressing    Description:  Goals to be met by: 2/18/20    Patient will increase functional independence with ADLs by performing:    UE Dressing with SBA.  LE Dressing with  CGA .  Grooming while standing with CGA  Toileting from bedside commode with Min A for hygiene and clothing management.   Toilet transfer to bedside commode with CGA                         Time Tracking:     OT Date of Treatment: 02/07/20  OT Start Time: 1041  OT Stop Time: 1120  OT Total Time (min): 39 min    Billable Minutes:Self Care/Home Management 23 minutes  Therapeutic Activity 16 minutes    Yamile Carpio OT  2/7/2020

## 2020-02-07 NOTE — PROGRESS NOTES
[facility]  Cardiothoracic Surgery  Progress Note    Patient Name: Marisela Bunn  MRN: [id number]  Admission Date: 1/19/2020  Hospital Length of Stay: 19 days  Code Status: Full Code   Attending Physician: Primo Soni MD   Referring Provider: Kelly, Provider  Principal Problem:Diaphragmatic hernia    Subjective:     Post-Op Info:  Procedure(s) (LRB):  THORACOTOMY (Left)  RECONSTRUCTION, DIAPHRAGM (Left)  RECONSTRUCTION, CHEST WALL (Left)   9 Days Post-Op     Interval History:   NAEON. Tolerating diet, down to 2L NC. Pain well controlled. Having BM. Required simethicone for gas discomfort overnight.    Medications:  Continuous Infusions:    Scheduled Meds:   acetylcysteine 100 mg/ml (10%)  4 mL Nebulization TID    albuterol-ipratropium  3 mL Nebulization Q4H    amLODIPine  10 mg Oral Daily    benzonatate  100 mg Oral Q8H    celecoxib  200 mg Oral Daily    cephALEXin  500 mg Oral Q6H    dextromethorphan-guaifenesin  mg/5 ml  5 mL Oral Q6H    enoxaparin  40 mg Subcutaneous BID    magnesium oxide  800 mg Oral BID    miconazole nitrate 2%   Topical (Top) BID    pantoprazole  40 mg Oral Daily    polyethylene glycol  17 g Per NG tube Daily    pregabalin  75 mg Oral QHS    senna-docusate 8.6-50 mg  1 tablet Per NG tube BID    simethicone  1 tablet Oral TID PC     PRN Meds:bisacodyL, calcium carbonate, Dextrose 10% Bolus, glucagon (human recombinant), hydrALAZINE, insulin aspart U-100, labetalol, melatonin, metoclopramide HCl, ondansetron, ondansetron, oxyCODONE, potassium chloride in water **AND** [DISCONTINUED] potassium chloride in water **AND** [DISCONTINUED] potassium chloride in water, promethazine (PHENERGAN) IVPB, sodium chloride 0.9%     Review of patient's allergies indicates:   Allergen Reactions    Erythromycin      Stomach pains    Rosuvastatin      Hives, muscle/joint pains    Zolpidem      Confusion      Objective:     Vital Signs (Most Recent):  Temp:  98.4 °F (36.9 °C) (02/07/20 0424)  Pulse: 88 (02/07/20 0746)  Resp: 16 (02/07/20 0746)  BP: (!) 142/63 (02/07/20 0424)  SpO2: 97 % (02/07/20 0746) Vital Signs (24h Range):  Temp:  [97.6 °F (36.4 °C)-99.1 °F (37.3 °C)] 98.4 °F (36.9 °C)  Pulse:  [] 88  Resp:  [14-24] 16  SpO2:  [93 %-98 %] 97 %  BP: (142-177)/(63-76) 142/63     Intake/Output - Last 3 Shifts       02/05 0700 - 02/06 0659 02/06 0700 - 02/07 0659 02/07 0700 - 02/08 0659    P.O. 240 957     I.V. (mL/kg)  100 (0.8)     Total Intake(mL/kg) 240 (2) 1057 (9)     Urine (mL/kg/hr) 2500 (0.9) 1300 (0.5)     Stool 0      Chest Tube       Total Output 2500 1300     Net -2260 -243            Urine Occurrence 2 x 4 x     Stool Occurrence 1 x 2 x           SpO2: 97 %  O2 Device (Oxygen Therapy): nasal cannula    Physical Exam   Constitutional: She appears well-developed and well-nourished. No distress.   Cardiovascular: Normal rate and regular rhythm.   Pulmonary/Chest: Effort normal. No respiratory distress.   Thoracotomy incision in crease of body fat. Incision skin edges closed but there is some blanching erythema surrounding the incision. On 2L NC.   Abdominal: Soft. She exhibits no distension. There is no tenderness.   Incisions covered with steristrips   Skin: Skin is warm and dry.     Significant Labs:  BMP:   Recent Labs   Lab 02/07/20  0547   MG 1.8     CBC:   Recent Labs   Lab 02/07/20  0547   WBC 9.01   RBC 3.76*   HGB 10.6*   HCT 33.4*   *   MCV 89   MCH 28.2   MCHC 31.7*     CMP: No results for input(s): GLU, CALCIUM, ALBUMIN, PROT, NA, K, CO2, CL, BUN, CREATININE, ALKPHOS, ALT, AST, BILITOT in the last 48 hours.    Significant Diagnostics:  CXR: I have reviewed all pertinent results/findings within the past 24 hours    VTE Risk Mitigation (From admission, onward)         Ordered     enoxaparin injection 40 mg  2 times daily      01/29/20 2000     Place sequential compression device  Until discontinued      01/23/20 1002     IP VTE LOW RISK  PATIENT  Once      01/19/20 2022              [facility]        Assessment/Plan:     * Diaphragmatic hernia  69 year old female admitted to SICU with recurrent left diaphragmatic hernia s/p laparoscopic repair 1/21/2020 with recurrence and s/p L thoracotomy, patch reconstruction L hemidiaphragm, chest wall reconstruction with [place]-Rajendra patch 1/29/2020.     - diabetic diet as tolerated  - scheduling simethicone  - wean O2 as tolerated for SaO2 > 90%  - out of bed to chair  - PT/OT to start ambulating  - work on dispo      Shruthi Prescott MD  Cardiothoracic Surgery  [facility]

## 2020-02-07 NOTE — SUBJECTIVE & OBJECTIVE
Interval History:   NAEON. Tolerating diet, down to 2L NC. Pain well controlled. Having BM. Required simethicone for gas discomfort overnight.    Medications:  Continuous Infusions:    Scheduled Meds:   acetylcysteine 100 mg/ml (10%)  4 mL Nebulization TID    albuterol-ipratropium  3 mL Nebulization Q4H    amLODIPine  10 mg Oral Daily    benzonatate  100 mg Oral Q8H    celecoxib  200 mg Oral Daily    cephALEXin  500 mg Oral Q6H    dextromethorphan-guaifenesin  mg/5 ml  5 mL Oral Q6H    enoxaparin  40 mg Subcutaneous BID    magnesium oxide  800 mg Oral BID    miconazole nitrate 2%   Topical (Top) BID    pantoprazole  40 mg Oral Daily    polyethylene glycol  17 g Per NG tube Daily    pregabalin  75 mg Oral QHS    senna-docusate 8.6-50 mg  1 tablet Per NG tube BID    simethicone  1 tablet Oral TID PC     PRN Meds:bisacodyL, calcium carbonate, Dextrose 10% Bolus, glucagon (human recombinant), hydrALAZINE, insulin aspart U-100, labetalol, melatonin, metoclopramide HCl, ondansetron, ondansetron, oxyCODONE, potassium chloride in water **AND** [DISCONTINUED] potassium chloride in water **AND** [DISCONTINUED] potassium chloride in water, promethazine (PHENERGAN) IVPB, sodium chloride 0.9%     Review of patient's allergies indicates:   Allergen Reactions    Erythromycin      Stomach pains    Rosuvastatin      Hives, muscle/joint pains    Zolpidem      Confusion      Objective:     Vital Signs (Most Recent):  Temp: 98.4 °F (36.9 °C) (02/07/20 0424)  Pulse: 88 (02/07/20 0746)  Resp: 16 (02/07/20 0746)  BP: (!) 142/63 (02/07/20 0424)  SpO2: 97 % (02/07/20 0746) Vital Signs (24h Range):  Temp:  [97.6 °F (36.4 °C)-99.1 °F (37.3 °C)] 98.4 °F (36.9 °C)  Pulse:  [] 88  Resp:  [14-24] 16  SpO2:  [93 %-98 %] 97 %  BP: (142-177)/(63-76) 142/63     Intake/Output - Last 3 Shifts       02/05 0700 - 02/06 0659 02/06 0700 - 02/07 0659 02/07 0700 - 02/08 0659    P.O. 240 957     I.V. (mL/kg)  100 (0.8)     Total  Intake(mL/kg) 240 (2) 1057 (9)     Urine (mL/kg/hr) 2500 (0.9) 1300 (0.5)     Stool 0      Chest Tube       Total Output 2500 1300     Net -2260 -243            Urine Occurrence 2 x 4 x     Stool Occurrence 1 x 2 x           SpO2: 97 %  O2 Device (Oxygen Therapy): nasal cannula    Physical Exam   Constitutional: She appears well-developed and well-nourished. No distress.   Cardiovascular: Normal rate and regular rhythm.   Pulmonary/Chest: Effort normal. No respiratory distress.   Thoracotomy incision in crease of body fat. Incision skin edges closed but there is some blanching erythema surrounding the incision. On 2L NC.   Abdominal: Soft. She exhibits no distension. There is no tenderness.   Incisions covered with steristrips   Skin: Skin is warm and dry.     Significant Labs:  BMP:   Recent Labs   Lab 02/07/20  0547   MG 1.8     CBC:   Recent Labs   Lab 02/07/20  0547   WBC 9.01   RBC 3.76*   HGB 10.6*   HCT 33.4*   *   MCV 89   MCH 28.2   MCHC 31.7*     CMP: No results for input(s): GLU, CALCIUM, ALBUMIN, PROT, NA, K, CO2, CL, BUN, CREATININE, ALKPHOS, ALT, AST, BILITOT in the last 48 hours.    Significant Diagnostics:  CXR: I have reviewed all pertinent results/findings within the past 24 hours    VTE Risk Mitigation (From admission, onward)         Ordered     enoxaparin injection 40 mg  2 times daily      01/29/20 2000     Place sequential compression device  Until discontinued      01/23/20 1002     IP VTE LOW RISK PATIENT  Once      01/19/20 2022              [facility]

## 2020-02-07 NOTE — PLAN OF CARE
SNF referrals pending.  Spoke with Jessenia at [facility], stated they are reviewing patient, they do have a bed and will call back after review complete.  [facility] stated the same, in review and will call back after review.  [facility] stated no one in admit department today so patient will not be reviewed until Monday.  Will continue to follow for needs.       02/07/20 0957   Discharge Reassessment   Assessment Type Discharge Planning Reassessment   Discharge Plan A Skilled Nursing Facility   Post-Acute Status   Post-Acute Authorization Placement   Post-Acute Placement Status Pending Post-Acute Medical Review

## 2020-02-08 LAB
POCT GLUCOSE: 163 MG/DL (ref 70–110)
TB INDURATION 48 - 72 HR READ: 0 MM

## 2020-02-08 PROCEDURE — 25000242 PHARM REV CODE 250 ALT 637 W/ HCPCS: Performed by: STUDENT IN AN ORGANIZED HEALTH CARE EDUCATION/TRAINING PROGRAM

## 2020-02-08 PROCEDURE — 25000242 PHARM REV CODE 250 ALT 637 W/ HCPCS: Performed by: THORACIC SURGERY (CARDIOTHORACIC VASCULAR SURGERY)

## 2020-02-08 PROCEDURE — 94640 AIRWAY INHALATION TREATMENT: CPT

## 2020-02-08 PROCEDURE — 27000221 HC OXYGEN, UP TO 24 HOURS

## 2020-02-08 PROCEDURE — 25000003 PHARM REV CODE 250: Performed by: STUDENT IN AN ORGANIZED HEALTH CARE EDUCATION/TRAINING PROGRAM

## 2020-02-08 PROCEDURE — 25000003 PHARM REV CODE 250: Performed by: ANESTHESIOLOGY

## 2020-02-08 PROCEDURE — 63600175 PHARM REV CODE 636 W HCPCS: Performed by: STUDENT IN AN ORGANIZED HEALTH CARE EDUCATION/TRAINING PROGRAM

## 2020-02-08 PROCEDURE — 20600001 HC STEP DOWN PRIVATE ROOM

## 2020-02-08 PROCEDURE — 25000003 PHARM REV CODE 250: Performed by: PHYSICIAN ASSISTANT

## 2020-02-08 PROCEDURE — 94664 DEMO&/EVAL PT USE INHALER: CPT

## 2020-02-08 PROCEDURE — 99900035 HC TECH TIME PER 15 MIN (STAT)

## 2020-02-08 PROCEDURE — 94668 MNPJ CHEST WALL SBSQ: CPT

## 2020-02-08 PROCEDURE — 94761 N-INVAS EAR/PLS OXIMETRY MLT: CPT

## 2020-02-08 RX ORDER — ACETAMINOPHEN 325 MG/1
650 TABLET ORAL EVERY 6 HOURS PRN
Status: DISCONTINUED | OUTPATIENT
Start: 2020-02-08 | End: 2020-02-12 | Stop reason: HOSPADM

## 2020-02-08 RX ORDER — POLYETHYLENE GLYCOL 3350 17 G/17G
17 POWDER, FOR SOLUTION ORAL DAILY
Status: DISCONTINUED | OUTPATIENT
Start: 2020-02-09 | End: 2020-02-12 | Stop reason: HOSPADM

## 2020-02-08 RX ORDER — ACETYLCYSTEINE 100 MG/ML
4 SOLUTION ORAL; RESPIRATORY (INHALATION)
Status: DISCONTINUED | OUTPATIENT
Start: 2020-02-08 | End: 2020-02-11

## 2020-02-08 RX ORDER — AMOXICILLIN 250 MG
1 CAPSULE ORAL 2 TIMES DAILY
Status: DISCONTINUED | OUTPATIENT
Start: 2020-02-08 | End: 2020-02-12 | Stop reason: HOSPADM

## 2020-02-08 RX ADMIN — IPRATROPIUM BROMIDE AND ALBUTEROL SULFATE 3 ML: .5; 3 SOLUTION RESPIRATORY (INHALATION) at 12:02

## 2020-02-08 RX ADMIN — CELECOXIB 200 MG: 200 CAPSULE ORAL at 09:02

## 2020-02-08 RX ADMIN — GUAIFENESIN AND DEXTROMETHORPHAN 5 ML: 100; 10 SYRUP ORAL at 05:02

## 2020-02-08 RX ADMIN — PANTOPRAZOLE SODIUM 40 MG: 40 TABLET, DELAYED RELEASE ORAL at 09:02

## 2020-02-08 RX ADMIN — OXYCODONE HYDROCHLORIDE 5 MG: 5 TABLET ORAL at 10:02

## 2020-02-08 RX ADMIN — IPRATROPIUM BROMIDE AND ALBUTEROL SULFATE 3 ML: .5; 3 SOLUTION RESPIRATORY (INHALATION) at 04:02

## 2020-02-08 RX ADMIN — AMLODIPINE BESYLATE 10 MG: 10 TABLET ORAL at 09:02

## 2020-02-08 RX ADMIN — PREGABALIN 75 MG: 75 CAPSULE ORAL at 10:02

## 2020-02-08 RX ADMIN — OXYCODONE HYDROCHLORIDE 5 MG: 5 TABLET ORAL at 01:02

## 2020-02-08 RX ADMIN — ENOXAPARIN SODIUM 40 MG: 100 INJECTION SUBCUTANEOUS at 08:02

## 2020-02-08 RX ADMIN — OXYCODONE HYDROCHLORIDE 5 MG: 5 TABLET ORAL at 05:02

## 2020-02-08 RX ADMIN — DOCUSATE SODIUM - SENNOSIDES 1 TABLET: 50; 8.6 TABLET, FILM COATED ORAL at 10:02

## 2020-02-08 RX ADMIN — ENOXAPARIN SODIUM 40 MG: 100 INJECTION SUBCUTANEOUS at 10:02

## 2020-02-08 RX ADMIN — CEPHALEXIN 500 MG: 500 CAPSULE ORAL at 11:02

## 2020-02-08 RX ADMIN — BENZONATATE 100 MG: 100 CAPSULE ORAL at 05:02

## 2020-02-08 RX ADMIN — SIMETHICONE CHEW TAB 80 MG 80 MG: 80 TABLET ORAL at 09:02

## 2020-02-08 RX ADMIN — BENZONATATE 100 MG: 100 CAPSULE ORAL at 02:02

## 2020-02-08 RX ADMIN — CEPHALEXIN 500 MG: 500 CAPSULE ORAL at 10:02

## 2020-02-08 RX ADMIN — SIMETHICONE CHEW TAB 80 MG 80 MG: 80 TABLET ORAL at 02:02

## 2020-02-08 RX ADMIN — MICONAZOLE NITRATE: 20 OINTMENT TOPICAL at 10:02

## 2020-02-08 RX ADMIN — Medication 800 MG: at 10:02

## 2020-02-08 RX ADMIN — BENZONATATE 100 MG: 100 CAPSULE ORAL at 10:02

## 2020-02-08 RX ADMIN — CEPHALEXIN 500 MG: 500 CAPSULE ORAL at 05:02

## 2020-02-08 RX ADMIN — MICONAZOLE NITRATE: 20 OINTMENT TOPICAL at 09:02

## 2020-02-08 RX ADMIN — IPRATROPIUM BROMIDE AND ALBUTEROL SULFATE 3 ML: .5; 3 SOLUTION RESPIRATORY (INHALATION) at 08:02

## 2020-02-08 RX ADMIN — IPRATROPIUM BROMIDE AND ALBUTEROL SULFATE 3 ML: .5; 3 SOLUTION RESPIRATORY (INHALATION) at 11:02

## 2020-02-08 RX ADMIN — SIMETHICONE CHEW TAB 80 MG 80 MG: 80 TABLET ORAL at 08:02

## 2020-02-08 RX ADMIN — Medication 800 MG: at 09:02

## 2020-02-08 RX ADMIN — ACETYLCYSTEINE 4 ML: 100 SOLUTION ORAL; RESPIRATORY (INHALATION) at 12:02

## 2020-02-08 RX ADMIN — ACETYLCYSTEINE 4 ML: 100 INHALANT RESPIRATORY (INHALATION) at 07:02

## 2020-02-08 RX ADMIN — OXYCODONE HYDROCHLORIDE 5 MG: 5 TABLET ORAL at 11:02

## 2020-02-08 RX ADMIN — IPRATROPIUM BROMIDE AND ALBUTEROL SULFATE 3 ML: .5; 3 SOLUTION RESPIRATORY (INHALATION) at 07:02

## 2020-02-08 RX ADMIN — GUAIFENESIN AND DEXTROMETHORPHAN 5 ML: 100; 10 SYRUP ORAL at 11:02

## 2020-02-08 RX ADMIN — ACETYLCYSTEINE 4 ML: 100 SOLUTION ORAL; RESPIRATORY (INHALATION) at 08:02

## 2020-02-08 NOTE — PROGRESS NOTES
[facility]  Cardiothoracic Surgery  Progress Note    Patient Name: Marisela Bunn  MRN: [id number]  Admission Date: 1/19/2020  Hospital Length of Stay: 20 days  Code Status: Full Code   Attending Physician: Primo Soni MD   Referring Provider: Kelly, Provider  Principal Problem:Diaphragmatic hernia    Subjective:     Post-Op Info:  Procedure(s) (LRB):  THORACOTOMY (Left)  RECONSTRUCTION, DIAPHRAGM (Left)  RECONSTRUCTION, CHEST WALL (Left)   10 Days Post-Op     Interval History:   NAEON. Tolerating diet, down to 2L NC. Pain well controlled.     Medications:  Continuous Infusions:    Scheduled Meds:   acetylcysteine 100 mg/ml (10%)  4 mL Nebulization TID    albuterol-ipratropium  3 mL Nebulization Q4H    amLODIPine  10 mg Oral Daily    benzonatate  100 mg Oral Q8H    celecoxib  200 mg Oral Daily    cephALEXin  500 mg Oral Q6H    dextromethorphan-guaifenesin  mg/5 ml  5 mL Oral Q6H    enoxaparin  40 mg Subcutaneous BID    magnesium oxide  800 mg Oral BID    miconazole nitrate 2%   Topical (Top) BID    pantoprazole  40 mg Oral Daily    polyethylene glycol  17 g Per NG tube Daily    pregabalin  75 mg Oral QHS    senna-docusate 8.6-50 mg  1 tablet Per NG tube BID    simethicone  1 tablet Oral TID PC     PRN Meds:bisacodyL, calcium carbonate, Dextrose 10% Bolus, glucagon (human recombinant), hydrALAZINE, insulin aspart U-100, labetalol, melatonin, metoclopramide HCl, ondansetron, ondansetron, oxyCODONE, potassium chloride in water **AND** [DISCONTINUED] potassium chloride in water **AND** [DISCONTINUED] potassium chloride in water, promethazine (PHENERGAN) IVPB, sodium chloride 0.9%     Review of patient's allergies indicates:   Allergen Reactions    Erythromycin      Stomach pains    Rosuvastatin      Hives, muscle/joint pains    Zolpidem      Confusion      Objective:     Vital Signs (Most Recent):  Temp: 97.3 °F (36.3 °C) (02/08/20 0805)  Pulse: 83 (02/08/20  0806)  Resp: 16 (02/08/20 0806)  BP: 139/63 (02/08/20 0805)  SpO2: 95 % (02/08/20 0806) Vital Signs (24h Range):  Temp:  [97.3 °F (36.3 °C)-99.4 °F (37.4 °C)] 97.3 °F (36.3 °C)  Pulse:  [] 83  Resp:  [16-20] 16  SpO2:  [92 %-97 %] 95 %  BP: (129-182)/(58-74) 139/63     Intake/Output - Last 3 Shifts       02/06 0700 - 02/07 0659 02/07 0700 - 02/08 0659 02/08 0700 - 02/09 0659    P.O. 957 530     I.V. (mL/kg) 100 (0.8)      Total Intake(mL/kg) 1057 (9) 530 (4.5)     Urine (mL/kg/hr) 1300 (0.5) 350 (0.1)     Stool       Total Output 1300 350     Net -243 +180            Urine Occurrence 4 x 4 x     Stool Occurrence 2 x 3 x     Emesis Occurrence  0 x           SpO2: 95 %  O2 Device (Oxygen Therapy): nasal cannula    Physical Exam   Constitutional: She appears well-developed and well-nourished. No distress.   Cardiovascular: Normal rate and regular rhythm.   Pulmonary/Chest: Effort normal. No respiratory distress.   Thoracotomy incision in crease of body fat. Incision skin edges closed but there is some blanching erythema surrounding the incision. On 2L NC.   Abdominal: Soft. She exhibits no distension. There is no tenderness.   Incisions covered with steristrips   Skin: Skin is warm and dry.     Significant Labs:  BMP:   Recent Labs   Lab 02/07/20  0547   MG 1.8     CBC:   Recent Labs   Lab 02/07/20  0547   WBC 9.01   RBC 3.76*   HGB 10.6*   HCT 33.4*   *   MCV 89   MCH 28.2   MCHC 31.7*     CMP: No results for input(s): GLU, CALCIUM, ALBUMIN, PROT, NA, K, CO2, CL, BUN, CREATININE, ALKPHOS, ALT, AST, BILITOT in the last 48 hours.    Significant Diagnostics:  CXR: I have reviewed all pertinent results/findings within the past 24 hours    VTE Risk Mitigation (From admission, onward)         Ordered     enoxaparin injection 40 mg  2 times daily      01/29/20 2000     Place sequential compression device  Until discontinued      01/23/20 1002     IP VTE LOW RISK PATIENT  Once      01/19/20 2022               [facility]        Assessment/Plan:     * Diaphragmatic hernia  69 year old female admitted to SICU with recurrent left diaphragmatic hernia s/p laparoscopic repair 1/21/2020 with recurrence and s/p L thoracotomy, patch reconstruction L hemidiaphragm, chest wall reconstruction with [place]-Rajendra patch 1/29/2020.     - diabetic diet as tolerated  - scheduling simethicone  - wean O2 as tolerated for SaO2 > 90%  - out of bed to chair  - PT/OT to start ambulating  - work on dispo        Radha Wilson MD  Cardiothoracic Surgery  [facility]

## 2020-02-08 NOTE — ASSESSMENT & PLAN NOTE
Marisela Bunn is a 69 y.o. female with PMH COPD (not on home oxygen), HTN, HLD (not on anticoagulation) received as direct admission for large diaphragmatic hernia. Patient is symptomatic from hernia with constipation and dyspnea with overlying bruise on left flank. She underwent lap diaphragmatic hernia repair with mesh on 1/21/20.   Evidence of recurrent hernia on CT 1/27. Underwent recurrent L diaphragmatic hernia repair with L chestwall recon with thoracic sx on 1/29. Extubated on 2/2/20.   Transferred to Thoracic team for further mgmt     - Wean O2 as tolerated   - PT/OT consults   - Advanced diet to mechanical soft per Speech recs  - Please call with questions or concerns    DISPO: OT recs SNF, pending placement and d/c per thoracic team

## 2020-02-08 NOTE — PLAN OF CARE
Pt AAOX4. Pain managed with PRN pain meds. Mechanical soft diet, no complaints of nausea or vomiting. Up to bedside commode, adequate urine output overnight. VS stable on 2L NC. Pt slept between care. Son at bedside. Turned. Bed low and locked, call bell within reach. Will continue to monitor.

## 2020-02-08 NOTE — SUBJECTIVE & OBJECTIVE
Interval History:   NAEON.   2L O2  No N/V  Resting comfortably     Medications:  Continuous Infusions:    Scheduled Meds:   acetylcysteine 100 mg/ml (10%)  4 mL Nebulization TID    albuterol-ipratropium  3 mL Nebulization Q4H    amLODIPine  10 mg Oral Daily    benzonatate  100 mg Oral Q8H    celecoxib  200 mg Oral Daily    cephALEXin  500 mg Oral Q6H    dextromethorphan-guaifenesin  mg/5 ml  5 mL Oral Q6H    enoxaparin  40 mg Subcutaneous BID    magnesium oxide  800 mg Oral BID    miconazole nitrate 2%   Topical (Top) BID    pantoprazole  40 mg Oral Daily    polyethylene glycol  17 g Per NG tube Daily    pregabalin  75 mg Oral QHS    senna-docusate 8.6-50 mg  1 tablet Per NG tube BID    simethicone  1 tablet Oral TID PC     PRN Meds:bisacodyL, calcium carbonate, Dextrose 10% Bolus, glucagon (human recombinant), hydrALAZINE, insulin aspart U-100, labetalol, melatonin, metoclopramide HCl, ondansetron, ondansetron, oxyCODONE, potassium chloride in water **AND** [DISCONTINUED] potassium chloride in water **AND** [DISCONTINUED] potassium chloride in water, promethazine (PHENERGAN) IVPB, sodium chloride 0.9%     Review of patient's allergies indicates:   Allergen Reactions    Erythromycin      Stomach pains    Rosuvastatin      Hives, muscle/joint pains    Zolpidem      Confusion      Objective:     Vital Signs (Most Recent):  Temp: 97.3 °F (36.3 °C) (02/08/20 0805)  Pulse: 83 (02/08/20 0806)  Resp: 16 (02/08/20 0806)  BP: 139/63 (02/08/20 0805)  SpO2: 95 % (02/08/20 0806) Vital Signs (24h Range):  Temp:  [97.3 °F (36.3 °C)-99.4 °F (37.4 °C)] 97.3 °F (36.3 °C)  Pulse:  [] 83  Resp:  [16-20] 16  SpO2:  [92 %-97 %] 95 %  BP: (129-182)/(58-74) 139/63     Weight: 118 kg (260 lb 2.3 oz)  Body mass index is 49.15 kg/m².    Intake/Output - Last 3 Shifts       02/06 0700 - 02/07 0659 02/07 0700 - 02/08 0659 02/08 0700 - 02/09 0659    P.O. 957 530     I.V. (mL/kg) 100 (0.8)      Total Intake(mL/kg)  1057 (9) 530 (4.5)     Urine (mL/kg/hr) 1300 (0.5) 350 (0.1)     Stool       Total Output 1300 350     Net -243 +180            Urine Occurrence 4 x 4 x     Stool Occurrence 2 x 3 x     Emesis Occurrence  0 x           Physical Exam   Constitutional: She is oriented to person, place, and time. She appears well-developed and well-nourished. No distress.   HENT:   Head: Normocephalic and atraumatic.   Eyes: Conjunctivae and EOM are normal. Right eye exhibits no discharge. Left eye exhibits no discharge.   Cardiovascular: Normal rate and regular rhythm.   Pulmonary/Chest: Effort normal and breath sounds normal. No respiratory distress.   Abdominal: Soft. She exhibits no distension. There is no tenderness.   Lap incisions are c/d/i   Musculoskeletal: Normal range of motion. She exhibits no deformity.   Neurological: She is alert and oriented to person, place, and time.   Skin: Skin is warm and dry.         Significant Labs:  CBC:   Recent Labs   Lab 02/07/20  0547   WBC 9.01   RBC 3.76*   HGB 10.6*   HCT 33.4*   *   MCV 89   MCH 28.2   MCHC 31.7*     CMP:   Recent Labs   Lab 02/03/20  0200   GLU 80   CALCIUM 8.5*   ALBUMIN 1.7*   PROT 5.7*      K 3.6   CO2 28   CL 98   BUN 24*   CREATININE 0.8   ALKPHOS 92   ALT 20   AST 45*   BILITOT 0.3       Significant Diagnostics:  I have reviewed all pertinent imaging results/findings within the past 24 hours.

## 2020-02-08 NOTE — ASSESSMENT & PLAN NOTE
69 year old female admitted to SICU with recurrent left diaphragmatic hernia s/p laparoscopic repair 1/21/2020 with recurrence and s/p L thoracotomy, patch reconstruction L hemidiaphragm, chest wall reconstruction with [place]-Rajendra patch 1/29/2020.     - diabetic diet as tolerated  - scheduling simethicone  - wean O2 as tolerated for SaO2 > 90%  - out of bed to chair  - PT/OT to start ambulating  - work on dispo

## 2020-02-08 NOTE — PROGRESS NOTES
"[facility]  General Surgery  Progress Note    Subjective:     History of Present Illness:  Marisela Bunn is a 69 y.o. female with PMH CAD, HTN, TIA, GERD presents to the hospital as a direct admission for evaluation of a newly diagnosed diaphragmatic hernia. She initially presented to [facility] on 1/18/20 for a 1-month history of a cough, intermittent fevers and dyspnea.  She had been seen by 2 urgent care physicians in the last month for the cough, with 2 different antibiotic courses given without improvement in cough. She reports that on 1/15 after an particularly severe coughing episode she felt a "pop" on left side, with associated severe pain and  Subsequent bruising of the left flank. She reports constant severe pain since that time. She is on daily ASA 81mg,     On ED presentation, patient was hemodynamically stable, H/H 12.4/40.0, breathing on RA. Labs revealed leukocytosis (19.2), lactic acid 2.5 (repeat lactic acid1.6), BMP wnl. CXR revealed left lower lobe pneumonia with possible associated pleural effusion. CT abdomen/pelvis revealed a large left diaphragmatic hernia containing fat  and bowel loops with hernia of intra-abdomnial contents outside the thorax from 7th left ICS. Medium sized HH. CTA revealed large Bochdalek hernia through defect in left hemo-diaphragm, with associated widening of 7th intercostal space. She was started on IV hydration and antibiotics (levofloxacin, zosyn). She reports she has not passed a bowel movement of flatus in 2 days. Intermittent lower quadrant "spasms" while coughing, otherwise no abdominal pain. She reports no other symptoms.    Post-Op Info:  Procedure(s) (LRB):  THORACOTOMY (Left)  RECONSTRUCTION, DIAPHRAGM (Left)  RECONSTRUCTION, CHEST WALL (Left)   10 Days Post-Op     Interval History:   NAEON.   2L O2  No N/V  Resting comfortably     Medications:  Continuous Infusions:    Scheduled Meds:   acetylcysteine 100 mg/ml (10%)  4 mL " Nebulization TID    albuterol-ipratropium  3 mL Nebulization Q4H    amLODIPine  10 mg Oral Daily    benzonatate  100 mg Oral Q8H    celecoxib  200 mg Oral Daily    cephALEXin  500 mg Oral Q6H    dextromethorphan-guaifenesin  mg/5 ml  5 mL Oral Q6H    enoxaparin  40 mg Subcutaneous BID    magnesium oxide  800 mg Oral BID    miconazole nitrate 2%   Topical (Top) BID    pantoprazole  40 mg Oral Daily    polyethylene glycol  17 g Per NG tube Daily    pregabalin  75 mg Oral QHS    senna-docusate 8.6-50 mg  1 tablet Per NG tube BID    simethicone  1 tablet Oral TID PC     PRN Meds:bisacodyL, calcium carbonate, Dextrose 10% Bolus, glucagon (human recombinant), hydrALAZINE, insulin aspart U-100, labetalol, melatonin, metoclopramide HCl, ondansetron, ondansetron, oxyCODONE, potassium chloride in water **AND** [DISCONTINUED] potassium chloride in water **AND** [DISCONTINUED] potassium chloride in water, promethazine (PHENERGAN) IVPB, sodium chloride 0.9%     Review of patient's allergies indicates:   Allergen Reactions    Erythromycin      Stomach pains    Rosuvastatin      Hives, muscle/joint pains    Zolpidem      Confusion      Objective:     Vital Signs (Most Recent):  Temp: 97.3 °F (36.3 °C) (02/08/20 0805)  Pulse: 83 (02/08/20 0806)  Resp: 16 (02/08/20 0806)  BP: 139/63 (02/08/20 0805)  SpO2: 95 % (02/08/20 0806) Vital Signs (24h Range):  Temp:  [97.3 °F (36.3 °C)-99.4 °F (37.4 °C)] 97.3 °F (36.3 °C)  Pulse:  [] 83  Resp:  [16-20] 16  SpO2:  [92 %-97 %] 95 %  BP: (129-182)/(58-74) 139/63     Weight: 118 kg (260 lb 2.3 oz)  Body mass index is 49.15 kg/m².    Intake/Output - Last 3 Shifts       02/06 0700 - 02/07 0659 02/07 0700 - 02/08 0659 02/08 0700 - 02/09 0659    P.O. 957 530     I.V. (mL/kg) 100 (0.8)      Total Intake(mL/kg) 1057 (9) 530 (4.5)     Urine (mL/kg/hr) 1300 (0.5) 350 (0.1)     Stool       Total Output 1300 350     Net -243 +180            Urine Occurrence 4 x 4 x     Stool  Occurrence 2 x 3 x     Emesis Occurrence  0 x           Physical Exam   Constitutional: She is oriented to person, place, and time. She appears well-developed and well-nourished. No distress.   HENT:   Head: Normocephalic and atraumatic.   Eyes: Conjunctivae and EOM are normal. Right eye exhibits no discharge. Left eye exhibits no discharge.   Cardiovascular: Normal rate and regular rhythm.   Pulmonary/Chest: Effort normal and breath sounds normal. No respiratory distress.   Abdominal: Soft. She exhibits no distension. There is no tenderness.   Lap incisions are c/d/i   Musculoskeletal: Normal range of motion. She exhibits no deformity.   Neurological: She is alert and oriented to person, place, and time.   Skin: Skin is warm and dry.         Significant Labs:  CBC:   Recent Labs   Lab 02/07/20  0547   WBC 9.01   RBC 3.76*   HGB 10.6*   HCT 33.4*   *   MCV 89   MCH 28.2   MCHC 31.7*     CMP:   Recent Labs   Lab 02/03/20  0200   GLU 80   CALCIUM 8.5*   ALBUMIN 1.7*   PROT 5.7*      K 3.6   CO2 28   CL 98   BUN 24*   CREATININE 0.8   ALKPHOS 92   ALT 20   AST 45*   BILITOT 0.3       Significant Diagnostics:  I have reviewed all pertinent imaging results/findings within the past 24 hours.    Assessment/Plan:     * Diaphragmatic hernia  Marisela Bunn is a 69 y.o. female with PMH COPD (not on home oxygen), HTN, HLD (not on anticoagulation) received as direct admission for large diaphragmatic hernia. Patient is symptomatic from hernia with constipation and dyspnea with overlying bruise on left flank. She underwent lap diaphragmatic hernia repair with mesh on 1/21/20.   Evidence of recurrent hernia on CT 1/27. Underwent recurrent L diaphragmatic hernia repair with L chestwall recon with thoracic sx on 1/29. Extubated on 2/2/20.   Transferred to Thoracic team for further mgmt     - Wean O2 as tolerated   - PT/OT consults   - Advanced diet to mechanical soft per Speech recs  - Please call with questions or  concerns    DISPO: OT recs SNF, pending placement and d/c per thoracic team        Sanjeev Murcia MD  General Surgery  [facility]

## 2020-02-08 NOTE — SUBJECTIVE & OBJECTIVE
Interval History:   NAEON. Tolerating diet, down to 2L NC. Pain well controlled.     Medications:  Continuous Infusions:    Scheduled Meds:   acetylcysteine 100 mg/ml (10%)  4 mL Nebulization TID    albuterol-ipratropium  3 mL Nebulization Q4H    amLODIPine  10 mg Oral Daily    benzonatate  100 mg Oral Q8H    celecoxib  200 mg Oral Daily    cephALEXin  500 mg Oral Q6H    dextromethorphan-guaifenesin  mg/5 ml  5 mL Oral Q6H    enoxaparin  40 mg Subcutaneous BID    magnesium oxide  800 mg Oral BID    miconazole nitrate 2%   Topical (Top) BID    pantoprazole  40 mg Oral Daily    polyethylene glycol  17 g Per NG tube Daily    pregabalin  75 mg Oral QHS    senna-docusate 8.6-50 mg  1 tablet Per NG tube BID    simethicone  1 tablet Oral TID PC     PRN Meds:bisacodyL, calcium carbonate, Dextrose 10% Bolus, glucagon (human recombinant), hydrALAZINE, insulin aspart U-100, labetalol, melatonin, metoclopramide HCl, ondansetron, ondansetron, oxyCODONE, potassium chloride in water **AND** [DISCONTINUED] potassium chloride in water **AND** [DISCONTINUED] potassium chloride in water, promethazine (PHENERGAN) IVPB, sodium chloride 0.9%     Review of patient's allergies indicates:   Allergen Reactions    Erythromycin      Stomach pains    Rosuvastatin      Hives, muscle/joint pains    Zolpidem      Confusion      Objective:     Vital Signs (Most Recent):  Temp: 97.3 °F (36.3 °C) (02/08/20 0805)  Pulse: 83 (02/08/20 0806)  Resp: 16 (02/08/20 0806)  BP: 139/63 (02/08/20 0805)  SpO2: 95 % (02/08/20 0806) Vital Signs (24h Range):  Temp:  [97.3 °F (36.3 °C)-99.4 °F (37.4 °C)] 97.3 °F (36.3 °C)  Pulse:  [] 83  Resp:  [16-20] 16  SpO2:  [92 %-97 %] 95 %  BP: (129-182)/(58-74) 139/63     Intake/Output - Last 3 Shifts       02/06 0700 - 02/07 0659 02/07 0700 - 02/08 0659 02/08 0700 - 02/09 0659    P.O. 957 530     I.V. (mL/kg) 100 (0.8)      Total Intake(mL/kg) 1057 (9) 530 (4.5)     Urine (mL/kg/hr) 1300 (0.5)  350 (0.1)     Stool       Total Output 1300 350     Net -243 +180            Urine Occurrence 4 x 4 x     Stool Occurrence 2 x 3 x     Emesis Occurrence  0 x           SpO2: 95 %  O2 Device (Oxygen Therapy): nasal cannula    Physical Exam   Constitutional: She appears well-developed and well-nourished. No distress.   Cardiovascular: Normal rate and regular rhythm.   Pulmonary/Chest: Effort normal. No respiratory distress.   Thoracotomy incision in crease of body fat. Incision skin edges closed but there is some blanching erythema surrounding the incision. On 2L NC.   Abdominal: Soft. She exhibits no distension. There is no tenderness.   Incisions covered with steristrips   Skin: Skin is warm and dry.     Significant Labs:  BMP:   Recent Labs   Lab 02/07/20  0547   MG 1.8     CBC:   Recent Labs   Lab 02/07/20  0547   WBC 9.01   RBC 3.76*   HGB 10.6*   HCT 33.4*   *   MCV 89   MCH 28.2   MCHC 31.7*     CMP: No results for input(s): GLU, CALCIUM, ALBUMIN, PROT, NA, K, CO2, CL, BUN, CREATININE, ALKPHOS, ALT, AST, BILITOT in the last 48 hours.    Significant Diagnostics:  CXR: I have reviewed all pertinent results/findings within the past 24 hours    VTE Risk Mitigation (From admission, onward)         Ordered     enoxaparin injection 40 mg  2 times daily      01/29/20 2000     Place sequential compression device  Until discontinued      01/23/20 1002     IP VTE LOW RISK PATIENT  Once      01/19/20 2022              SAFIA

## 2020-02-09 LAB
FINAL PATHOLOGIC DIAGNOSIS: NORMAL
GROSS: NORMAL
POCT GLUCOSE: 115 MG/DL (ref 70–110)
POCT GLUCOSE: 116 MG/DL (ref 70–110)
POCT GLUCOSE: 119 MG/DL (ref 70–110)
POCT GLUCOSE: 154 MG/DL (ref 70–110)

## 2020-02-09 PROCEDURE — 94761 N-INVAS EAR/PLS OXIMETRY MLT: CPT

## 2020-02-09 PROCEDURE — 25000003 PHARM REV CODE 250: Performed by: PHYSICIAN ASSISTANT

## 2020-02-09 PROCEDURE — 25000003 PHARM REV CODE 250: Performed by: STUDENT IN AN ORGANIZED HEALTH CARE EDUCATION/TRAINING PROGRAM

## 2020-02-09 PROCEDURE — 25000242 PHARM REV CODE 250 ALT 637 W/ HCPCS: Performed by: STUDENT IN AN ORGANIZED HEALTH CARE EDUCATION/TRAINING PROGRAM

## 2020-02-09 PROCEDURE — 94799 UNLISTED PULMONARY SVC/PX: CPT

## 2020-02-09 PROCEDURE — 25000003 PHARM REV CODE 250: Performed by: ANESTHESIOLOGY

## 2020-02-09 PROCEDURE — 27000221 HC OXYGEN, UP TO 24 HOURS

## 2020-02-09 PROCEDURE — 94640 AIRWAY INHALATION TREATMENT: CPT

## 2020-02-09 PROCEDURE — 94668 MNPJ CHEST WALL SBSQ: CPT

## 2020-02-09 PROCEDURE — 20600001 HC STEP DOWN PRIVATE ROOM

## 2020-02-09 PROCEDURE — 63600175 PHARM REV CODE 636 W HCPCS: Performed by: STUDENT IN AN ORGANIZED HEALTH CARE EDUCATION/TRAINING PROGRAM

## 2020-02-09 PROCEDURE — 25000242 PHARM REV CODE 250 ALT 637 W/ HCPCS: Performed by: THORACIC SURGERY (CARDIOTHORACIC VASCULAR SURGERY)

## 2020-02-09 RX ADMIN — GUAIFENESIN AND DEXTROMETHORPHAN 5 ML: 100; 10 SYRUP ORAL at 06:02

## 2020-02-09 RX ADMIN — MICONAZOLE NITRATE: 20 OINTMENT TOPICAL at 10:02

## 2020-02-09 RX ADMIN — ACETYLCYSTEINE 4 ML: 100 INHALANT RESPIRATORY (INHALATION) at 08:02

## 2020-02-09 RX ADMIN — GUAIFENESIN AND DEXTROMETHORPHAN 5 ML: 100; 10 SYRUP ORAL at 05:02

## 2020-02-09 RX ADMIN — CELECOXIB 200 MG: 200 CAPSULE ORAL at 09:02

## 2020-02-09 RX ADMIN — ACETAMINOPHEN 650 MG: 325 TABLET ORAL at 09:02

## 2020-02-09 RX ADMIN — BENZONATATE 100 MG: 100 CAPSULE ORAL at 05:02

## 2020-02-09 RX ADMIN — Medication 800 MG: at 09:02

## 2020-02-09 RX ADMIN — BENZONATATE 100 MG: 100 CAPSULE ORAL at 02:02

## 2020-02-09 RX ADMIN — IPRATROPIUM BROMIDE AND ALBUTEROL SULFATE 3 ML: .5; 3 SOLUTION RESPIRATORY (INHALATION) at 07:02

## 2020-02-09 RX ADMIN — ACETYLCYSTEINE 4 ML: 100 INHALANT RESPIRATORY (INHALATION) at 11:02

## 2020-02-09 RX ADMIN — IPRATROPIUM BROMIDE AND ALBUTEROL SULFATE 3 ML: .5; 3 SOLUTION RESPIRATORY (INHALATION) at 08:02

## 2020-02-09 RX ADMIN — CEPHALEXIN 500 MG: 500 CAPSULE ORAL at 11:02

## 2020-02-09 RX ADMIN — PANTOPRAZOLE SODIUM 40 MG: 40 TABLET, DELAYED RELEASE ORAL at 09:02

## 2020-02-09 RX ADMIN — IPRATROPIUM BROMIDE AND ALBUTEROL SULFATE 3 ML: .5; 3 SOLUTION RESPIRATORY (INHALATION) at 11:02

## 2020-02-09 RX ADMIN — ENOXAPARIN SODIUM 40 MG: 100 INJECTION SUBCUTANEOUS at 09:02

## 2020-02-09 RX ADMIN — PREGABALIN 75 MG: 75 CAPSULE ORAL at 09:02

## 2020-02-09 RX ADMIN — IPRATROPIUM BROMIDE AND ALBUTEROL SULFATE 3 ML: .5; 3 SOLUTION RESPIRATORY (INHALATION) at 03:02

## 2020-02-09 RX ADMIN — DOCUSATE SODIUM - SENNOSIDES 1 TABLET: 50; 8.6 TABLET, FILM COATED ORAL at 09:02

## 2020-02-09 RX ADMIN — SIMETHICONE CHEW TAB 80 MG 80 MG: 80 TABLET ORAL at 08:02

## 2020-02-09 RX ADMIN — SIMETHICONE CHEW TAB 80 MG 80 MG: 80 TABLET ORAL at 02:02

## 2020-02-09 RX ADMIN — GUAIFENESIN AND DEXTROMETHORPHAN 5 ML: 100; 10 SYRUP ORAL at 12:02

## 2020-02-09 RX ADMIN — CEPHALEXIN 500 MG: 500 CAPSULE ORAL at 10:02

## 2020-02-09 RX ADMIN — ACETYLCYSTEINE 4 ML: 100 INHALANT RESPIRATORY (INHALATION) at 07:02

## 2020-02-09 RX ADMIN — OXYCODONE HYDROCHLORIDE 5 MG: 5 TABLET ORAL at 03:02

## 2020-02-09 RX ADMIN — GUAIFENESIN AND DEXTROMETHORPHAN 5 ML: 100; 10 SYRUP ORAL at 11:02

## 2020-02-09 RX ADMIN — CEPHALEXIN 500 MG: 500 CAPSULE ORAL at 05:02

## 2020-02-09 RX ADMIN — AMLODIPINE BESYLATE 10 MG: 10 TABLET ORAL at 09:02

## 2020-02-09 RX ADMIN — SIMETHICONE CHEW TAB 80 MG 80 MG: 80 TABLET ORAL at 09:02

## 2020-02-09 RX ADMIN — CEPHALEXIN 500 MG: 500 CAPSULE ORAL at 06:02

## 2020-02-09 RX ADMIN — MICONAZOLE NITRATE: 20 OINTMENT TOPICAL at 09:02

## 2020-02-09 RX ADMIN — BENZONATATE 100 MG: 100 CAPSULE ORAL at 09:02

## 2020-02-09 NOTE — SUBJECTIVE & OBJECTIVE
Interval History:   NAEON. Tolerating diet, down to 2L NC. Pain well controlled. Encouraged her to get out of the bed today.    Medications:  Continuous Infusions:    Scheduled Meds:   acetylcysteine 100 mg/ml (10%)  4 mL Nebulization TID WAKE    albuterol-ipratropium  3 mL Nebulization Q4H    amLODIPine  10 mg Oral Daily    benzonatate  100 mg Oral Q8H    celecoxib  200 mg Oral Daily    cephALEXin  500 mg Oral Q6H    dextromethorphan-guaifenesin  mg/5 ml  5 mL Oral Q6H    enoxaparin  40 mg Subcutaneous BID    magnesium oxide  800 mg Oral BID    miconazole nitrate 2%   Topical (Top) BID    pantoprazole  40 mg Oral Daily    polyethylene glycol  17 g Oral Daily    pregabalin  75 mg Oral QHS    senna-docusate 8.6-50 mg  1 tablet Oral BID    simethicone  1 tablet Oral TID PC     PRN Meds:acetaminophen, bisacodyL, calcium carbonate, Dextrose 10% Bolus, glucagon (human recombinant), hydrALAZINE, insulin aspart U-100, labetalol, melatonin, metoclopramide HCl, ondansetron, ondansetron, oxyCODONE, potassium chloride in water **AND** [DISCONTINUED] potassium chloride in water **AND** [DISCONTINUED] potassium chloride in water, promethazine (PHENERGAN) IVPB, sodium chloride 0.9%     Review of patient's allergies indicates:   Allergen Reactions    Erythromycin      Stomach pains    Rosuvastatin      Hives, muscle/joint pains    Zolpidem      Confusion      Objective:     Vital Signs (Most Recent):  Temp: 97.7 °F (36.5 °C) (02/09/20 0757)  Pulse: 91 (02/09/20 0757)  Resp: 19 (02/09/20 0757)  BP: (!) 151/65 (02/09/20 0757)  SpO2: (!) 94 % (02/09/20 0757) Vital Signs (24h Range):  Temp:  [97.6 °F (36.4 °C)-98.2 °F (36.8 °C)] 97.7 °F (36.5 °C)  Pulse:  [76-97] 91  Resp:  [16-19] 19  SpO2:  [93 %-97 %] 94 %  BP: (128-151)/(60-65) 151/65     Intake/Output - Last 3 Shifts       02/07 0700 - 02/08 0659 02/08 0700 - 02/09 0659 02/09 0700 - 02/10 0659    P.O. 530 480     I.V. (mL/kg)       Total Intake(mL/kg) 530  (4.5) 480 (4.1)     Urine (mL/kg/hr) 350 (0.1) 1500 (0.5)     Stool  0     Total Output 350 1500     Net +180 -1020            Urine Occurrence 4 x      Stool Occurrence 3 x 1 x     Emesis Occurrence 0 x            SpO2: (!) 94 %  O2 Device (Oxygen Therapy): nasal cannula w/ humidification    Physical Exam   Constitutional: She appears well-developed and well-nourished. No distress.   Cardiovascular: Normal rate and regular rhythm.   Pulmonary/Chest: Effort normal. No respiratory distress.   Thoracotomy incision in crease of body fat. Incision skin edges closed but there is some blanching erythema surrounding the incision (improved.) some fibrinous exudate about 4 cm of incision. Covered with silver strip. On 2L NC.   Abdominal: Soft. She exhibits no distension. There is no tenderness.   Incisions covered with steristrips   Skin: Skin is warm and dry.     Significant Labs:  BMP:   No results for input(s): GLU, NA, K, CL, CO2, BUN, CREATININE, CALCIUM, MG in the last 48 hours.  CBC:   No results for input(s): WBC, RBC, HGB, HCT, PLT, MCV, MCH, MCHC in the last 48 hours.  CMP: No results for input(s): GLU, CALCIUM, ALBUMIN, PROT, NA, K, CO2, CL, BUN, CREATININE, ALKPHOS, ALT, AST, BILITOT in the last 48 hours.    Significant Diagnostics:  CXR: I have reviewed all pertinent results/findings within the past 24 hours    VTE Risk Mitigation (From admission, onward)         Ordered     enoxaparin injection 40 mg  2 times daily      01/29/20 2000     Place sequential compression device  Until discontinued      01/23/20 1002     IP VTE LOW RISK PATIENT  Once      01/19/20 2022              ROS

## 2020-02-09 NOTE — ASSESSMENT & PLAN NOTE
69 year old female admitted to SICU with recurrent left diaphragmatic hernia s/p laparoscopic repair 1/21/2020 with recurrence and s/p L thoracotomy, patch reconstruction L hemidiaphragm, chest wall reconstruction with [place]-Rajendra patch 1/29/2020.     - diabetic diet as tolerated  - scheduling simethicone  - wean O2 as tolerated for SaO2 > 90%  - out of bed to chair  - PT/OT to start ambulating  - work on dispo

## 2020-02-09 NOTE — PLAN OF CARE
POC reviewed with pt. Verbalized understanding.Pt AAOX'4 on 2L NC Pt on mechanical soft diet tolerating well.Pt uses bedside commode adequate urine output overnight. Pt slept between care. No acute events. Bed in lowest position with call light within reach. WCTM.

## 2020-02-09 NOTE — PROGRESS NOTES
[facility]  Cardiothoracic Surgery  Progress Note    Patient Name: Marisela Bunn  MRN: [id number]  Admission Date: 1/19/2020  Hospital Length of Stay: 21 days  Code Status: Full Code   Attending Physician: Primo Soni MD   Referring Provider: Kelly, Provider  Principal Problem:Diaphragmatic hernia    Subjective:     Post-Op Info:  Procedure(s) (LRB):  THORACOTOMY (Left)  RECONSTRUCTION, DIAPHRAGM (Left)  RECONSTRUCTION, CHEST WALL (Left)   11 Days Post-Op     Interval History:   NAEON. Tolerating diet, down to 2L NC. Pain well controlled. Encouraged her to get out of the bed today.    Medications:  Continuous Infusions:    Scheduled Meds:   acetylcysteine 100 mg/ml (10%)  4 mL Nebulization TID WAKE    albuterol-ipratropium  3 mL Nebulization Q4H    amLODIPine  10 mg Oral Daily    benzonatate  100 mg Oral Q8H    celecoxib  200 mg Oral Daily    cephALEXin  500 mg Oral Q6H    dextromethorphan-guaifenesin  mg/5 ml  5 mL Oral Q6H    enoxaparin  40 mg Subcutaneous BID    magnesium oxide  800 mg Oral BID    miconazole nitrate 2%   Topical (Top) BID    pantoprazole  40 mg Oral Daily    polyethylene glycol  17 g Oral Daily    pregabalin  75 mg Oral QHS    senna-docusate 8.6-50 mg  1 tablet Oral BID    simethicone  1 tablet Oral TID PC     PRN Meds:acetaminophen, bisacodyL, calcium carbonate, Dextrose 10% Bolus, glucagon (human recombinant), hydrALAZINE, insulin aspart U-100, labetalol, melatonin, metoclopramide HCl, ondansetron, ondansetron, oxyCODONE, potassium chloride in water **AND** [DISCONTINUED] potassium chloride in water **AND** [DISCONTINUED] potassium chloride in water, promethazine (PHENERGAN) IVPB, sodium chloride 0.9%     Review of patient's allergies indicates:   Allergen Reactions    Erythromycin      Stomach pains    Rosuvastatin      Hives, muscle/joint pains    Zolpidem      Confusion      Objective:     Vital Signs (Most Recent):  Temp: 97.7 °F (36.5  °C) (02/09/20 0757)  Pulse: 91 (02/09/20 0757)  Resp: 19 (02/09/20 0757)  BP: (!) 151/65 (02/09/20 0757)  SpO2: (!) 94 % (02/09/20 0757) Vital Signs (24h Range):  Temp:  [97.6 °F (36.4 °C)-98.2 °F (36.8 °C)] 97.7 °F (36.5 °C)  Pulse:  [76-97] 91  Resp:  [16-19] 19  SpO2:  [93 %-97 %] 94 %  BP: (128-151)/(60-65) 151/65     Intake/Output - Last 3 Shifts       02/07 0700 - 02/08 0659 02/08 0700 - 02/09 0659 02/09 0700 - 02/10 0659    P.O. 530 480     I.V. (mL/kg)       Total Intake(mL/kg) 530 (4.5) 480 (4.1)     Urine (mL/kg/hr) 350 (0.1) 1500 (0.5)     Stool  0     Total Output 350 1500     Net +180 -1020            Urine Occurrence 4 x      Stool Occurrence 3 x 1 x     Emesis Occurrence 0 x            SpO2: (!) 94 %  O2 Device (Oxygen Therapy): nasal cannula w/ humidification    Physical Exam   Constitutional: She appears well-developed and well-nourished. No distress.   Cardiovascular: Normal rate and regular rhythm.   Pulmonary/Chest: Effort normal. No respiratory distress.   Thoracotomy incision in crease of body fat. Incision skin edges closed but there is some blanching erythema surrounding the incision (improved.) some fibrinous exudate about 4 cm of incision. Covered with silver strip. On 2L NC.   Abdominal: Soft. She exhibits no distension. There is no tenderness.   Incisions covered with steristrips   Skin: Skin is warm and dry.     Significant Labs:  BMP:   No results for input(s): GLU, NA, K, CL, CO2, BUN, CREATININE, CALCIUM, MG in the last 48 hours.  CBC:   No results for input(s): WBC, RBC, HGB, HCT, PLT, MCV, MCH, MCHC in the last 48 hours.  CMP: No results for input(s): GLU, CALCIUM, ALBUMIN, PROT, NA, K, CO2, CL, BUN, CREATININE, ALKPHOS, ALT, AST, BILITOT in the last 48 hours.    Significant Diagnostics:  CXR: I have reviewed all pertinent results/findings within the past 24 hours    VTE Risk Mitigation (From admission, onward)         Ordered     enoxaparin injection 40 mg  2 times daily       01/29/20 2000     Place sequential compression device  Until discontinued      01/23/20 1002     IP VTE LOW RISK PATIENT  Once      01/19/20 2022              ROS        Assessment/Plan:     * Diaphragmatic hernia  69 year old female admitted to SICU with recurrent left diaphragmatic hernia s/p laparoscopic repair 1/21/2020 with recurrence and s/p L thoracotomy, patch reconstruction L hemidiaphragm, chest wall reconstruction with [place]-Rajendra patch 1/29/2020.     - diabetic diet as tolerated  - scheduling simethicone  - wean O2 as tolerated for SaO2 > 90%  - out of bed to chair  - PT/OT to start ambulating  - work on dispo        Radha Wilson MD  Cardiothoracic Surgery  [facility]

## 2020-02-09 NOTE — NURSING
Pt AAOX4. Pain managed with PRN pain meds. Mechanical soft diet, no complaints of nausea or vomiting. Up to bedside commode, adequate urine output. VS stable on 2L NC. Pt ambulated with assist to chair. Son at bedside morning and afternoon. Repositioned. Bed low and locked, call bell within reach. Will continue to monitor.

## 2020-02-10 LAB
ANION GAP SERPL CALC-SCNC: 7 MMOL/L (ref 8–16)
BASOPHILS # BLD AUTO: 0.06 K/UL (ref 0–0.2)
BASOPHILS NFR BLD: 0.9 % (ref 0–1.9)
BUN SERPL-MCNC: 7 MG/DL (ref 8–23)
CALCIUM SERPL-MCNC: 8.6 MG/DL (ref 8.7–10.5)
CHLORIDE SERPL-SCNC: 104 MMOL/L (ref 95–110)
CO2 SERPL-SCNC: 29 MMOL/L (ref 23–29)
CREAT SERPL-MCNC: 0.7 MG/DL (ref 0.5–1.4)
DIFFERENTIAL METHOD: ABNORMAL
EOSINOPHIL # BLD AUTO: 0.5 K/UL (ref 0–0.5)
EOSINOPHIL NFR BLD: 7.9 % (ref 0–8)
ERYTHROCYTE [DISTWIDTH] IN BLOOD BY AUTOMATED COUNT: 14.5 % (ref 11.5–14.5)
EST. GFR  (AFRICAN AMERICAN): >60 ML/MIN/1.73 M^2
EST. GFR  (NON AFRICAN AMERICAN): >60 ML/MIN/1.73 M^2
GLUCOSE SERPL-MCNC: 107 MG/DL (ref 70–110)
HCT VFR BLD AUTO: 33.9 % (ref 37–48.5)
HGB BLD-MCNC: 10.2 G/DL (ref 12–16)
IMM GRANULOCYTES # BLD AUTO: 0.15 K/UL (ref 0–0.04)
IMM GRANULOCYTES NFR BLD AUTO: 2.3 % (ref 0–0.5)
LYMPHOCYTES # BLD AUTO: 2.2 K/UL (ref 1–4.8)
LYMPHOCYTES NFR BLD: 33 % (ref 18–48)
MCH RBC QN AUTO: 27.5 PG (ref 27–31)
MCHC RBC AUTO-ENTMCNC: 30.1 G/DL (ref 32–36)
MCV RBC AUTO: 91 FL (ref 82–98)
MONOCYTES # BLD AUTO: 0.9 K/UL (ref 0.3–1)
MONOCYTES NFR BLD: 14 % (ref 4–15)
NEUTROPHILS # BLD AUTO: 2.8 K/UL (ref 1.8–7.7)
NEUTROPHILS NFR BLD: 41.9 % (ref 38–73)
NRBC BLD-RTO: 0 /100 WBC
PLATELET # BLD AUTO: 533 K/UL (ref 150–350)
PMV BLD AUTO: 8.9 FL (ref 9.2–12.9)
POCT GLUCOSE: 105 MG/DL (ref 70–110)
POCT GLUCOSE: 113 MG/DL (ref 70–110)
POCT GLUCOSE: 121 MG/DL (ref 70–110)
POCT GLUCOSE: 121 MG/DL (ref 70–110)
POTASSIUM SERPL-SCNC: 3.8 MMOL/L (ref 3.5–5.1)
RBC # BLD AUTO: 3.71 M/UL (ref 4–5.4)
SODIUM SERPL-SCNC: 140 MMOL/L (ref 136–145)
WBC # BLD AUTO: 6.58 K/UL (ref 3.9–12.7)

## 2020-02-10 PROCEDURE — 94668 MNPJ CHEST WALL SBSQ: CPT

## 2020-02-10 PROCEDURE — 25000003 PHARM REV CODE 250: Performed by: STUDENT IN AN ORGANIZED HEALTH CARE EDUCATION/TRAINING PROGRAM

## 2020-02-10 PROCEDURE — 25000003 PHARM REV CODE 250: Performed by: PHYSICIAN ASSISTANT

## 2020-02-10 PROCEDURE — 25000242 PHARM REV CODE 250 ALT 637 W/ HCPCS: Performed by: THORACIC SURGERY (CARDIOTHORACIC VASCULAR SURGERY)

## 2020-02-10 PROCEDURE — 27000221 HC OXYGEN, UP TO 24 HOURS

## 2020-02-10 PROCEDURE — 97116 GAIT TRAINING THERAPY: CPT

## 2020-02-10 PROCEDURE — 80048 BASIC METABOLIC PNL TOTAL CA: CPT

## 2020-02-10 PROCEDURE — 94640 AIRWAY INHALATION TREATMENT: CPT

## 2020-02-10 PROCEDURE — 25000003 PHARM REV CODE 250: Performed by: ANESTHESIOLOGY

## 2020-02-10 PROCEDURE — 36415 COLL VENOUS BLD VENIPUNCTURE: CPT

## 2020-02-10 PROCEDURE — 63600175 PHARM REV CODE 636 W HCPCS: Performed by: THORACIC SURGERY (CARDIOTHORACIC VASCULAR SURGERY)

## 2020-02-10 PROCEDURE — 25000242 PHARM REV CODE 250 ALT 637 W/ HCPCS: Performed by: STUDENT IN AN ORGANIZED HEALTH CARE EDUCATION/TRAINING PROGRAM

## 2020-02-10 PROCEDURE — 20600001 HC STEP DOWN PRIVATE ROOM

## 2020-02-10 PROCEDURE — 94664 DEMO&/EVAL PT USE INHALER: CPT

## 2020-02-10 PROCEDURE — 97535 SELF CARE MNGMENT TRAINING: CPT

## 2020-02-10 PROCEDURE — 85025 COMPLETE CBC W/AUTO DIFF WBC: CPT

## 2020-02-10 PROCEDURE — 63600175 PHARM REV CODE 636 W HCPCS: Performed by: STUDENT IN AN ORGANIZED HEALTH CARE EDUCATION/TRAINING PROGRAM

## 2020-02-10 PROCEDURE — 94761 N-INVAS EAR/PLS OXIMETRY MLT: CPT

## 2020-02-10 PROCEDURE — 94799 UNLISTED PULMONARY SVC/PX: CPT

## 2020-02-10 PROCEDURE — 99900035 HC TECH TIME PER 15 MIN (STAT)

## 2020-02-10 RX ORDER — CELECOXIB 200 MG/1
200 CAPSULE ORAL DAILY
Start: 2020-02-11

## 2020-02-10 RX ORDER — ACETAMINOPHEN 325 MG/1
650 TABLET ORAL EVERY 6 HOURS PRN
Refills: 0 | Status: ON HOLD
Start: 2020-02-10 | End: 2020-03-10 | Stop reason: HOSPADM

## 2020-02-10 RX ORDER — IPRATROPIUM BROMIDE AND ALBUTEROL SULFATE 2.5; .5 MG/3ML; MG/3ML
3 SOLUTION RESPIRATORY (INHALATION) EVERY 4 HOURS
Qty: 1 BOX | Refills: 0 | Status: ON HOLD
Start: 2020-02-10 | End: 2020-03-10 | Stop reason: HOSPADM

## 2020-02-10 RX ORDER — AMLODIPINE BESYLATE 10 MG/1
10 TABLET ORAL DAILY
Qty: 30 TABLET | Refills: 11 | Status: ON HOLD
Start: 2020-02-11 | End: 2020-03-10 | Stop reason: HOSPADM

## 2020-02-10 RX ORDER — VANCOMYCIN 1.75 GRAM/500 ML IN 0.9 % SODIUM CHLORIDE INTRAVENOUS
1750
Status: DISCONTINUED | OUTPATIENT
Start: 2020-02-11 | End: 2020-02-12 | Stop reason: HOSPADM

## 2020-02-10 RX ORDER — PREGABALIN 75 MG/1
75 CAPSULE ORAL NIGHTLY
Qty: 30 CAPSULE | Refills: 6 | Status: SHIPPED | OUTPATIENT
Start: 2020-02-10 | End: 2020-02-11

## 2020-02-10 RX ORDER — OXYCODONE HYDROCHLORIDE 5 MG/1
5 TABLET ORAL EVERY 4 HOURS PRN
Qty: 31 TABLET | Refills: 0 | Status: SHIPPED | OUTPATIENT
Start: 2020-02-10 | End: 2020-02-11

## 2020-02-10 RX ORDER — BENZONATATE 100 MG/1
100 CAPSULE ORAL EVERY 8 HOURS
Qty: 30 CAPSULE | Refills: 0
Start: 2020-02-10 | End: 2020-02-20

## 2020-02-10 RX ORDER — CEPHALEXIN 500 MG/1
500 CAPSULE ORAL EVERY 6 HOURS
Qty: 31 CAPSULE | Refills: 0 | Status: SHIPPED | OUTPATIENT
Start: 2020-02-10 | End: 2020-02-11 | Stop reason: HOSPADM

## 2020-02-10 RX ADMIN — IPRATROPIUM BROMIDE AND ALBUTEROL SULFATE 3 ML: .5; 3 SOLUTION RESPIRATORY (INHALATION) at 11:02

## 2020-02-10 RX ADMIN — ENOXAPARIN SODIUM 40 MG: 100 INJECTION SUBCUTANEOUS at 10:02

## 2020-02-10 RX ADMIN — SIMETHICONE CHEW TAB 80 MG 80 MG: 80 TABLET ORAL at 05:02

## 2020-02-10 RX ADMIN — SIMETHICONE CHEW TAB 80 MG 80 MG: 80 TABLET ORAL at 02:02

## 2020-02-10 RX ADMIN — CEPHALEXIN 500 MG: 500 CAPSULE ORAL at 05:02

## 2020-02-10 RX ADMIN — PREGABALIN 75 MG: 75 CAPSULE ORAL at 10:02

## 2020-02-10 RX ADMIN — PANTOPRAZOLE SODIUM 40 MG: 40 TABLET, DELAYED RELEASE ORAL at 10:02

## 2020-02-10 RX ADMIN — IPRATROPIUM BROMIDE AND ALBUTEROL SULFATE 3 ML: .5; 3 SOLUTION RESPIRATORY (INHALATION) at 01:02

## 2020-02-10 RX ADMIN — CELECOXIB 200 MG: 200 CAPSULE ORAL at 10:02

## 2020-02-10 RX ADMIN — OXYCODONE HYDROCHLORIDE 5 MG: 5 TABLET ORAL at 05:02

## 2020-02-10 RX ADMIN — GUAIFENESIN AND DEXTROMETHORPHAN 5 ML: 100; 10 SYRUP ORAL at 12:02

## 2020-02-10 RX ADMIN — Medication 6 MG: at 10:02

## 2020-02-10 RX ADMIN — OXYCODONE HYDROCHLORIDE 5 MG: 5 TABLET ORAL at 12:02

## 2020-02-10 RX ADMIN — CEPHALEXIN 500 MG: 500 CAPSULE ORAL at 10:02

## 2020-02-10 RX ADMIN — ACETYLCYSTEINE 4 ML: 100 INHALANT RESPIRATORY (INHALATION) at 07:02

## 2020-02-10 RX ADMIN — SIMETHICONE CHEW TAB 80 MG 80 MG: 80 TABLET ORAL at 10:02

## 2020-02-10 RX ADMIN — IPRATROPIUM BROMIDE AND ALBUTEROL SULFATE 3 ML: .5; 3 SOLUTION RESPIRATORY (INHALATION) at 03:02

## 2020-02-10 RX ADMIN — IPRATROPIUM BROMIDE AND ALBUTEROL SULFATE 3 ML: .5; 3 SOLUTION RESPIRATORY (INHALATION) at 07:02

## 2020-02-10 RX ADMIN — VANCOMYCIN HYDROCHLORIDE 2500 MG: 1 INJECTION, POWDER, LYOPHILIZED, FOR SOLUTION INTRAVENOUS at 06:02

## 2020-02-10 RX ADMIN — ACETYLCYSTEINE 4 ML: 100 INHALANT RESPIRATORY (INHALATION) at 01:02

## 2020-02-10 RX ADMIN — MICONAZOLE NITRATE: 20 OINTMENT TOPICAL at 10:02

## 2020-02-10 RX ADMIN — BENZONATATE 100 MG: 100 CAPSULE ORAL at 10:02

## 2020-02-10 RX ADMIN — GUAIFENESIN AND DEXTROMETHORPHAN 5 ML: 100; 10 SYRUP ORAL at 05:02

## 2020-02-10 RX ADMIN — BENZONATATE 100 MG: 100 CAPSULE ORAL at 05:02

## 2020-02-10 RX ADMIN — BENZONATATE 100 MG: 100 CAPSULE ORAL at 02:02

## 2020-02-10 RX ADMIN — DOCUSATE SODIUM - SENNOSIDES 1 TABLET: 50; 8.6 TABLET, FILM COATED ORAL at 10:02

## 2020-02-10 RX ADMIN — MICONAZOLE NITRATE: 20 OINTMENT TOPICAL at 09:02

## 2020-02-10 RX ADMIN — AMLODIPINE BESYLATE 10 MG: 10 TABLET ORAL at 10:02

## 2020-02-10 RX ADMIN — Medication 800 MG: at 10:02

## 2020-02-10 RX ADMIN — IPRATROPIUM BROMIDE AND ALBUTEROL SULFATE 3 ML: .5; 3 SOLUTION RESPIRATORY (INHALATION) at 12:02

## 2020-02-10 RX ADMIN — IPRATROPIUM BROMIDE AND ALBUTEROL SULFATE 3 ML: .5; 3 SOLUTION RESPIRATORY (INHALATION) at 04:02

## 2020-02-10 NOTE — PLAN OF CARE
Patient has been accepted to [facility] in [place] and is expected to discharge to facility tomorrow.  Will continue to follow for needs.       02/10/20 0770   Discharge Reassessment   Assessment Type Discharge Planning Reassessment   Discharge Plan A Skilled Nursing Facility   Post-Acute Status   Post-Acute Authorization Placement   Post-Acute Placement Status Set-up Complete

## 2020-02-10 NOTE — PROGRESS NOTES
"[facility]  Thoracic Surgery  Progress Note    Subjective:     History of Present Illness:  Patient is a 69 year old female with PMH of CAD, HTN, TIA, OA, COPD and GERD admitted to SICU after presenting to outside ED on 1/18/20 for worsening SOB, cough and left flank ecchymosis. She reports that on 1/15 after an particularly severe coughing episode she felt a "pop" on left side, with associated severe pain and development of bruising. CT C/A/P showed large left hemidiaphragm hernia with associated widening of 7th intercostal space allowing for bowel to herniate outside of ribs. Transferred to [facility] on 1/19/20 on 2LNC. However, she rapidly declined from a respiratory standpoint. Underwent a laparoscopic reduction and repair of diaphragmatic hernia on 1/21/20. Per the op note, defect itself measured approximately 10 x 15 cm requiring a [place]-Rajendra patch to close the defect. Remained intubated, sedated and paralyzed after the procedure. Extubated and drain removed on 1/24/20. Over the weekend her O2 requirements again increased and had worsening leukocytosis. Chest CT today showed recurrent diaphragmatic hernia. Today she is on 7L comfort flow with decent saturations at rest. Afebrile. Hemodynamically stable. Tolerating a soft diet.     PSH of laparoscopic cholecystectomy, EMILIANO with BSO and right TKR   Never smoker. Denies EtOH use.    Post-Op Info:  Procedure(s) (LRB):  THORACOTOMY (Left)  RECONSTRUCTION, DIAPHRAGM (Left)  RECONSTRUCTION, CHEST WALL (Left)   12 Days Post-Op     Interval History: NAEON. On 2LNC with saturations in high 90s. Good bowel and bladder functioning. Working with PT/OT.     Medications:  Continuous Infusions:  Scheduled Meds:   acetylcysteine 100 mg/ml (10%)  4 mL Nebulization TID WAKE    albuterol-ipratropium  3 mL Nebulization Q4H    amLODIPine  10 mg Oral Daily    benzonatate  100 mg Oral Q8H    celecoxib  200 mg Oral Daily    cephALEXin  500 mg Oral Q6H    " dextromethorphan-guaifenesin  mg/5 ml  5 mL Oral Q6H    enoxaparin  40 mg Subcutaneous BID    magnesium oxide  800 mg Oral BID    miconazole nitrate 2%   Topical (Top) BID    pantoprazole  40 mg Oral Daily    polyethylene glycol  17 g Oral Daily    pregabalin  75 mg Oral QHS    senna-docusate 8.6-50 mg  1 tablet Oral BID    simethicone  1 tablet Oral TID PC     PRN Meds:acetaminophen, bisacodyL, calcium carbonate, Dextrose 10% Bolus, glucagon (human recombinant), hydrALAZINE, insulin aspart U-100, labetalol, melatonin, metoclopramide HCl, ondansetron, ondansetron, oxyCODONE, potassium chloride in water **AND** [DISCONTINUED] potassium chloride in water **AND** [DISCONTINUED] potassium chloride in water, promethazine (PHENERGAN) IVPB, sodium chloride 0.9%     Review of patient's allergies indicates:   Allergen Reactions    Erythromycin      Stomach pains    Rosuvastatin      Hives, muscle/joint pains    Zolpidem      Confusion      Objective:     Vital Signs (Most Recent):  Temp: 97.6 °F (36.4 °C) (02/10/20 0415)  Pulse: 85 (02/10/20 0711)  Resp: 16 (02/10/20 0711)  BP: 136/72 (02/10/20 0415)  SpO2: 95 % (02/10/20 0712) Vital Signs (24h Range):  Temp:  [97.6 °F (36.4 °C)-99.4 °F (37.4 °C)] 97.6 °F (36.4 °C)  Pulse:  [76-97] 85  Resp:  [16-20] 16  SpO2:  [94 %-97 %] 95 %  BP: (131-151)/(62-79) 136/72     Intake/Output - Last 3 Shifts       02/08 0700 - 02/09 0659 02/09 0700 - 02/10 0659 02/10 0700 - 02/11 0659    P.O. 480 580     Total Intake(mL/kg) 480 (4.1) 580 (4.9)     Urine (mL/kg/hr) 1500 (0.5) 2350 (0.8)     Stool 0 3     Total Output 1500 2353     Net -1020 -1773            Stool Occurrence 1 x 1 x           SpO2: 95 %  O2 Device (Oxygen Therapy): nasal cannula w/ humidification    Physical Exam   Constitutional: She appears well-developed and well-nourished. No distress.   Cardiovascular: Normal rate and regular rhythm.   Pulmonary/Chest: Effort normal. No respiratory distress.   Thoracotomy  incision in crease of body fat. Incision skin edges closed but there is some blanching erythema surrounding the incision (improved.) some fibrinous exudate about 4 cm of incision. Covered with silver strip. On 2L NC.   Abdominal: Soft. She exhibits no distension. There is no tenderness.   Incisions covered with steristrips   Skin: Skin is warm and dry.       Significant Labs:  BMP: No results for input(s): GLU, NA, K, CL, CO2, BUN, CREATININE, CALCIUM, MG in the last 48 hours.  CBC: No results for input(s): WBC, RBC, HGB, HCT, PLT, MCV, MCH, MCHC in the last 48 hours.  CMP: No results for input(s): GLU, CALCIUM, ALBUMIN, PROT, NA, K, CO2, CL, BUN, CREATININE, ALKPHOS, ALT, AST, BILITOT in the last 48 hours.    Significant Diagnostics:  CXR: I have reviewed all pertinent results/findings within the past 24 hours    VTE Risk Mitigation (From admission, onward)         Ordered     enoxaparin injection 40 mg  2 times daily      01/29/20 2000     Place sequential compression device  Until discontinued      01/23/20 1002     IP VTE LOW RISK PATIENT  Once      01/19/20 2022              Assessment/Plan:     * Diaphragmatic hernia  69 year old female admitted to SICU with recurrent left diaphragmatic hernia s/p failed laparoscopic repair now s/p diaphragm reconstruction and chest wall reconstruction.     - Pulmonary toilet- Duo Nebs, Acapella   - PRN anti-tussives  - Bowel regimen-Daily stool softener  - Multi-modal pain control- continue Celebrex, Lyrica and PRN narcotics   - PT/OT  - Wound care following    Dispo- Awaiting SNF placement for continued rehab          ANTONY Haider  Thoracic Surgery  [facility]

## 2020-02-10 NOTE — SUBJECTIVE & OBJECTIVE
"Interval History:   DIEUDONNEJETHRO.   States she feels "much better"    Medications:  Continuous Infusions:    Scheduled Meds:   acetylcysteine 100 mg/ml (10%)  4 mL Nebulization TID WAKE    albuterol-ipratropium  3 mL Nebulization Q4H    amLODIPine  10 mg Oral Daily    benzonatate  100 mg Oral Q8H    celecoxib  200 mg Oral Daily    cephALEXin  500 mg Oral Q6H    dextromethorphan-guaifenesin  mg/5 ml  5 mL Oral Q6H    enoxaparin  40 mg Subcutaneous BID    magnesium oxide  800 mg Oral BID    miconazole nitrate 2%   Topical (Top) BID    pantoprazole  40 mg Oral Daily    polyethylene glycol  17 g Oral Daily    pregabalin  75 mg Oral QHS    senna-docusate 8.6-50 mg  1 tablet Oral BID    simethicone  1 tablet Oral TID PC     PRN Meds:acetaminophen, bisacodyL, calcium carbonate, Dextrose 10% Bolus, glucagon (human recombinant), hydrALAZINE, insulin aspart U-100, labetalol, melatonin, metoclopramide HCl, ondansetron, ondansetron, oxyCODONE, potassium chloride in water **AND** [DISCONTINUED] potassium chloride in water **AND** [DISCONTINUED] potassium chloride in water, promethazine (PHENERGAN) IVPB, sodium chloride 0.9%     Review of patient's allergies indicates:   Allergen Reactions    Erythromycin      Stomach pains    Rosuvastatin      Hives, muscle/joint pains    Zolpidem      Confusion      Objective:     Vital Signs (Most Recent):  Temp: 97.6 °F (36.4 °C) (02/10/20 0415)  Pulse: 76 (02/10/20 0701)  Resp: 16 (02/10/20 0415)  BP: 136/72 (02/10/20 0415)  SpO2: 97 % (02/10/20 0415) Vital Signs (24h Range):  Temp:  [97.6 °F (36.4 °C)-99.4 °F (37.4 °C)] 97.6 °F (36.4 °C)  Pulse:  [76-97] 76  Resp:  [16-20] 16  SpO2:  [93 %-97 %] 97 %  BP: (131-151)/(62-79) 136/72     Weight: 118.6 kg (261 lb 7.5 oz)  Body mass index is 49.4 kg/m².    Intake/Output - Last 3 Shifts       02/08 0700 - 02/09 0659 02/09 0700 - 02/10 0659 02/10 0700 - 02/11 0659    P.O. 480 580     Total Intake(mL/kg) 480 (4.1) 580 (4.9)     " Urine (mL/kg/hr) 1500 (0.5) 2350 (0.8)     Stool 0 3     Total Output 1500 2353     Net -1020 -1773            Stool Occurrence 1 x 1 x           Physical Exam   Constitutional: She is oriented to person, place, and time. She appears well-developed and well-nourished. No distress.   HENT:   Head: Normocephalic and atraumatic.   Eyes: Conjunctivae and EOM are normal. Right eye exhibits no discharge. Left eye exhibits no discharge.   Cardiovascular: Normal rate and regular rhythm.   Pulmonary/Chest: Effort normal and breath sounds normal. No respiratory distress.   Abdominal: Soft. She exhibits no distension. There is no tenderness.   Lap incisions are c/d/i   Musculoskeletal: Normal range of motion. She exhibits no deformity.   Neurological: She is alert and oriented to person, place, and time.   Skin: Skin is warm and dry.         Significant Labs:  CBC:   Recent Labs   Lab 02/07/20  0547   WBC 9.01   RBC 3.76*   HGB 10.6*   HCT 33.4*   *   MCV 89   MCH 28.2   MCHC 31.7*     CMP:   No results for input(s): GLU, CALCIUM, ALBUMIN, PROT, NA, K, CO2, CL, BUN, CREATININE, ALKPHOS, ALT, AST, BILITOT in the last 168 hours.    Significant Diagnostics:  I have reviewed all pertinent imaging results/findings within the past 24 hours.

## 2020-02-10 NOTE — PLAN OF CARE
02/10/20 1442   Post-Acute Status   Post-Acute Authorization Placement   Post-Acute Placement Status Accepted Pending Bed Availability   [facility] received auth for pt and pt can be transported tomorrow. KAYLEE received documents that needed to be filled out per facility. SW will send once filled out. KAYLEE updated pt's son, Miguel.    Chula Ram, LMSW [facility]  [id number]

## 2020-02-10 NOTE — PLAN OF CARE
POC reviewed with pt. Verbalized understanding.Pt AAOX'4 on 2L NC.VSS. Pt on mechanical soft diet tolerating well.No complaints of nausea or vomiting.Pt walked to  bedside commode with assist. Pt slept between care. No acute events. Bed in lowest position with call light within reach. TM

## 2020-02-10 NOTE — ASSESSMENT & PLAN NOTE
69 year old female admitted to SICU with recurrent left diaphragmatic hernia s/p failed laparoscopic repair now s/p diaphragm reconstruction and chest wall reconstruction.     - Pulmonary toilet- Duo Nebs, Acapella   - PRN anti-tussives  - Bowel regimen-Daily stool softener  - Multi-modal pain control- continue Celebrex, Lyrica and PRN narcotics   - PT/OT    Dispo- Awaiting SNF placement for continued rehab

## 2020-02-10 NOTE — PLAN OF CARE
Problem: Occupational Therapy Goal  Goal: Occupational Therapy Goal  Description  Goals to be met by: 2/18/20    Patient will increase functional independence with ADLs by performing:    UE Dressing with SBA.  LE Dressing with  CGA .  Grooming while standing with CGA  Toileting from bedside commode with Min A for hygiene and clothing management.   Toilet transfer to bedside commode with CGA        Outcome: Ongoing, Progressing    Progressing with POC.  Yamile Carpio OT  2/10/2020

## 2020-02-10 NOTE — PLAN OF CARE
Problem: Physical Therapy Goal  Goal: Physical Therapy Goal  Description  Goals to be met by:20  Pt re-evaluated and goals updated    Patient will increase functional independence with mobility by performin. Supine to sit with Contact Guard Assistance.  2. Sit to stand transfer with Contact Guard Assistance.= met w/ RW  3. Bed to chair with Contact Guard Assistance using Rolling Walker or LRAD.= met w/ RW  4. Gait  x 100 feet with Contact Guard Assistance using Rolling Walker or LRAD.  5. Lower extremity exercise program x15 reps  with supervision.       Outcome: Ongoing, Progressing

## 2020-02-10 NOTE — PT/OT/SLP PROGRESS
Occupational Therapy   Treatment    Name: Marisela Bunn  MRN: [id number]  Admitting Diagnosis:  Diaphragmatic hernia  12 Days Post-Op   THORACOTOMY (Left)  RECONSTRUCTION, DIAPHRAGM (Left)  RECONSTRUCTION, CHEST WALL (Left)     Recommendations:     Discharge Recommendations: nursing facility, skilled  Discharge Equipment Recommendations:  (TBD)  Barriers to discharge:  None    Assessment:     Marisela Bunn is a 69 y.o. female with a medical diagnosis of Diaphragmatic hernia.  She is agreeable to participate with therapy and tolerates session well. Patient is progressing with POC and is motivated to return to OF. Performance deficits affecting function are weakness, impaired endurance, impaired self care skills, impaired functional mobilty, gait instability, impaired balance, impaired cardiopulmonary response to activity.     Rehab Prognosis:  Good; patient would benefit from acute skilled OT services to address these deficits and reach maximum level of function.       Plan:     Patient to be seen 4 x/week to address the above listed problems via self-care/home management, therapeutic activities, therapeutic exercises  · Plan of Care Expires: 03/05/20  · Plan of Care Reviewed with: patient    Subjective     Pain/Comfort:  · Pain Rating 1: other (see comments)(Did not rate)    Objective:     Communicated with: RN prior to session.  Patient found sitting on BSC with telemetry, oxygen(hep lock IV) upon OT entry to room.    General Precautions: Standard, aspiration, fall, contact   Orthopedic Precautions:N/A   Braces: N/A     Occupational Performance:     Bed Mobility:    · Did not observe    Functional Mobility/Transfers:  · Patient completed sit > stand transfer from the bedside commode with minimum assistance with  rolling walker  · Patient completed Bedside Commode > Bedside Chair Transfer using Step Transfer technique with contact guard assistance with rolling walker  · Functional Mobility: Patient took ~6 steps  from the BSC to the bedside chair with CGA and the RW.    Activities of Daily Living:  · Grooming: set-up assistance Patient participated in oral hygiene and face wash while sitting UIC with set-up assistance.  · Upper Body Dressing: modified independence Patient doffed/donned hospital gowns while sitting UIC with modified independence 2' telemetry management.  · Toileting: maximal assistance Patient completed toileting on the bedside commode with max assist for bre-care with patient able to clean anteriorly but requiring assist posteriorly.      [facility] 6 Click ADL: 16    Treatment & Education:  Role of OT/POC  Call button for assistance    Patient left up in chair with all lines intact, call button in reach and RN notifiedEducation:      GOALS:   Multidisciplinary Problems     Occupational Therapy Goals        Problem: Occupational Therapy Goal    Goal Priority Disciplines Outcome Interventions   Occupational Therapy Goal     OT, PT/OT Ongoing, Progressing    Description:  Goals to be met by: 2/18/20    Patient will increase functional independence with ADLs by performing:    UE Dressing with SBA.  LE Dressing with  CGA .  Grooming while standing with CGA  Toileting from bedside commode with Min A for hygiene and clothing management.   Toilet transfer to bedside commode with CGA                         Time Tracking:     OT Date of Treatment: 02/10/20  OT Start Time: 1028  OT Stop Time: 1053  OT Total Time (min): 25 min    Billable Minutes:Self Care/Home Management 25 minutes    Yamile Carpio OT  2/10/2020

## 2020-02-10 NOTE — SUBJECTIVE & OBJECTIVE
Interval History: NAEON. On 2LNC with saturations in high 90s. Good bowel and bladder functioning. Working with PT/OT.     Medications:  Continuous Infusions:  Scheduled Meds:   acetylcysteine 100 mg/ml (10%)  4 mL Nebulization TID WAKE    albuterol-ipratropium  3 mL Nebulization Q4H    amLODIPine  10 mg Oral Daily    benzonatate  100 mg Oral Q8H    celecoxib  200 mg Oral Daily    cephALEXin  500 mg Oral Q6H    dextromethorphan-guaifenesin  mg/5 ml  5 mL Oral Q6H    enoxaparin  40 mg Subcutaneous BID    magnesium oxide  800 mg Oral BID    miconazole nitrate 2%   Topical (Top) BID    pantoprazole  40 mg Oral Daily    polyethylene glycol  17 g Oral Daily    pregabalin  75 mg Oral QHS    senna-docusate 8.6-50 mg  1 tablet Oral BID    simethicone  1 tablet Oral TID PC     PRN Meds:acetaminophen, bisacodyL, calcium carbonate, Dextrose 10% Bolus, glucagon (human recombinant), hydrALAZINE, insulin aspart U-100, labetalol, melatonin, metoclopramide HCl, ondansetron, ondansetron, oxyCODONE, potassium chloride in water **AND** [DISCONTINUED] potassium chloride in water **AND** [DISCONTINUED] potassium chloride in water, promethazine (PHENERGAN) IVPB, sodium chloride 0.9%     Review of patient's allergies indicates:   Allergen Reactions    Erythromycin      Stomach pains    Rosuvastatin      Hives, muscle/joint pains    Zolpidem      Confusion      Objective:     Vital Signs (Most Recent):  Temp: 97.6 °F (36.4 °C) (02/10/20 0415)  Pulse: 85 (02/10/20 0711)  Resp: 16 (02/10/20 0711)  BP: 136/72 (02/10/20 0415)  SpO2: 95 % (02/10/20 0712) Vital Signs (24h Range):  Temp:  [97.6 °F (36.4 °C)-99.4 °F (37.4 °C)] 97.6 °F (36.4 °C)  Pulse:  [76-97] 85  Resp:  [16-20] 16  SpO2:  [94 %-97 %] 95 %  BP: (131-151)/(62-79) 136/72     Intake/Output - Last 3 Shifts       02/08 0700 - 02/09 0659 02/09 0700 - 02/10 0659 02/10 0700 - 02/11 0659    P.O. 480 580     Total Intake(mL/kg) 480 (4.1) 580 (4.9)     Urine  (mL/kg/hr) 1500 (0.5) 2350 (0.8)     Stool 0 3     Total Output 1500 2353     Net -1020 -1773            Stool Occurrence 1 x 1 x           SpO2: 95 %  O2 Device (Oxygen Therapy): nasal cannula w/ humidification    Physical Exam   Constitutional: She appears well-developed and well-nourished. No distress.   Cardiovascular: Normal rate and regular rhythm.   Pulmonary/Chest: Effort normal. No respiratory distress.   Thoracotomy incision in crease of body fat. Incision skin edges closed but there is some blanching erythema surrounding the incision (improved.) some fibrinous exudate about 4 cm of incision. Covered with silver strip. On 2L NC.   Abdominal: Soft. She exhibits no distension. There is no tenderness.   Incisions covered with steristrips   Skin: Skin is warm and dry.       Significant Labs:  BMP: No results for input(s): GLU, NA, K, CL, CO2, BUN, CREATININE, CALCIUM, MG in the last 48 hours.  CBC: No results for input(s): WBC, RBC, HGB, HCT, PLT, MCV, MCH, MCHC in the last 48 hours.  CMP: No results for input(s): GLU, CALCIUM, ALBUMIN, PROT, NA, K, CO2, CL, BUN, CREATININE, ALKPHOS, ALT, AST, BILITOT in the last 48 hours.    Significant Diagnostics:  CXR: I have reviewed all pertinent results/findings within the past 24 hours    VTE Risk Mitigation (From admission, onward)         Ordered     enoxaparin injection 40 mg  2 times daily      01/29/20 2000     Place sequential compression device  Until discontinued      01/23/20 1002     IP VTE LOW RISK PATIENT  Once      01/19/20 2022

## 2020-02-10 NOTE — PLAN OF CARE
Patient AAOx3, ambulated from bed to chair and to bedside commode with progressing independence throughout the day. Three soft bowel movements became more formed throughout shift. Pain managed by PRN medication per MAR and repositioning. Tolerating mechanical soft diet and oral liquids. Wound care provided. Oral care self-implemented. Intermittent cough with red-tinged sputum. No respiratory distress noted.

## 2020-02-10 NOTE — PT/OT/SLP PROGRESS
Physical Therapy Treatment    Patient Name:  Marisela Bunn   MRN:  [id number]    Recommendations:     Discharge Recommendations:  nursing facility, skilled   Discharge Equipment Recommendations: (TBD)   Barriers to discharge: Decreased caregiver support    Assessment:     Marisela Bunn is a 69 y.o. female admitted with a medical diagnosis of Diaphragmatic hernia.  She presents with the following impairments/functional limitations:  weakness, impaired functional mobilty, impaired endurance, gait instability, impaired cardiopulmonary response to activity . Patient participated well. Transfers w/ CGa and ambulated in room w/ RW and CGA 30 feet. .    Rehab Prognosis: Good; patient would benefit from acute skilled PT services to address these deficits and reach maximum level of function.    Recent Surgery: Procedure(s) (LRB):  THORACOTOMY (Left)  RECONSTRUCTION, DIAPHRAGM (Left)  RECONSTRUCTION, CHEST WALL (Left) 12 Days Post-Op    Plan:     During this hospitalization, patient to be seen 4 x/week to address the identified rehab impairments via gait training, therapeutic activities, therapeutic exercises and progress toward the following goals:    · Plan of Care Expires:  03/05/20    Subjective     Chief Complaint: cold  Patient/Family Comments/goals: reports she is ready to get back in bed; has been up in chair all day;   Pain/Comfort:  · Pain Rating 1: 0/10  · Pain Rating Post-Intervention 1: 0/10      Objective:     Communicated with nurse prior to session.  Patient found up in chair with oxygen upon PT entry to room.     General Precautions: Standard, aspiration, fall, contact   Orthopedic Precautions:N/A   Braces: N/A     Functional Mobility:  · Bed Mobility:     · Sit to Supine: stand by assistance  · Transfers:     · Sit to Stand:  contact guard assistance with rolling walker  · Bed to Chair: contact guard assistance with  rolling walker  using  Step Transfer  · Gait: ambulates w/ RW and CGA 10 feet, seated rest  break on EOB, distance limited by lines. (switched oxygen to tank). patient ambulates w/ RW and CGA w/ chair follow 30 feet, including turns, she does not need a seated rest break to complete gait trial. Forward flexed posture, no LOB, Slow pace      AM-PAC 6 CLICK MOBILITY  Turning over in bed (including adjusting bedclothes, sheets and blankets)?: 3  Sitting down on and standing up from a chair with arms (e.g., wheelchair, bedside commode, etc.): 3  Moving from lying on back to sitting on the side of the bed?: 3  Moving to and from a bed to a chair (including a wheelchair)?: 3  Need to walk in hospital room?: 3  Climbing 3-5 steps with a railing?: 2  Basic Mobility Total Score: 17       Therapeutic Activities and Exercises:   Patient performs BLE exercises, AP, QS, GS, LAQ x20  Patient educated on PT POC, gait and trf technique    Patient left HOB elevated with all lines intact, call button in reach and  present present..    GOALS:   Multidisciplinary Problems     Physical Therapy Goals        Problem: Physical Therapy Goal    Goal Priority Disciplines Outcome Goal Variances Interventions   Physical Therapy Goal     PT, PT/OT Ongoing, Progressing     Description:  Goals to be met by:20  Pt re-evaluated and goals updated    Patient will increase functional independence with mobility by performin. Supine to sit with Contact Guard Assistance.  2. Sit to stand transfer with Contact Guard Assistance.= met w/ RW  3. Bed to chair with Contact Guard Assistance using Rolling Walker or LRAD.= met w/ RW  4. Gait  x 100 feet with Contact Guard Assistance using Rolling Walker or LRAD.  5. Lower extremity exercise program x15 reps  with supervision.                        Time Tracking:     PT Received On: 02/10/20  PT Start Time: 1458     PT Stop Time: 1516  PT Total Time (min): 18 min     Billable Minutes: Gait Training 18    Treatment Type: Treatment  PT/PTA: PT     PTA Visit Number: 0     Carlee MAE  Alex, PT  02/10/2020

## 2020-02-10 NOTE — PLAN OF CARE
Patient is AAOx3, requires 1 person for assistance. Patient c/o incisional pain, PRN pain meds given per orders. Abdominal lap sites CDI. PT and OT are working with the patient. Patient sat up in the chair all day today. Patient remains NSR on telemetry. Monitoring the patient's blood sugar AC&HS. Dermatitis noted under the patient's breast, absorbant sheet sin use. Plan is to discharge the patient tomorrow to a SNF facility. Reminded the patient to call for assistance. Call light and personal items are within reach.

## 2020-02-10 NOTE — PROGRESS NOTES
"[facility]  General Surgery  Progress Note    Subjective:     History of Present Illness:  Marisela Bunn is a 69 y.o. female with PMH CAD, HTN, TIA, GERD presents to the hospital as a direct admission for evaluation of a newly diagnosed diaphragmatic hernia. She initially presented to [facility] ED on 1/18/20 for a 1-month history of a cough, intermittent fevers and dyspnea.  She had been seen by 2 urgent care physicians in the last month for the cough, with 2 different antibiotic courses given without improvement in cough. She reports that on 1/15 after an particularly severe coughing episode she felt a "pop" on left side, with associated severe pain and  Subsequent bruising of the left flank. She reports constant severe pain since that time. She is on daily ASA 81mg,     On ED presentation, patient was hemodynamically stable, H/H 12.4/40.0, breathing on RA. Labs revealed leukocytosis (19.2), lactic acid 2.5 (repeat lactic acid1.6), BMP wnl. CXR revealed left lower lobe pneumonia with possible associated pleural effusion. CT abdomen/pelvis revealed a large left diaphragmatic hernia containing fat  and bowel loops with hernia of intra-abdomnial contents outside the thorax from 7th left ICS. Medium sized HH. CTA revealed large Bochdalek hernia through defect in left hemo-diaphragm, with associated widening of 7th intercostal space. She was started on IV hydration and antibiotics (levofloxacin, zosyn). She reports she has not passed a bowel movement of flatus in 2 days. Intermittent lower quadrant "spasms" while coughing, otherwise no abdominal pain. She reports no other symptoms.    Post-Op Info:  Procedure(s) (LRB):  THORACOTOMY (Left)  RECONSTRUCTION, DIAPHRAGM (Left)  RECONSTRUCTION, CHEST WALL (Left)   12 Days Post-Op     Interval History:   NAEON.   States she feels "much better"    Medications:  Continuous Infusions:    Scheduled Meds:   acetylcysteine 100 mg/ml (10%)  4 mL Nebulization " TID WAKE    albuterol-ipratropium  3 mL Nebulization Q4H    amLODIPine  10 mg Oral Daily    benzonatate  100 mg Oral Q8H    celecoxib  200 mg Oral Daily    cephALEXin  500 mg Oral Q6H    dextromethorphan-guaifenesin  mg/5 ml  5 mL Oral Q6H    enoxaparin  40 mg Subcutaneous BID    magnesium oxide  800 mg Oral BID    miconazole nitrate 2%   Topical (Top) BID    pantoprazole  40 mg Oral Daily    polyethylene glycol  17 g Oral Daily    pregabalin  75 mg Oral QHS    senna-docusate 8.6-50 mg  1 tablet Oral BID    simethicone  1 tablet Oral TID PC     PRN Meds:acetaminophen, bisacodyL, calcium carbonate, Dextrose 10% Bolus, glucagon (human recombinant), hydrALAZINE, insulin aspart U-100, labetalol, melatonin, metoclopramide HCl, ondansetron, ondansetron, oxyCODONE, potassium chloride in water **AND** [DISCONTINUED] potassium chloride in water **AND** [DISCONTINUED] potassium chloride in water, promethazine (PHENERGAN) IVPB, sodium chloride 0.9%     Review of patient's allergies indicates:   Allergen Reactions    Erythromycin      Stomach pains    Rosuvastatin      Hives, muscle/joint pains    Zolpidem      Confusion      Objective:     Vital Signs (Most Recent):  Temp: 97.6 °F (36.4 °C) (02/10/20 0415)  Pulse: 76 (02/10/20 0701)  Resp: 16 (02/10/20 0415)  BP: 136/72 (02/10/20 0415)  SpO2: 97 % (02/10/20 0415) Vital Signs (24h Range):  Temp:  [97.6 °F (36.4 °C)-99.4 °F (37.4 °C)] 97.6 °F (36.4 °C)  Pulse:  [76-97] 76  Resp:  [16-20] 16  SpO2:  [93 %-97 %] 97 %  BP: (131-151)/(62-79) 136/72     Weight: 118.6 kg (261 lb 7.5 oz)  Body mass index is 49.4 kg/m².    Intake/Output - Last 3 Shifts       02/08 0700 - 02/09 0659 02/09 0700 - 02/10 0659 02/10 0700 - 02/11 0659    P.O. 480 580     Total Intake(mL/kg) 480 (4.1) 580 (4.9)     Urine (mL/kg/hr) 1500 (0.5) 2350 (0.8)     Stool 0 3     Total Output 1500 2353     Net -2365 -6791            Stool Occurrence 1 x 1 x           Physical Exam    Constitutional: She is oriented to person, place, and time. She appears well-developed and well-nourished. No distress.   HENT:   Head: Normocephalic and atraumatic.   Eyes: Conjunctivae and EOM are normal. Right eye exhibits no discharge. Left eye exhibits no discharge.   Cardiovascular: Normal rate and regular rhythm.   Pulmonary/Chest: Effort normal and breath sounds normal. No respiratory distress.   Abdominal: Soft. She exhibits no distension. There is no tenderness.   Lap incisions are c/d/i   Musculoskeletal: Normal range of motion. She exhibits no deformity.   Neurological: She is alert and oriented to person, place, and time.   Skin: Skin is warm and dry.         Significant Labs:  CBC:   Recent Labs   Lab 02/07/20  0547   WBC 9.01   RBC 3.76*   HGB 10.6*   HCT 33.4*   *   MCV 89   MCH 28.2   MCHC 31.7*     CMP:   No results for input(s): GLU, CALCIUM, ALBUMIN, PROT, NA, K, CO2, CL, BUN, CREATININE, ALKPHOS, ALT, AST, BILITOT in the last 168 hours.    Significant Diagnostics:  I have reviewed all pertinent imaging results/findings within the past 24 hours.    Assessment/Plan:     * Diaphragmatic hernia  Marisela Bunn is a 69 y.o. female with PMH COPD (not on home oxygen), HTN, HLD (not on anticoagulation) received as direct admission for large diaphragmatic hernia. Patient is symptomatic from hernia with constipation and dyspnea with overlying bruise on left flank. She underwent lap diaphragmatic hernia repair with mesh on 1/21/20.   Evidence of recurrent hernia on CT 1/27. Underwent recurrent L diaphragmatic hernia repair with L chestwall recon with thoracic sx on 1/29. Extubated on 2/2/20.   Transferred to Thoracic team for further mgmt     - Wean O2 as tolerated   - PT/OT consults   - Advanced diet to mechanical soft per Speech recs  - Awaiting dispo         Hugh Alanis MD  General Surgery  [facility]

## 2020-02-10 NOTE — ASSESSMENT & PLAN NOTE
Marisela Bunn is a 69 y.o. female with PMH COPD (not on home oxygen), HTN, HLD (not on anticoagulation) received as direct admission for large diaphragmatic hernia. Patient is symptomatic from hernia with constipation and dyspnea with overlying bruise on left flank. She underwent lap diaphragmatic hernia repair with mesh on 1/21/20.   Evidence of recurrent hernia on CT 1/27. Underwent recurrent L diaphragmatic hernia repair with L chestwall recon with thoracic sx on 1/29. Extubated on 2/2/20.   Transferred to Thoracic team for further mgmt     - Wean O2 as tolerated   - PT/OT consults   - Advanced diet to mechanical soft per Speech recs  - Awaiting dispo

## 2020-02-10 NOTE — PROGRESS NOTES
Pharmacokinetic Initial Assessment: IV Vancomycin    Assessment/Plan:    · Initiate intravenous vancomycin with loading dose of 2500 mg once followed by a maintenance dose of vancomycin 1750mg IV every 12 hours  · Desired empiric serum trough concentration is 15 to 20 mcg/mL  · Draw vancomycin trough level 30 min prior to fourth dose on 2/12/20 at approximately 0500    Pharmacy will continue to follow and monitor vancomycin.      Please contact pharmacy at extension [phone] with any questions regarding this assessment.     Thank you for the consult,   Luba Trejo       Patient brief summary:  Marisela Bunn is a 69 y.o. female initiated on antimicrobial therapy with IV Vancomycin for treatment of suspected skin & soft tissue infection    Drug Allergies:   Review of patient's allergies indicates:   Allergen Reactions    Erythromycin      Stomach pains    Rosuvastatin      Hives, muscle/joint pains    Zolpidem      Confusion        Actual Body Weight:   118.6 kg    Renal Function:   Estimated Creatinine Clearance: 92.4 mL/min (based on SCr of 0.7 mg/dL).,     Dialysis Method (if applicable):  N/A    CBC (last 72 hours):  Recent Labs   Lab Result Units 02/10/20  1503   WBC K/uL 6.58   Hemoglobin g/dL 10.2*   Hematocrit % 33.9*   Platelets K/uL 533*   Gran% % 41.9   Lymph% % 33.0   Mono% % 14.0   Eosinophil% % 7.9   Basophil% % 0.9   Differential Method  Automated       Metabolic Panel (last 72 hours):  Recent Labs   Lab Result Units 02/10/20  1503   Sodium mmol/L 140   Potassium mmol/L 3.8   Chloride mmol/L 104   CO2 mmol/L 29   Glucose mg/dL 107   BUN, Bld mg/dL 7*   Creatinine mg/dL 0.7       Drug levels (last 3 results):  No results for input(s): VANCOMYCINRA, VANCOMYCINPE, VANCOMYCINTR in the last 72 hours.    Microbiologic Results:  Microbiology Results (last 7 days)     ** No results found for the last 168 hours. **

## 2020-02-10 NOTE — PLAN OF CARE
[facility]     Department of Hospital Medicine     [address] [place] [address]     [phone] after hours  [phone] fax       NURSING HOME ORDERS    02/10/2020    Admit to Nursing Home:  Skilled Bed                                                Diagnoses:  Active Hospital Problems    Diagnosis  POA    *Diaphragmatic hernia [K44.9]  Yes    Alteration in skin integrity [R23.9]  Yes    Cough [R05]  Yes    Leukocytosis [D72.829]  Yes      Resolved Hospital Problems    Diagnosis Date Resolved POA    Pneumonia [J18.9] 02/02/2020 Yes    Pre-op chest exam [Z01.811] 01/29/2020 Not Applicable       Patient is homebound due to:  Diaphragmatic hernia    Allergies:  Review of patient's allergies indicates:   Allergen Reactions    Erythromycin      Stomach pains    Rosuvastatin      Hives, muscle/joint pains    Zolpidem      Confusion        Vitals:  Every shift (Skilled Nursing patients)    Diet:Diabetic Dysphagia Mechanical Soft    Acitivities:      - Up in a chair each morning as tolerated   - Ambulate with assistance to bathroom   - Scheduled walks once each shift (every 8 hours)   - May use walker, cane, or self-propelled wheelchair    LABS:  Per facility protocol   CMP, CBC every other day              Vanc trough and random per facility protocol     Nursing Precautions:     - Aspiration precautions:             -  Upright 90 degrees befor during and after meals             -  Suction at bedside          - Fall precautions per nursing home protocol   - Seizure precaution per shelter protocol   - Decubitus precautions:        -  for positioning   - Pressure reducing foam mattress   - Turn patient every two hours. Use wedge pillows to anchor patient    CONSULTS:      Physical Therapy to evaluate and treat     Occupational Therapy to evaluate and treat     Speech Therapy  to evaluate and treat    MISCELLANEOUS CARE:      Routine Skin for  Bedridden Patients:  Apply moisture barrier cream to all    skin folds and wet areas in perineal area daily and after baths and                           all bowel movements.    WOUND CARE:  Wound spray or saline for wound cleaning with all dressing changes.    All wounds to be measured with first dressing changes and every week.    Other Wounds:   Surgical Wound to left lateral chest  Recommend cleansing the incision daily and covering the suture line with aquacel ag. Barrier antifungal cream BID to the perineal area and under breasts          DIABETES CARE:        Check blood sugar:       Fingerstick blood sugar AC and HS      Report CBG < 60 or > 400 to physician.                                          Insulin Sliding Scale          Glucose  Novolog Insulin Subcutaneous        0 - 60   Orange juice or glucose tablet, hold insulin      No insulin   201-250  2 units   251-300  4 units   301-350  6 units   351-400  8 units   >400   10 units then call physician      Medications: Discontinue all previous medication orders, if any. See new list below.     Marisela Bunn   Home Medication Instructions ANGIE:[id number]    Printed on:02/10/20 4111   Medication Information                      acetaminophen (TYLENOL) 325 MG tablet  Take 2 tablets (650 mg total) by mouth every 6 (six) hours as needed.             albuterol-ipratropium (DUO-NEB) 2.5 mg-0.5 mg/3 mL nebulizer solution  Take 3 mLs by nebulization every 4 (four) hours. Rescue             amLODIPine (NORVASC) 10 MG tablet  Take 1 tablet (10 mg total) by mouth once daily.             benzonatate (TESSALON) 100 MG capsule  Take 1 capsule (100 mg total) by mouth every 8 (eight) hours. for 10 days             celecoxib (CELEBREX) 200 MG capsule  Take 1 capsule (200 mg total) by mouth once daily.             sulfamethoxazole- trimethoprim 800-160mg tablet, Oral, Every 12 hours for 7 days.    Stop date 2/18/20 Stop date 2/18/20          oxyCODONE (ROXICODONE) 5  MG immediate release tablet  Take 1 tablet (5 mg total) by mouth every 4 (four) hours as needed for Pain.             pregabalin (LYRICA) 75 MG capsule  Take 1 capsule (75 mg total) by mouth every evening.                       _________________________________  ANTONY Haider  02/10/2020

## 2020-02-11 DIAGNOSIS — K44.0 DIAPHRAGMATIC HERNIA WITH OBSTRUCTION, WITHOUT GANGRENE: Primary | ICD-10-CM

## 2020-02-11 LAB
POCT GLUCOSE: 112 MG/DL (ref 70–110)
POCT GLUCOSE: 121 MG/DL (ref 70–110)

## 2020-02-11 PROCEDURE — 94799 UNLISTED PULMONARY SVC/PX: CPT

## 2020-02-11 PROCEDURE — 94761 N-INVAS EAR/PLS OXIMETRY MLT: CPT

## 2020-02-11 PROCEDURE — 25000003 PHARM REV CODE 250: Performed by: STUDENT IN AN ORGANIZED HEALTH CARE EDUCATION/TRAINING PROGRAM

## 2020-02-11 PROCEDURE — 63600175 PHARM REV CODE 636 W HCPCS: Performed by: STUDENT IN AN ORGANIZED HEALTH CARE EDUCATION/TRAINING PROGRAM

## 2020-02-11 PROCEDURE — 99900035 HC TECH TIME PER 15 MIN (STAT)

## 2020-02-11 PROCEDURE — 20600001 HC STEP DOWN PRIVATE ROOM

## 2020-02-11 PROCEDURE — 25000242 PHARM REV CODE 250 ALT 637 W/ HCPCS: Performed by: THORACIC SURGERY (CARDIOTHORACIC VASCULAR SURGERY)

## 2020-02-11 PROCEDURE — 25000003 PHARM REV CODE 250: Performed by: PHYSICIAN ASSISTANT

## 2020-02-11 PROCEDURE — 63600175 PHARM REV CODE 636 W HCPCS: Performed by: THORACIC SURGERY (CARDIOTHORACIC VASCULAR SURGERY)

## 2020-02-11 PROCEDURE — 94640 AIRWAY INHALATION TREATMENT: CPT

## 2020-02-11 PROCEDURE — 25000242 PHARM REV CODE 250 ALT 637 W/ HCPCS: Performed by: STUDENT IN AN ORGANIZED HEALTH CARE EDUCATION/TRAINING PROGRAM

## 2020-02-11 RX ORDER — PREGABALIN 75 MG/1
75 CAPSULE ORAL NIGHTLY
Qty: 30 CAPSULE | Refills: 4 | Status: ON HOLD | OUTPATIENT
Start: 2020-02-11 | End: 2020-03-10 | Stop reason: SDUPTHER

## 2020-02-11 RX ORDER — OXYCODONE HYDROCHLORIDE 5 MG/1
5 TABLET ORAL EVERY 4 HOURS PRN
Qty: 41 TABLET | Refills: 0 | Status: ON HOLD | OUTPATIENT
Start: 2020-02-11 | End: 2020-03-10 | Stop reason: HOSPADM

## 2020-02-11 RX ORDER — SULFAMETHOXAZOLE AND TRIMETHOPRIM 800; 160 MG/1; MG/1
1 TABLET ORAL 2 TIMES DAILY
Qty: 14 TABLET | Refills: 0
Start: 2020-02-11 | End: 2020-02-18

## 2020-02-11 RX ORDER — SULFAMETHOXAZOLE AND TRIMETHOPRIM 800; 160 MG/1; MG/1
1 TABLET ORAL 2 TIMES DAILY
Status: ON HOLD
Start: 2020-02-11 | End: 2020-03-10 | Stop reason: HOSPADM

## 2020-02-11 RX ORDER — SULFAMETHOXAZOLE AND TRIMETHOPRIM 800; 160 MG/1; MG/1
1 TABLET ORAL 2 TIMES DAILY
Status: DISCONTINUED | OUTPATIENT
Start: 2020-02-11 | End: 2020-02-12 | Stop reason: HOSPADM

## 2020-02-11 RX ORDER — SULFAMETHOXAZOLE AND TRIMETHOPRIM 800; 160 MG/1; MG/1
1 TABLET ORAL 2 TIMES DAILY
Status: DISCONTINUED | OUTPATIENT
Start: 2020-02-11 | End: 2020-02-11

## 2020-02-11 RX ADMIN — Medication 6 MG: at 09:02

## 2020-02-11 RX ADMIN — SULFAMETHOXAZOLE AND TRIMETHOPRIM 1 TABLET: 800; 160 TABLET ORAL at 05:02

## 2020-02-11 RX ADMIN — ENOXAPARIN SODIUM 40 MG: 100 INJECTION SUBCUTANEOUS at 09:02

## 2020-02-11 RX ADMIN — BENZONATATE 100 MG: 100 CAPSULE ORAL at 09:02

## 2020-02-11 RX ADMIN — PANTOPRAZOLE SODIUM 40 MG: 40 TABLET, DELAYED RELEASE ORAL at 10:02

## 2020-02-11 RX ADMIN — IPRATROPIUM BROMIDE AND ALBUTEROL SULFATE 3 ML: .5; 3 SOLUTION RESPIRATORY (INHALATION) at 08:02

## 2020-02-11 RX ADMIN — POLYETHYLENE GLYCOL 3350 17 G: 17 POWDER, FOR SOLUTION ORAL at 10:02

## 2020-02-11 RX ADMIN — Medication 800 MG: at 09:02

## 2020-02-11 RX ADMIN — SULFAMETHOXAZOLE AND TRIMETHOPRIM 1 TABLET: 800; 160 TABLET ORAL at 10:02

## 2020-02-11 RX ADMIN — GUAIFENESIN AND DEXTROMETHORPHAN 5 ML: 100; 10 SYRUP ORAL at 05:02

## 2020-02-11 RX ADMIN — SIMETHICONE CHEW TAB 80 MG 80 MG: 80 TABLET ORAL at 10:02

## 2020-02-11 RX ADMIN — MICONAZOLE NITRATE: 20 OINTMENT TOPICAL at 09:02

## 2020-02-11 RX ADMIN — GUAIFENESIN AND DEXTROMETHORPHAN 5 ML: 100; 10 SYRUP ORAL at 06:02

## 2020-02-11 RX ADMIN — IPRATROPIUM BROMIDE AND ALBUTEROL SULFATE 3 ML: .5; 3 SOLUTION RESPIRATORY (INHALATION) at 03:02

## 2020-02-11 RX ADMIN — GUAIFENESIN AND DEXTROMETHORPHAN 5 ML: 100; 10 SYRUP ORAL at 01:02

## 2020-02-11 RX ADMIN — AMLODIPINE BESYLATE 10 MG: 10 TABLET ORAL at 09:02

## 2020-02-11 RX ADMIN — SIMETHICONE CHEW TAB 80 MG 80 MG: 80 TABLET ORAL at 08:02

## 2020-02-11 RX ADMIN — CELECOXIB 200 MG: 200 CAPSULE ORAL at 10:02

## 2020-02-11 RX ADMIN — BENZONATATE 100 MG: 100 CAPSULE ORAL at 02:02

## 2020-02-11 RX ADMIN — ACETYLCYSTEINE 4 ML: 100 INHALANT RESPIRATORY (INHALATION) at 12:02

## 2020-02-11 RX ADMIN — ACETYLCYSTEINE 4 ML: 100 INHALANT RESPIRATORY (INHALATION) at 08:02

## 2020-02-11 RX ADMIN — CEPHALEXIN 500 MG: 500 CAPSULE ORAL at 05:02

## 2020-02-11 RX ADMIN — IPRATROPIUM BROMIDE AND ALBUTEROL SULFATE 3 ML: .5; 3 SOLUTION RESPIRATORY (INHALATION) at 12:02

## 2020-02-11 RX ADMIN — ENOXAPARIN SODIUM 40 MG: 100 INJECTION SUBCUTANEOUS at 10:02

## 2020-02-11 RX ADMIN — PREGABALIN 75 MG: 75 CAPSULE ORAL at 09:02

## 2020-02-11 RX ADMIN — BENZONATATE 100 MG: 100 CAPSULE ORAL at 05:02

## 2020-02-11 RX ADMIN — GUAIFENESIN AND DEXTROMETHORPHAN 5 ML: 100; 10 SYRUP ORAL at 12:02

## 2020-02-11 RX ADMIN — VANCOMYCIN HYDROCHLORIDE 1750 MG: 100 INJECTION, POWDER, LYOPHILIZED, FOR SOLUTION INTRAVENOUS at 05:02

## 2020-02-11 RX ADMIN — SIMETHICONE CHEW TAB 80 MG 80 MG: 80 TABLET ORAL at 02:02

## 2020-02-11 NOTE — PLAN OF CARE
02/11/20 1156   Post-Acute Status   Post-Acute Authorization Placement   Post-Acute Placement Status Set-up Complete   Pt accepted to [facility]. Transport delayed due to medication change and getting it authorized by facility's DON. Facility requesting pt to be transported first thing tomorrow (2/12/2020). Pt's son, Miguel notified.  SW arranged stretcher transport via [facility]. Requested  time is 6:00 AM on Wednesday 2/12/2020. Requested  time does not guarantee arrival time.  Nurse can call report to [phone] opt. 2.  Ambulance packet given to nurse's station.  Nurse notified of the above.    Chula Ram, BATSHEVA  [facility]  [id number]

## 2020-02-11 NOTE — PROGRESS NOTES
At start of shift, patient had no IV access and IVPB Vancomycin infusion hanging on IV pole from previous shift. Per MAR, medication scanned at 0551. New peripheral IV placed in the left hand and medication restarted at 0756 after bedside handoff. Will continue to monitor.

## 2020-02-11 NOTE — PROGRESS NOTES
[facility]  Cardiothoracic Surgery  Progress Note    Patient Name: Marisela Bunn  MRN: [id number]  Admission Date: 1/19/2020  Hospital Length of Stay: 23 days  Code Status: Full Code   Attending Physician: Primo Soni MD   Referring Provider: Kelly, Provider  Principal Problem:Diaphragmatic hernia    Subjective:     Post-Op Info:  Procedure(s) (LRB):  THORACOTOMY (Left)  RECONSTRUCTION, DIAPHRAGM (Left)  RECONSTRUCTION, CHEST WALL (Left)   13 Days Post-Op     Interval History: NAEON. On 2LNC with saturations in high 90s. Good bowel and bladder functioning. Working with PT/OT. Excited to leave today.      Medications:  Continuous Infusions:  Scheduled Meds:   acetylcysteine 100 mg/ml (10%)  4 mL Nebulization TID WAKE    albuterol-ipratropium  3 mL Nebulization Q4H    amLODIPine  10 mg Oral Daily    benzonatate  100 mg Oral Q8H    celecoxib  200 mg Oral Daily    cephALEXin  500 mg Oral Q6H    dextromethorphan-guaifenesin  mg/5 ml  5 mL Oral Q6H    enoxaparin  40 mg Subcutaneous BID    magnesium oxide  800 mg Oral BID    miconazole nitrate 2%   Topical (Top) BID    pantoprazole  40 mg Oral Daily    polyethylene glycol  17 g Oral Daily    pregabalin  75 mg Oral QHS    senna-docusate 8.6-50 mg  1 tablet Oral BID    simethicone  1 tablet Oral TID PC    vancomycin (VANCOCIN) IVPB  1,750 mg Intravenous Q12H     PRN Meds:acetaminophen, bisacodyL, calcium carbonate, Dextrose 10% Bolus, glucagon (human recombinant), hydrALAZINE, insulin aspart U-100, labetalol, melatonin, metoclopramide HCl, ondansetron, ondansetron, oxyCODONE, potassium chloride in water **AND** [DISCONTINUED] potassium chloride in water **AND** [DISCONTINUED] potassium chloride in water, promethazine (PHENERGAN) IVPB, sodium chloride 0.9%, Pharmacy to dose Vancomycin consult **AND** vancomycin - pharmacy to dose     Review of patient's allergies indicates:   Allergen Reactions    Erythromycin       Stomach pains    Rosuvastatin      Hives, muscle/joint pains    Zolpidem      Confusion      Objective:     Vital Signs (Most Recent):  Temp: 97.6 °F (36.4 °C) (02/11/20 0400)  Pulse: 77 (02/11/20 0715)  Resp: 20 (02/11/20 0400)  BP: 118/72 (02/11/20 0400)  SpO2: (!) 93 % (02/11/20 0000) Vital Signs (24h Range):  Temp:  [97.2 °F (36.2 °C)-98.8 °F (37.1 °C)] 97.6 °F (36.4 °C)  Pulse:  [77-99] 77  Resp:  [12-20] 20  SpO2:  [92 %-98 %] 93 %  BP: (118-163)/(64-79) 118/72     Intake/Output - Last 3 Shifts       02/09 0700 - 02/10 0659 02/10 0700 - 02/11 0659 02/11 0700 - 02/12 0659    P.O. 580 1290     IV Piggyback  500     Total Intake(mL/kg) 580 (4.9) 1790 (15.1)     Urine (mL/kg/hr) 2350 (0.8)      Stool 3      Total Output 2353      Net -1773 +1790            Urine Occurrence  7 x     Stool Occurrence 1 x 2 x           SpO2: (!) 93 %  O2 Device (Oxygen Therapy): room air    Physical Exam   Constitutional: She appears well-developed and well-nourished. No distress.   Cardiovascular: Normal rate and regular rhythm.   Pulmonary/Chest: Effort normal. No respiratory distress.   Thoracotomy incision in crease of body fat. Incision skin edges closed, some fibrinous exudate about 4 cm of incision. Covered with silver strip.   Erythema with induration and pitting edema covering a large portion of skin inferior to the incision.   On 2L NC.   Abdominal: Soft. She exhibits no distension. There is no tenderness.   Incisions covered with steristrips   Skin: Skin is warm and dry.       Significant Labs:  BMP:   Recent Labs   Lab 02/10/20  1503         K 3.8      CO2 29   BUN 7*   CREATININE 0.7   CALCIUM 8.6*     CBC:   Recent Labs   Lab 02/10/20  1503   WBC 6.58   RBC 3.71*   HGB 10.2*   HCT 33.9*   *   MCV 91   MCH 27.5   MCHC 30.1*     CMP:   Recent Labs   Lab 02/10/20  1503      CALCIUM 8.6*      K 3.8   CO2 29      BUN 7*   CREATININE 0.7       Significant Diagnostics:  CXR: I  have reviewed all pertinent results/findings within the past 24 hours    VTE Risk Mitigation (From admission, onward)         Ordered     enoxaparin injection 40 mg  2 times daily      01/29/20 2000     Place sequential compression device  Until discontinued      01/23/20 1002     IP VTE LOW RISK PATIENT  Once      01/19/20 2022              ROS        Assessment/Plan:     * Diaphragmatic hernia  69 year old female admitted to SICU with recurrent left diaphragmatic hernia s/p laparoscopic repair 1/21/2020 with recurrence and s/p L thoracotomy, patch reconstruction L hemidiaphragm, chest wall reconstruction with Fonda-Rajendra patch 1/29/2020.     - diabetic diet as tolerated  - scheduling simethicone  - wean O2 as tolerated for SaO2 > 90%  - out of bed to chair  - Bactrim, vanc  - PT/OT to start ambulating  - work on dispo        Radha Wilson MD  Cardiothoracic Surgery  [facility]

## 2020-02-11 NOTE — PLAN OF CARE
"SW was contacted by Talco Slade ([facility]) via phone, stating that the referral placement is still in progress.     UPDATE: SW was contacted by Pt. son Miguel,checking on his mothers status. SW provided Pt. Son with an update, stating her placement is currently pending.    UPDATE: KAYLEE contacted pt.'s son, to inform him that placement has been made "[facility]" and transportation has been set for 6:00 am.           "

## 2020-02-11 NOTE — ASSESSMENT & PLAN NOTE
69 year old female admitted to SICU with recurrent left diaphragmatic hernia s/p laparoscopic repair 1/21/2020 with recurrence and s/p L thoracotomy, patch reconstruction L hemidiaphragm, chest wall reconstruction with Saxton-Rajendra patch 1/29/2020.     - diabetic diet as tolerated  - scheduling simethicone  - wean O2 as tolerated for SaO2 > 90%  - out of bed to chair  - Bactrim, vanc  - PT/OT to start ambulating  - work on dispo

## 2020-02-11 NOTE — PLAN OF CARE
Plan of care reviewed with pt/verbalized understanding, vss, patient denies pain, patient on mechnical soft diet tolerating well with no c/o of nausea/vomiting, patient up to bedside commode with assistance, call-light within reach, will continue to monitor.

## 2020-02-11 NOTE — SUBJECTIVE & OBJECTIVE
Interval History: NAEON. On 2LNC with saturations in high 90s. Good bowel and bladder functioning. Working with PT/OT. Excited to leave today.      Medications:  Continuous Infusions:  Scheduled Meds:   acetylcysteine 100 mg/ml (10%)  4 mL Nebulization TID WAKE    albuterol-ipratropium  3 mL Nebulization Q4H    amLODIPine  10 mg Oral Daily    benzonatate  100 mg Oral Q8H    celecoxib  200 mg Oral Daily    cephALEXin  500 mg Oral Q6H    dextromethorphan-guaifenesin  mg/5 ml  5 mL Oral Q6H    enoxaparin  40 mg Subcutaneous BID    magnesium oxide  800 mg Oral BID    miconazole nitrate 2%   Topical (Top) BID    pantoprazole  40 mg Oral Daily    polyethylene glycol  17 g Oral Daily    pregabalin  75 mg Oral QHS    senna-docusate 8.6-50 mg  1 tablet Oral BID    simethicone  1 tablet Oral TID PC    vancomycin (VANCOCIN) IVPB  1,750 mg Intravenous Q12H     PRN Meds:acetaminophen, bisacodyL, calcium carbonate, Dextrose 10% Bolus, glucagon (human recombinant), hydrALAZINE, insulin aspart U-100, labetalol, melatonin, metoclopramide HCl, ondansetron, ondansetron, oxyCODONE, potassium chloride in water **AND** [DISCONTINUED] potassium chloride in water **AND** [DISCONTINUED] potassium chloride in water, promethazine (PHENERGAN) IVPB, sodium chloride 0.9%, Pharmacy to dose Vancomycin consult **AND** vancomycin - pharmacy to dose     Review of patient's allergies indicates:   Allergen Reactions    Erythromycin      Stomach pains    Rosuvastatin      Hives, muscle/joint pains    Zolpidem      Confusion      Objective:     Vital Signs (Most Recent):  Temp: 97.6 °F (36.4 °C) (02/11/20 0400)  Pulse: 77 (02/11/20 0715)  Resp: 20 (02/11/20 0400)  BP: 118/72 (02/11/20 0400)  SpO2: (!) 93 % (02/11/20 0000) Vital Signs (24h Range):  Temp:  [97.2 °F (36.2 °C)-98.8 °F (37.1 °C)] 97.6 °F (36.4 °C)  Pulse:  [77-99] 77  Resp:  [12-20] 20  SpO2:  [92 %-98 %] 93 %  BP: (118-163)/(64-79) 118/72     Intake/Output - Last 3  Shifts       02/09 0700 - 02/10 0659 02/10 0700 - 02/11 0659 02/11 0700 - 02/12 0659    P.O. 580 1290     IV Piggyback  500     Total Intake(mL/kg) 580 (4.9) 1790 (15.1)     Urine (mL/kg/hr) 2350 (0.8)      Stool 3      Total Output 2353      Net -1773 +1790            Urine Occurrence  7 x     Stool Occurrence 1 x 2 x           SpO2: (!) 93 %  O2 Device (Oxygen Therapy): room air    Physical Exam   Constitutional: She appears well-developed and well-nourished. No distress.   Cardiovascular: Normal rate and regular rhythm.   Pulmonary/Chest: Effort normal. No respiratory distress.   Thoracotomy incision in crease of body fat. Incision skin edges closed, some fibrinous exudate about 4 cm of incision. Covered with silver strip.   Erythema with induration and pitting edema covering a large portion of skin inferior to the incision.   On 2L NC.   Abdominal: Soft. She exhibits no distension. There is no tenderness.   Incisions covered with steristrips   Skin: Skin is warm and dry.       Significant Labs:  BMP:   Recent Labs   Lab 02/10/20  1503         K 3.8      CO2 29   BUN 7*   CREATININE 0.7   CALCIUM 8.6*     CBC:   Recent Labs   Lab 02/10/20  1503   WBC 6.58   RBC 3.71*   HGB 10.2*   HCT 33.9*   *   MCV 91   MCH 27.5   MCHC 30.1*     CMP:   Recent Labs   Lab 02/10/20  1503      CALCIUM 8.6*      K 3.8   CO2 29      BUN 7*   CREATININE 0.7       Significant Diagnostics:  CXR: I have reviewed all pertinent results/findings within the past 24 hours    VTE Risk Mitigation (From admission, onward)         Ordered     enoxaparin injection 40 mg  2 times daily      01/29/20 2000     Place sequential compression device  Until discontinued      01/23/20 1002     IP VTE LOW RISK PATIENT  Once      01/19/20 2022              SAFIA

## 2020-02-12 VITALS
HEIGHT: 61 IN | BODY MASS INDEX: 49.36 KG/M2 | OXYGEN SATURATION: 95 % | WEIGHT: 261.44 LBS | TEMPERATURE: 98 F | SYSTOLIC BLOOD PRESSURE: 142 MMHG | DIASTOLIC BLOOD PRESSURE: 64 MMHG | HEART RATE: 90 BPM | RESPIRATION RATE: 18 BRPM

## 2020-02-12 LAB — POCT GLUCOSE: 106 MG/DL (ref 70–110)

## 2020-02-12 PROCEDURE — 94761 N-INVAS EAR/PLS OXIMETRY MLT: CPT

## 2020-02-12 PROCEDURE — 25000003 PHARM REV CODE 250: Performed by: STUDENT IN AN ORGANIZED HEALTH CARE EDUCATION/TRAINING PROGRAM

## 2020-02-12 PROCEDURE — 25000242 PHARM REV CODE 250 ALT 637 W/ HCPCS: Performed by: STUDENT IN AN ORGANIZED HEALTH CARE EDUCATION/TRAINING PROGRAM

## 2020-02-12 PROCEDURE — 94640 AIRWAY INHALATION TREATMENT: CPT

## 2020-02-12 PROCEDURE — 25000003 PHARM REV CODE 250: Performed by: ANESTHESIOLOGY

## 2020-02-12 RX ADMIN — OXYCODONE HYDROCHLORIDE 5 MG: 5 TABLET ORAL at 06:02

## 2020-02-12 RX ADMIN — GUAIFENESIN AND DEXTROMETHORPHAN 5 ML: 100; 10 SYRUP ORAL at 12:02

## 2020-02-12 RX ADMIN — IPRATROPIUM BROMIDE AND ALBUTEROL SULFATE 3 ML: .5; 3 SOLUTION RESPIRATORY (INHALATION) at 12:02

## 2020-02-12 RX ADMIN — IPRATROPIUM BROMIDE AND ALBUTEROL SULFATE 3 ML: .5; 3 SOLUTION RESPIRATORY (INHALATION) at 04:02

## 2020-02-12 RX ADMIN — OXYCODONE HYDROCHLORIDE 5 MG: 5 TABLET ORAL at 12:02

## 2020-02-12 NOTE — NURSING
Called MD about pt's IV access and Vancomycin. Pt refused to get new IV and MD stated that the Team would be notified in the morning about pt refusing Iv for medication.

## 2020-02-12 NOTE — PLAN OF CARE
Patient discharged to [facility] in [place] today.    Future Appointments   Date Time Provider Department Center   3/3/2020 10:30 AM Primo Soni MD [facility] THORAC [facility]          02/12/20 0932   Final Note   Assessment Type Final Discharge Note   Anticipated Discharge Disposition SNF   Hospital Follow Up  Appt(s) scheduled? Yes   Right Care Referral Info   Post Acute Recommendation SNF / Sub-Acute Rehab   Post-Acute Status   Post-Acute Authorization Placement   Post-Acute Placement Status Set-up Complete

## 2020-02-12 NOTE — DISCHARGE SUMMARY
"[facility]  Thoracic Surgery  Discharge Summary    Patient Name: Marisela Bunn  MRN: [id number]  Admission Date: 1/19/2020  Hospital Length of Stay: 24 days  Discharge Date and Time:  02/12/2020 6:46 AM  Attending Physician: Primo Soni MD   Discharging Provider: ANTONY Haider  Primary Care Provider: Primary Doctor No    HPI:   Patient is a 69 year old female with PMH of CAD, HTN, TIA, OA, COPD and GERD admitted to SICU after presenting to outside ED on 1/18/20 for worsening SOB, cough and left flank ecchymosis. She reports that on 1/15 after an particularly severe coughing episode she felt a "pop" on left side, with associated severe pain and development of bruising. CT C/A/P showed large left hemidiaphragm hernia with associated widening of 7th intercostal space allowing for bowel to herniate outside of ribs. Transferred to [facility] on 1/19/20 on 2LNC. However, she rapidly declined from a respiratory standpoint. Underwent a laparoscopic reduction and repair of diaphragmatic hernia on 1/21/20. Per the op note, defect itself measured approximately 10 x 15 cm requiring a [place]-Rajendra patch to close the defect. Remained intubated, sedated and paralyzed after the procedure. Extubated and drain removed on 1/24/20. Over the weekend her O2 requirements again increased and had worsening leukocytosis. Chest CT today showed recurrent diaphragmatic hernia. Today she is on 7L comfort flow with decent saturations at rest. Afebrile. Hemodynamically stable. Tolerating a soft diet.     PSH of laparoscopic cholecystectomy, EMILIANO with BSO and right TKR   Never smoker. Denies EtOH use.    Procedure(s) (LRB):  THORACOTOMY (Left)  RECONSTRUCTION, DIAPHRAGM (Left)  RECONSTRUCTION, CHEST WALL (Left)      Hospital Course: 1/19/20- Transferred from outside facility for evaluation of newly diagnosed diaphragm and costal hernia. Initally stable but rapidly declined from a respiratory standpoint.   1/21/20- " Laparoscopic reduction and repair of diaphragmatic hernia. Defect itself measured approximately 10 x 15 cm requiring a [place]-Rajendra patch to close the defect. Remained intubated, sedated and paralyzed after the procedure.   1/24/20- Extubated and drain removed. Cleared for dysphagia soft diet by SLP.   1/27/20- Over the weekend her O2 requirements again increased and had worsening leukocytosis. Chest CT showed recurrent diaphragmatic hernia. Thoracic surgery consulted.   1/28/20- Preop aggressive bowel regimen initiated.   1/29/20- Left lateral thoracotomy, mobilization and reduction of herniated intrathoracicand abdominal viscera, patch reconstruction of left hemidiaphragm, chest wall reconstruction with [place]-Rajendra patch, DREW drain insertion, left chest tube insertion. Erector spinae catheter placed for post- thoracotomy pain control.   1/30/20- Kept intubated, sedated and paralyzed. Diuresis. Weaned vent but kept PEEP somewhat high for lung recruitment.  2/1/20- Paralytic turned off. Marginal UOP. HDS. Afebrile. Drains to suction.  2/3/20- Extubated. Weak/hoarse voice. SLP re-consulted. OOB. Continued pain control with erector spinae block.   2/4/20- Cleared for dysphagia soft with aspiration precautions. NC O2 weaned to 4L. Return of bowel function. Chest tube and DREW drain removed.   2/5/20- Stepped down. Breathing comfortably on 3LNC.   2/6/20- Thoracic surgery took over as primary service. Wound care consulted for erythematous region below left thoracotomy and candidal prophylaxis.   2/7/20- Started of prophylactic antibiotics and topical antifungals . No evidence of incision or wound infection. PT/OT working with patient and recommending SNF.  2/10/20- Stable on 2LNC with saturations nearly 1.00%. Tolerating diet. Normal BM. Ambulating with PT.   2/11/20- Changed antibiotics to Bactrim for prophylaxis and topical antifungals. No evidence of incision or wound infection Erythema greatly improved. Pain well controlled.  On 2LNC. Stable for discharge to SNF in am.          Consults (From admission, onward)        Status Ordering Provider     Inpatient consult to Cardiothoracic Surgery  Once     Provider:  (Not yet assigned)    Completed MADALYN GREENWOOD     Inpatient consult to Pulmonology  Once     Provider:  (Not yet assigned)    Completed SUSU SPARKS     Inpatient consult to Registered Dietitian/Nutritionist  Once     Provider:  (Not yet assigned)    Completed ENA DHILLON     Inpatient consult to Social Work  Once     Provider:  (Not yet assigned)    Acknowledged SUSU SPARKS     Pharmacy to dose Vancomycin consult  Once     Provider:  (Not yet assigned)    Acknowledged ALL MAY          Significant Diagnostic Studies: Radiology: X-Ray: CXR: X-Ray Chest 1 View (CXR):   Results for orders placed or performed during the hospital encounter of 01/19/20   X-Ray Chest 1 View    Narrative    EXAMINATION:  XR CHEST 1 VIEW    CLINICAL HISTORY:  s/p diaphragmatic hernia repair;    TECHNIQUE:  Single frontal view of the chest was performed.    COMPARISON:  02/04/2020.    FINDINGS:  Left thoracostomy tube has been removed.  Skin staples project over the lateral left chest wall.    Current examination was obtained with the patient in apical lordosis.  There is stable opacification of the left lower lung zone similar to 02/04/2020.  Left mid and upper lung zones and right lung remain clear.    I detect no disease in the aerated portion of the left lung or in the right lung.  There is no right pleural fluid.  I detect no left or right pneumothorax and no pneumomediastinum, pneumoperitoneum, cardiac decompensation or significant osseous abnormality.      Impression    Stable postoperative appearance of the chest.      Electronically signed by: Merline Sunshine MD  Date:    02/11/2020  Time:    08:17       Pending Diagnostic Studies:     None        Final Active Diagnoses:    Diagnosis Date Noted POA    PRINCIPAL PROBLEM:   Diaphragmatic hernia [K44.9] 01/19/2020 Yes    Alteration in skin integrity [R23.9] 01/31/2020 Yes    Cough [R05]  Yes    Leukocytosis [D72.829]  Yes      Problems Resolved During this Admission:    Diagnosis Date Noted Date Resolved POA    Pneumonia [J18.9] 01/21/2020 02/02/2020 Yes    Pre-op chest exam [Z01.811]  01/29/2020 Not Applicable      Discharged Condition: good    Disposition: Skilled Nursing Facility    Follow Up:    Patient Instructions:      Diet diabetic     Lifting restrictions   Order Comments: No pulling, pushing or lifting more than 20 pounds.     Notify your health care provider if you experience any of the following:  temperature >100.4     Notify your health care provider if you experience any of the following:  persistent nausea and vomiting or diarrhea     Notify your health care provider if you experience any of the following:  severe uncontrolled pain     Notify your health care provider if you experience any of the following:  redness, tenderness, or signs of infection (pain, swelling, redness, odor or green/yellow discharge around incision site)     Notify your health care provider if you experience any of the following:  difficulty breathing or increased cough     Notify your health care provider if you experience any of the following:  severe persistent headache     Notify your health care provider if you experience any of the following:  worsening rash     Notify your health care provider if you experience any of the following:  persistent dizziness, light-headedness, or visual disturbances     Notify your health care provider if you experience any of the following:  increased confusion or weakness     Change dressing (specify)   Order Comments: Recommend cleansing the incision daily and covering the suture line with aquacel ag. Barrier antifungal cream BID to the perineal area and under breasts     Activity as tolerated     Medications:  Reconciled Home Medications:       Medication List      START taking these medications    acetaminophen 325 MG tablet  Commonly known as:  TYLENOL  Take 2 tablets (650 mg total) by mouth every 6 (six) hours as needed.     albuterol-ipratropium 2.5 mg-0.5 mg/3 mL nebulizer solution  Commonly known as:  DUO-NEB  Take 3 mLs by nebulization every 4 (four) hours. Rescue     amLODIPine 10 MG tablet  Commonly known as:  NORVASC  Take 1 tablet (10 mg total) by mouth once daily.     benzonatate 100 MG capsule  Commonly known as:  TESSALON  Take 1 capsule (100 mg total) by mouth every 8 (eight) hours. for 10 days     celecoxib 200 MG capsule  Commonly known as:  CeleBREX  Take 1 capsule (200 mg total) by mouth once daily.     oxyCODONE 5 MG immediate release tablet  Commonly known as:  ROXICODONE  Take 1 tablet (5 mg total) by mouth every 4 (four) hours as needed for Pain.     pregabalin 75 MG capsule  Commonly known as:  LYRICA  Take 1 capsule (75 mg total) by mouth every evening.     * sulfamethoxazole-trimethoprim 800-160mg 800-160 mg Tab  Commonly known as:  BACTRIM DS  Take 1 tablet by mouth 2 (two) times daily. for 7 days     * sulfamethoxazole-trimethoprim 800-160mg 800-160 mg Tab  Commonly known as:  BACTRIM DS  Take 1 tablet by mouth 2 (two) times daily.         * This list has 2 medication(s) that are the same as other medications prescribed for you. Read the directions carefully, and ask your doctor or other care provider to review them with you.                ANTONY Haider  Thoracic Surgery  [facility]

## 2020-02-12 NOTE — PLAN OF CARE
Plan of care reviewed with pt.Verbalized understanding.AAOX'4.VSS.Patient on mechnical soft diet tolerating well with no c/o of nausea or vomiting.Patient up to bedside commode with assistance. Pain managed with prn meds as per MD's order.No acute events.Bed in lowest position with call light within reach. WCTM.

## 2020-02-12 NOTE — PLAN OF CARE
Plan of care reviewed with patient at 1400. Patient verbalized understanding. All questions and concerns addressed today. Patient remains free from injury and falls. No acute events today. New peripheral IV inserted today. Timing on Vancomycin changed by PharmD due to late scheduled dose from previous shift. Patient to be transferred to [facility] tomorrow and night RN to call report at 0600. Patient and charge nurse given information to expedite transporting tomorrow. Patient is progressing towards goals. Will continue to monitor. See flowsheets for full assessments and vitals throughout shift.

## 2020-03-01 NOTE — PROGRESS NOTES
Subjective:       Patient ID: Marisela Bunn is a 69 y.o. female.    Chief Complaint: Post-op Evaluation    Diagnosis:  Diaphragmatic Hernia    Pre-operative therapy: 1/21/20- Laparoscopic reduction and repair of diaphragmatic hernia. Defect itself measured approximately 10 x 15 cm requiring a [place]-Rajendra patch to close the defect.    Procedure(s) and date(s): 1/29/20- Left lateral thoracotomy, mobilization and reduction of herniated intrathoracicand abdominal viscera, patch reconstruction of left hemidiaphragm, chest wall reconstruction with [place]-Rajendra patch, DREW drain insertion, left chest tube insertion.    Pathology:   A. Pleural Effusion- Fibrinopurulent exudate. Negative for malignancy.  B. Hernia Sac- Mesothelial-lined soft tissue consistent with hernia sac, inflamed. No malignancy identified.     Post-operative therapy: Wound debridement and VAC placement     HPI   69 year old female returns for follow up after hospitalization from 1/19/20- 2/11/20 for management of complicated diaphragmatic hernia with extension out to chest wall. Underwent repair laparoscopically and via left thoracotomy. Transferred to SNF on prophylactic Bactrim and topical antifungals for wound erythema. Today she reports doing well. Slowly increasing endurance and strength with PT/OT. She does endorse evening coughing episodes. Biggest concern is ongoing wound issues.     Review of Systems   Constitutional: Negative for activity change and appetite change.   HENT: Positive for postnasal drip, rhinorrhea and sinus pressure. Negative for trouble swallowing and voice change.    Eyes: Negative for visual disturbance.   Respiratory: Positive for cough. Negative for shortness of breath and wheezing.    Cardiovascular: Negative for chest pain, palpitations and leg swelling.   Gastrointestinal: Negative for abdominal distention, abdominal pain and vomiting.   Genitourinary: Negative for difficulty urinating.   Musculoskeletal: Negative for  "arthralgias and myalgias.   Skin: Positive for wound.   Neurological: Negative for dizziness, syncope, weakness and numbness.   Hematological: Negative for adenopathy.   Psychiatric/Behavioral: Negative for confusion.         Objective:       Vitals:    03/03/20 1028   BP: 115/65   Pulse: 77   Temp: 97.5 °F (36.4 °C)   Weight: 118.4 kg (261 lb)   Height: 5' 1" (1.549 m)   PainSc: 0-No pain       Physical Exam   Constitutional: She is oriented to person, place, and time. She appears well-developed and well-nourished.   HENT:   Mouth/Throat: Oropharynx is clear and moist.   Neck: Normal range of motion.   Cardiovascular: Normal rate, regular rhythm, normal heart sounds and intact distal pulses.   Pulmonary/Chest: Effort normal and breath sounds normal. She has no wheezes.   Anterior aspect of left lateral thoracotomy open about 4-5cm x 3cm deep. Edges erythematous and surrounding tissue firm. Good granulation tissue. Adhesive rash. No odor. No purulent drainage.   Abdominal: Soft. Bowel sounds are normal. She exhibits no distension. There is no tenderness.   Lap sites well healed   Musculoskeletal: She exhibits no edema.   Lymphadenopathy:     She has no cervical adenopathy.   Neurological: She is alert and oriented to person, place, and time.   Psychiatric: She has a normal mood and affect.         Assessment:       69 year old female returns for follow up after hospitalization from for management of complicated diaphragmatic hernia with extension to chest wall.     Plan:       Wound debrided in clinic today and packed with wet to dry dressing. Every other staple removed.   Plan for surgical debridement and wound VAC placement on 3/9/20. Will admit to [facility] on 3/8/20 for additional CT imaging.     Additional SNF orders:   - Please pack wound with wet to dry gauze until surgical debridement  - Please continue patient on 200mg Tessalon pearles tid  - Please start patient on daily antihistamine          "

## 2020-03-03 ENCOUNTER — OFFICE VISIT (OUTPATIENT)
Dept: CARDIOTHORACIC SURGERY | Facility: CLINIC | Age: 70
End: 2020-03-03
Payer: COMMERCIAL

## 2020-03-03 VITALS
DIASTOLIC BLOOD PRESSURE: 65 MMHG | SYSTOLIC BLOOD PRESSURE: 115 MMHG | HEIGHT: 61 IN | TEMPERATURE: 98 F | WEIGHT: 261 LBS | HEART RATE: 77 BPM | BODY MASS INDEX: 49.28 KG/M2

## 2020-03-03 DIAGNOSIS — K44.9 DIAPHRAGMATIC HERNIA WITHOUT OBSTRUCTION AND WITHOUT GANGRENE: Primary | ICD-10-CM

## 2020-03-03 PROCEDURE — 99024 PR POST-OP FOLLOW-UP VISIT: ICD-10-PCS | Mod: S$GLB,,, | Performed by: THORACIC SURGERY (CARDIOTHORACIC VASCULAR SURGERY)

## 2020-03-03 PROCEDURE — 99024 POSTOP FOLLOW-UP VISIT: CPT | Mod: S$GLB,,, | Performed by: THORACIC SURGERY (CARDIOTHORACIC VASCULAR SURGERY)

## 2020-03-03 PROCEDURE — 99999 PR PBB SHADOW E&M-EST. PATIENT-LVL IV: CPT | Mod: PBBFAC,,, | Performed by: THORACIC SURGERY (CARDIOTHORACIC VASCULAR SURGERY)

## 2020-03-03 PROCEDURE — 99999 PR PBB SHADOW E&M-EST. PATIENT-LVL IV: ICD-10-PCS | Mod: PBBFAC,,, | Performed by: THORACIC SURGERY (CARDIOTHORACIC VASCULAR SURGERY)

## 2020-03-06 ENCOUNTER — ANESTHESIA EVENT (OUTPATIENT)
Dept: SURGERY | Facility: HOSPITAL | Age: 70
DRG: 902 | End: 2020-03-06
Payer: COMMERCIAL

## 2020-03-08 ENCOUNTER — HOSPITAL ENCOUNTER (INPATIENT)
Facility: HOSPITAL | Age: 70
LOS: 4 days | Discharge: HOME HEALTH | DRG: 902 | End: 2020-03-12
Attending: THORACIC SURGERY (CARDIOTHORACIC VASCULAR SURGERY) | Admitting: THORACIC SURGERY (CARDIOTHORACIC VASCULAR SURGERY)
Payer: COMMERCIAL

## 2020-03-08 DIAGNOSIS — L08.9 WOUND INFECTION: ICD-10-CM

## 2020-03-08 DIAGNOSIS — T81.30XA WOUND DEHISCENCE: ICD-10-CM

## 2020-03-08 DIAGNOSIS — T14.8XXA WOUND INFECTION: ICD-10-CM

## 2020-03-08 DIAGNOSIS — K44.9 DIAPHRAGMATIC HERNIA WITHOUT OBSTRUCTION AND WITHOUT GANGRENE: Primary | ICD-10-CM

## 2020-03-08 LAB
ANION GAP SERPL CALC-SCNC: 10 MMOL/L (ref 8–16)
APTT BLDCRRT: 23.9 SEC (ref 21–32)
BASOPHILS # BLD AUTO: 0.02 K/UL (ref 0–0.2)
BASOPHILS NFR BLD: 0.3 % (ref 0–1.9)
BUN SERPL-MCNC: 9 MG/DL (ref 8–23)
CALCIUM SERPL-MCNC: 8.9 MG/DL (ref 8.7–10.5)
CHLORIDE SERPL-SCNC: 102 MMOL/L (ref 95–110)
CO2 SERPL-SCNC: 27 MMOL/L (ref 23–29)
CREAT SERPL-MCNC: 0.7 MG/DL (ref 0.5–1.4)
DIFFERENTIAL METHOD: ABNORMAL
EOSINOPHIL # BLD AUTO: 0.3 K/UL (ref 0–0.5)
EOSINOPHIL NFR BLD: 3.8 % (ref 0–8)
ERYTHROCYTE [DISTWIDTH] IN BLOOD BY AUTOMATED COUNT: 14.6 % (ref 11.5–14.5)
EST. GFR  (AFRICAN AMERICAN): >60 ML/MIN/1.73 M^2
EST. GFR  (NON AFRICAN AMERICAN): >60 ML/MIN/1.73 M^2
GLUCOSE SERPL-MCNC: 102 MG/DL (ref 70–110)
HCT VFR BLD AUTO: 36.1 % (ref 37–48.5)
HGB BLD-MCNC: 10.6 G/DL (ref 12–16)
IMM GRANULOCYTES # BLD AUTO: 0.03 K/UL (ref 0–0.04)
IMM GRANULOCYTES NFR BLD AUTO: 0.4 % (ref 0–0.5)
INR PPP: 0.9 (ref 0.8–1.2)
LYMPHOCYTES # BLD AUTO: 2.6 K/UL (ref 1–4.8)
LYMPHOCYTES NFR BLD: 36.8 % (ref 18–48)
MCH RBC QN AUTO: 25.9 PG (ref 27–31)
MCHC RBC AUTO-ENTMCNC: 29.4 G/DL (ref 32–36)
MCV RBC AUTO: 88 FL (ref 82–98)
MONOCYTES # BLD AUTO: 0.7 K/UL (ref 0.3–1)
MONOCYTES NFR BLD: 10.1 % (ref 4–15)
NEUTROPHILS # BLD AUTO: 3.4 K/UL (ref 1.8–7.7)
NEUTROPHILS NFR BLD: 48.6 % (ref 38–73)
NRBC BLD-RTO: 0 /100 WBC
PLATELET # BLD AUTO: 423 K/UL (ref 150–350)
PMV BLD AUTO: 9.8 FL (ref 9.2–12.9)
POCT GLUCOSE: 99 MG/DL (ref 70–110)
POTASSIUM SERPL-SCNC: 2.9 MMOL/L (ref 3.5–5.1)
PROTHROMBIN TIME: 9.6 SEC (ref 9–12.5)
RBC # BLD AUTO: 4.09 M/UL (ref 4–5.4)
SODIUM SERPL-SCNC: 139 MMOL/L (ref 136–145)
WBC # BLD AUTO: 7.04 K/UL (ref 3.9–12.7)

## 2020-03-08 PROCEDURE — 85730 THROMBOPLASTIN TIME PARTIAL: CPT

## 2020-03-08 PROCEDURE — 85610 PROTHROMBIN TIME: CPT

## 2020-03-08 PROCEDURE — 25500020 PHARM REV CODE 255: Performed by: STUDENT IN AN ORGANIZED HEALTH CARE EDUCATION/TRAINING PROGRAM

## 2020-03-08 PROCEDURE — 63600175 PHARM REV CODE 636 W HCPCS: Performed by: STUDENT IN AN ORGANIZED HEALTH CARE EDUCATION/TRAINING PROGRAM

## 2020-03-08 PROCEDURE — 25000003 PHARM REV CODE 250: Performed by: STUDENT IN AN ORGANIZED HEALTH CARE EDUCATION/TRAINING PROGRAM

## 2020-03-08 PROCEDURE — 36415 COLL VENOUS BLD VENIPUNCTURE: CPT

## 2020-03-08 PROCEDURE — 85025 COMPLETE CBC W/AUTO DIFF WBC: CPT

## 2020-03-08 PROCEDURE — 20600001 HC STEP DOWN PRIVATE ROOM

## 2020-03-08 PROCEDURE — 80048 BASIC METABOLIC PNL TOTAL CA: CPT

## 2020-03-08 PROCEDURE — 25000242 PHARM REV CODE 250 ALT 637 W/ HCPCS: Performed by: FAMILY MEDICINE

## 2020-03-08 RX ORDER — LIDOCAINE HYDROCHLORIDE 10 MG/ML
1 INJECTION, SOLUTION EPIDURAL; INFILTRATION; INTRACAUDAL; PERINEURAL ONCE
Status: DISCONTINUED | OUTPATIENT
Start: 2020-03-08 | End: 2020-03-08

## 2020-03-08 RX ORDER — ACETAMINOPHEN 325 MG/1
650 TABLET ORAL EVERY 8 HOURS PRN
Status: DISCONTINUED | OUTPATIENT
Start: 2020-03-08 | End: 2020-03-12 | Stop reason: HOSPADM

## 2020-03-08 RX ORDER — SODIUM CHLORIDE 0.9 % (FLUSH) 0.9 %
10 SYRINGE (ML) INJECTION
Status: DISCONTINUED | OUTPATIENT
Start: 2020-03-08 | End: 2020-03-12 | Stop reason: HOSPADM

## 2020-03-08 RX ORDER — ONDANSETRON 8 MG/1
8 TABLET, ORALLY DISINTEGRATING ORAL EVERY 8 HOURS PRN
Status: DISCONTINUED | OUTPATIENT
Start: 2020-03-08 | End: 2020-03-12 | Stop reason: HOSPADM

## 2020-03-08 RX ORDER — GLUCAGON 1 MG
1 KIT INJECTION
Status: DISCONTINUED | OUTPATIENT
Start: 2020-03-08 | End: 2020-03-12 | Stop reason: HOSPADM

## 2020-03-08 RX ORDER — IBUPROFEN 200 MG
24 TABLET ORAL
Status: DISCONTINUED | OUTPATIENT
Start: 2020-03-08 | End: 2020-03-12 | Stop reason: HOSPADM

## 2020-03-08 RX ORDER — IPRATROPIUM BROMIDE AND ALBUTEROL SULFATE 2.5; .5 MG/3ML; MG/3ML
3 SOLUTION RESPIRATORY (INHALATION) EVERY 4 HOURS
Status: DISCONTINUED | OUTPATIENT
Start: 2020-03-08 | End: 2020-03-12 | Stop reason: HOSPADM

## 2020-03-08 RX ORDER — POTASSIUM CHLORIDE 7.45 MG/ML
10 INJECTION INTRAVENOUS
Status: DISPENSED | OUTPATIENT
Start: 2020-03-08 | End: 2020-03-09

## 2020-03-08 RX ORDER — OXYCODONE AND ACETAMINOPHEN 10; 325 MG/1; MG/1
1 TABLET ORAL EVERY 4 HOURS PRN
Status: DISCONTINUED | OUTPATIENT
Start: 2020-03-08 | End: 2020-03-12 | Stop reason: HOSPADM

## 2020-03-08 RX ORDER — IPRATROPIUM BROMIDE AND ALBUTEROL SULFATE 2.5; .5 MG/3ML; MG/3ML
3 SOLUTION RESPIRATORY (INHALATION) EVERY 4 HOURS
Status: DISCONTINUED | OUTPATIENT
Start: 2020-03-08 | End: 2020-03-08

## 2020-03-08 RX ORDER — INSULIN ASPART 100 [IU]/ML
0-5 INJECTION, SOLUTION INTRAVENOUS; SUBCUTANEOUS
Status: DISCONTINUED | OUTPATIENT
Start: 2020-03-08 | End: 2020-03-12 | Stop reason: HOSPADM

## 2020-03-08 RX ORDER — OXYCODONE AND ACETAMINOPHEN 5; 325 MG/1; MG/1
1 TABLET ORAL EVERY 4 HOURS PRN
Status: DISCONTINUED | OUTPATIENT
Start: 2020-03-08 | End: 2020-03-12 | Stop reason: HOSPADM

## 2020-03-08 RX ORDER — SODIUM CHLORIDE, SODIUM LACTATE, POTASSIUM CHLORIDE, CALCIUM CHLORIDE 600; 310; 30; 20 MG/100ML; MG/100ML; MG/100ML; MG/100ML
INJECTION, SOLUTION INTRAVENOUS CONTINUOUS
Status: DISCONTINUED | OUTPATIENT
Start: 2020-03-08 | End: 2020-03-09

## 2020-03-08 RX ORDER — AMLODIPINE BESYLATE 10 MG/1
10 TABLET ORAL DAILY
Status: DISCONTINUED | OUTPATIENT
Start: 2020-03-09 | End: 2020-03-12 | Stop reason: HOSPADM

## 2020-03-08 RX ORDER — IBUPROFEN 200 MG
16 TABLET ORAL
Status: DISCONTINUED | OUTPATIENT
Start: 2020-03-08 | End: 2020-03-12 | Stop reason: HOSPADM

## 2020-03-08 RX ORDER — HEPARIN SODIUM 5000 [USP'U]/ML
5000 INJECTION, SOLUTION INTRAVENOUS; SUBCUTANEOUS EVERY 8 HOURS
Status: DISCONTINUED | OUTPATIENT
Start: 2020-03-08 | End: 2020-03-12 | Stop reason: HOSPADM

## 2020-03-08 RX ADMIN — POTASSIUM CHLORIDE 10 MEQ: 7.46 INJECTION, SOLUTION INTRAVENOUS at 11:03

## 2020-03-08 RX ADMIN — POTASSIUM CHLORIDE 10 MEQ: 7.46 INJECTION, SOLUTION INTRAVENOUS at 10:03

## 2020-03-08 RX ADMIN — HEPARIN SODIUM 5000 UNITS: 5000 INJECTION, SOLUTION INTRAVENOUS; SUBCUTANEOUS at 09:03

## 2020-03-08 RX ADMIN — IOHEXOL 15 ML: 350 INJECTION, SOLUTION INTRAVENOUS at 09:03

## 2020-03-08 RX ADMIN — ONDANSETRON 8 MG: 8 TABLET, ORALLY DISINTEGRATING ORAL at 10:03

## 2020-03-08 RX ADMIN — SODIUM CHLORIDE, SODIUM LACTATE, POTASSIUM CHLORIDE, AND CALCIUM CHLORIDE: 600; 310; 30; 20 INJECTION, SOLUTION INTRAVENOUS at 04:03

## 2020-03-08 NOTE — HPI
Patient is a 69 y.o. female with a history of HTN, nonalcoholic fatty liver disease, GERD, and CAD who underwent a diaphragmatic hernia repair on 1/21/20.  The defect measured approximately 93k00gw and was patched with a [place]-Rajendra patch.  She had a recurrent diaphragmatic hernia which was repaired on 1/29/20.  She was discharged to SNF on 2/11/20.  Since discharge she has had a wound to her left chest/upper abdomen.  Over this time she states that it has gradually grown in size.  She denies any fevers, chills, nausea, or vomiting.  States it is red but the erythema is not spreading.  No increased pain.  No shortness of breath or chest pain.       States she is not taking any antiplatelet or anticoagulant medications.  Has been started on insulin at SNF but denies a history of diabetes.

## 2020-03-08 NOTE — H&P
[facility]  Cardiothoracic Surgery  History & Physical    Patient Name: Marisela Bunn  MRN: [id number]  Admission Date: 3/8/2020  Attending Physician: Primo Soni MD   Primary Care Provider: Primary Doctor No    Patient information was obtained from patient, past medical records and ER records.     Subjective:     Chief Complaint/Reason for Admission: Wound    History of Present Illness:  Patient is a 69 y.o. female with a history of HTN, nonalcoholic fatty liver disease, GERD, and CAD who underwent a diaphragmatic hernia repair on 1/21/20.  The defect measured approximately 93f01dv and was patched with a [place]-Rajendra patch.  She had a recurrent diaphragmatic hernia which was repaired on 1/29/20.  She was discharged to SNF on 2/11/20.  Since discharge she has had a wound to her left chest/upper abdomen.  Over this time she states that it has gradually grown in size.  She denies any fevers, chills, nausea, or vomiting.  States it is red but the erythema is not spreading.  No increased pain.  No shortness of breath or chest pain.      States she is not taking any antiplatelet or anticoagulant medications.  Has been started on insulin at [facility] but denies a history of diabetes.      Current Facility-Administered Medications on File Prior to Encounter   Medication    [DISCONTINUED] acetaminophen tablet 650 mg    [DISCONTINUED] albuterol-ipratropium 2.5 mg-0.5 mg/3 mL nebulizer solution 3 mL    [DISCONTINUED] amLODIPine tablet 10 mg     Current Outpatient Medications on File Prior to Encounter   Medication Sig    acetaminophen (TYLENOL) 325 MG tablet Take 2 tablets (650 mg total) by mouth every 6 (six) hours as needed.    albuterol-ipratropium (DUO-NEB) 2.5 mg-0.5 mg/3 mL nebulizer solution Take 3 mLs by nebulization every 4 (four) hours. Rescue    amLODIPine (NORVASC) 10 MG tablet Take 1 tablet (10 mg total) by mouth once daily.    celecoxib (CELEBREX) 200 MG capsule Take 1 capsule (200 mg  total) by mouth once daily.    oxyCODONE (ROXICODONE) 5 MG immediate release tablet Take 1 tablet (5 mg total) by mouth every 4 (four) hours as needed for Pain.    pregabalin (LYRICA) 75 MG capsule Take 1 capsule (75 mg total) by mouth every evening.    sulfamethoxazole-trimethoprim 800-160mg (BACTRIM DS) 800-160 mg Tab Take 1 tablet by mouth 2 (two) times daily.       Review of patient's allergies indicates:   Allergen Reactions    Adhesive Blisters    Erythromycin      Stomach pains    Rosuvastatin      Hives, muscle/joint pains    Zolpidem      Confusion        Past Medical History:   Diagnosis Date    CAD (coronary artery disease)     Diaphragmatic hernia     GERD (gastroesophageal reflux disease)     Hypertension     TIA (transient ischemic attack)      Past Surgical History:   Procedure Laterality Date    RECONSTRUCTION OF CHEST WALL Left 1/29/2020    Procedure: RECONSTRUCTION, CHEST WALL;  Surgeon: Primo Soni MD;  Location: [address];  Service: Thoracic;  Laterality: Left;    RECONSTRUCTION OF DIAPHRAGM Left 1/29/2020    Procedure: RECONSTRUCTION, DIAPHRAGM;  Surgeon: Primo Soni MD;  Location: [address];  Service: Thoracic;  Laterality: Left;  Hernia reduction and repair    REPAIR OF DIAPHRAGMATIC HERNIA Left 1/21/2020    Procedure: REPAIR, HERNIA, DIAPHRAGMATIC, Laparoscopic;  Surgeon: Lucho Garcia Jr., MD;  Location: [address];  Service: General;  Laterality: Left;    REPAIR OF DIAPHRAGMATIC HERNIA Left 1/23/2020    Procedure: REPAIR, HERNIA, DIAPHRAGMATIC;  Surgeon: Lucho Garcia Jr., MD;  Location: [address];  Service: General;  Laterality: Left;     Family History     None        Tobacco Use    Smoking status: Never Smoker    Smokeless tobacco: Never Used   Substance and Sexual Activity    Alcohol use: Not on file    Drug use: Not on file    Sexual activity: Not on file     Review of Systems   Constitutional: Negative for chills,  fatigue, fever and unexpected weight change.   HENT: Negative for congestion.    Eyes: Negative for visual disturbance.   Respiratory: Negative for chest tightness and shortness of breath.    Cardiovascular: Positive for leg swelling. Negative for chest pain and palpitations.   Gastrointestinal: Positive for diarrhea. Negative for abdominal distention, abdominal pain, blood in stool, constipation, nausea and vomiting.   Genitourinary: Negative for dysuria and hematuria.   Musculoskeletal: Negative for arthralgias and back pain.   Skin: Positive for wound.   Neurological: Negative for dizziness, syncope and numbness.   Psychiatric/Behavioral: Negative for confusion.     Objective:     Vital Signs (Most Recent):    Vital Signs (24h Range):  Temp:  [97.8 °F (36.6 °C)-98.7 °F (37.1 °C)] 97.8 °F (36.6 °C)  Pulse:  [76-78] 76  Resp:  [15-16] 16  SpO2:  [93 %-95 %] 93 %  BP: (129-141)/(60-70) 141/70        There is no height or weight on file to calculate BMI.    Physical Exam   Constitutional: She is oriented to person, place, and time. She appears well-developed and well-nourished.   Obese   HENT:   Head: Normocephalic and atraumatic.   Eyes: EOM are normal.   Neck: Normal range of motion.   Cardiovascular: Normal rate and intact distal pulses.   Pulmonary/Chest: Effort normal. No respiratory distress.   Abdominal: Soft. She exhibits no distension. There is no tenderness.   Musculoskeletal: Normal range of motion.   Neurological: She is alert and oriented to person, place, and time.   Skin: Skin is warm and dry.   Wound to left lateral chest/upper abdomin.  Open with granulation tissue within.  Some mild erythema surrounding the edges of the wound.  No foul smelling discharge.   Psychiatric: She has a normal mood and affect.       Significant Labs:  Labs ordered: BMP, CBC, PTT, PT, INR    Significant Diagnostics:  CT Chest, Abdomen, and Pelvis ordered    Assessment/Plan:     Active Diagnoses:    Diagnosis Date Noted POA     Wound infection [T14.8XXA, L08.9] 03/08/2020 Yes      Problems Resolved During this Admission:     VTE Risk Mitigation (From admission, onward)         Ordered     heparin (porcine) injection 5,000 Units  Every 8 hours      03/08/20 1620     IP VTE HIGH RISK PATIENT  Once      03/08/20 1620            Sepideh is our 68yo female who is now status post diaphragmatic hernia repair who now presents with a chronic surgical wound.  Denies fevers, chills, or purulent discharge.  No current signs of infection.      - NPO  - mIV fluids at 125cc/hr  - Sliding scale insulin  - PRN nausea meds  - DVT ppx: Heparin 5k Q8, SCDs  - Ambulate as tolerated  - Labs: CBC, BMP, PT, PTT, INR  - CT c/a/p with contrast  - Plan for OR tomorrow for debridement and washout of wound with wound vac placement    Gian Marie MD  Cardiothoracic Surgery  [facility]

## 2020-03-08 NOTE — ANESTHESIA PREPROCEDURE EVALUATION
[facility]  Anesthesia Pre-Operative Evaluation         Patient Name: Marisela Bunn  YOB: 1950  MRN: [id number]    SUBJECTIVE:     Pre-operative evaluation for Procedure(s) (LRB):  IRRIGATION AND DEBRIDEMENT (Left)  APPLICATION, WOUND VAC (Left)     03/08/2020    Marisela Bunn is a 69 y.o. female with morbid obesity, HTN, nonalcoholic fatty liver disease, GERD, and CAD who underwent a diaphragmatic hernia repair on 1/21/20.  The defect measured approximately 32b39rt and was patched with a Ryan-Rajendra patch. She was discharged to a SNF.   Wound to the left chest/ upper abdomen debrided in clinic.    Patient now presents for the above procedure(s).      LDA: None documented.    Prev airway:   Mask Difficulty Assessment: easy mask   Final Endotracheal Airway: cuffed   Technique Used For Successful Placement: double lumen tube left   ETT Size (mm): 37F   Number of Attempts at Approach: 1         Drips: None documented.    Patient Active Problem List   Diagnosis    Diaphragmatic hernia    Cough    Leukocytosis    Alteration in skin integrity    Wound infection       Review of patient's allergies indicates:   Allergen Reactions    Adhesive Blisters    Erythromycin      Stomach pains    Rosuvastatin      Hives, muscle/joint pains    Zolpidem      Confusion        Current Outpatient Medications:  No current facility-administered medications for this encounter.   No current outpatient medications on file.    Facility-Administered Medications Ordered in Other Encounters:     acetaminophen tablet 650 mg, 650 mg, Oral, Q8H PRN, Lucio Isbell MD    albuterol-ipratropium 2.5 mg-0.5 mg/3 mL nebulizer solution 3 mL, 3 mL, Nebulization, Q4H, Landy Pineda MD    [START ON 3/9/2020] amLODIPine tablet 10 mg, 10 mg, Oral, Daily, Gian Marie MD    dextrose 10% (D10W) Bolus, 12.5 g, Intravenous, PRN, Gian Marie MD     dextrose 10% (D10W) Bolus, 25 g, Intravenous, PRN, Gian Marie MD    glucagon (human recombinant) injection 1 mg, 1 mg, Intramuscular, PRN, Gian Marie MD    glucose chewable tablet 16 g, 16 g, Oral, PRN, Gian Marie MD    glucose chewable tablet 24 g, 24 g, Oral, PRN, Gian Marie MD    heparin (porcine) injection 5,000 Units, 5,000 Units, Subcutaneous, Q8H, Lucio Isbell MD    insulin aspart U-100 pen 0-5 Units, 0-5 Units, Subcutaneous, QID (AC + HS) PRN, Gian Marie MD    iohexol (OMNIPAQUE) oral solution 15 mL, 15 mL, Oral, PRN, Morteza Grant MD    lactated ringers infusion, , Intravenous, Continuous, Lucio Isbell MD, Last Rate: 125 mL/hr at 03/08/20 1630    lidocaine (PF) 10 mg/ml (1%) injection 10 mg, 1 mL, Other, Once, Lucio Isbell MD    ondansetron disintegrating tablet 8 mg, 8 mg, Oral, Q8H PRN, Lucio Isbell MD    oxyCODONE-acetaminophen  mg per tablet 1 tablet, 1 tablet, Oral, Q4H PRN, Lucio Isbell MD    oxyCODONE-acetaminophen 5-325 mg per tablet 1 tablet, 1 tablet, Oral, Q4H PRN, Lucio Isbell MD    sodium chloride 0.9% flush 10 mL, 10 mL, Intravenous, PRN, Lucio Isbell MD    Past Surgical History:   Procedure Laterality Date    RECONSTRUCTION OF CHEST WALL Left 1/29/2020    Procedure: RECONSTRUCTION, CHEST WALL;  Surgeon: Primo Soni MD;  Location: [facility] OR [address] [place] [address];  Service: Thoracic;  Laterality: Left;    RECONSTRUCTION OF DIAPHRAGM Left 1/29/2020    Procedure: RECONSTRUCTION, DIAPHRAGM;  Surgeon: Primo Soni MD;  Location: [facility] OR [address] [place] [address];  Service: Thoracic;  Laterality: Left;  Hernia reduction and repair    REPAIR OF DIAPHRAGMATIC HERNIA Left 1/21/2020    Procedure: REPAIR, HERNIA, DIAPHRAGMATIC, Laparoscopic;  Surgeon: Lucho Garcia Jr., MD;  Location: [facility] OR [address] [place] [address];  Service: General;  Laterality: Left;    REPAIR OF DIAPHRAGMATIC HERNIA Left  1/23/2020    Procedure: REPAIR, HERNIA, DIAPHRAGMATIC;  Surgeon: Lucho Garcia Jr., MD;  Location: [facility] OR [address] [place] [address];  Service: General;  Laterality: Left;       Social History     Socioeconomic History    Marital status:      Spouse name: Not on file    Number of children: Not on file    Years of education: Not on file    Highest education level: Not on file   Occupational History    Not on file   Social Needs    Financial resource strain: Not on file    Food insecurity:     Worry: Not on file     Inability: Not on file    Transportation needs:     Medical: Not on file     Non-medical: Not on file   Tobacco Use    Smoking status: Never Smoker    Smokeless tobacco: Never Used   Substance and Sexual Activity    Alcohol use: Not on file    Drug use: Not on file    Sexual activity: Not on file   Lifestyle    Physical activity:     Days per week: Not on file     Minutes per session: Not on file    Stress: Not on file   Relationships    Social connections:     Talks on phone: Not on file     Gets together: Not on file     Attends Muslim service: Not on file     Active member of club or organization: Not on file     Attends meetings of clubs or organizations: Not on file     Relationship status: Not on file   Other Topics Concern    Not on file   Social History Narrative    Not on file       OBJECTIVE:     Vital Signs Range (Last 24H):  Temp:  [36.6 °C (97.8 °F)-37.1 °C (98.7 °F)]   Pulse:  [76-78]   Resp:  [15-16]   BP: (129-141)/(60-70)   SpO2:  [93 %-95 %]       Significant Labs:  Lab Results   Component Value Date    WBC 6.58 02/10/2020    HGB 10.2 (L) 02/10/2020    HCT 33.9 (L) 02/10/2020     (H) 02/10/2020    ALT 20 02/03/2020    AST 45 (H) 02/03/2020     02/10/2020    K 3.8 02/10/2020     02/10/2020    CREATININE 0.7 02/10/2020    BUN 7 (L) 02/10/2020    CO2 29 02/10/2020    INR 0.9 01/30/2020       Diagnostic Studies:    Left thoracostomy tube has been removed.   Skin staples project over the lateral left chest wall.    Current examination was obtained with the patient in apical lordosis.  There is stable opacification of the left lower lung zone similar to 02/04/2020.  Left mid and upper lung zones and right lung remain clear.    I detect no disease in the aerated portion of the left lung or in the right lung.  There is no right pleural fluid.  I detect no left or right pneumothorax and no pneumomediastinum, pneumoperitoneum, cardiac decompensation or significant osseous abnormality.            EKG:   Results for orders placed or performed during the hospital encounter of 01/19/20   EKG 12-lead    Collection Time: 01/21/20  7:50 AM    Narrative    Test Reason : Z01.811,    Vent. Rate : 103 BPM     Atrial Rate : 103 BPM     P-R Int : 128 ms          QRS Dur : 078 ms      QT Int : 338 ms       P-R-T Axes : 065 038 156 degrees     QTc Int : 442 ms    Sinus tachycardia  Nonspecific ST and T wave abnormality  Abnormal ECG  No previous ECGs available  Confirmed by CLAUDY YEE MD ([id number]) on 1/21/2020 1:15:47 PM    Referred By: PROVIDER NOTINSYSTEM           Confirmed By:CLAUDY YEE MD       2D ECHO:  TTE:  · Normal left ventricular systolic function. The estimated ejection fraction is 65%.  · Indeterminate left ventricular diastolic function.  · Normal right ventricular systolic function.  · Mild left atrial enlargement.  · Normal central venous pressure (3 mmHg).       ASSESSMENT/PLAN:         Anesthesia Evaluation    I have reviewed the Patient Summary Reports.    I have reviewed the Nursing Notes.   I have reviewed the Medications.     Review of Systems  Anesthesia Hx:  No problems with previous Anesthesia  History of prior surgery of interest to airway management or planning: Denies Family Hx of Anesthesia complications.   Denies Personal Hx of Anesthesia complications.   Social:  Non-Smoker  Denies Tobacco Use.   EENT/Dental:EENT/Dental Normal   Cardiovascular:   Denies  Pacemaker. Hypertension, well controlled  Denies MI. CAD  asymptomatic  Denies CABG/stent.  Denies Dysrhythmias.   Denies Angina.     no hyperlipidemia ECG has been reviewed.  Coronary Artery Disease:  patient problem list, patient hx of diagnosis.  Hypertension , stage 1 hypertension, systolic 140 - 159 or diastolic 90 - 99, Well Controlled on Rx    Pulmonary:   COPD, mild Asthma asymptomatic and mild Denies Shortness of breath.  Denies Recent URI.  Denies Asthma.  Denies Chronic Obstructive Pulmonary Disease (COPD).    Renal/:   Denies Chronic Renal Disease.   Denies Kidney Function/Disease    Hepatic/GI:   Hiatal Hernia, GERD, poorly controlled Liver Disease,  Esophageal / Stomach Disorders Gerd Controlled by chronic antireflux medication.  Liver Disease, Fatty Liver    Neurological:   TIA, Denies CVA. Denies Seizures.  Denies Seizure Disorder  Denies CVA - Cerebrovasular Accident  TIA - Transient Ischemic Attack , has had 1 TIA , transient deficits are no residual deficit.   Endocrine:   Denies Diabetes. Denies Hypothyroidism.  Denies Diabetes  Denies Thyroid Disease  Metabolic Disorders, Morbid Obesity / BMI > 40      Physical Exam  General:  Morbid Obesity    Airway/Jaw/Neck:  Airway Findings: Mouth Opening: Normal Tongue: Normal  General Airway Assessment: Adult  Mallampati: II  TM Distance: Normal, at least 6 cm         Dental:  Dental Findings: Upper Dentures   Chest/Lungs:  Chest/Lungs Findings: Normal Respiratory Rate, Clear to auscultation     Heart/Vascular:  Heart Findings: Rate: Normal  Rhythm: Regular Rhythm  Sounds: Normal  Heart murmur: negative      Skin:  Very large area of ecchymoses from left armpit all the way to left hip   Mental Status:  Mental Status Findings:  Alert and Oriented, Cooperative         Anesthesia Plan  Type of Anesthesia, risks & benefits discussed:  Anesthesia Type:  general  Patient's Preference:   Intra-op Monitoring Plan: standard ASA monitors  Intra-op Monitoring Plan  Comments:   Post Op Pain Control Plan: per primary service following discharge from PACU, IV/PO Opioids PRN and multimodal analgesia  Post Op Pain Control Plan Comments:   Induction:   IV  Beta Blocker:  Patient is not currently on a Beta-Blocker (No further documentation required).       Informed Consent: Patient understands risks and agrees with Anesthesia plan.  Questions answered. Anesthesia consent signed with patient.  ASA Score: 3     Day of Surgery Review of History & Physical:    H&P update referred to the surgeon.         Ready For Surgery From Anesthesia Perspective.

## 2020-03-09 ENCOUNTER — ANESTHESIA (OUTPATIENT)
Dept: SURGERY | Facility: HOSPITAL | Age: 70
DRG: 902 | End: 2020-03-09
Payer: COMMERCIAL

## 2020-03-09 PROBLEM — T81.30XA WOUND DEHISCENCE: Status: ACTIVE | Noted: 2020-03-09

## 2020-03-09 LAB
ANION GAP SERPL CALC-SCNC: 9 MMOL/L (ref 8–16)
ANION GAP SERPL CALC-SCNC: 9 MMOL/L (ref 8–16)
BUN SERPL-MCNC: 5 MG/DL (ref 8–23)
BUN SERPL-MCNC: 5 MG/DL (ref 8–23)
CALCIUM SERPL-MCNC: 8.9 MG/DL (ref 8.7–10.5)
CALCIUM SERPL-MCNC: 8.9 MG/DL (ref 8.7–10.5)
CHLORIDE SERPL-SCNC: 107 MMOL/L (ref 95–110)
CHLORIDE SERPL-SCNC: 107 MMOL/L (ref 95–110)
CO2 SERPL-SCNC: 25 MMOL/L (ref 23–29)
CO2 SERPL-SCNC: 25 MMOL/L (ref 23–29)
CREAT SERPL-MCNC: 0.7 MG/DL (ref 0.5–1.4)
CREAT SERPL-MCNC: 0.7 MG/DL (ref 0.5–1.4)
EST. GFR  (AFRICAN AMERICAN): >60 ML/MIN/1.73 M^2
EST. GFR  (AFRICAN AMERICAN): >60 ML/MIN/1.73 M^2
EST. GFR  (NON AFRICAN AMERICAN): >60 ML/MIN/1.73 M^2
EST. GFR  (NON AFRICAN AMERICAN): >60 ML/MIN/1.73 M^2
GLUCOSE SERPL-MCNC: 97 MG/DL (ref 70–110)
GLUCOSE SERPL-MCNC: 97 MG/DL (ref 70–110)
MAGNESIUM SERPL-MCNC: 1.7 MG/DL (ref 1.6–2.6)
PHOSPHATE SERPL-MCNC: 3.5 MG/DL (ref 2.7–4.5)
POCT GLUCOSE: 120 MG/DL (ref 70–110)
POCT GLUCOSE: 130 MG/DL (ref 70–110)
POCT GLUCOSE: 189 MG/DL (ref 70–110)
POCT GLUCOSE: 99 MG/DL (ref 70–110)
POTASSIUM SERPL-SCNC: 3.7 MMOL/L (ref 3.5–5.1)
POTASSIUM SERPL-SCNC: 3.7 MMOL/L (ref 3.5–5.1)
SODIUM SERPL-SCNC: 141 MMOL/L (ref 136–145)
SODIUM SERPL-SCNC: 141 MMOL/L (ref 136–145)

## 2020-03-09 PROCEDURE — 71000033 HC RECOVERY, INTIAL HOUR: Performed by: THORACIC SURGERY (CARDIOTHORACIC VASCULAR SURGERY)

## 2020-03-09 PROCEDURE — D9220A PRA ANESTHESIA: ICD-10-PCS | Mod: ,,, | Performed by: ANESTHESIOLOGY

## 2020-03-09 PROCEDURE — 84100 ASSAY OF PHOSPHORUS: CPT

## 2020-03-09 PROCEDURE — 25000003 PHARM REV CODE 250: Performed by: THORACIC SURGERY (CARDIOTHORACIC VASCULAR SURGERY)

## 2020-03-09 PROCEDURE — 20600001 HC STEP DOWN PRIVATE ROOM

## 2020-03-09 PROCEDURE — 25000003 PHARM REV CODE 250: Performed by: SURGERY

## 2020-03-09 PROCEDURE — 25500020 PHARM REV CODE 255: Performed by: THORACIC SURGERY (CARDIOTHORACIC VASCULAR SURGERY)

## 2020-03-09 PROCEDURE — 80048 BASIC METABOLIC PNL TOTAL CA: CPT

## 2020-03-09 PROCEDURE — 36000707: Performed by: THORACIC SURGERY (CARDIOTHORACIC VASCULAR SURGERY)

## 2020-03-09 PROCEDURE — 83735 ASSAY OF MAGNESIUM: CPT

## 2020-03-09 PROCEDURE — 63600175 PHARM REV CODE 636 W HCPCS: Performed by: STUDENT IN AN ORGANIZED HEALTH CARE EDUCATION/TRAINING PROGRAM

## 2020-03-09 PROCEDURE — S0020 INJECTION, BUPIVICAINE HYDRO: HCPCS | Performed by: THORACIC SURGERY (CARDIOTHORACIC VASCULAR SURGERY)

## 2020-03-09 PROCEDURE — 94761 N-INVAS EAR/PLS OXIMETRY MLT: CPT

## 2020-03-09 PROCEDURE — 25000003 PHARM REV CODE 250: Performed by: STUDENT IN AN ORGANIZED HEALTH CARE EDUCATION/TRAINING PROGRAM

## 2020-03-09 PROCEDURE — 25000242 PHARM REV CODE 250 ALT 637 W/ HCPCS: Performed by: FAMILY MEDICINE

## 2020-03-09 PROCEDURE — 37000008 HC ANESTHESIA 1ST 15 MINUTES: Performed by: THORACIC SURGERY (CARDIOTHORACIC VASCULAR SURGERY)

## 2020-03-09 PROCEDURE — 97597 PR DEBRIDEMENT OPEN WOUND 20 SQ CM<: ICD-10-PCS | Mod: 78,,, | Performed by: THORACIC SURGERY (CARDIOTHORACIC VASCULAR SURGERY)

## 2020-03-09 PROCEDURE — 36000706: Performed by: THORACIC SURGERY (CARDIOTHORACIC VASCULAR SURGERY)

## 2020-03-09 PROCEDURE — 97598 DBRDMT OPN WND ADDL 20CM/<: CPT | Mod: ,,, | Performed by: THORACIC SURGERY (CARDIOTHORACIC VASCULAR SURGERY)

## 2020-03-09 PROCEDURE — 71000016 HC POSTOP RECOV ADDL HR: Performed by: THORACIC SURGERY (CARDIOTHORACIC VASCULAR SURGERY)

## 2020-03-09 PROCEDURE — 94640 AIRWAY INHALATION TREATMENT: CPT

## 2020-03-09 PROCEDURE — 63600175 PHARM REV CODE 636 W HCPCS: Performed by: SURGERY

## 2020-03-09 PROCEDURE — 97598 PR DEBRIDEMENT OPEN WOUND EA ADDL 20 SQ CM: ICD-10-PCS | Mod: ,,, | Performed by: THORACIC SURGERY (CARDIOTHORACIC VASCULAR SURGERY)

## 2020-03-09 PROCEDURE — 27201423 OPTIME MED/SURG SUP & DEVICES STERILE SUPPLY: Performed by: THORACIC SURGERY (CARDIOTHORACIC VASCULAR SURGERY)

## 2020-03-09 PROCEDURE — D9220A PRA ANESTHESIA: Mod: ,,, | Performed by: ANESTHESIOLOGY

## 2020-03-09 PROCEDURE — 63600175 PHARM REV CODE 636 W HCPCS: Performed by: ANESTHESIOLOGY

## 2020-03-09 PROCEDURE — 37000009 HC ANESTHESIA EA ADD 15 MINS: Performed by: THORACIC SURGERY (CARDIOTHORACIC VASCULAR SURGERY)

## 2020-03-09 PROCEDURE — 71000015 HC POSTOP RECOV 1ST HR: Performed by: THORACIC SURGERY (CARDIOTHORACIC VASCULAR SURGERY)

## 2020-03-09 PROCEDURE — 36415 COLL VENOUS BLD VENIPUNCTURE: CPT

## 2020-03-09 PROCEDURE — 97597 DBRDMT OPN WND 1ST 20 CM/<: CPT | Mod: 78,,, | Performed by: THORACIC SURGERY (CARDIOTHORACIC VASCULAR SURGERY)

## 2020-03-09 RX ORDER — PHENYLEPHRINE HYDROCHLORIDE 10 MG/ML
INJECTION INTRAVENOUS
Status: DISCONTINUED | OUTPATIENT
Start: 2020-03-09 | End: 2020-03-09

## 2020-03-09 RX ORDER — BUPIVACAINE HYDROCHLORIDE 5 MG/ML
INJECTION, SOLUTION EPIDURAL; INTRACAUDAL
Status: DISCONTINUED | OUTPATIENT
Start: 2020-03-09 | End: 2020-03-09 | Stop reason: HOSPADM

## 2020-03-09 RX ORDER — DEXAMETHASONE SODIUM PHOSPHATE 4 MG/ML
INJECTION, SOLUTION INTRA-ARTICULAR; INTRALESIONAL; INTRAMUSCULAR; INTRAVENOUS; SOFT TISSUE
Status: DISCONTINUED | OUTPATIENT
Start: 2020-03-09 | End: 2020-03-09

## 2020-03-09 RX ORDER — ROCURONIUM BROMIDE 10 MG/ML
INJECTION, SOLUTION INTRAVENOUS
Status: DISCONTINUED | OUTPATIENT
Start: 2020-03-09 | End: 2020-03-09

## 2020-03-09 RX ORDER — MIDAZOLAM HYDROCHLORIDE 1 MG/ML
INJECTION, SOLUTION INTRAMUSCULAR; INTRAVENOUS
Status: DISCONTINUED | OUTPATIENT
Start: 2020-03-09 | End: 2020-03-09

## 2020-03-09 RX ORDER — FENTANYL CITRATE 50 UG/ML
25 INJECTION, SOLUTION INTRAMUSCULAR; INTRAVENOUS EVERY 5 MIN PRN
Status: DISCONTINUED | OUTPATIENT
Start: 2020-03-09 | End: 2020-03-09 | Stop reason: HOSPADM

## 2020-03-09 RX ORDER — LIDOCAINE HCL/PF 100 MG/5ML
SYRINGE (ML) INTRAVENOUS
Status: DISCONTINUED | OUTPATIENT
Start: 2020-03-09 | End: 2020-03-09

## 2020-03-09 RX ORDER — CEFAZOLIN SODIUM 1 G/3ML
INJECTION, POWDER, FOR SOLUTION INTRAMUSCULAR; INTRAVENOUS
Status: DISCONTINUED | OUTPATIENT
Start: 2020-03-09 | End: 2020-03-09

## 2020-03-09 RX ORDER — POTASSIUM CHLORIDE 7.45 MG/ML
10 INJECTION INTRAVENOUS
Status: COMPLETED | OUTPATIENT
Start: 2020-03-09 | End: 2020-03-09

## 2020-03-09 RX ORDER — PROPOFOL 10 MG/ML
VIAL (ML) INTRAVENOUS
Status: DISCONTINUED | OUTPATIENT
Start: 2020-03-09 | End: 2020-03-09

## 2020-03-09 RX ORDER — ACETAMINOPHEN 10 MG/ML
INJECTION, SOLUTION INTRAVENOUS
Status: DISCONTINUED | OUTPATIENT
Start: 2020-03-09 | End: 2020-03-09

## 2020-03-09 RX ORDER — SODIUM CHLORIDE 0.9 % (FLUSH) 0.9 %
2 SYRINGE (ML) INJECTION
Status: DISCONTINUED | OUTPATIENT
Start: 2020-03-09 | End: 2020-03-09 | Stop reason: HOSPADM

## 2020-03-09 RX ORDER — KETAMINE HCL IN 0.9 % NACL 50 MG/5 ML
SYRINGE (ML) INTRAVENOUS
Status: DISCONTINUED | OUTPATIENT
Start: 2020-03-09 | End: 2020-03-09

## 2020-03-09 RX ORDER — SODIUM CHLORIDE 9 MG/ML
INJECTION, SOLUTION INTRAVENOUS CONTINUOUS PRN
Status: DISCONTINUED | OUTPATIENT
Start: 2020-03-09 | End: 2020-03-09

## 2020-03-09 RX ORDER — FENTANYL CITRATE 50 UG/ML
INJECTION, SOLUTION INTRAMUSCULAR; INTRAVENOUS
Status: DISCONTINUED | OUTPATIENT
Start: 2020-03-09 | End: 2020-03-09

## 2020-03-09 RX ORDER — ONDANSETRON 2 MG/ML
4 INJECTION INTRAMUSCULAR; INTRAVENOUS ONCE AS NEEDED
Status: COMPLETED | OUTPATIENT
Start: 2020-03-09 | End: 2020-03-09

## 2020-03-09 RX ORDER — BACITRACIN 50000 [IU]/1
INJECTION, POWDER, FOR SOLUTION INTRAMUSCULAR
Status: DISCONTINUED | OUTPATIENT
Start: 2020-03-09 | End: 2020-03-09 | Stop reason: HOSPADM

## 2020-03-09 RX ORDER — SUCCINYLCHOLINE CHLORIDE 20 MG/ML
INJECTION INTRAMUSCULAR; INTRAVENOUS
Status: DISCONTINUED | OUTPATIENT
Start: 2020-03-09 | End: 2020-03-09

## 2020-03-09 RX ADMIN — IOHEXOL 100 ML: 350 INJECTION, SOLUTION INTRAVENOUS at 12:03

## 2020-03-09 RX ADMIN — OXYCODONE AND ACETAMINOPHEN 1 TABLET: 10; 325 TABLET ORAL at 09:03

## 2020-03-09 RX ADMIN — CEFAZOLIN 2 G: 330 INJECTION, POWDER, FOR SOLUTION INTRAMUSCULAR; INTRAVENOUS at 09:03

## 2020-03-09 RX ADMIN — DEXAMETHASONE SODIUM PHOSPHATE 4 MG: 4 INJECTION, SOLUTION INTRAMUSCULAR; INTRAVENOUS at 09:03

## 2020-03-09 RX ADMIN — IPRATROPIUM BROMIDE AND ALBUTEROL SULFATE 3 ML: .5; 3 SOLUTION RESPIRATORY (INHALATION) at 05:03

## 2020-03-09 RX ADMIN — PHENYLEPHRINE HYDROCHLORIDE 200 MCG: 10 INJECTION INTRAVENOUS at 09:03

## 2020-03-09 RX ADMIN — SUCCINYLCHOLINE CHLORIDE 200 MG: 20 INJECTION, SOLUTION INTRAMUSCULAR; INTRAVENOUS at 09:03

## 2020-03-09 RX ADMIN — PHENYLEPHRINE HYDROCHLORIDE 200 MCG: 10 INJECTION INTRAVENOUS at 10:03

## 2020-03-09 RX ADMIN — HEPARIN SODIUM 5000 UNITS: 5000 INJECTION, SOLUTION INTRAVENOUS; SUBCUTANEOUS at 05:03

## 2020-03-09 RX ADMIN — POTASSIUM CHLORIDE 10 MEQ: 7.46 INJECTION, SOLUTION INTRAVENOUS at 04:03

## 2020-03-09 RX ADMIN — SUGAMMADEX 416 MG: 100 INJECTION, SOLUTION INTRAVENOUS at 10:03

## 2020-03-09 RX ADMIN — PROPOFOL 30 MG: 10 INJECTION, EMULSION INTRAVENOUS at 09:03

## 2020-03-09 RX ADMIN — OXYCODONE HYDROCHLORIDE AND ACETAMINOPHEN 1 TABLET: 5; 325 TABLET ORAL at 03:03

## 2020-03-09 RX ADMIN — PROPOFOL 150 MG: 10 INJECTION, EMULSION INTRAVENOUS at 09:03

## 2020-03-09 RX ADMIN — ONDANSETRON 4 MG: 2 INJECTION INTRAMUSCULAR; INTRAVENOUS at 11:03

## 2020-03-09 RX ADMIN — SODIUM CHLORIDE: 0.9 INJECTION, SOLUTION INTRAVENOUS at 08:03

## 2020-03-09 RX ADMIN — SODIUM CHLORIDE, SODIUM LACTATE, POTASSIUM CHLORIDE, AND CALCIUM CHLORIDE: 600; 310; 30; 20 INJECTION, SOLUTION INTRAVENOUS at 10:03

## 2020-03-09 RX ADMIN — ROCURONIUM BROMIDE 40 MG: 10 INJECTION, SOLUTION INTRAVENOUS at 09:03

## 2020-03-09 RX ADMIN — POTASSIUM CHLORIDE 10 MEQ: 10 INJECTION, SOLUTION INTRAVENOUS at 01:03

## 2020-03-09 RX ADMIN — HEPARIN SODIUM 5000 UNITS: 5000 INJECTION, SOLUTION INTRAVENOUS; SUBCUTANEOUS at 01:03

## 2020-03-09 RX ADMIN — FENTANYL CITRATE 50 MCG: 50 INJECTION, SOLUTION INTRAMUSCULAR; INTRAVENOUS at 09:03

## 2020-03-09 RX ADMIN — POTASSIUM CHLORIDE 10 MEQ: 7.46 INJECTION, SOLUTION INTRAVENOUS at 01:03

## 2020-03-09 RX ADMIN — LIDOCAINE HYDROCHLORIDE 75 MG: 20 INJECTION, SOLUTION INTRAVENOUS at 09:03

## 2020-03-09 RX ADMIN — HEPARIN SODIUM 5000 UNITS: 5000 INJECTION, SOLUTION INTRAVENOUS; SUBCUTANEOUS at 09:03

## 2020-03-09 RX ADMIN — IPRATROPIUM BROMIDE AND ALBUTEROL SULFATE 3 ML: .5; 3 SOLUTION RESPIRATORY (INHALATION) at 03:03

## 2020-03-09 RX ADMIN — MIDAZOLAM HYDROCHLORIDE 2 MG: 1 INJECTION, SOLUTION INTRAMUSCULAR; INTRAVENOUS at 08:03

## 2020-03-09 RX ADMIN — ROCURONIUM BROMIDE 10 MG: 10 INJECTION, SOLUTION INTRAVENOUS at 09:03

## 2020-03-09 RX ADMIN — FENTANYL CITRATE 100 MCG: 50 INJECTION, SOLUTION INTRAMUSCULAR; INTRAVENOUS at 09:03

## 2020-03-09 RX ADMIN — IPRATROPIUM BROMIDE AND ALBUTEROL SULFATE 3 ML: .5; 3 SOLUTION RESPIRATORY (INHALATION) at 08:03

## 2020-03-09 RX ADMIN — ACETAMINOPHEN 1000 MG: 10 INJECTION, SOLUTION INTRAVENOUS at 09:03

## 2020-03-09 RX ADMIN — PHENYLEPHRINE HYDROCHLORIDE 100 MCG: 10 INJECTION INTRAVENOUS at 09:03

## 2020-03-09 RX ADMIN — Medication 30 MG: at 09:03

## 2020-03-09 RX ADMIN — SODIUM CHLORIDE, SODIUM LACTATE, POTASSIUM CHLORIDE, AND CALCIUM CHLORIDE: 600; 310; 30; 20 INJECTION, SOLUTION INTRAVENOUS at 02:03

## 2020-03-09 RX ADMIN — IPRATROPIUM BROMIDE AND ALBUTEROL SULFATE 3 ML: .5; 3 SOLUTION RESPIRATORY (INHALATION) at 01:03

## 2020-03-09 RX ADMIN — AMLODIPINE BESYLATE 10 MG: 10 TABLET ORAL at 01:03

## 2020-03-09 RX ADMIN — POTASSIUM CHLORIDE 10 MEQ: 10 INJECTION, SOLUTION INTRAVENOUS at 07:03

## 2020-03-09 RX ADMIN — SODIUM CHLORIDE, SODIUM GLUCONATE, SODIUM ACETATE, POTASSIUM CHLORIDE, MAGNESIUM CHLORIDE, SODIUM PHOSPHATE, DIBASIC, AND POTASSIUM PHOSPHATE: .53; .5; .37; .037; .03; .012; .00082 INJECTION, SOLUTION INTRAVENOUS at 08:03

## 2020-03-09 NOTE — SUBJECTIVE & OBJECTIVE
Interval History:   NAEO, afebrile. Potassium replaced with BMP needed. CT obtained and reviewed. To OR today.    Review of Systems   Constitution: Negative for chills, decreased appetite, diaphoresis and fever.   Eyes: Negative for blurred vision.   Cardiovascular: Negative for chest pain.   Respiratory: Negative for shortness of breath and wheezing.    Skin: Positive for poor wound healing.   Musculoskeletal: Negative for muscle cramps.   Genitourinary: Negative for flank pain and frequency.   Neurological: Negative for dizziness.     Medications:  Continuous Infusions:   lactated ringers 125 mL/hr at 03/09/20 0203     Scheduled Meds:   albuterol-ipratropium  3 mL Nebulization Q4H    amLODIPine  10 mg Oral Daily    heparin (porcine)  5,000 Units Subcutaneous Q8H     PRN Meds:acetaminophen, Dextrose 10% Bolus, Dextrose 10% Bolus, glucagon (human recombinant), glucose, glucose, insulin aspart U-100, iohexol, ondansetron, oxyCODONE-acetaminophen, oxyCODONE-acetaminophen, sodium chloride 0.9%     Objective:     Vital Signs (Most Recent):  Temp: 98.1 °F (36.7 °C) (03/09/20 0434)  Pulse: 105 (03/09/20 0707)  Resp: 16 (03/09/20 0434)  BP: 126/63 (03/09/20 0434)  SpO2: (!) 92 % (03/09/20 0434) Vital Signs (24h Range):  Temp:  [97.8 °F (36.6 °C)-98.7 °F (37.1 °C)] 98.1 °F (36.7 °C)  Pulse:  [] 105  Resp:  [15-20] 16  SpO2:  [92 %-98 %] 92 %  BP: (126-158)/(60-72) 126/63     Weight: 104 kg (229 lb 4.5 oz)  Body mass index is 43.32 kg/m².    SpO2: (!) 92 %  O2 Device (Oxygen Therapy): room air    Intake/Output - Last 3 Shifts       03/07 0700 - 03/08 0659 03/08 0700 - 03/09 0659 03/09 0700 - 03/10 0659    P.O.  450     Total Intake(mL/kg)  450 (4.3)     Urine (mL/kg/hr)  1100     Stool  200     Total Output  1300     Net  -850            Stool Occurrence  0 x           Lines/Drains/Airways     Peripheral Intravenous Line                 Peripheral IV - Single Lumen 03/08/20 1849 22 G Left Antecubital less than 1  day                Physical Exam   Constitutional: She is oriented to person, place, and time. She appears well-developed and well-nourished.   morbidly obese   HENT:   Head: Normocephalic and atraumatic.   Eyes: Conjunctivae and EOM are normal.   Neck: Normal range of motion.   Cardiovascular: Normal rate and regular rhythm.   Pulmonary/Chest: Effort normal and breath sounds normal. No stridor. No respiratory distress.   Abdominal: Soft. She exhibits no distension.   Morbidly obese   Musculoskeletal: She exhibits no edema or deformity.   Neurological: She is alert and oriented to person, place, and time.   Skin: Skin is warm and dry. Capillary refill takes less than 2 seconds. No erythema.   Left upper abdomen/lower chest wound open between remaining staplers, no erythema, no draining purulence, with evidence of grannulation and fibrinous exudate in wound bed   Psychiatric: She has a normal mood and affect. Her behavior is normal.       Significant Labs:  BMP:   Recent Labs   Lab 03/08/20  1713         K 2.9*      CO2 27   BUN 9   CREATININE 0.7   CALCIUM 8.9     CBC:   Recent Labs   Lab 03/08/20  1713   WBC 7.04   RBC 4.09   HGB 10.6*   HCT 36.1*   *   MCV 88   MCH 25.9*   MCHC 29.4*     All pertinent labs from the last 24 hours have been reviewed.    Significant Diagnostics:  CT: I have reviewed all pertinent results/findings within the past 24 hours   -diaphragmatic repair intact, chest wall repair intact

## 2020-03-09 NOTE — PROGRESS NOTES
[facility]  Cardiothoracic Surgery  Progress Note    Patient Name: Marisela Bunn  MRN: [id number]  Admission Date: 3/8/2020  Hospital Length of Stay: 1 days  Code Status: Full Code   Attending Physician: Primo Soni MD   Referring Provider: Primo Soni MD  Principal Problem:Wound dehiscence    Subjective:     Post-Op Info:  Procedure(s) (LRB):  IRRIGATION AND DEBRIDEMENT (Left)  APPLICATION, WOUND VAC (Left)         Interval History:   NAEO, afebrile. Potassium replaced with BMP needed. CT obtained and reviewed. To OR today.    Review of Systems   Constitution: Negative for chills, decreased appetite, diaphoresis and fever.   Eyes: Negative for blurred vision.   Cardiovascular: Negative for chest pain.   Respiratory: Negative for shortness of breath and wheezing.    Skin: Positive for poor wound healing.   Musculoskeletal: Negative for muscle cramps.   Genitourinary: Negative for flank pain and frequency.   Neurological: Negative for dizziness.     Medications:  Continuous Infusions:   lactated ringers 125 mL/hr at 03/09/20 0203     Scheduled Meds:   albuterol-ipratropium  3 mL Nebulization Q4H    amLODIPine  10 mg Oral Daily    heparin (porcine)  5,000 Units Subcutaneous Q8H     PRN Meds:acetaminophen, Dextrose 10% Bolus, Dextrose 10% Bolus, glucagon (human recombinant), glucose, glucose, insulin aspart U-100, iohexol, ondansetron, oxyCODONE-acetaminophen, oxyCODONE-acetaminophen, sodium chloride 0.9%     Objective:     Vital Signs (Most Recent):  Temp: 98.1 °F (36.7 °C) (03/09/20 0434)  Pulse: 105 (03/09/20 0707)  Resp: 16 (03/09/20 0434)  BP: 126/63 (03/09/20 0434)  SpO2: (!) 92 % (03/09/20 0434) Vital Signs (24h Range):  Temp:  [97.8 °F (36.6 °C)-98.7 °F (37.1 °C)] 98.1 °F (36.7 °C)  Pulse:  [] 105  Resp:  [15-20] 16  SpO2:  [92 %-98 %] 92 %  BP: (126-158)/(60-72) 126/63     Weight: 104 kg (229 lb 4.5 oz)  Body mass index is 43.32 kg/m².    SpO2: (!) 92 %  O2 Device  (Oxygen Therapy): room air    Intake/Output - Last 3 Shifts       03/07 0700 - 03/08 0659 03/08 0700 - 03/09 0659 03/09 0700 - 03/10 0659    P.O.  450     Total Intake(mL/kg)  450 (4.3)     Urine (mL/kg/hr)  1100     Stool  200     Total Output  1300     Net  -850            Stool Occurrence  0 x           Lines/Drains/Airways     Peripheral Intravenous Line                 Peripheral IV - Single Lumen 03/08/20 1849 22 G Left Antecubital less than 1 day                Physical Exam   Constitutional: She is oriented to person, place, and time. She appears well-developed and well-nourished.   morbidly obese   HENT:   Head: Normocephalic and atraumatic.   Eyes: Conjunctivae and EOM are normal.   Neck: Normal range of motion.   Cardiovascular: Normal rate and regular rhythm.   Pulmonary/Chest: Effort normal and breath sounds normal. No stridor. No respiratory distress.   Abdominal: Soft. She exhibits no distension.   Morbidly obese   Musculoskeletal: She exhibits no edema or deformity.   Neurological: She is alert and oriented to person, place, and time.   Skin: Skin is warm and dry. Capillary refill takes less than 2 seconds. No erythema.   Left upper abdomen/lower chest wound open between remaining staplers, no erythema, no draining purulence, with evidence of grannulation and fibrinous exudate in wound bed   Psychiatric: She has a normal mood and affect. Her behavior is normal.       Significant Labs:  BMP:   Recent Labs   Lab 03/08/20  1713         K 2.9*      CO2 27   BUN 9   CREATININE 0.7   CALCIUM 8.9     CBC:   Recent Labs   Lab 03/08/20  1713   WBC 7.04   RBC 4.09   HGB 10.6*   HCT 36.1*   *   MCV 88   MCH 25.9*   MCHC 29.4*     All pertinent labs from the last 24 hours have been reviewed.    Significant Diagnostics:  CT: I have reviewed all pertinent results/findings within the past 24 hours   -diaphragmatic repair intact, chest wall repair intact    Assessment/Plan:     * Wound  dehiscence  68yo female s/p left diaphragmatic hernia repair and reconstruction with biologic mesh, and chest wall hernia repair with [place]-ad[place]isa mesh presenting with a chronic surgical wound with dehiscence.  Denies fevers, chills, or purulent discharge.  No current signs of infection.       - NPO  - mIV fluids at 125cc/hr  - Sliding scale insulin  - PRN nausea meds  - DVT ppx: Heparin 5k Q8, SCDs  - Ambulate as tolerated  - Need to establish PIV access for BMP redraw post potassium replacement this AM  - CT c/a/p with contrast obtained and reviewed with evidence of two intact repairs  - OR today for wound debridement and washout of wound with wound vac placement      Shruthi Prescott MD  Cardiothoracic Surgery  [facility]

## 2020-03-09 NOTE — OP NOTE
DATE OF PROCEDURE: 3/9/2020    PREOPERATIVE DIAGNOSIS: Delayed wound healing     POSTOPERATIVE DIAGNOSIS: Same    PROCEDURES PERFORMED:  1.  Irrigation and debridement of wound  2.  Wound vac placement     ATTENDING SURGEON: Primo Soni MD    RESIDENT: Hugh Alanis MD     ANESTHESIA: GETA    KEY FINDINGS: [id number] cavity overlying mesh with fibrinous exudate and seroma.  No purulence noted    ESTIMATED BLOOD LOSS: 2 ml    DRAINS: Wound vac    COMPLICATIONS: None apparent     INDICATIONS FOR PROCEDURE: The patient is a 69 y.o. female   who presented with delayed healing of a left thoracotomy wound.  The wound had previously been managed with wet to dry dressings and we recommended surgical debridement and wound vac therapy. She agreed to proceed     PROCEDURE IN DETAIL: After informed consent was obtained, the patient was brought to the Operative Theater and placed in in the right lateral decubitus position. After adequate anesthesia the patient was prepped and draped in the usual sterile fashion. A timeout procedure was performed and the staples were removed from the previous incision.  The wound was copiously irrigated with bacitracin impregnated saline using pulse-vac therapy (3L total).  We then debrided the fibrinous exudate from the wound sharply revealing healthy granulation tissue at the base of the wound.  Mesh was visible and only serous fluid was drained, no purulence.  The final defect measured [id number].  We then elected to place a wound vac, black sponge, into the cavity.  The vac was set to 125mmHg with good seal.  She was then extubated.  All counts were correct. The patient tolerated the procedure well and was transported to the recovery room in stable condition.  Dr Soni was present for all critical portions of the procedure

## 2020-03-09 NOTE — PLAN OF CARE
03/09/20 1241   Post-Acute Status   Post-Acute Authorization Placement   Post-Acute Placement Status Referrals Sent  (Referral sent to [facility])       SW sent referral to [facility] . Awaiting acceptance.     Will f/u as needed.    Sydnie Hess LMSW  Case Management Social Worker   [facility]

## 2020-03-09 NOTE — TRANSFER OF CARE
"Anesthesia Transfer of Care Note    Patient: Marisela Bunn    Procedure(s) Performed: Procedure(s) (LRB):  IRRIGATION AND DEBRIDEMENT (Left)  APPLICATION, WOUND VAC (Left)    Transport from OR: Transported from OR on 6-10 L/min O2 by face mask with adequate spontaneous ventilation    Post pain: adequate analgesia    Post assessment: no apparent anesthetic complications    Post vital signs: stable    Level of consciousness: awake, oriented and alert    Nausea/Vomiting: no nausea/vomiting    Complications: none    Transfer of care protocol was followed      Last vitals:   Visit Vitals  BP (!) 149/72 (BP Location: Right arm, Patient Position: Lying)   Pulse 79   Temp 36.5 °C (97.7 °F) (Temporal)   Resp 18   Ht 5' 1" (1.549 m)   Wt 103.9 kg (229 lb)   LMP  (LMP Unknown)   SpO2 98%   Breastfeeding? No   BMI 43.27 kg/m²     "

## 2020-03-09 NOTE — PROGRESS NOTES
Potassium Chloride given at half the rate, 50 mL/hr, due to patient complaint of significant burning sensation.  MARIANA Rivera

## 2020-03-09 NOTE — PLAN OF CARE
POC reviewed, questions/concerns discussed. AAOx4, VSS on RA with no complaints of SOB, headaches, or dizziness. Independent, up to BR or bedside commode, urine output adequate. One BM with moderate size brown formed stool. NPO except for Omnipaque contrast supporting CT imaging.  Complaint of nausea on one occasion, controlled with SL zofran. Blood glucose monitored once, within normal limits.  Patient is resting quietly with side rails up and call light with in reach. Will continue to monitor patient status.

## 2020-03-09 NOTE — NURSING TRANSFER
Nursing Transfer Note      3/9/2020     Transfer To: 1045    Transfer via stretcher    Transfer with parul, wound vac, tele    Transported by pct    Medicines sent: no    Chart send with patient: Yes    Notified: daughter

## 2020-03-09 NOTE — ANESTHESIA POSTPROCEDURE EVALUATION
Anesthesia Post Evaluation    Patient: Marisela Bunn    Procedure(s) Performed: Procedure(s) (LRB):  IRRIGATION AND DEBRIDEMENT (Left)  APPLICATION, WOUND VAC (Left)    Final Anesthesia Type: general    Patient location during evaluation: PACU  Patient participation: Yes- Able to Participate  Level of consciousness: awake and alert and oriented  Post-procedure vital signs: reviewed and stable  Pain management: adequate  Airway patency: patent    PONV status at discharge: No PONV  Anesthetic complications: no      Cardiovascular status: hemodynamically stable and blood pressure returned to baseline  Respiratory status: room air, spontaneous ventilation and unassisted  Hydration status: euvolemic  Follow-up not needed.          Vitals Value Taken Time   /67 3/9/2020 12:44 PM   Temp 36.9 °C (98.5 °F) 3/9/2020 12:44 PM   Pulse 85 3/9/2020 12:44 PM   Resp 16 3/9/2020 12:44 PM   SpO2 92 % 3/9/2020 12:44 PM         Event Time     Out of Recovery 03/09/2020 11:18:22          Pain/Alex Score: Pain Rating Prior to Med Admin: 0 (3/9/2020 12:15 PM)  Pain Rating Post Med Admin: 0 (3/9/2020 12:15 PM)  Alex Score: 9 (3/9/2020 12:15 PM)

## 2020-03-09 NOTE — ANESTHESIA PROCEDURE NOTES
Intubation  Performed by: Desmond Green MD  Authorized by: Kashif Jang MD     Intubation:     Induction:  Intravenous    Intubated:  Postinduction    Mask Ventilation:  Easy with oral airway    Attempts:  1    Attempted By:  Resident anesthesiologist    Method of Intubation:  Direct    Blade:  Domingo 2    Laryngeal View Grade: Grade I - full view of chords      Difficult Airway Encountered?: No      Airway Device:  Oral endotracheal tube    Airway Device Size:  7.0    Style/Cuff Inflation:  Cuffed (inflated to minimal occlusive pressure)    Tube secured:  20    Secured at:  The lips    Placement Verified By:  Capnometry    Complicating Factors:  Obesity    Findings Post-Intubation:  BS equal bilateral

## 2020-03-09 NOTE — PLAN OF CARE
[facility]     Department of Hospital Medicine     [address] [place] [address]     [phone] after hours  [phone] fax       NURSING HOME ORDERS    03/09/2020    Admit to Nursing Home: Skilled Bed                       Diagnoses:  Active Hospital Problems    Diagnosis  POA    *Wound dehiscence [T81.30XA]  Yes    Wound infection [T14.8XXA, L08.9]  Yes    Diaphragmatic hernia [K44.9]  Yes      Resolved Hospital Problems   No resolved problems to display.       Patient is homebound due to:  Wound dehiscence    Allergies:  Review of patient's allergies indicates:   Allergen Reactions    Adhesive Blisters    Erythromycin      Stomach pains    Rosuvastatin      Hives, muscle/joint pains    Zolpidem      Confusion        Vitals:       Every shift (Skilled Nursing patients)    Diet:Diabetic Dysphagia Mechanical Soft     Acitivities:      - Up in a chair each morning as tolerated   - Ambulate with assistance to bathroom   - Scheduled walks once each shift (every 8 hours)   - May use walker, cane, or self-propelled wheelchair     Labs:  Per facility protocol              CMP, CBC every other day              Vanc trough and random per facility protocol      Nursing Precautions:     - Aspiration precautions:               -  Upright 90 degrees befor during and after meals               -  Suction at bedside          - Fall precautions per nursing home protocol   - Seizure precaution per care home protocol   - Decubitus precautions:              -  for positioning              - Pressure reducing foam mattress              - Turn patient every two hours. Use wedge pillows to anchor patient     CONSULTS:                 Physical Therapy to evaluate and treat                 Occupational Therapy to evaluate and treat                 Speech Therapy  to evaluate and treat     MISCELLANEOUS CARE:                  Routine Skin for Bedridden Patients:   Apply moisture barrier cream to all                          skin folds and wet areas in perineal area daily and after baths and                           all bowel movements.     WOUND CARE: All wounds to be measured with first dressing changes and every week.     Wound Vac to Surgical Wound:  Left chest-  [id number] defect. Black sponge to 125mmHg suction.         Dressing changes every Monday, Wednesday and Friday.        ET Consult    Other Wounds:  Barrier antifungal cream BID to the perineal area and under breasts          DIABETES CARE:       Check blood sugar:       Fingerstick blood sugar AC and HS      Report CBG < 60 or > 400 to physician.                                          Insulin Sliding Scale          Glucose  Novolog Insulin Subcutaneous        0 - 60   Orange juice or glucose tablet, hold insulin      No insulin   201-250  2 units   251-300  4 units   301-350  6 units   351-400  8 units   >400   10 units then call physician      Medications: Discontinue all previous medication orders, if any. See new list below.     Marisela Bunn   Home Medication Instructions ANGIE:[id number]    Printed on:03/09/20 1204   Medication Information                      acetaminophen (TYLENOL) 325 MG tablet  Take 2 tablets (650 mg total) by mouth every 6 (six) hours as needed.             albuterol-ipratropium (DUO-NEB) 2.5 mg-0.5 mg/3 mL nebulizer solution  Take 3 mLs by nebulization every 4 (four) hours. Rescue             amLODIPine (NORVASC) 10 MG tablet  Take 1 tablet (10 mg total) by mouth once daily.             celecoxib (CELEBREX) 200 MG capsule  Take 1 capsule (200 mg total) by mouth once daily.             oxyCODONE (ROXICODONE) 5 MG immediate release tablet  Take 1 tablet (5 mg total) by mouth every 4 (four) hours as needed for Pain.             pregabalin (LYRICA) 75 MG capsule  Take 1 capsule (75 mg total) by mouth every evening.                                     _________________________________  ANTONY Haider  03/09/2020

## 2020-03-10 LAB
ANION GAP SERPL CALC-SCNC: 11 MMOL/L (ref 8–16)
BUN SERPL-MCNC: 6 MG/DL (ref 8–23)
CALCIUM SERPL-MCNC: 9.4 MG/DL (ref 8.7–10.5)
CHLORIDE SERPL-SCNC: 107 MMOL/L (ref 95–110)
CO2 SERPL-SCNC: 25 MMOL/L (ref 23–29)
CREAT SERPL-MCNC: 0.7 MG/DL (ref 0.5–1.4)
EST. GFR  (AFRICAN AMERICAN): >60 ML/MIN/1.73 M^2
EST. GFR  (NON AFRICAN AMERICAN): >60 ML/MIN/1.73 M^2
GLUCOSE SERPL-MCNC: 110 MG/DL (ref 70–110)
MAGNESIUM SERPL-MCNC: 1.9 MG/DL (ref 1.6–2.6)
PHOSPHATE SERPL-MCNC: 4.3 MG/DL (ref 2.7–4.5)
POCT GLUCOSE: 122 MG/DL (ref 70–110)
POCT GLUCOSE: 135 MG/DL (ref 70–110)
POCT GLUCOSE: 137 MG/DL (ref 70–110)
POCT GLUCOSE: 145 MG/DL (ref 70–110)
POTASSIUM SERPL-SCNC: 3.6 MMOL/L (ref 3.5–5.1)
SODIUM SERPL-SCNC: 143 MMOL/L (ref 136–145)

## 2020-03-10 PROCEDURE — 20600001 HC STEP DOWN PRIVATE ROOM

## 2020-03-10 PROCEDURE — 94640 AIRWAY INHALATION TREATMENT: CPT

## 2020-03-10 PROCEDURE — 63600175 PHARM REV CODE 636 W HCPCS: Performed by: STUDENT IN AN ORGANIZED HEALTH CARE EDUCATION/TRAINING PROGRAM

## 2020-03-10 PROCEDURE — 80048 BASIC METABOLIC PNL TOTAL CA: CPT

## 2020-03-10 PROCEDURE — 25000003 PHARM REV CODE 250: Performed by: PHYSICIAN ASSISTANT

## 2020-03-10 PROCEDURE — 94761 N-INVAS EAR/PLS OXIMETRY MLT: CPT

## 2020-03-10 PROCEDURE — 84100 ASSAY OF PHOSPHORUS: CPT

## 2020-03-10 PROCEDURE — 25000003 PHARM REV CODE 250: Performed by: STUDENT IN AN ORGANIZED HEALTH CARE EDUCATION/TRAINING PROGRAM

## 2020-03-10 PROCEDURE — 63600175 PHARM REV CODE 636 W HCPCS: Performed by: THORACIC SURGERY (CARDIOTHORACIC VASCULAR SURGERY)

## 2020-03-10 PROCEDURE — 90670 PCV13 VACCINE IM: CPT | Performed by: THORACIC SURGERY (CARDIOTHORACIC VASCULAR SURGERY)

## 2020-03-10 PROCEDURE — 25000242 PHARM REV CODE 250 ALT 637 W/ HCPCS: Performed by: FAMILY MEDICINE

## 2020-03-10 PROCEDURE — 83735 ASSAY OF MAGNESIUM: CPT

## 2020-03-10 PROCEDURE — 36415 COLL VENOUS BLD VENIPUNCTURE: CPT

## 2020-03-10 PROCEDURE — 90471 IMMUNIZATION ADMIN: CPT | Performed by: THORACIC SURGERY (CARDIOTHORACIC VASCULAR SURGERY)

## 2020-03-10 RX ORDER — PREGABALIN 75 MG/1
75 CAPSULE ORAL NIGHTLY
Qty: 30 CAPSULE | Refills: 3 | Status: SHIPPED | OUTPATIENT
Start: 2020-03-10 | End: 2020-03-12 | Stop reason: SDUPTHER

## 2020-03-10 RX ORDER — OXYCODONE AND ACETAMINOPHEN 5; 325 MG/1; MG/1
1 TABLET ORAL EVERY 4 HOURS PRN
Qty: 28 TABLET | Refills: 0 | Status: SHIPPED | OUTPATIENT
Start: 2020-03-10 | End: 2020-03-12

## 2020-03-10 RX ORDER — PROMETHAZINE HYDROCHLORIDE 6.25 MG/5ML
12.5 SYRUP ORAL NIGHTLY PRN
Status: DISCONTINUED | OUTPATIENT
Start: 2020-03-10 | End: 2020-03-12 | Stop reason: HOSPADM

## 2020-03-10 RX ORDER — POTASSIUM CHLORIDE 20 MEQ/15ML
20 SOLUTION ORAL ONCE
Status: COMPLETED | OUTPATIENT
Start: 2020-03-10 | End: 2020-03-10

## 2020-03-10 RX ORDER — CETIRIZINE HYDROCHLORIDE 10 MG/1
10 TABLET ORAL DAILY
Status: DISCONTINUED | OUTPATIENT
Start: 2020-03-10 | End: 2020-03-12 | Stop reason: HOSPADM

## 2020-03-10 RX ORDER — ACETAMINOPHEN 325 MG/1
650 TABLET ORAL EVERY 8 HOURS PRN
Refills: 0
Start: 2020-03-10

## 2020-03-10 RX ORDER — BENZONATATE 100 MG/1
200 CAPSULE ORAL 3 TIMES DAILY
Status: DISCONTINUED | OUTPATIENT
Start: 2020-03-10 | End: 2020-03-12 | Stop reason: HOSPADM

## 2020-03-10 RX ADMIN — IPRATROPIUM BROMIDE AND ALBUTEROL SULFATE 3 ML: .5; 3 SOLUTION RESPIRATORY (INHALATION) at 11:03

## 2020-03-10 RX ADMIN — POTASSIUM CHLORIDE 20 MEQ: 20 SOLUTION ORAL at 09:03

## 2020-03-10 RX ADMIN — PNEUMOCOCCAL 13-VALENT CONJUGATE VACCINE 0.5 ML: 2.2; 2.2; 2.2; 2.2; 2.2; 4.4; 2.2; 2.2; 2.2; 2.2; 2.2; 2.2; 2.2 INJECTION, SUSPENSION INTRAMUSCULAR at 09:03

## 2020-03-10 RX ADMIN — CETIRIZINE HYDROCHLORIDE 10 MG: 10 TABLET, FILM COATED ORAL at 09:03

## 2020-03-10 RX ADMIN — IPRATROPIUM BROMIDE AND ALBUTEROL SULFATE 3 ML: .5; 3 SOLUTION RESPIRATORY (INHALATION) at 12:03

## 2020-03-10 RX ADMIN — BENZONATATE 200 MG: 100 CAPSULE ORAL at 09:03

## 2020-03-10 RX ADMIN — HEPARIN SODIUM 5000 UNITS: 5000 INJECTION, SOLUTION INTRAVENOUS; SUBCUTANEOUS at 11:03

## 2020-03-10 RX ADMIN — HEPARIN SODIUM 5000 UNITS: 5000 INJECTION, SOLUTION INTRAVENOUS; SUBCUTANEOUS at 03:03

## 2020-03-10 RX ADMIN — AMLODIPINE BESYLATE 10 MG: 10 TABLET ORAL at 09:03

## 2020-03-10 RX ADMIN — BENZONATATE 200 MG: 100 CAPSULE ORAL at 03:03

## 2020-03-10 RX ADMIN — BENZONATATE 200 MG: 100 CAPSULE ORAL at 10:03

## 2020-03-10 RX ADMIN — OXYCODONE AND ACETAMINOPHEN 1 TABLET: 10; 325 TABLET ORAL at 10:03

## 2020-03-10 RX ADMIN — IPRATROPIUM BROMIDE AND ALBUTEROL SULFATE 3 ML: .5; 3 SOLUTION RESPIRATORY (INHALATION) at 08:03

## 2020-03-10 RX ADMIN — IPRATROPIUM BROMIDE AND ALBUTEROL SULFATE 3 ML: .5; 3 SOLUTION RESPIRATORY (INHALATION) at 04:03

## 2020-03-10 RX ADMIN — OXYCODONE AND ACETAMINOPHEN 1 TABLET: 10; 325 TABLET ORAL at 12:03

## 2020-03-10 RX ADMIN — HEPARIN SODIUM 5000 UNITS: 5000 INJECTION, SOLUTION INTRAVENOUS; SUBCUTANEOUS at 05:03

## 2020-03-10 RX ADMIN — OXYCODONE AND ACETAMINOPHEN 1 TABLET: 10; 325 TABLET ORAL at 05:03

## 2020-03-10 NOTE — HPI
Patient is a 69 y.o. female with a history of HTN, nonalcoholic fatty liver disease, GERD, and CAD who underwent a diaphragmatic hernia repair on 1/21/20.  The defect measured approximately [id number] and was patched with a [place]-Rajendra patch.  She had a recurrent diaphragmatic hernia which was repaired on 1/29/20.  She was discharged to SNF on 2/11/20.  Since discharge she has had a wound to her left chest/upper abdomen.  Over this time she states that it has gradually grown in size.  She denies any fevers, chills, nausea, or vomiting.  States it is red but the erythema is not spreading.  No increased pain.  No shortness of breath or chest pain.       States she is not taking any antiplatelet or anticoagulant medications.  Has been started on insulin at SNF but denies a history of diabetes.

## 2020-03-10 NOTE — PLAN OF CARE
POC reviewed. AAOx4, VSS on RA with no complaints of SOB, headaches, or dizziness. Independent, up to BR or bedside commode, urine output good. Diabetic dysphagia mechanical soft diet. Wound vac to suction, seal maintained.  Patient is resting quietly with side rails up and call light with in reach. Will continue to monitor patient status.

## 2020-03-10 NOTE — PLAN OF CARE
03/10/20 0949   Post-Acute Status   Post-Acute Authorization Placement   Post-Acute Placement Status Pending Post-Acute Clinical Review   KAYLEE sent over requested clinicals again to [facility]. KAYLEE also attempted to contact Jessenia and Isaac in admissions (left message [phone] ).    UPDATE:11:09 AM  SW finally received call back from Jessenia at [facility]. She still stated she did not receive NH orders despite being faxed over numerous times to verified fax number. KAYLEE hard faxed again. Jessenia stated she resubmitted auth to [facility] and is waiting to hear back. She is also double checking with her wound care team regarding when their wound vac can be delivered to the SNF but it will not be today. KAYLEE notified team.    UPDATE:12:02 PM  KAYLEE spoke with Jessenia again and she stated they should get wound care supplies today, but still waiting on auth. KAYLEE spoke with pt and daughter in law. Pt deciding on transportation options to the NH. KAYLEE will follow up at the end of day regarding if auth has been obtained or not.    UPDATE:3:46 PM  KAYLEE spoke with Jessenia and they still have not received auth. KAYLEE spoke with Michelle at [facility] regarding expediting this. She stated she has left a message for Omar at [facility] who is working on the case.     UPDATE:4:18 PM  KAYLEE spoke with Omar at [facility]. She stated she is waiting for more clinicals to be sent from [facility]. KAYLEE reiterated that pt is ready to return to SNF tomorrow. She stated everything should be done tomorrow.  KAYLEE verified with team that they are okay with pt's daughter in law transporting her to the SNF if this is what the pt wants. KAYLEE called pts d-I-l (Ambar) and left this information on VM for her.    Chula Ram, SW  [facility]  [id number]

## 2020-03-10 NOTE — PLAN OF CARE
Plan of care reviewed with pt: AAOx4, on RA, VS stable. Pt got her wound debridement today with Woundvac applied at -125mmHg with only scant amount of drainage. Pain is only 4/10, under control with Oxy 5mg. Tolerated her diet well, consumed about 25-50% of her meals. No complains of nausea. Voided with adequate amount clear yellow urine. SCD on. Call light in reach. WCTM

## 2020-03-10 NOTE — PROGRESS NOTES
[facility]  Cardiothoracic Surgery  Progress Note    Patient Name: Marisela Bunn  MRN: [id number]  Admission Date: 3/8/2020  Hospital Length of Stay: 2 days  Code Status: Full Code   Attending Physician: Primo Soni MD   Referring Provider: Primo Soni MD  Principal Problem:Wound dehiscence    Subjective:     Post-Op Info:  Procedure(s) (LRB):  IRRIGATION AND DEBRIDEMENT (Left)  APPLICATION, WOUND VAC (Left)   1 Day Post-Op     Interval History:   NAEO, VSS afebrile post-op. Wound VAC maintains suction.    Medications:  Continuous Infusions:    Scheduled Meds:   albuterol-ipratropium  3 mL Nebulization Q4H    amLODIPine  10 mg Oral Daily    heparin (porcine)  5,000 Units Subcutaneous Q8H    potassium chloride 10%  20 mEq Oral Once     PRN Meds:acetaminophen, Dextrose 10% Bolus, Dextrose 10% Bolus, glucagon (human recombinant), glucose, glucose, influenza, insulin aspart U-100, iohexol, ondansetron, oxyCODONE-acetaminophen, oxyCODONE-acetaminophen, pneumoc 13-dipak conj-dip cr(PF), sodium chloride 0.9%     Objective:     Vital Signs (Most Recent):  Temp: 98.1 °F (36.7 °C) (03/10/20 0526)  Pulse: 100 (03/10/20 0526)  Resp: 20 (03/10/20 0526)  BP: 134/64 (03/10/20 0526)  SpO2: (!) 94 % (03/10/20 0526) Vital Signs (24h Range):  Temp:  [96.5 °F (35.8 °C)-98.6 °F (37 °C)] 98.1 °F (36.7 °C)  Pulse:  [] 100  Resp:  [16-20] 20  SpO2:  [90 %-100 %] 94 %  BP: (120-149)/(60-72) 134/64     Weight: 103.9 kg (229 lb)  Body mass index is 43.27 kg/m².    SpO2: (!) 94 %  O2 Device (Oxygen Therapy): room air    Intake/Output - Last 3 Shifts       03/08 0700 - 03/09 0659 03/09 0700 - 03/10 0659 03/10 0700 - 03/11 0659    P.O. 450 480     I.V. (mL/kg)  3654.2 (35.2)     IV Piggyback  200     Total Intake(mL/kg) 450 (4.3) 4334.2 (41.7)     Urine (mL/kg/hr) 1100 2700 (1.1)     Emesis/NG output  0     Other  50     Stool 200 0     Total Output 1300 2750     Net -850 +1584.2            Urine  Occurrence  3 x     Stool Occurrence 0 x 1 x     Emesis Occurrence  0 x           Lines/Drains/Airways     Peripheral Intravenous Line                 Peripheral IV - Single Lumen 03/08/20 1849 22 G Left Antecubital 1 day              Physical Exam   Constitutional: She is oriented to person, place, and time. She appears well-developed and well-nourished.   morbidly obese   HENT:   Head: Normocephalic and atraumatic.   Eyes: Conjunctivae and EOM are normal.   Neck: Normal range of motion.   Cardiovascular: Normal rate and regular rhythm.   Pulmonary/Chest: Effort normal and breath sounds normal. No stridor. No respiratory distress.   Abdominal: Soft. She exhibits no distension.   Musculoskeletal: She exhibits no edema or deformity.   Neurological: She is alert and oriented to person, place, and time.   Skin: Skin is warm and dry. Capillary refill takes less than 2 seconds. No erythema.   Left upper abdomen/lower chest wound with wound vac in place to suction, maintaining seal. 50mL serosanguinous output since placement.   Psychiatric: She has a normal mood and affect. Her behavior is normal.     Significant Labs:  BMP:   Recent Labs   Lab 03/10/20  0442         K 3.6      CO2 25   BUN 6*   CREATININE 0.7   CALCIUM 9.4   MG 1.9     CBC:   Recent Labs   Lab 03/08/20  1713   WBC 7.04   RBC 4.09   HGB 10.6*   HCT 36.1*   *   MCV 88   MCH 25.9*   MCHC 29.4*     All pertinent labs from the last 24 hours have been reviewed.    Significant Diagnostics:  CT: I have reviewed all pertinent results/findings within the past 24 hours   -diaphragmatic repair intact, chest wall repair intact    Assessment/Plan:     * Wound dehiscence  68yo female s/p left diaphragmatic hernia repair and reconstruction with biologic mesh, and chest wall hernia repair with [place]-adalgisa mesh presenting with a chronic surgical wound with dehiscence.  Denies fevers, chills, or purulent discharge.  No current signs of infection. S/p  wound debridement and wound VAC placement 3/9/2020.     - cardiac diet  - Sliding scale insulin  - PRN nausea meds  - DVT ppx: Heparin 5k Q8, SCDs  - Ambulate as tolerated  - continue wound VAC to suction  - start working on wound VAC supplies to SNF and dispo today    Shruthi Prescott MD  Cardiothoracic Surgery  [facility]

## 2020-03-10 NOTE — SUBJECTIVE & OBJECTIVE
Interval History:   NAEO, VSS afebrile post-op. Wound VAC maintains suction.    Medications:  Continuous Infusions:    Scheduled Meds:   albuterol-ipratropium  3 mL Nebulization Q4H    amLODIPine  10 mg Oral Daily    heparin (porcine)  5,000 Units Subcutaneous Q8H    potassium chloride 10%  20 mEq Oral Once     PRN Meds:acetaminophen, Dextrose 10% Bolus, Dextrose 10% Bolus, glucagon (human recombinant), glucose, glucose, influenza, insulin aspart U-100, iohexol, ondansetron, oxyCODONE-acetaminophen, oxyCODONE-acetaminophen, pneumoc 13-dipak conj-dip cr(PF), sodium chloride 0.9%     Objective:     Vital Signs (Most Recent):  Temp: 98.1 °F (36.7 °C) (03/10/20 0526)  Pulse: 100 (03/10/20 0526)  Resp: 20 (03/10/20 0526)  BP: 134/64 (03/10/20 0526)  SpO2: (!) 94 % (03/10/20 0526) Vital Signs (24h Range):  Temp:  [96.5 °F (35.8 °C)-98.6 °F (37 °C)] 98.1 °F (36.7 °C)  Pulse:  [] 100  Resp:  [16-20] 20  SpO2:  [90 %-100 %] 94 %  BP: (120-149)/(60-72) 134/64     Weight: 103.9 kg (229 lb)  Body mass index is 43.27 kg/m².    SpO2: (!) 94 %  O2 Device (Oxygen Therapy): room air    Intake/Output - Last 3 Shifts       03/08 0700 - 03/09 0659 03/09 0700 - 03/10 0659 03/10 0700 - 03/11 0659    P.O. 450 480     I.V. (mL/kg)  3654.2 (35.2)     IV Piggyback  200     Total Intake(mL/kg) 450 (4.3) 4334.2 (41.7)     Urine (mL/kg/hr) 1100 2700 (1.1)     Emesis/NG output  0     Other  50     Stool 200 0     Total Output 1300 2750     Net -850 +1584.2            Urine Occurrence  3 x     Stool Occurrence 0 x 1 x     Emesis Occurrence  0 x           Lines/Drains/Airways     Peripheral Intravenous Line                 Peripheral IV - Single Lumen 03/08/20 1849 22 G Left Antecubital 1 day              Physical Exam   Constitutional: She is oriented to person, place, and time. She appears well-developed and well-nourished.   morbidly obese   HENT:   Head: Normocephalic and atraumatic.   Eyes: Conjunctivae and EOM are normal.   Neck:  Normal range of motion.   Cardiovascular: Normal rate and regular rhythm.   Pulmonary/Chest: Effort normal and breath sounds normal. No stridor. No respiratory distress.   Abdominal: Soft. She exhibits no distension.   Musculoskeletal: She exhibits no edema or deformity.   Neurological: She is alert and oriented to person, place, and time.   Skin: Skin is warm and dry. Capillary refill takes less than 2 seconds. No erythema.   Left upper abdomen/lower chest wound with wound vac in place to suction, maintaining seal. 50mL serosanguinous output since placement.   Psychiatric: She has a normal mood and affect. Her behavior is normal.     Significant Labs:  BMP:   Recent Labs   Lab 03/10/20  0442         K 3.6      CO2 25   BUN 6*   CREATININE 0.7   CALCIUM 9.4   MG 1.9     CBC:   Recent Labs   Lab 03/08/20  1713   WBC 7.04   RBC 4.09   HGB 10.6*   HCT 36.1*   *   MCV 88   MCH 25.9*   MCHC 29.4*     All pertinent labs from the last 24 hours have been reviewed.    Significant Diagnostics:  CT: I have reviewed all pertinent results/findings within the past 24 hours   -diaphragmatic repair intact, chest wall repair intact

## 2020-03-10 NOTE — ASSESSMENT & PLAN NOTE
70yo female s/p left diaphragmatic hernia repair and reconstruction with biologic mesh, and chest wall hernia repair with [place]-adalgisa mesh presenting with a chronic surgical wound with dehiscence.  Denies fevers, chills, or purulent discharge.  No current signs of infection. S/p wound debridement and wound VAC placement 3/9/2020.     - cardiac diet  - Sliding scale insulin  - PRN nausea meds  - DVT ppx: Heparin 5k Q8, SCDs  - Ambulate as tolerated  - continue wound VAC to suction  - start working on wound VAC supplies to SNF and dispo today

## 2020-03-10 NOTE — PLAN OF CARE
Patient re-admitted from [facility] 3/8/2020, went to OR 3/9/2020 for I&D Left Side Wound and application of wound vac.  Patient is expected to discharge back to [facility] when Insurance authorization obtained by facility.    Patient lives alone in a mobile home.  Patient's daughter-in-law at bedside, expected to drive her to the SNF.  's name and number written on the white board in patient's room.  Will continue to follow for needs.    PCP  Scott Trevizo MD  [address] [place] [address] [address] [facility] 7*  [phone]  [phone]      [id number] - [place], [place] - [address] AT [facility]  [address]  [place] [address]  Phone: [phone] Fax: [phone]      Extended Emergency Contact Information  Primary Emergency Contact: LIBERTAD BUNN  Mobile Phone: [phone]  Relation: Son  Preferred language: English   needed? No  Secondary Emergency Contact: Ambar Bunn  Mobile Phone: [phone]  Relation: Daughter       03/10/20 1557   Discharge Assessment   Assessment Type Discharge Planning Assessment   Confirmed/corrected address and phone number on facesheet? Yes   Assessment information obtained from? Patient   Expected Length of Stay (days) 4   Communicated expected length of stay with patient/caregiver yes   Prior to hospitilization cognitive status: Alert/Oriented   Prior to hospitalization functional status: Independent;Assistive Equipment;Needs Assistance   Current cognitive status: Alert/Oriented   Current Functional Status: Independent;Assistive Equipment;Needs Assistance   Facility Arrived From: [facility]   Lives With alone   Able to Return to Prior Arrangements other (see comments)  (SNF)   Is patient able to care for self after discharge? Unable to determine at this time (comments)   Who are your caregiver(s) and their phone number(s)? family   Patient's perception of discharge  disposition skilled nursing facility   Equipment Currently Used at Home bedside commode;rollator;wheelchair   Do you have any problems affording any of your prescribed medications? No   Is the patient taking medications as prescribed? yes   Does the patient have transportation home? Yes   Transportation Anticipated family or friend will provide   Discharge Plan A Skilled Nursing Facility   Discharge Plan B Home with family;Home Health   DME Needed Upon Discharge  wound care supplies   Patient/Family in Agreement with Plan yes

## 2020-03-11 LAB
ANION GAP SERPL CALC-SCNC: 10 MMOL/L (ref 8–16)
BUN SERPL-MCNC: 5 MG/DL (ref 8–23)
CALCIUM SERPL-MCNC: 8.9 MG/DL (ref 8.7–10.5)
CHLORIDE SERPL-SCNC: 106 MMOL/L (ref 95–110)
CO2 SERPL-SCNC: 25 MMOL/L (ref 23–29)
CREAT SERPL-MCNC: 0.7 MG/DL (ref 0.5–1.4)
EST. GFR  (AFRICAN AMERICAN): >60 ML/MIN/1.73 M^2
EST. GFR  (NON AFRICAN AMERICAN): >60 ML/MIN/1.73 M^2
GLUCOSE SERPL-MCNC: 89 MG/DL (ref 70–110)
MAGNESIUM SERPL-MCNC: 1.8 MG/DL (ref 1.6–2.6)
PHOSPHATE SERPL-MCNC: 3.8 MG/DL (ref 2.7–4.5)
POCT GLUCOSE: 112 MG/DL (ref 70–110)
POCT GLUCOSE: 115 MG/DL (ref 70–110)
POCT GLUCOSE: 81 MG/DL (ref 70–110)
POCT GLUCOSE: 85 MG/DL (ref 70–110)
POTASSIUM SERPL-SCNC: 3.6 MMOL/L (ref 3.5–5.1)
SODIUM SERPL-SCNC: 141 MMOL/L (ref 136–145)

## 2020-03-11 PROCEDURE — 25000003 PHARM REV CODE 250: Performed by: PHYSICIAN ASSISTANT

## 2020-03-11 PROCEDURE — 94640 AIRWAY INHALATION TREATMENT: CPT

## 2020-03-11 PROCEDURE — 83735 ASSAY OF MAGNESIUM: CPT

## 2020-03-11 PROCEDURE — 63600175 PHARM REV CODE 636 W HCPCS: Performed by: STUDENT IN AN ORGANIZED HEALTH CARE EDUCATION/TRAINING PROGRAM

## 2020-03-11 PROCEDURE — 94761 N-INVAS EAR/PLS OXIMETRY MLT: CPT

## 2020-03-11 PROCEDURE — 25000003 PHARM REV CODE 250: Performed by: STUDENT IN AN ORGANIZED HEALTH CARE EDUCATION/TRAINING PROGRAM

## 2020-03-11 PROCEDURE — 20600001 HC STEP DOWN PRIVATE ROOM

## 2020-03-11 PROCEDURE — 25000242 PHARM REV CODE 250 ALT 637 W/ HCPCS: Performed by: FAMILY MEDICINE

## 2020-03-11 PROCEDURE — 36415 COLL VENOUS BLD VENIPUNCTURE: CPT

## 2020-03-11 PROCEDURE — 94760 N-INVAS EAR/PLS OXIMETRY 1: CPT

## 2020-03-11 PROCEDURE — 99900035 HC TECH TIME PER 15 MIN (STAT)

## 2020-03-11 PROCEDURE — 84100 ASSAY OF PHOSPHORUS: CPT

## 2020-03-11 PROCEDURE — 25000003 PHARM REV CODE 250: Performed by: SURGERY

## 2020-03-11 PROCEDURE — 80048 BASIC METABOLIC PNL TOTAL CA: CPT

## 2020-03-11 RX ORDER — PANTOPRAZOLE SODIUM 40 MG/1
40 TABLET, DELAYED RELEASE ORAL DAILY
Status: DISCONTINUED | OUTPATIENT
Start: 2020-03-11 | End: 2020-03-12 | Stop reason: HOSPADM

## 2020-03-11 RX ADMIN — BENZONATATE 200 MG: 100 CAPSULE ORAL at 03:03

## 2020-03-11 RX ADMIN — AMLODIPINE BESYLATE 10 MG: 10 TABLET ORAL at 08:03

## 2020-03-11 RX ADMIN — OXYCODONE HYDROCHLORIDE AND ACETAMINOPHEN 1 TABLET: 5; 325 TABLET ORAL at 03:03

## 2020-03-11 RX ADMIN — IPRATROPIUM BROMIDE AND ALBUTEROL SULFATE 3 ML: .5; 3 SOLUTION RESPIRATORY (INHALATION) at 07:03

## 2020-03-11 RX ADMIN — IPRATROPIUM BROMIDE AND ALBUTEROL SULFATE 3 ML: .5; 3 SOLUTION RESPIRATORY (INHALATION) at 12:03

## 2020-03-11 RX ADMIN — IPRATROPIUM BROMIDE AND ALBUTEROL SULFATE 3 ML: .5; 3 SOLUTION RESPIRATORY (INHALATION) at 08:03

## 2020-03-11 RX ADMIN — HEPARIN SODIUM 5000 UNITS: 5000 INJECTION, SOLUTION INTRAVENOUS; SUBCUTANEOUS at 03:03

## 2020-03-11 RX ADMIN — HEPARIN SODIUM 5000 UNITS: 5000 INJECTION, SOLUTION INTRAVENOUS; SUBCUTANEOUS at 05:03

## 2020-03-11 RX ADMIN — HEPARIN SODIUM 5000 UNITS: 5000 INJECTION, SOLUTION INTRAVENOUS; SUBCUTANEOUS at 09:03

## 2020-03-11 RX ADMIN — OXYCODONE HYDROCHLORIDE AND ACETAMINOPHEN 1 TABLET: 5; 325 TABLET ORAL at 08:03

## 2020-03-11 RX ADMIN — CETIRIZINE HYDROCHLORIDE 10 MG: 10 TABLET, FILM COATED ORAL at 08:03

## 2020-03-11 RX ADMIN — IPRATROPIUM BROMIDE AND ALBUTEROL SULFATE 3 ML: .5; 3 SOLUTION RESPIRATORY (INHALATION) at 04:03

## 2020-03-11 RX ADMIN — BENZONATATE 200 MG: 100 CAPSULE ORAL at 08:03

## 2020-03-11 RX ADMIN — BENZONATATE 200 MG: 100 CAPSULE ORAL at 09:03

## 2020-03-11 RX ADMIN — PANTOPRAZOLE SODIUM 40 MG: 40 TABLET, DELAYED RELEASE ORAL at 11:03

## 2020-03-11 RX ADMIN — OXYCODONE AND ACETAMINOPHEN 1 TABLET: 10; 325 TABLET ORAL at 06:03

## 2020-03-11 RX ADMIN — OXYCODONE AND ACETAMINOPHEN 1 TABLET: 10; 325 TABLET ORAL at 11:03

## 2020-03-11 RX ADMIN — OXYCODONE AND ACETAMINOPHEN 1 TABLET: 10; 325 TABLET ORAL at 02:03

## 2020-03-11 NOTE — PLAN OF CARE
POC reviewed. AAOx4, VSS on RA, no complaints of SOB, headaches, or dizziness. Independent, up to BR or bedside commode, urine output good. Tolerating diabetic dysphagia mechanical soft diet. Wound vac to suction, 125 mmHg, seal maintained.  Blood glucose monitored, within normal range, 122. Patient is resting quietly with side rails up and call light with in reach. Will continue to monitor patient status.

## 2020-03-11 NOTE — PLAN OF CARE
Patient denied return to [facility] by [facility] insurance.  Peer to Peer with Insurance Medical Director done this morning with denial upheld.  Patient will have to discharge home with home health and wound vac.  DANGELO Davalos will speak with patient then HH preference will be obtained.  Wound vac form emailed to NP by BATSHEVA Quiros.  Will try to expedite vac arrival for discharge today.  Will continue to follow for needs.    Update: Plan is for patient to be picked up by family 3/12/2020 @ 11:00am.  Patient prefers [facility] with [facility] as .       03/11/20 0941   Discharge Reassessment   Assessment Type Discharge Planning Reassessment   Discharge Plan A Home;Home Health   Post-Acute Status   Post-Acute Authorization Home Health/Hospice;HME   HME Status Awaiting Clarification Orders   Home Health/Hospice Status Awaiting Orders for HH

## 2020-03-11 NOTE — SUBJECTIVE & OBJECTIVE
Interval History: NAEON. Afebrile. Comfortable on room air. Good seal on wound VAC. Pain well controlled. Adequate UOP.     Medications:  Continuous Infusions:  Scheduled Meds:   albuterol-ipratropium  3 mL Nebulization Q4H    amLODIPine  10 mg Oral Daily    benzonatate  200 mg Oral TID    cetirizine  10 mg Oral Daily    heparin (porcine)  5,000 Units Subcutaneous Q8H     PRN Meds:acetaminophen, Dextrose 10% Bolus, Dextrose 10% Bolus, glucagon (human recombinant), glucose, glucose, influenza, insulin aspart U-100, iohexol, ondansetron, oxyCODONE-acetaminophen, oxyCODONE-acetaminophen, promethazine, sodium chloride 0.9%     Review of patient's allergies indicates:   Allergen Reactions    Adhesive Blisters    Erythromycin      Stomach pains    Rosuvastatin      Hives, muscle/joint pains    Zolpidem      Confusion      Objective:     Vital Signs (Most Recent):  Temp: 98 °F (36.7 °C) (03/11/20 0428)  Pulse: 78 (03/11/20 0437)  Resp: 18 (03/11/20 0437)  BP: (!) 153/70 (03/11/20 0428)  SpO2: 99 % (03/11/20 0437) Vital Signs (24h Range):  Temp:  [97.8 °F (36.6 °C)-98.4 °F (36.9 °C)] 98 °F (36.7 °C)  Pulse:  [67-99] 78  Resp:  [15-18] 18  SpO2:  [91 %-99 %] 99 %  BP: (115-153)/(56-70) 153/70     Intake/Output - Last 3 Shifts       03/09 0700 - 03/10 0659 03/10 0700 - 03/11 0659 03/11 0700 - 03/12 0659    P.O. 480 960     I.V. (mL/kg) 3654.2 (35.2)      IV Piggyback 200      Total Intake(mL/kg) 4334.2 (41.7) 960 (9.2)     Urine (mL/kg/hr) 2700 (1.1) 1100 (0.4)     Emesis/NG output 0 0     Other 50 100     Stool 0 0     Total Output 2750 1200     Net +1584.2 -240            Urine Occurrence 6 x 2 x     Stool Occurrence 1 x 0 x     Emesis Occurrence 0 x 0 x           SpO2: 99 %  O2 Device (Oxygen Therapy): room air    Physical Exam   Constitutional: She is oriented to person, place, and time. She appears well-developed and well-nourished.   morbidly obese   HENT:   Head: Normocephalic and atraumatic.   Eyes:  Conjunctivae and EOM are normal.   Neck: Normal range of motion.   Cardiovascular: Normal rate and regular rhythm.   Pulmonary/Chest: Effort normal and breath sounds normal. No stridor. No respiratory distress.   Abdominal: Soft. She exhibits no distension.   Musculoskeletal: She exhibits no edema or deformity.   Neurological: She is alert and oriented to person, place, and time.   Skin: Skin is warm and dry. Capillary refill takes less than 2 seconds. No erythema.   Left upper abdomen/lower chest wound with wound vac in place to suction, maintaining seal. 50mL serosanguinous output since placement.   Psychiatric: She has a normal mood and affect. Her behavior is normal.       Significant Labs:  ABGs: No results for input(s): PH, PCO2, PO2, HCO3, POCSATURATED, BE in the last 48 hours.  BMP:   Recent Labs   Lab 03/11/20  0454   GLU 89      K 3.6      CO2 25   BUN 5*   CREATININE 0.7   CALCIUM 8.9   MG 1.8     CBC: No results for input(s): WBC, RBC, HGB, HCT, PLT, MCV, MCH, MCHC in the last 48 hours.  CMP:   Recent Labs   Lab 03/11/20  0454   GLU 89   CALCIUM 8.9      K 3.6   CO2 25      BUN 5*   CREATININE 0.7       Significant Diagnostics:  CXR: I have reviewed all pertinent results/findings within the past 24 hours    VTE Risk Mitigation (From admission, onward)         Ordered     heparin (porcine) injection 5,000 Units  Every 8 hours      03/08/20 1620     IP VTE HIGH RISK PATIENT  Once      03/08/20 1620

## 2020-03-11 NOTE — ASSESSMENT & PLAN NOTE
68yo female s/p left diaphragmatic hernia repair and reconstruction with biologic mesh, and chest wall hernia repair with [place]-adalgisa mesh presenting with a chronic surgical wound with dehiscence.  Denies fevers, chills, or purulent discharge.  No current signs of infection. S/p wound debridement and wound VAC placement 3/9/2020.     - cardiac dysphagia soft diet  - Sliding scale insulin  - PRN nausea meds  - DVT ppx: Heparin 5k Q8, SCDs  - Ambulate as tolerated  - continue wound VAC to suction  - start working on wound VAC supplies to SNF and dispo toda    Dispo- Transfer to SNF once auth reapproved. Will remove our VAC prior to transfer.

## 2020-03-11 NOTE — PROGRESS NOTES
[facility]  Thoracic Surgery  Progress Note    Subjective:     History of Present Illness:  Patient is a 69 y.o. female with a history of HTN, nonalcoholic fatty liver disease, GERD, and CAD who underwent a diaphragmatic hernia repair on 1/21/20.  The defect measured approximately 21x42ly and was patched with a [place]-Rajendra patch.  She had a recurrent diaphragmatic hernia which was repaired on 1/29/20.  She was discharged to [facility] on 2/11/20.  Since discharge she has had a wound to her left chest/upper abdomen.  Over this time she states that it has gradually grown in size.  She denies any fevers, chills, nausea, or vomiting.  States it is red but the erythema is not spreading.  No increased pain.  No shortness of breath or chest pain.       States she is not taking any antiplatelet or anticoagulant medications.  Has been started on insulin at [facility] but denies a history of diabetes.       Post-Op Info:  Procedure(s) (LRB):  IRRIGATION AND DEBRIDEMENT (Left)  APPLICATION, WOUND VAC (Left)   2 Days Post-Op     Interval History: NAEON. Afebrile. Comfortable on room air. Good seal on wound VAC. Pain well controlled. Adequate UOP.     Medications:  Continuous Infusions:  Scheduled Meds:   albuterol-ipratropium  3 mL Nebulization Q4H    amLODIPine  10 mg Oral Daily    benzonatate  200 mg Oral TID    cetirizine  10 mg Oral Daily    heparin (porcine)  5,000 Units Subcutaneous Q8H     PRN Meds:acetaminophen, Dextrose 10% Bolus, Dextrose 10% Bolus, glucagon (human recombinant), glucose, glucose, influenza, insulin aspart U-100, iohexol, ondansetron, oxyCODONE-acetaminophen, oxyCODONE-acetaminophen, promethazine, sodium chloride 0.9%     Review of patient's allergies indicates:   Allergen Reactions    Adhesive Blisters    Erythromycin      Stomach pains    Rosuvastatin      Hives, muscle/joint pains    Zolpidem      Confusion      Objective:     Vital Signs (Most Recent):  Temp: 98 °F (36.7 °C) (03/11/20  0428)  Pulse: 78 (03/11/20 0437)  Resp: 18 (03/11/20 0437)  BP: (!) 153/70 (03/11/20 0428)  SpO2: 99 % (03/11/20 0437) Vital Signs (24h Range):  Temp:  [97.8 °F (36.6 °C)-98.4 °F (36.9 °C)] 98 °F (36.7 °C)  Pulse:  [67-99] 78  Resp:  [15-18] 18  SpO2:  [91 %-99 %] 99 %  BP: (115-153)/(56-70) 153/70     Intake/Output - Last 3 Shifts       03/09 0700 - 03/10 0659 03/10 0700 - 03/11 0659 03/11 0700 - 03/12 0659    P.O. 480 960     I.V. (mL/kg) 3654.2 (35.2)      IV Piggyback 200      Total Intake(mL/kg) 4334.2 (41.7) 960 (9.2)     Urine (mL/kg/hr) 2700 (1.1) 1100 (0.4)     Emesis/NG output 0 0     Other 50 100     Stool 0 0     Total Output 2750 1200     Net +1584.2 -240            Urine Occurrence 6 x 2 x     Stool Occurrence 1 x 0 x     Emesis Occurrence 0 x 0 x           SpO2: 99 %  O2 Device (Oxygen Therapy): room air    Physical Exam   Constitutional: She is oriented to person, place, and time. She appears well-developed and well-nourished.   morbidly obese   HENT:   Head: Normocephalic and atraumatic.   Eyes: Conjunctivae and EOM are normal.   Neck: Normal range of motion.   Cardiovascular: Normal rate and regular rhythm.   Pulmonary/Chest: Effort normal and breath sounds normal. No stridor. No respiratory distress.   Abdominal: Soft. She exhibits no distension.   Musculoskeletal: She exhibits no edema or deformity.   Neurological: She is alert and oriented to person, place, and time.   Skin: Skin is warm and dry. Capillary refill takes less than 2 seconds. No erythema.   Left upper abdomen/lower chest wound with wound vac in place to suction, maintaining seal. 50mL serosanguinous output since placement.   Psychiatric: She has a normal mood and affect. Her behavior is normal.       Significant Labs:  ABGs: No results for input(s): PH, PCO2, PO2, HCO3, POCSATURATED, BE in the last 48 hours.  BMP:   Recent Labs   Lab 03/11/20  0454   GLU 89      K 3.6      CO2 25   BUN 5*   CREATININE 0.7   CALCIUM 8.9    MG 1.8     CBC: No results for input(s): WBC, RBC, HGB, HCT, PLT, MCV, MCH, MCHC in the last 48 hours.  CMP:   Recent Labs   Lab 03/11/20  0454   GLU 89   CALCIUM 8.9      K 3.6   CO2 25      BUN 5*   CREATININE 0.7       Significant Diagnostics:  CXR: I have reviewed all pertinent results/findings within the past 24 hours    VTE Risk Mitigation (From admission, onward)         Ordered     heparin (porcine) injection 5,000 Units  Every 8 hours      03/08/20 1620     IP VTE HIGH RISK PATIENT  Once      03/08/20 1620              Assessment/Plan:     * Wound dehiscence  70yo female s/p left diaphragmatic hernia repair and reconstruction with biologic mesh, and chest wall hernia repair with [place]-adalgisa mesh presenting with a chronic surgical wound with dehiscence.  Denies fevers, chills, or purulent discharge.  No current signs of infection. S/p wound debridement and wound VAC placement 3/9/2020.     - cardiac  dysphagia soft diet  - Sliding scale insulin  - PRN nausea meds  - DVT ppx: Heparin 5k Q8, SCDs  - Ambulate as tolerated  - continue wound VAC to suction  - start working on wound VAC supplies to SNF and dispo toda    Dispo- Transfer to SNF once auth reapproved. Will remove our VAC prior to transfer.         ANTONY Haider  Thoracic Surgery  [facility]

## 2020-03-11 NOTE — PLAN OF CARE
[facility]    HOME HEALTH ORDERS  FACE TO FACE ENCOUNTER    Patient Name: Marisela Bunn  YOB: 1950    PCP: Scott Trevizo MD   PCP Address: [address] [place] [address] [address] [facility] / [place] [address]*  PCP Phone Number: [phone]  PCP Fax: [phone]    Encounter Date: 03/11/2020    Admit to Home Health    Diagnoses:  Active Hospital Problems    Diagnosis  POA    *Wound dehiscence [T81.30XA]  Yes    Wound infection [T14.8XXA, L08.9]  Yes    Diaphragmatic hernia [K44.9]  Yes      Resolved Hospital Problems   No resolved problems to display.       No future appointments.        I have seen and examined this patient face to face today. My clinical findings that support the need for the home health skilled services and home bound status are the following:  Weakness/numbness causing balance and gait disturbance due to Infection, Weakness/Debility and Surgery making it taxing to leave home.  Requiring assistive device to leave home due to unsteady gait caused by  Weakness/Debility and Surgery.  Medical restrictions requiring assistance of another human to leave home due to  Dyspnea on exertion (SOB), Post surgery monitoring, Wound care needs and Morbid Obesity.    Allergies:  Review of patient's allergies indicates:   Allergen Reactions    Adhesive Blisters    Erythromycin      Stomach pains    Rosuvastatin      Hives, muscle/joint pains    Zolpidem      Confusion        Diet: regular diet    Activities: activity as tolerated    Nursing:   SN to complete comprehensive assessment including routine vital signs. Instruct on disease process and s/s of complications to report to MD. Review/verify medication list sent home with the patient at time of discharge  and instruct patient/caregiver as needed. Frequency may be adjusted depending on start of care date.    Notify MD if SBP > 160 or < 90; DBP > 90 or < 50; HR > 120 or < 50; Temp > 101; Other:         CONSULTS:    Physical  Therapy to evaluate and treat. Evaluate for home safety and equipment needs; Establish/upgrade home exercise program. Perform / instruct on therapeutic exercises, gait training, transfer training, and Range of Motion.  Occupational Therapy to evaluate and treat. Evaluate home environment for safety and equipment needs. Perform/Instruct on transfers, ADL training, ROM, and therapeutic exercises.   to evaluate for community resources/long-range planning.  Aide to provide assistance with personal care, ADLs, and vital signs.    WOUND CARE ORDERS  yes:  Surgical Wound:  Location: Wound VAC to left  chest    Consult ET nurse        Apply the following to wound:   Other: VAC changes 3 times a week    Medications: Review discharge medications with patient and family and provide education.      Current Discharge Medication List      START taking these medications    Details   oxyCODONE-acetaminophen (PERCOCET) 5-325 mg per tablet Take 1 tablet by mouth every 4 (four) hours as needed for Pain.  Qty: 28 tablet, Refills: 0    Comments: Quantity prescribed more than 7 day supply? No         CONTINUE these medications which have CHANGED    Details   acetaminophen (TYLENOL) 325 MG tablet Take 2 tablets (650 mg total) by mouth every 8 (eight) hours as needed.  Refills: 0      pregabalin (LYRICA) 75 MG capsule Take 1 capsule (75 mg total) by mouth every evening.  Qty: 30 capsule, Refills: 3         CONTINUE these medications which have NOT CHANGED    Details   celecoxib (CELEBREX) 200 MG capsule Take 1 capsule (200 mg total) by mouth once daily.         STOP taking these medications       albuterol-ipratropium (DUO-NEB) 2.5 mg-0.5 mg/3 mL nebulizer solution Comments:   Reason for Stopping:         amLODIPine (NORVASC) 10 MG tablet Comments:   Reason for Stopping:         oxyCODONE (ROXICODONE) 5 MG immediate release tablet Comments:   Reason for Stopping:         sulfamethoxazole-trimethoprim 800-160mg (BACTRIM DS)  800-160 mg Tab Comments:   Reason for Stopping:               I certify that this patient is confined to her home and needs intermittent skilled nursing care, physical therapy and occupational therapy.

## 2020-03-11 NOTE — PLAN OF CARE
03/11/20 1447   Post-Acute Status   Post-Acute Authorization HME;Home Health/Hospice   HME Status Referrals Sent   Home Health/Hospice Status Referrals Sent   Sw sent over [facility] wound vac form.  Sw sent over hh orders to [facility] ([phone]), per pt's request to ([phone]).    Chula Ram LMSW  [facility]  [id number]

## 2020-03-11 NOTE — HPI
Patient is a 69 y.o. female with a history of HTN, nonalcoholic fatty liver disease, GERD, and CAD who underwent a diaphragmatic hernia repair on 1/21/20.  The defect measured approximately 90e55pd and was patched with a [place]-Rajendra patch.  She had a recurrent diaphragmatic hernia which was repaired on 1/29/20.  She was discharged to SNF on 2/11/20.  Since discharge she has had a wound to her left chest/upper abdomen.  Over this time she states that it has gradually grown in size.  She denies any fevers, chills, nausea, or vomiting.  States it is red but the erythema is not spreading.  No increased pain.  No shortness of breath or chest pain.       States she is not taking any antiplatelet or anticoagulant medications.  Has been started on insulin at SNF but denies a history of diabetes.

## 2020-03-11 NOTE — PLAN OF CARE
Plan of care reviewed with pt: AAOx4, on RA, VS stable. Woundvac intact, still at -125mmHg with only scant amount of drainage.Complained of pain around the woundvac more often today, pain under control with Oxy 10mg.  Pt is waiting from approval from insurance company before being transferred to SNF. Tolerated her diet well, consumed about 25-50% of her meals. No complains of nausea. Voided with adequate amount clear yellow urine. SCD on. Call light in reach. WCTM

## 2020-03-12 VITALS
TEMPERATURE: 99 F | BODY MASS INDEX: 43.23 KG/M2 | HEIGHT: 61 IN | WEIGHT: 229 LBS | SYSTOLIC BLOOD PRESSURE: 111 MMHG | OXYGEN SATURATION: 92 % | DIASTOLIC BLOOD PRESSURE: 53 MMHG | RESPIRATION RATE: 18 BRPM | HEART RATE: 90 BPM

## 2020-03-12 LAB
ANION GAP SERPL CALC-SCNC: 8 MMOL/L (ref 8–16)
BASOPHILS # BLD AUTO: 0.03 K/UL (ref 0–0.2)
BASOPHILS NFR BLD: 0.3 % (ref 0–1.9)
BUN SERPL-MCNC: 8 MG/DL (ref 8–23)
CALCIUM SERPL-MCNC: 8.5 MG/DL (ref 8.7–10.5)
CHLORIDE SERPL-SCNC: 103 MMOL/L (ref 95–110)
CO2 SERPL-SCNC: 30 MMOL/L (ref 23–29)
CREAT SERPL-MCNC: 0.7 MG/DL (ref 0.5–1.4)
DIFFERENTIAL METHOD: ABNORMAL
EOSINOPHIL # BLD AUTO: 0.4 K/UL (ref 0–0.5)
EOSINOPHIL NFR BLD: 4.8 % (ref 0–8)
ERYTHROCYTE [DISTWIDTH] IN BLOOD BY AUTOMATED COUNT: 14.8 % (ref 11.5–14.5)
EST. GFR  (AFRICAN AMERICAN): >60 ML/MIN/1.73 M^2
EST. GFR  (NON AFRICAN AMERICAN): >60 ML/MIN/1.73 M^2
GLUCOSE SERPL-MCNC: 91 MG/DL (ref 70–110)
HCT VFR BLD AUTO: 36.4 % (ref 37–48.5)
HGB BLD-MCNC: 10.6 G/DL (ref 12–16)
IMM GRANULOCYTES # BLD AUTO: 0.07 K/UL (ref 0–0.04)
IMM GRANULOCYTES NFR BLD AUTO: 0.8 % (ref 0–0.5)
LYMPHOCYTES # BLD AUTO: 2.7 K/UL (ref 1–4.8)
LYMPHOCYTES NFR BLD: 30.7 % (ref 18–48)
MAGNESIUM SERPL-MCNC: 1.7 MG/DL (ref 1.6–2.6)
MCH RBC QN AUTO: 26.1 PG (ref 27–31)
MCHC RBC AUTO-ENTMCNC: 29.1 G/DL (ref 32–36)
MCV RBC AUTO: 90 FL (ref 82–98)
MONOCYTES # BLD AUTO: 0.7 K/UL (ref 0.3–1)
MONOCYTES NFR BLD: 7.8 % (ref 4–15)
NEUTROPHILS # BLD AUTO: 4.8 K/UL (ref 1.8–7.7)
NEUTROPHILS NFR BLD: 55.6 % (ref 38–73)
NRBC BLD-RTO: 0 /100 WBC
PHOSPHATE SERPL-MCNC: 3.7 MG/DL (ref 2.7–4.5)
PLATELET # BLD AUTO: 392 K/UL (ref 150–350)
PMV BLD AUTO: 9.4 FL (ref 9.2–12.9)
POCT GLUCOSE: 98 MG/DL (ref 70–110)
POTASSIUM SERPL-SCNC: 3.7 MMOL/L (ref 3.5–5.1)
RBC # BLD AUTO: 4.06 M/UL (ref 4–5.4)
SODIUM SERPL-SCNC: 141 MMOL/L (ref 136–145)
WBC # BLD AUTO: 8.63 K/UL (ref 3.9–12.7)

## 2020-03-12 PROCEDURE — 80048 BASIC METABOLIC PNL TOTAL CA: CPT

## 2020-03-12 PROCEDURE — 25000003 PHARM REV CODE 250: Performed by: PHYSICIAN ASSISTANT

## 2020-03-12 PROCEDURE — 84100 ASSAY OF PHOSPHORUS: CPT

## 2020-03-12 PROCEDURE — 25000242 PHARM REV CODE 250 ALT 637 W/ HCPCS: Performed by: FAMILY MEDICINE

## 2020-03-12 PROCEDURE — 94640 AIRWAY INHALATION TREATMENT: CPT

## 2020-03-12 PROCEDURE — 85025 COMPLETE CBC W/AUTO DIFF WBC: CPT

## 2020-03-12 PROCEDURE — 99900035 HC TECH TIME PER 15 MIN (STAT)

## 2020-03-12 PROCEDURE — 25000003 PHARM REV CODE 250: Performed by: STUDENT IN AN ORGANIZED HEALTH CARE EDUCATION/TRAINING PROGRAM

## 2020-03-12 PROCEDURE — 36415 COLL VENOUS BLD VENIPUNCTURE: CPT

## 2020-03-12 PROCEDURE — 83735 ASSAY OF MAGNESIUM: CPT

## 2020-03-12 PROCEDURE — 63600175 PHARM REV CODE 636 W HCPCS: Performed by: STUDENT IN AN ORGANIZED HEALTH CARE EDUCATION/TRAINING PROGRAM

## 2020-03-12 PROCEDURE — 63600175 PHARM REV CODE 636 W HCPCS: Performed by: PHYSICIAN ASSISTANT

## 2020-03-12 PROCEDURE — 94761 N-INVAS EAR/PLS OXIMETRY MLT: CPT

## 2020-03-12 PROCEDURE — 25000003 PHARM REV CODE 250: Performed by: SURGERY

## 2020-03-12 RX ORDER — OXYCODONE AND ACETAMINOPHEN 5; 325 MG/1; MG/1
1 TABLET ORAL EVERY 4 HOURS PRN
Qty: 28 TABLET | Refills: 0 | Status: SHIPPED | OUTPATIENT
Start: 2020-03-12

## 2020-03-12 RX ORDER — PREGABALIN 75 MG/1
75 CAPSULE ORAL NIGHTLY
Qty: 30 CAPSULE | Refills: 3 | Status: SHIPPED | OUTPATIENT
Start: 2020-03-12 | End: 2020-07-10

## 2020-03-12 RX ORDER — LEVOFLOXACIN 750 MG/1
750 TABLET ORAL DAILY
Qty: 7 TABLET | Refills: 0 | Status: ON HOLD | OUTPATIENT
Start: 2020-03-13 | End: 2020-04-24 | Stop reason: HOSPADM

## 2020-03-12 RX ORDER — LEVOFLOXACIN 750 MG/1
750 TABLET ORAL DAILY
Status: DISCONTINUED | OUTPATIENT
Start: 2020-03-12 | End: 2020-03-12 | Stop reason: HOSPADM

## 2020-03-12 RX ADMIN — BENZONATATE 200 MG: 100 CAPSULE ORAL at 09:03

## 2020-03-12 RX ADMIN — IPRATROPIUM BROMIDE AND ALBUTEROL SULFATE 3 ML: .5; 3 SOLUTION RESPIRATORY (INHALATION) at 01:03

## 2020-03-12 RX ADMIN — IPRATROPIUM BROMIDE AND ALBUTEROL SULFATE 3 ML: .5; 3 SOLUTION RESPIRATORY (INHALATION) at 07:03

## 2020-03-12 RX ADMIN — IPRATROPIUM BROMIDE AND ALBUTEROL SULFATE 3 ML: .5; 3 SOLUTION RESPIRATORY (INHALATION) at 12:03

## 2020-03-12 RX ADMIN — HEPARIN SODIUM 5000 UNITS: 5000 INJECTION, SOLUTION INTRAVENOUS; SUBCUTANEOUS at 05:03

## 2020-03-12 RX ADMIN — PANTOPRAZOLE SODIUM 40 MG: 40 TABLET, DELAYED RELEASE ORAL at 09:03

## 2020-03-12 RX ADMIN — LEVOFLOXACIN 750 MG: 750 TABLET, FILM COATED ORAL at 09:03

## 2020-03-12 RX ADMIN — OXYCODONE AND ACETAMINOPHEN 1 TABLET: 10; 325 TABLET ORAL at 05:03

## 2020-03-12 RX ADMIN — AMLODIPINE BESYLATE 10 MG: 10 TABLET ORAL at 09:03

## 2020-03-12 RX ADMIN — CETIRIZINE HYDROCHLORIDE 10 MG: 10 TABLET, FILM COATED ORAL at 09:03

## 2020-03-12 NOTE — DISCHARGE SUMMARY
[facility]  Thoracic Surgery  Discharge Summary    Patient Name: Marisela Bunn  MRN: [id number]  Admission Date: 3/8/2020  Hospital Length of Stay: 4 days  Discharge Date and Time: 3/12/2020  1:30 PM  Attending Physician: No att. providers found   Discharging Provider: ANTONY Haider  Primary Care Provider: Scott Trevizo MD    HPI:   Patient is a 69 y.o. female with a history of HTN, nonalcoholic fatty liver disease, GERD, and CAD who underwent a diaphragmatic hernia repair on 1/21/20.  The defect measured approximately 47i22xi and was patched with a [place]-Rajendra patch.  She had a recurrent diaphragmatic hernia which was repaired on 1/29/20.  She was discharged to SNF on 2/11/20.  Since discharge she has had a wound to her left chest/upper abdomen.  Over this time she states that it has gradually grown in size.  She denies any fevers, chills, nausea, or vomiting.  States it is red but the erythema is not spreading.  No increased pain.  No shortness of breath or chest pain.       States she is not taking any antiplatelet or anticoagulant medications.  Has been started on insulin at SNF but denies a history of diabetes.       Procedure(s) (LRB):  IRRIGATION AND DEBRIDEMENT (Left)  APPLICATION, WOUND VAC (Left)      Hospital Course: 3/8/20- Admitted from SNF for nonhealing wound to left chest. Afebrile. No leukocytosis.   3/9/20- Irrigation and debridement in the OR with wound VAC placement. [id number] cavity overlying mesh with fibrinous exudate and seroma.  No purulence noted. Black sponge placed.   3/10/20- Afebrile. Tolerating diet. Good bowel and bladder functioning.  3/11/20- Insurance denied return to SNF. Awaiting approval for .  3/12/20- Started on 7 day course of Levaquin for wound erythema. No leukocytosis. No fever. No purulent drainage.  approved. Stable for discharge home with family.           Significant Diagnostic Studies: Radiology: X-Ray: CXR: X-Ray Chest 1 View (CXR): No  results found for this visit on 03/08/20.    Pending Diagnostic Studies:     None        Final Active Diagnoses:    Diagnosis Date Noted POA    PRINCIPAL PROBLEM:  Wound dehiscence [T81.30XA] 03/09/2020 Yes    Wound infection [T14.8XXA, L08.9] 03/08/2020 Yes    Diaphragmatic hernia [K44.9] 01/19/2020 Yes      Problems Resolved During this Admission:      Discharged Condition: good    Disposition: Home or Self Care    Follow Up:  Follow-up Information     Primo Soni MD.    Specialty:  Cardiothoracic Surgery  Why:  As needed  Contact information:  [address]  [facility] [address]  [phone]                 Patient Instructions:      Diet Adult Regular     Notify your health care provider if you experience any of the following:  temperature >100.4     Notify your health care provider if you experience any of the following:  persistent nausea and vomiting or diarrhea     Notify your health care provider if you experience any of the following:  severe uncontrolled pain     Notify your health care provider if you experience any of the following:  redness, tenderness, or signs of infection (pain, swelling, redness, odor or green/yellow discharge around incision site)     Notify your health care provider if you experience any of the following:  difficulty breathing or increased cough     Notify your health care provider if you experience any of the following:  severe persistent headache     Notify your health care provider if you experience any of the following:  worsening rash     Notify your health care provider if you experience any of the following:  persistent dizziness, light-headedness, or visual disturbances     Notify your health care provider if you experience any of the following:  increased confusion or weakness     Change dressing (specify)   Order Comments: Wound VAC changes 2-3 times a week     Activity as tolerated     Medications:  Reconciled Home Medications:      Medication List       START taking these medications    levoFLOXacin 750 MG tablet  Commonly known as:  LEVAQUIN  Take 1 tablet (750 mg total) by mouth once daily.  Start taking on:  March 13, 2020     oxyCODONE-acetaminophen 5-325 mg per tablet  Commonly known as:  PERCOCET  Take 1 tablet by mouth every 4 (four) hours as needed for Pain.     pregabalin 75 MG capsule  Commonly known as:  LYRICA  Take 1 capsule (75 mg total) by mouth every evening.        CHANGE how you take these medications    acetaminophen 325 MG tablet  Commonly known as:  TYLENOL  Take 2 tablets (650 mg total) by mouth every 8 (eight) hours as needed.  What changed:  when to take this        CONTINUE taking these medications    celecoxib 200 MG capsule  Commonly known as:  CeleBREX  Take 1 capsule (200 mg total) by mouth once daily.        STOP taking these medications    albuterol-ipratropium 2.5 mg-0.5 mg/3 mL nebulizer solution  Commonly known as:  DUO-NEB     amLODIPine 10 MG tablet  Commonly known as:  NORVASC     oxyCODONE 5 MG immediate release tablet  Commonly known as:  ROXICODONE     sulfamethoxazole-trimethoprim 800-160mg 800-160 mg Tab  Commonly known as:  BACTRIM DS            ANTONY Haider  Thoracic Surgery  [facility]

## 2020-03-12 NOTE — PROGRESS NOTES
[facility]  Thoracic Surgery  Progress Note    Subjective:     History of Present Illness:  Patient is a 69 y.o. female with a history of HTN, nonalcoholic fatty liver disease, GERD, and CAD who underwent a diaphragmatic hernia repair on 1/21/20.  The defect measured approximately 00w37if and was patched with a [place]-Rajendra patch.  She had a recurrent diaphragmatic hernia which was repaired on 1/29/20.  She was discharged to SNF on 2/11/20.  Since discharge she has had a wound to her left chest/upper abdomen.  Over this time she states that it has gradually grown in size.  She denies any fevers, chills, nausea, or vomiting.  States it is red but the erythema is not spreading.  No increased pain.  No shortness of breath or chest pain.       States she is not taking any antiplatelet or anticoagulant medications.  Has been started on insulin at [facility] but denies a history of diabetes.       Post-Op Info:  Procedure(s) (LRB):  IRRIGATION AND DEBRIDEMENT (Left)  APPLICATION, WOUND VAC (Left)   3 Days Post-Op     Interval History: NAEON. Afebrile. Tolerating diet. Good bowel and bladder functioning. Insurance denied return to SNF. Awaiting approval for .    Medications:  Continuous Infusions:  Scheduled Meds:   albuterol-ipratropium  3 mL Nebulization Q4H    amLODIPine  10 mg Oral Daily    benzonatate  200 mg Oral TID    cetirizine  10 mg Oral Daily    heparin (porcine)  5,000 Units Subcutaneous Q8H    levoFLOXacin  750 mg Oral Daily    pantoprazole  40 mg Oral Daily     PRN Meds:acetaminophen, Dextrose 10% Bolus, Dextrose 10% Bolus, glucagon (human recombinant), glucose, glucose, influenza, insulin aspart U-100, iohexol, ondansetron, oxyCODONE-acetaminophen, oxyCODONE-acetaminophen, promethazine, sodium chloride 0.9%     Review of patient's allergies indicates:   Allergen Reactions    Adhesive Blisters    Erythromycin      Stomach pains    Rosuvastatin      Hives, muscle/joint pains    Zolpidem       Confusion      Objective:     Vital Signs (Most Recent):  Temp: 99.1 °F (37.3 °C) (03/12/20 0509)  Pulse: 91 (03/12/20 0736)  Resp: 18 (03/12/20 0736)  BP: (!) 140/62 (03/12/20 0509)  SpO2: (!) 94 % (03/12/20 0736) Vital Signs (24h Range):  Temp:  [97.8 °F (36.6 °C)-99.1 °F (37.3 °C)] 99.1 °F (37.3 °C)  Pulse:  [71-99] 91  Resp:  [16-19] 18  SpO2:  [91 %-96 %] 94 %  BP: (106-140)/(54-63) 140/62     Intake/Output - Last 3 Shifts       03/10 0700 - 03/11 0659 03/11 0700 - 03/12 0659 03/12 0700 - 03/13 0659    P.O. 960 1320     I.V. (mL/kg)       IV Piggyback       Total Intake(mL/kg) 960 (9.2) 1320 (12.7)     Urine (mL/kg/hr) 1100 (0.4) 1300 (0.5)     Emesis/NG output 0 0     Other 100 100     Stool 0 0     Total Output 1200 1400     Net -240 -80            Urine Occurrence 2 x 8 x     Stool Occurrence 0 x 0 x     Emesis Occurrence 0 x 0 x           SpO2: (!) 94 %  O2 Device (Oxygen Therapy): room air    Physical Exam   Constitutional: She is oriented to person, place, and time. She appears well-developed and well-nourished.   morbidly obese   HENT:   Head: Normocephalic and atraumatic.   Eyes: Conjunctivae and EOM are normal.   Neck: Normal range of motion.   Cardiovascular: Normal rate and regular rhythm.   Pulmonary/Chest: Effort normal and breath sounds normal. No stridor. No respiratory distress.   Abdominal: Soft. She exhibits no distension.   Musculoskeletal: She exhibits no edema or deformity.   Neurological: She is alert and oriented to person, place, and time.   Skin: Skin is warm and dry. Capillary refill takes less than 2 seconds. No erythema.   Left upper abdomen/lower chest wound with wound vac in place to suction, maintaining seal. Slight increase in erythema below incision.   Psychiatric: She has a normal mood and affect. Her behavior is normal.       Significant Labs:  CBC: No results for input(s): WBC, RBC, HGB, HCT, PLT, MCV, MCH, MCHC in the last 48 hours.  CMP:   Recent Labs   Lab  03/12/20  0402   GLU 91   CALCIUM 8.5*      K 3.7   CO2 30*      BUN 8   CREATININE 0.7       Significant Diagnostics:  CXR: I have reviewed all pertinent results/findings within the past 24 hours    VTE Risk Mitigation (From admission, onward)         Ordered     heparin (porcine) injection 5,000 Units  Every 8 hours      03/08/20 1620     IP VTE HIGH RISK PATIENT  Once      03/08/20 1620              Assessment/Plan:     * Wound dehiscence  70yo female s/p left diaphragmatic hernia repair and reconstruction with biologic mesh, and chest wall hernia repair with [place]-adalgisa mesh presenting with a chronic surgical wound with dehiscence.  Denies fevers, chills, or purulent discharge.  No current signs of infection. S/p wound debridement and wound VAC placement 3/9/2020.     - cardiac dysphagia soft diet  - Sliding scale insulin  - PRN nausea meds  - DVT ppx: Heparin 5k Q8, SCDs  - Ambulate as tolerated  - continue wound VAC to suction  - Start 7 day course of Levaquin today    Dispo- Home with home health PT/OT, wound care and nursing aide.        ANTONY Haider  Thoracic Surgery  [facility]

## 2020-03-12 NOTE — PLAN OF CARE
Plan of care reviewed with pt: AAOx4, on RA, VS stable. Woundvac intact, still at -125mmHg with only 50cc of drainage.Complained of pain around the woundvac more often today, pain under control with Oxy given around the clock. Pt is waiting for Woundvac to be delivered to room and will go home with home health. Tolerated her diet well, consumed about 50- 75% of her meals. No complains of nausea. Voided with adequate amount clear yellow urine. SCD on. Call light in reach. WCTM

## 2020-03-12 NOTE — PLAN OF CARE
Plan of care reviewed, patient verbalized understanding. VSS, AAOx4. PRN Percocet was given for pain. Left lateral chest tube is connected to wound vac at 125 mm HG with a small amount of serosanguinous output. Up ad sapphire to the bedside commode. Free from falls, injuries. No acute distress noted. Patient is sleeping in bed with her call light within reach. Will continue to monitor.

## 2020-03-12 NOTE — PLAN OF CARE
D/C orders noted. Pt. will d/c home with [facility]. Wound vac in place. Son here and will provide transportation home.     Future Appointments   Date Time Provider Department Center   4/7/2020 10:00 AM Primo Soni MD [facility]        03/12/20 0791   Final Note   Hospital Follow Up  Appt(s) scheduled? Yes

## 2020-03-12 NOTE — HOSPITAL COURSE
3/8/20- Admitted from SNF for nonhealing wound to left chest. Afebrile. No leukocytosis.   3/9/20- Irrigation and debridement in the OR with wound VAC placement. [id number] cavity overlying mesh with fibrinous exudate and seroma.  No purulence noted. Black sponge placed.   3/10/20- Afebrile. Tolerating diet. Good bowel and bladder functioning.  3/11/20- Insurance denied return to SNF. Awaiting approval for .  3/12/20- Started on 7 day course of Levaquin for wound erythema. No leukocytosis. No fever. No purulent drainage.  approved. Stable for discharge home with family.

## 2020-03-12 NOTE — ASSESSMENT & PLAN NOTE
68yo female s/p left diaphragmatic hernia repair and reconstruction with biologic mesh, and chest wall hernia repair with [place]-adalgisa mesh presenting with a chronic surgical wound with dehiscence.  Denies fevers, chills, or purulent discharge.  No current signs of infection. S/p wound debridement and wound VAC placement 3/9/2020.     - cardiac dysphagia soft diet  - Sliding scale insulin  - PRN nausea meds  - DVT ppx: Heparin 5k Q8, SCDs  - Ambulate as tolerated  - continue wound VAC to suction  - Start 7 day course of Levaquin today    Dispo- Home with home health PT/OT, wound care and nursing aide.

## 2020-03-12 NOTE — SUBJECTIVE & OBJECTIVE
Interval History: NAEON. Afebrile. Tolerating diet. Good bowel and bladder functioning. Insurance denied return to SNF. Awaiting approval for .    Medications:  Continuous Infusions:  Scheduled Meds:   albuterol-ipratropium  3 mL Nebulization Q4H    amLODIPine  10 mg Oral Daily    benzonatate  200 mg Oral TID    cetirizine  10 mg Oral Daily    heparin (porcine)  5,000 Units Subcutaneous Q8H    levoFLOXacin  750 mg Oral Daily    pantoprazole  40 mg Oral Daily     PRN Meds:acetaminophen, Dextrose 10% Bolus, Dextrose 10% Bolus, glucagon (human recombinant), glucose, glucose, influenza, insulin aspart U-100, iohexol, ondansetron, oxyCODONE-acetaminophen, oxyCODONE-acetaminophen, promethazine, sodium chloride 0.9%     Review of patient's allergies indicates:   Allergen Reactions    Adhesive Blisters    Erythromycin      Stomach pains    Rosuvastatin      Hives, muscle/joint pains    Zolpidem      Confusion      Objective:     Vital Signs (Most Recent):  Temp: 99.1 °F (37.3 °C) (03/12/20 0509)  Pulse: 91 (03/12/20 0736)  Resp: 18 (03/12/20 0736)  BP: (!) 140/62 (03/12/20 0509)  SpO2: (!) 94 % (03/12/20 0736) Vital Signs (24h Range):  Temp:  [97.8 °F (36.6 °C)-99.1 °F (37.3 °C)] 99.1 °F (37.3 °C)  Pulse:  [71-99] 91  Resp:  [16-19] 18  SpO2:  [91 %-96 %] 94 %  BP: (106-140)/(54-63) 140/62     Intake/Output - Last 3 Shifts       03/10 0700 - 03/11 0659 03/11 0700 - 03/12 0659 03/12 0700 - 03/13 0659    P.O. 960 1320     I.V. (mL/kg)       IV Piggyback       Total Intake(mL/kg) 960 (9.2) 1320 (12.7)     Urine (mL/kg/hr) 1100 (0.4) 1300 (0.5)     Emesis/NG output 0 0     Other 100 100     Stool 0 0     Total Output 1200 1400     Net -240 -80            Urine Occurrence 2 x 8 x     Stool Occurrence 0 x 0 x     Emesis Occurrence 0 x 0 x           SpO2: (!) 94 %  O2 Device (Oxygen Therapy): room air    Physical Exam   Constitutional: She is oriented to person, place, and time. She appears well-developed and  well-nourished.   morbidly obese   HENT:   Head: Normocephalic and atraumatic.   Eyes: Conjunctivae and EOM are normal.   Neck: Normal range of motion.   Cardiovascular: Normal rate and regular rhythm.   Pulmonary/Chest: Effort normal and breath sounds normal. No stridor. No respiratory distress.   Abdominal: Soft. She exhibits no distension.   Musculoskeletal: She exhibits no edema or deformity.   Neurological: She is alert and oriented to person, place, and time.   Skin: Skin is warm and dry. Capillary refill takes less than 2 seconds. No erythema.   Left upper abdomen/lower chest wound with wound vac in place to suction, maintaining seal. Slight increase in erythema below incision.   Psychiatric: She has a normal mood and affect. Her behavior is normal.       Significant Labs:  CBC: No results for input(s): WBC, RBC, HGB, HCT, PLT, MCV, MCH, MCHC in the last 48 hours.  CMP:   Recent Labs   Lab 03/12/20  0402   GLU 91   CALCIUM 8.5*      K 3.7   CO2 30*      BUN 8   CREATININE 0.7       Significant Diagnostics:  CXR: I have reviewed all pertinent results/findings within the past 24 hours    VTE Risk Mitigation (From admission, onward)         Ordered     heparin (porcine) injection 5,000 Units  Every 8 hours      03/08/20 1620     IP VTE HIGH RISK PATIENT  Once      03/08/20 1620

## 2020-03-12 NOTE — PLAN OF CARE
03/12/20 1054   Post-Acute Status   Post-Acute Authorization Placement   Post-Acute Placement Status Referrals Sent       KAYLEE contacted [facility], who stated they were not in network with pt insurance. Referral sent to [facility]. Awaiting decision.     KAYLEE spoke with Peggy at [facility] [phone]. Stated that she would contact Dr. Soni to get additional order form signed. KAYLEE provided Dr. Soni's number to Peggy. Awaiting approval.       Sydnie Hess LMSW  Case Management Social Worker   [facility]

## 2020-03-12 NOTE — NURSING
Pt discharged home with home health. Discharge instructions verbally given and hard copy provided to pt. Hard copy prescription delivered bedside. Removed 20g IV with cath tip in place. Patient tolerated IV removal well with bleeding controlled. Patient remains free of falls with no acute pain or distress noted. Pt left with wound vac at 125 to L lateral chest. Patient left floor via wheelchair with transport and son.

## 2020-03-12 NOTE — PHYSICIAN QUERY
"PT Name: Marisela Bunn  MR #: [id number]    Physician Query Form - Procedure Clarification     CDS/: Anastasiia Larsen               Contact information: [email or web address]    This form is a permanent document in the medical record.     Query Date: March 12, 2020  By submitting this query, we are merely seeking further clarification of documentation. Please utilize your independent clinical judgment when addressing the question(s) below.    The Medical record contains the following:     Indicators       Supporting Clinical Findings   Location in Medical Record   x Documentation of "Debridement"   A timeout procedure was performed and the staples were removed from the previous incision.  The wound was copiously irrigated with bacitracin impregnated saline using pulse-vac therapy (3L total).  We then debrided the fibrinous exudate from the wound sharply revealing healthy granulation tissue at the base of the wound.  Mesh was visible and only serous fluid was drained, no purulence.  Op note 3/9     Documentation of "I & D"      EBL =      Other:       Excisional debridement is a surgical removal of  nonvitalized tissue, necrosis or slough. The use of a sharp instrument does not always indicate that an excisional debridement was performed.  Non excisional debridement is the scraping, washing, irrigating, brushing away or removal of loose tissue fragments.    Provider, please specify type of procedure(s) performed:    [    ]  Excisional Debridement (Specify site and depth of tissue removed)       * Site: (Specify) _left chest wall_____       *Depth of tissue excised:The final defect measured [id number].       [    ] Skin [    ] Subcutaneous Tissue/Fascia [    ] Muscle [    ] Tendon [    ] Bone   [    ]  Non-excisional Debridement    *Site : (Specify): _______________       *Depth of tissue excised:       [    ] Skin [ X   ] Subcutaneous Tissue/Fascia [    ] Muscle [    ] Tendon [    ] Bone   [    ] Other Procedure " (Specify) _THIS IS CLEARLY STATED IN THE TITLE AND BODY OF THE OP NOTE DATED 3/9_______         [  ] Clinically Undetermined

## 2020-03-12 NOTE — PLAN OF CARE
03/12/20 1141   Post-Acute Status   Post-Acute Authorization Placement   Post-Acute Placement Status Referrals Sent       SW was contacted by Peggy [facility]. Cannot provide services to pt bc pt is geographically outside of their service area.     SW contacted pt for more choices. Pt stated she had no other preferences and would go with the first accepting provider.     SW submitted referral to [facility], Home Health Care, [facility], and [facility]. Awaiting decisions.    Sydnie Hess, BATSHEVA  Case Management Social Worker   [facility]

## 2020-03-12 NOTE — PLAN OF CARE
Met with pt. Rayray Caldera  unable to accept pt. This information shared with pt. Additional choices for  services given per pt. Choices: [facility] and [facility]. Referrals sent. Wound vac set up and in room. Son will provide transportation home.

## 2020-03-13 NOTE — PLAN OF CARE
03/13/20 1110   Post-Acute Status   Post-Acute Authorization Placement   Home Health/Hospice Status Set-up Complete     SW contacted pt to provide HH information. Spoke w/ pt and pt  Son to verify [facility] will be servicing pt. Provide Celia as contact ([phone]).     Pt verified that wound vac was placed prior to leaving [facility].     No further needs at this time.     Sydnie Hess LMSW  Case Management Social Worker   [facility]

## 2020-03-16 ENCOUNTER — TELEPHONE (OUTPATIENT)
Dept: WOUND CARE | Facility: CLINIC | Age: 70
End: 2020-03-16

## 2020-03-16 NOTE — TELEPHONE ENCOUNTER
----- Message from Olga Nicholson sent at 3/16/2020  3:29 PM CDT -----  Contact: Celia with [place] [address] @[phone]  Temporary order on how to treat the wound until the wound vac supplies arrive. Pt states the wound vac supplies were sent to a different location

## 2020-04-01 ENCOUNTER — TELEPHONE (OUTPATIENT)
Dept: CARDIOTHORACIC SURGERY | Facility: CLINIC | Age: 70
End: 2020-04-01

## 2020-04-01 NOTE — TELEPHONE ENCOUNTER
Patient called by [facility] in [place] and her appointment was cancelled due to covid pandemic. She still has HH coming to house for wound vac exchanges. Currently wound is 8cm long and 3 cm deep.     We are unable to continue to travel to  this month thus we will attempt to find her a wound care office closer to home to continue to manage wound vac.     She asked I reach out to [facility] in [place] to see if there is an opening. [phone]    Called and left voicemail. Fax # [phone]    CB     ----- Message from Trudy Matt sent at 4/1/2020 11:37 AM CDT -----  Contact: pt called [phone]  Calling to speak with Nurse in regards to  upcoming apt.      
Never

## 2020-04-02 DIAGNOSIS — T81.89XD NON-HEALING SURGICAL WOUND, SUBSEQUENT ENCOUNTER: Primary | ICD-10-CM

## 2020-04-02 NOTE — PROGRESS NOTES
Spoke with Yamini at [facility]    Last time patient in [place] 3/12/20. Currently under  care. Temp and vitals check with each visit.   New referral sent. They should be reaching out to patient with appt.     CB

## 2020-04-07 ENCOUNTER — TELEPHONE (OUTPATIENT)
Dept: CARDIOTHORACIC SURGERY | Facility: HOSPITAL | Age: 70
End: 2020-04-07

## 2020-04-07 NOTE — TELEPHONE ENCOUNTER
Returned phone call to patient regarding N/V, diarrhea, near syncope and night sweats. She reports several week history of diarrhea and abnormal BMs. States today is the first day she's felt light headed with cold sweats. Her daughter-in-law took her blood sugar which was 200. She has never taken PO diabetes medications or insulin other than when she was admitted in [place]. I recommended she start recording blood glucose throughout the day and contact her primary care physician for a visit. Also recommended increasing water intake, avoiding sugary drinks and increasing foods with fiber. Denies fever, SOB or cough. Denies warmth or erythema surrounding chest wound.       ----- Message from Ernestina Rankin sent at 4/7/2020  1:17 PM CDT -----  Contact: celia [facility]  Reason: Pt stated she feels weak, nauseous,faint and has night sweats. Celia stated she told the pt to go to the nearest ER    Communication:Celia # [phone] and Pt# [phone]

## 2020-04-21 DIAGNOSIS — T81.89XD NON-HEALING SURGICAL WOUND, SUBSEQUENT ENCOUNTER: Primary | ICD-10-CM

## 2020-04-22 ENCOUNTER — ANESTHESIA EVENT (OUTPATIENT)
Dept: SURGERY | Facility: HOSPITAL | Age: 70
End: 2020-04-22
Payer: COMMERCIAL

## 2020-04-22 RX ORDER — PANTOPRAZOLE SODIUM 40 MG/1
TABLET, DELAYED RELEASE ORAL NIGHTLY
COMMUNITY
Start: 2020-01-28

## 2020-04-22 NOTE — PRE-PROCEDURE INSTRUCTIONS
PreOp Instructions given:    -- Medication information (what to hold and what to take)   -- NPO guidelines as follows: (or as per your Surgeon)  1. Stop ALL solid food, gum, candy (including vitamins) 8 hours before surgery/procedure time.  2. Stop all CLOUDY liquids: coffee with creamer, cloudy juices, 6 hours prior to surgery/procedure  time.  3. The patient should be ENCOURAGED to drink carbohydrate-rich clear liquids (sports drinks, clear juices) until 2 hours prior to surgery/procedure  time.  4. CLEAR liquids include only water, black coffee NO creamer, clear oral rehydration drinks, clear sports drinks or clear fruit juices (no orange juice, no pulpy juices, no apple cider).   5. IF IN DOUBT, drink water instead.   6. NOTHING TO DRINK 2 hours before to surgery/procedure  time. If you are told to take medication on the morning of surgery, it may be taken with a sip of water.   -- Arrival place and directions given; time to be given the day before procedure by the Surgeon's Office   -- Bathing with antibacterial soap   -- Don't wear any jewelry or bring any valuables AM of surgery   -- No makeup or moisturizer to face   -- No perfume/cologne, powder, lotions or aftershave     --visitor/drop off policy explained    Pt verbalized understanding.

## 2020-04-22 NOTE — ANESTHESIA PREPROCEDURE EVALUATION
[facility]  Anesthesia Pre-Operative Evaluation         Patient Name: Marisela Bunn  YOB: 1950  MRN: [id number]    SUBJECTIVE:     Pre-operative evaluation for Procedure(s) (LRB):  INCISION AND DRAINAGE, CHEST (Left)  APPLICATION, WOUND VAC (Left)     04/22/2020    Marisela Bunn is a 70 y.o. female w/ a significant PMHx of morbid obesity, HTN, nonalcoholic fatty liver disease, GERD, and CAD. Hx of Diaphragmatic hernia repair s/p I&D wound vac exchange on 3/9/20.     Patient now presents for the above procedure(s).    2D ECHO: TTE 1/22/2020    · Normal left ventricular systolic function. The estimated ejection fraction is 65%.  · Indeterminate left ventricular diastolic function.  · Normal right ventricular systolic function.  · Mild left atrial enlargement.  · Normal central venous pressure (3 mmHg).      LDA: None documented.       Peripheral IV - Single Lumen 03/08/20 1849 22 G Left Antecubital (Active)   Number of days: 44       Prev airway:     03/09/20; Placement Time: 0925 (created via procedure documentation); Method of Intubation: Direct laryngoscopy; Mask Ventilation: Easy - oral; Intubated: Postinduction; Blade: Domingo #2; Airway Device Size: 7.0; Cuff Inflation: Minimal occlusive pressure; Placement Verified By: Capnometry; Complicating Factors: Obesity    Drips: None documented.      Patient Active Problem List   Diagnosis    Diaphragmatic hernia    Cough    Leukocytosis    Alteration in skin integrity    Wound infection    Wound dehiscence       Review of patient's allergies indicates:   Allergen Reactions    Adhesive Blisters    Erythromycin      Stomach pains    Rosuvastatin      Hives, muscle/joint pains    Zolpidem      Confusion        Current Inpatient Medications:      No current facility-administered medications on file prior to encounter.      Current Outpatient Medications on File Prior to Encounter   Medication Sig Dispense Refill     acetaminophen (TYLENOL) 325 MG tablet Take 2 tablets (650 mg total) by mouth every 8 (eight) hours as needed.  0    celecoxib (CELEBREX) 200 MG capsule Take 1 capsule (200 mg total) by mouth once daily.      oxyCODONE-acetaminophen (PERCOCET) 5-325 mg per tablet Take 1 tablet by mouth every 4 (four) hours as needed for Pain. 28 tablet 0    pregabalin (LYRICA) 75 MG capsule Take 1 capsule (75 mg total) by mouth every evening. 30 capsule 3    levoFLOXacin (LEVAQUIN) 750 MG tablet Take 1 tablet (750 mg total) by mouth once daily. 7 tablet 0    pantoprazole (PROTONIX) 40 MG tablet every evening.         Past Surgical History:   Procedure Laterality Date    APPLICATION OF WOUND VACUUM-ASSISTED CLOSURE DEVICE Left 3/9/2020    Procedure: APPLICATION, WOUND VAC;  Surgeon: Primo Soni MD;  Location: [facility] OR [address] [place] [address];  Service: Colon and Rectal;  Laterality: Left;    RECONSTRUCTION OF CHEST WALL Left 1/29/2020    Procedure: RECONSTRUCTION, CHEST WALL;  Surgeon: Primo Soni MD;  Location: [facility] OR [address] [place] [address];  Service: Thoracic;  Laterality: Left;    RECONSTRUCTION OF DIAPHRAGM Left 1/29/2020    Procedure: RECONSTRUCTION, DIAPHRAGM;  Surgeon: Primo Soni MD;  [facility];  Service: Thoracic;  Laterality: Left;  Hernia reduction and repair    REPAIR OF DIAPHRAGMATIC HERNIA Left 1/21/2020    Procedure: REPAIR, HERNIA, DIAPHRAGMATIC, Laparoscopic;  Surgeon: Lucho Garcia Jr., MD;  Location: [facility] OR [address] [place] [address];  Service: General;  Laterality: Left;    REPAIR OF DIAPHRAGMATIC HERNIA Left 1/23/2020    Procedure: REPAIR, HERNIA, DIAPHRAGMATIC;  Surgeon: Lucho Garcia Jr., MD;  Location: [facility] OR [address] [place] [address];  Service: General;  Laterality: Left;       Social History     Socioeconomic History    Marital status:      Spouse name: Not on file    Number of children: Not on file    Years of education: Not on file    Highest education level: Not on file   Occupational History     Not on file   Social Needs    Financial resource strain: Not on file    Food insecurity:     Worry: Not on file     Inability: Not on file    Transportation needs:     Medical: Not on file     Non-medical: Not on file   Tobacco Use    Smoking status: Never Smoker    Smokeless tobacco: Never Used   Substance and Sexual Activity    Alcohol use: Not on file    Drug use: Not on file    Sexual activity: Not on file   Lifestyle    Physical activity:     Days per week: Not on file     Minutes per session: Not on file    Stress: Not on file   Relationships    Social connections:     Talks on phone: Not on file     Gets together: Not on file     Attends Christian service: Not on file     Active member of club or organization: Not on file     Attends meetings of clubs or organizations: Not on file     Relationship status: Not on file   Other Topics Concern    Not on file   Social History Narrative    Not on file       OBJECTIVE:     Vital Signs Range (Last 24H):         Significant Labs:  Lab Results   Component Value Date    WBC 8.63 03/12/2020    HGB 10.6 (L) 03/12/2020    HCT 36.4 (L) 03/12/2020     (H) 03/12/2020    ALT 20 02/03/2020    AST 45 (H) 02/03/2020     03/12/2020    K 3.7 03/12/2020     03/12/2020    CREATININE 0.7 03/12/2020    BUN 8 03/12/2020    CO2 30 (H) 03/12/2020    INR 0.9 03/08/2020       Diagnostic Studies: No relevant studies.    EKG: No recent studies available.    2D ECHO: TTE 1/22/2020    · Normal left ventricular systolic function. The estimated ejection fraction is 65%.  · Indeterminate left ventricular diastolic function.  · Normal right ventricular systolic function.  · Mild left atrial enlargement.  · Normal central venous pressure (3 mmHg).    ASSESSMENT/PLAN:       Anesthesia Evaluation    I have reviewed the Patient Summary Reports.    I have reviewed the Nursing Notes.   I have reviewed the Medications.     Review of Systems  Anesthesia Hx:  No problems with  previous Anesthesia  History of prior surgery of interest to airway management or planning: Denies Family Hx of Anesthesia complications.   Denies Personal Hx of Anesthesia complications.   Social:  Non-Smoker  Denies Tobacco Use.   EENT/Dental:EENT/Dental Normal   Cardiovascular:   Denies Pacemaker. Hypertension, well controlled  Denies MI. CAD  asymptomatic  Denies CABG/stent.  Denies Dysrhythmias.   Denies Angina.     no hyperlipidemia ECG has been reviewed.  Coronary Artery Disease:  patient problem list, patient hx of diagnosis.  Hypertension , stage 1 hypertension, systolic 140 - 159 or diastolic 90 - 99, Well Controlled on Rx    Pulmonary:   COPD, mild Asthma asymptomatic and mild Denies Shortness of breath.  Denies Recent URI.  Denies Asthma.  Denies Chronic Obstructive Pulmonary Disease (COPD).    Renal/:   Denies Chronic Renal Disease.   Denies Kidney Function/Disease    Hepatic/GI:   Hiatal Hernia, GERD, poorly controlled Liver Disease,  Esophageal / Stomach Disorders Gerd Controlled by chronic antireflux medication.  Liver Disease, Fatty Liver    Neurological:   TIA, Denies CVA. Denies Seizures.  Denies Seizure Disorder  Denies CVA - Cerebrovasular Accident  TIA - Transient Ischemic Attack , has had 1 TIA , transient deficits are no residual deficit.   Endocrine:   Denies Diabetes. Denies Hypothyroidism.  Denies Diabetes  Denies Thyroid Disease  Metabolic Disorders, Morbid Obesity / BMI > 40      Physical Exam  General:  Morbid Obesity    Airway/Jaw/Neck:  Airway Findings: Mouth Opening: Normal Tongue: Normal  General Airway Assessment: Adult  Mallampati: II  TM Distance: Normal, at least 6 cm         Dental:  Dental Findings: Upper Dentures   Chest/Lungs:  Chest/Lungs Findings: Normal Respiratory Rate, Clear to auscultation     Heart/Vascular:  Heart Findings: Rate: Normal  Rhythm: Regular Rhythm  Sounds: Normal  Heart murmur: negative      Skin:  Very large area of ecchymoses from left armpit all the  way to left hip   Mental Status:  Mental Status Findings:  Alert and Oriented, Cooperative         Anesthesia Plan  Type of Anesthesia, risks & benefits discussed:  Anesthesia Type:  general  Patient's Preference:   Intra-op Monitoring Plan: standard ASA monitors  Intra-op Monitoring Plan Comments:   Post Op Pain Control Plan: per primary service following discharge from PACU, IV/PO Opioids PRN and multimodal analgesia  Post Op Pain Control Plan Comments:   Induction:   IV  Beta Blocker:  Patient is not currently on a Beta-Blocker (No further documentation required).       Informed Consent: Patient understands risks and agrees with Anesthesia plan.  Questions answered. Anesthesia consent signed with patient.  ASA Score: 3     Day of Surgery Review of History & Physical:    H&P update referred to the surgeon.         Ready For Surgery From Anesthesia Perspective.

## 2020-04-23 ENCOUNTER — ANESTHESIA (OUTPATIENT)
Dept: SURGERY | Facility: HOSPITAL | Age: 70
End: 2020-04-23
Payer: COMMERCIAL

## 2020-04-23 ENCOUNTER — HOSPITAL ENCOUNTER (OUTPATIENT)
Facility: HOSPITAL | Age: 70
Discharge: HOME | End: 2020-04-24
Attending: THORACIC SURGERY (CARDIOTHORACIC VASCULAR SURGERY) | Admitting: THORACIC SURGERY (CARDIOTHORACIC VASCULAR SURGERY)
Payer: COMMERCIAL

## 2020-04-23 DIAGNOSIS — S21.102D OPEN WOUND OF LEFT CHEST WALL, SUBSEQUENT ENCOUNTER: Primary | ICD-10-CM

## 2020-04-23 DIAGNOSIS — T81.89XD NON-HEALING SURGICAL WOUND, SUBSEQUENT ENCOUNTER: ICD-10-CM

## 2020-04-23 PROBLEM — S21.102A OPEN WOUND OF LEFT CHEST WALL: Status: ACTIVE | Noted: 2020-04-23

## 2020-04-23 PROBLEM — T81.89XA SURGICAL WOUND, NON HEALING: Status: ACTIVE | Noted: 2020-04-23

## 2020-04-23 LAB
ABO + RH BLD: NORMAL
ALBUMIN SERPL BCP-MCNC: 2.6 G/DL (ref 3.5–5.2)
ALP SERPL-CCNC: 130 U/L (ref 55–135)
ALT SERPL W/O P-5'-P-CCNC: <5 U/L (ref 10–44)
ANION GAP SERPL CALC-SCNC: 10 MMOL/L (ref 8–16)
AST SERPL-CCNC: 7 U/L (ref 10–40)
BASOPHILS # BLD AUTO: 0.06 K/UL (ref 0–0.2)
BASOPHILS NFR BLD: 0.5 % (ref 0–1.9)
BILIRUB SERPL-MCNC: 0.4 MG/DL (ref 0.1–1)
BLD GP AB SCN CELLS X3 SERPL QL: NORMAL
BUN SERPL-MCNC: 5 MG/DL (ref 8–23)
CALCIUM SERPL-MCNC: 9 MG/DL (ref 8.7–10.5)
CHLORIDE SERPL-SCNC: 102 MMOL/L (ref 95–110)
CO2 SERPL-SCNC: 27 MMOL/L (ref 23–29)
CREAT SERPL-MCNC: 0.7 MG/DL (ref 0.5–1.4)
DIFFERENTIAL METHOD: ABNORMAL
EOSINOPHIL # BLD AUTO: 0.1 K/UL (ref 0–0.5)
EOSINOPHIL NFR BLD: 0.5 % (ref 0–8)
ERYTHROCYTE [DISTWIDTH] IN BLOOD BY AUTOMATED COUNT: 15.9 % (ref 11.5–14.5)
EST. GFR  (AFRICAN AMERICAN): >60 ML/MIN/1.73 M^2
EST. GFR  (NON AFRICAN AMERICAN): >60 ML/MIN/1.73 M^2
GLUCOSE SERPL-MCNC: 97 MG/DL (ref 70–110)
GRAM STN SPEC: NORMAL
GRAM STN SPEC: NORMAL
HCT VFR BLD AUTO: 39.6 % (ref 37–48.5)
HGB BLD-MCNC: 11.8 G/DL (ref 12–16)
IMM GRANULOCYTES # BLD AUTO: 0.06 K/UL (ref 0–0.04)
IMM GRANULOCYTES NFR BLD AUTO: 0.5 % (ref 0–0.5)
LYMPHOCYTES # BLD AUTO: 3.3 K/UL (ref 1–4.8)
LYMPHOCYTES NFR BLD: 25.1 % (ref 18–48)
MCH RBC QN AUTO: 24.8 PG (ref 27–31)
MCHC RBC AUTO-ENTMCNC: 29.8 G/DL (ref 32–36)
MCV RBC AUTO: 83 FL (ref 82–98)
MONOCYTES # BLD AUTO: 1.2 K/UL (ref 0.3–1)
MONOCYTES NFR BLD: 8.7 % (ref 4–15)
NEUTROPHILS # BLD AUTO: 8.5 K/UL (ref 1.8–7.7)
NEUTROPHILS NFR BLD: 64.7 % (ref 38–73)
NRBC BLD-RTO: 0 /100 WBC
PLATELET # BLD AUTO: 565 K/UL (ref 150–350)
PMV BLD AUTO: 9.7 FL (ref 9.2–12.9)
POTASSIUM SERPL-SCNC: 3.1 MMOL/L (ref 3.5–5.1)
PROT SERPL-MCNC: 6.8 G/DL (ref 6–8.4)
RBC # BLD AUTO: 4.76 M/UL (ref 4–5.4)
SARS-COV-2 RNA RESP QL NAA+PROBE: NOT DETECTED
SODIUM SERPL-SCNC: 139 MMOL/L (ref 136–145)
WBC # BLD AUTO: 13.17 K/UL (ref 3.9–12.7)

## 2020-04-23 PROCEDURE — U0002 COVID-19 LAB TEST NON-CDC: HCPCS

## 2020-04-23 PROCEDURE — 87102 FUNGUS ISOLATION CULTURE: CPT

## 2020-04-23 PROCEDURE — 94799 UNLISTED PULMONARY SVC/PX: CPT

## 2020-04-23 PROCEDURE — 97597 DBRDMT OPN WND 1ST 20 CM/<: CPT | Mod: 78,,, | Performed by: THORACIC SURGERY (CARDIOTHORACIC VASCULAR SURGERY)

## 2020-04-23 PROCEDURE — 37000008 HC ANESTHESIA 1ST 15 MINUTES: Performed by: THORACIC SURGERY (CARDIOTHORACIC VASCULAR SURGERY)

## 2020-04-23 PROCEDURE — 63600175 PHARM REV CODE 636 W HCPCS: Performed by: STUDENT IN AN ORGANIZED HEALTH CARE EDUCATION/TRAINING PROGRAM

## 2020-04-23 PROCEDURE — 25000003 PHARM REV CODE 250: Performed by: NURSE ANESTHETIST, CERTIFIED REGISTERED

## 2020-04-23 PROCEDURE — 63600175 PHARM REV CODE 636 W HCPCS: Performed by: ANESTHESIOLOGY

## 2020-04-23 PROCEDURE — 88300 PR  SURG PATH,GROSS,LEVEL I: ICD-10-PCS | Mod: 26,,, | Performed by: PATHOLOGY

## 2020-04-23 PROCEDURE — G0378 HOSPITAL OBSERVATION PER HR: HCPCS

## 2020-04-23 PROCEDURE — 87077 CULTURE AEROBIC IDENTIFY: CPT

## 2020-04-23 PROCEDURE — 71000016 HC POSTOP RECOV ADDL HR: Performed by: THORACIC SURGERY (CARDIOTHORACIC VASCULAR SURGERY)

## 2020-04-23 PROCEDURE — 80053 COMPREHEN METABOLIC PANEL: CPT

## 2020-04-23 PROCEDURE — 63600175 PHARM REV CODE 636 W HCPCS: Performed by: NURSE ANESTHETIST, CERTIFIED REGISTERED

## 2020-04-23 PROCEDURE — D9220A PRA ANESTHESIA: Mod: CRNA,,, | Performed by: NURSE ANESTHETIST, CERTIFIED REGISTERED

## 2020-04-23 PROCEDURE — 36000704 HC OR TIME LEV I 1ST 15 MIN: Performed by: THORACIC SURGERY (CARDIOTHORACIC VASCULAR SURGERY)

## 2020-04-23 PROCEDURE — 97598 DBRDMT OPN WND ADDL 20CM/<: CPT | Mod: ,,, | Performed by: THORACIC SURGERY (CARDIOTHORACIC VASCULAR SURGERY)

## 2020-04-23 PROCEDURE — 86901 BLOOD TYPING SEROLOGIC RH(D): CPT

## 2020-04-23 PROCEDURE — 37000009 HC ANESTHESIA EA ADD 15 MINS: Performed by: THORACIC SURGERY (CARDIOTHORACIC VASCULAR SURGERY)

## 2020-04-23 PROCEDURE — 85025 COMPLETE CBC W/AUTO DIFF WBC: CPT

## 2020-04-23 PROCEDURE — 71000039 HC RECOVERY, EACH ADD'L HOUR: Performed by: THORACIC SURGERY (CARDIOTHORACIC VASCULAR SURGERY)

## 2020-04-23 PROCEDURE — 88300 SURGICAL PATH GROSS: CPT | Mod: 26,,, | Performed by: PATHOLOGY

## 2020-04-23 PROCEDURE — 87205 SMEAR GRAM STAIN: CPT

## 2020-04-23 PROCEDURE — 71000033 HC RECOVERY, INTIAL HOUR: Performed by: THORACIC SURGERY (CARDIOTHORACIC VASCULAR SURGERY)

## 2020-04-23 PROCEDURE — 25000003 PHARM REV CODE 250: Performed by: SURGERY

## 2020-04-23 PROCEDURE — 97597 PR DEBRIDEMENT OPEN WOUND 20 SQ CM<: ICD-10-PCS | Mod: 78,,, | Performed by: THORACIC SURGERY (CARDIOTHORACIC VASCULAR SURGERY)

## 2020-04-23 PROCEDURE — D9220A PRA ANESTHESIA: Mod: ANES,,, | Performed by: ANESTHESIOLOGY

## 2020-04-23 PROCEDURE — D9220A PRA ANESTHESIA: ICD-10-PCS | Mod: ANES,,, | Performed by: ANESTHESIOLOGY

## 2020-04-23 PROCEDURE — 97598 PR DEBRIDEMENT OPEN WOUND EA ADDL 20 SQ CM: ICD-10-PCS | Mod: ,,, | Performed by: THORACIC SURGERY (CARDIOTHORACIC VASCULAR SURGERY)

## 2020-04-23 PROCEDURE — 88300 SURGICAL PATH GROSS: CPT | Performed by: PATHOLOGY

## 2020-04-23 PROCEDURE — 94761 N-INVAS EAR/PLS OXIMETRY MLT: CPT

## 2020-04-23 PROCEDURE — 87186 SC STD MICRODIL/AGAR DIL: CPT

## 2020-04-23 PROCEDURE — D9220A PRA ANESTHESIA: ICD-10-PCS | Mod: CRNA,,, | Performed by: NURSE ANESTHETIST, CERTIFIED REGISTERED

## 2020-04-23 PROCEDURE — 71000015 HC POSTOP RECOV 1ST HR: Performed by: THORACIC SURGERY (CARDIOTHORACIC VASCULAR SURGERY)

## 2020-04-23 PROCEDURE — 25000003 PHARM REV CODE 250: Performed by: THORACIC SURGERY (CARDIOTHORACIC VASCULAR SURGERY)

## 2020-04-23 PROCEDURE — 25000003 PHARM REV CODE 250: Performed by: PHYSICIAN ASSISTANT

## 2020-04-23 PROCEDURE — 87070 CULTURE OTHR SPECIMN AEROBIC: CPT

## 2020-04-23 PROCEDURE — 36000705 HC OR TIME LEV I EA ADD 15 MIN: Performed by: THORACIC SURGERY (CARDIOTHORACIC VASCULAR SURGERY)

## 2020-04-23 PROCEDURE — 63600175 PHARM REV CODE 636 W HCPCS: Performed by: SURGERY

## 2020-04-23 PROCEDURE — 25000003 PHARM REV CODE 250: Performed by: STUDENT IN AN ORGANIZED HEALTH CARE EDUCATION/TRAINING PROGRAM

## 2020-04-23 PROCEDURE — 87075 CULTR BACTERIA EXCEPT BLOOD: CPT

## 2020-04-23 RX ORDER — POTASSIUM CHLORIDE 20 MEQ/1
60 TABLET, EXTENDED RELEASE ORAL ONCE
Status: COMPLETED | OUTPATIENT
Start: 2020-04-23 | End: 2020-04-23

## 2020-04-23 RX ORDER — DEXAMETHASONE SODIUM PHOSPHATE 4 MG/ML
INJECTION, SOLUTION INTRA-ARTICULAR; INTRALESIONAL; INTRAMUSCULAR; INTRAVENOUS; SOFT TISSUE
Status: DISCONTINUED | OUTPATIENT
Start: 2020-04-23 | End: 2020-04-23

## 2020-04-23 RX ORDER — BUPIVACAINE HYDROCHLORIDE AND EPINEPHRINE 5; 5 MG/ML; UG/ML
INJECTION, SOLUTION EPIDURAL; INTRACAUDAL; PERINEURAL
Status: DISCONTINUED | OUTPATIENT
Start: 2020-04-23 | End: 2020-04-23 | Stop reason: HOSPADM

## 2020-04-23 RX ORDER — FENTANYL CITRATE 50 UG/ML
INJECTION, SOLUTION INTRAMUSCULAR; INTRAVENOUS
Status: DISCONTINUED | OUTPATIENT
Start: 2020-04-23 | End: 2020-04-23

## 2020-04-23 RX ORDER — LIDOCAINE HYDROCHLORIDE 10 MG/ML
1 INJECTION, SOLUTION EPIDURAL; INFILTRATION; INTRACAUDAL; PERINEURAL ONCE
Status: COMPLETED | OUTPATIENT
Start: 2020-04-23 | End: 2020-04-23

## 2020-04-23 RX ORDER — SODIUM CHLORIDE 0.9 % (FLUSH) 0.9 %
3 SYRINGE (ML) INJECTION
Status: DISCONTINUED | OUTPATIENT
Start: 2020-04-23 | End: 2020-04-23 | Stop reason: HOSPADM

## 2020-04-23 RX ORDER — ONDANSETRON 2 MG/ML
INJECTION INTRAMUSCULAR; INTRAVENOUS
Status: DISCONTINUED | OUTPATIENT
Start: 2020-04-23 | End: 2020-04-23

## 2020-04-23 RX ORDER — OXYCODONE HYDROCHLORIDE 10 MG/1
10 TABLET ORAL EVERY 4 HOURS PRN
Status: DISCONTINUED | OUTPATIENT
Start: 2020-04-23 | End: 2020-04-24 | Stop reason: HOSPADM

## 2020-04-23 RX ORDER — ONDANSETRON 2 MG/ML
INJECTION INTRAMUSCULAR; INTRAVENOUS
Status: COMPLETED
Start: 2020-04-23 | End: 2020-04-23

## 2020-04-23 RX ORDER — AMOXICILLIN 250 MG
1 CAPSULE ORAL 2 TIMES DAILY
Status: DISCONTINUED | OUTPATIENT
Start: 2020-04-23 | End: 2020-04-24 | Stop reason: HOSPADM

## 2020-04-23 RX ORDER — PREGABALIN 75 MG/1
75 CAPSULE ORAL NIGHTLY
Status: DISCONTINUED | OUTPATIENT
Start: 2020-04-23 | End: 2020-04-24 | Stop reason: HOSPADM

## 2020-04-23 RX ORDER — ACETAMINOPHEN 10 MG/ML
INJECTION, SOLUTION INTRAVENOUS
Status: DISCONTINUED | OUTPATIENT
Start: 2020-04-23 | End: 2020-04-23

## 2020-04-23 RX ORDER — MUPIROCIN 20 MG/G
1 OINTMENT TOPICAL 2 TIMES DAILY
Status: DISCONTINUED | OUTPATIENT
Start: 2020-04-23 | End: 2020-04-24 | Stop reason: HOSPADM

## 2020-04-23 RX ORDER — FENTANYL CITRATE 50 UG/ML
25 INJECTION, SOLUTION INTRAMUSCULAR; INTRAVENOUS
Status: DISCONTINUED | OUTPATIENT
Start: 2020-04-23 | End: 2020-04-24 | Stop reason: HOSPADM

## 2020-04-23 RX ORDER — ACETAMINOPHEN 325 MG/1
650 TABLET ORAL EVERY 4 HOURS PRN
Status: DISCONTINUED | OUTPATIENT
Start: 2020-04-23 | End: 2020-04-24 | Stop reason: HOSPADM

## 2020-04-23 RX ORDER — PROPOFOL 10 MG/ML
VIAL (ML) INTRAVENOUS
Status: DISCONTINUED | OUTPATIENT
Start: 2020-04-23 | End: 2020-04-23

## 2020-04-23 RX ORDER — METOCLOPRAMIDE HYDROCHLORIDE 5 MG/ML
5 INJECTION INTRAMUSCULAR; INTRAVENOUS EVERY 6 HOURS PRN
Status: DISCONTINUED | OUTPATIENT
Start: 2020-04-23 | End: 2020-04-24 | Stop reason: HOSPADM

## 2020-04-23 RX ORDER — ONDANSETRON 2 MG/ML
4 INJECTION INTRAMUSCULAR; INTRAVENOUS ONCE
Status: COMPLETED | OUTPATIENT
Start: 2020-04-23 | End: 2020-04-23

## 2020-04-23 RX ORDER — DEXTROSE MONOHYDRATE, SODIUM CHLORIDE, AND POTASSIUM CHLORIDE 50; 1.49; 9 G/1000ML; G/1000ML; G/1000ML
INJECTION, SOLUTION INTRAVENOUS CONTINUOUS
Status: DISCONTINUED | OUTPATIENT
Start: 2020-04-23 | End: 2020-04-23

## 2020-04-23 RX ORDER — SUCCINYLCHOLINE CHLORIDE 20 MG/ML
INJECTION INTRAMUSCULAR; INTRAVENOUS
Status: DISCONTINUED | OUTPATIENT
Start: 2020-04-23 | End: 2020-04-23

## 2020-04-23 RX ORDER — HYDROMORPHONE HYDROCHLORIDE 1 MG/ML
0.2 INJECTION, SOLUTION INTRAMUSCULAR; INTRAVENOUS; SUBCUTANEOUS EVERY 5 MIN PRN
Status: DISCONTINUED | OUTPATIENT
Start: 2020-04-23 | End: 2020-04-23 | Stop reason: HOSPADM

## 2020-04-23 RX ORDER — BACITRACIN 50000 [IU]/1
INJECTION, POWDER, FOR SOLUTION INTRAMUSCULAR
Status: DISCONTINUED | OUTPATIENT
Start: 2020-04-23 | End: 2020-04-23 | Stop reason: HOSPADM

## 2020-04-23 RX ORDER — POLYETHYLENE GLYCOL 3350 17 G/17G
17 POWDER, FOR SOLUTION ORAL DAILY
Status: DISCONTINUED | OUTPATIENT
Start: 2020-04-23 | End: 2020-04-24 | Stop reason: HOSPADM

## 2020-04-23 RX ORDER — CEFAZOLIN SODIUM 1 G/3ML
2 INJECTION, POWDER, FOR SOLUTION INTRAMUSCULAR; INTRAVENOUS
Status: DISCONTINUED | OUTPATIENT
Start: 2020-04-23 | End: 2020-04-24 | Stop reason: HOSPADM

## 2020-04-23 RX ORDER — MAGNESIUM SULFATE HEPTAHYDRATE 40 MG/ML
2 INJECTION, SOLUTION INTRAVENOUS ONCE
Status: COMPLETED | OUTPATIENT
Start: 2020-04-23 | End: 2020-04-23

## 2020-04-23 RX ORDER — ONDANSETRON 8 MG/1
8 TABLET, ORALLY DISINTEGRATING ORAL EVERY 8 HOURS PRN
Status: DISCONTINUED | OUTPATIENT
Start: 2020-04-23 | End: 2020-04-24 | Stop reason: HOSPADM

## 2020-04-23 RX ORDER — ROCURONIUM BROMIDE 10 MG/ML
INJECTION, SOLUTION INTRAVENOUS
Status: DISCONTINUED | OUTPATIENT
Start: 2020-04-23 | End: 2020-04-23

## 2020-04-23 RX ORDER — POLYETHYLENE GLYCOL 3350 17 G/17G
17 POWDER, FOR SOLUTION ORAL DAILY
Status: DISCONTINUED | OUTPATIENT
Start: 2020-04-23 | End: 2020-04-23 | Stop reason: SDUPTHER

## 2020-04-23 RX ORDER — LIDOCAINE HCL/PF 100 MG/5ML
SYRINGE (ML) INTRAVENOUS
Status: DISCONTINUED | OUTPATIENT
Start: 2020-04-23 | End: 2020-04-23

## 2020-04-23 RX ORDER — OXYCODONE HYDROCHLORIDE 5 MG/1
5 TABLET ORAL EVERY 4 HOURS PRN
Status: DISCONTINUED | OUTPATIENT
Start: 2020-04-23 | End: 2020-04-24 | Stop reason: HOSPADM

## 2020-04-23 RX ORDER — PANTOPRAZOLE SODIUM 40 MG/1
40 TABLET, DELAYED RELEASE ORAL DAILY
Status: DISCONTINUED | OUTPATIENT
Start: 2020-04-24 | End: 2020-04-24 | Stop reason: HOSPADM

## 2020-04-23 RX ORDER — BISACODYL 10 MG
10 SUPPOSITORY, RECTAL RECTAL DAILY PRN
Status: DISCONTINUED | OUTPATIENT
Start: 2020-04-23 | End: 2020-04-24 | Stop reason: HOSPADM

## 2020-04-23 RX ADMIN — POLYETHYLENE GLYCOL 3350 17 G: 17 POWDER, FOR SOLUTION ORAL at 04:04

## 2020-04-23 RX ADMIN — MAGNESIUM SULFATE IN WATER 2 G: 40 INJECTION, SOLUTION INTRAVENOUS at 04:04

## 2020-04-23 RX ADMIN — ACETAMINOPHEN 1000 MG: 10 INJECTION, SOLUTION INTRAVENOUS at 02:04

## 2020-04-23 RX ADMIN — FENTANYL CITRATE 50 MCG: 50 INJECTION, SOLUTION INTRAMUSCULAR; INTRAVENOUS at 03:04

## 2020-04-23 RX ADMIN — LIDOCAINE HYDROCHLORIDE 80 MG: 20 INJECTION, SOLUTION INTRAVENOUS at 01:04

## 2020-04-23 RX ADMIN — VANCOMYCIN HYDROCHLORIDE 1 G: 1 INJECTION, POWDER, LYOPHILIZED, FOR SOLUTION INTRAVENOUS at 02:04

## 2020-04-23 RX ADMIN — FENTANYL CITRATE 50 MCG: 50 INJECTION, SOLUTION INTRAMUSCULAR; INTRAVENOUS at 02:04

## 2020-04-23 RX ADMIN — SENNOSIDES AND DOCUSATE SODIUM 1 TABLET: 8.6; 5 TABLET ORAL at 09:04

## 2020-04-23 RX ADMIN — ONDANSETRON 4 MG: 2 INJECTION, SOLUTION INTRAMUSCULAR; INTRAVENOUS at 04:04

## 2020-04-23 RX ADMIN — OXYCODONE HYDROCHLORIDE 10 MG: 5 TABLET ORAL at 07:04

## 2020-04-23 RX ADMIN — SUCCINYLCHOLINE CHLORIDE 160 MG: 20 INJECTION, SOLUTION INTRAMUSCULAR; INTRAVENOUS at 01:04

## 2020-04-23 RX ADMIN — DEXAMETHASONE SODIUM PHOSPHATE 4 MG: 4 INJECTION, SOLUTION INTRAMUSCULAR; INTRAVENOUS at 02:04

## 2020-04-23 RX ADMIN — ONDANSETRON 4 MG: 2 INJECTION, SOLUTION INTRAMUSCULAR; INTRAVENOUS at 02:04

## 2020-04-23 RX ADMIN — ROCURONIUM BROMIDE 20 MG: 10 INJECTION, SOLUTION INTRAVENOUS at 02:04

## 2020-04-23 RX ADMIN — FENTANYL CITRATE 50 MCG: 50 INJECTION, SOLUTION INTRAMUSCULAR; INTRAVENOUS at 01:04

## 2020-04-23 RX ADMIN — ROCURONIUM BROMIDE 10 MG: 10 INJECTION, SOLUTION INTRAVENOUS at 01:04

## 2020-04-23 RX ADMIN — PREGABALIN 75 MG: 75 CAPSULE ORAL at 09:04

## 2020-04-23 RX ADMIN — MUPIROCIN 1 G: 20 OINTMENT TOPICAL at 09:04

## 2020-04-23 RX ADMIN — POTASSIUM CHLORIDE 60 MEQ: 1500 TABLET, EXTENDED RELEASE ORAL at 04:04

## 2020-04-23 RX ADMIN — LIDOCAINE HYDROCHLORIDE 10 MG: 10 INJECTION, SOLUTION EPIDURAL; INFILTRATION; INTRACAUDAL; PERINEURAL at 10:04

## 2020-04-23 RX ADMIN — SUGAMMADEX 200 MG: 100 INJECTION, SOLUTION INTRAVENOUS at 02:04

## 2020-04-23 RX ADMIN — PROPOFOL 160 MG: 10 INJECTION, EMULSION INTRAVENOUS at 01:04

## 2020-04-23 RX ADMIN — ROCURONIUM BROMIDE 10 MG: 10 INJECTION, SOLUTION INTRAVENOUS at 02:04

## 2020-04-23 NOTE — PLAN OF CARE
[facility]    HOME HEALTH ORDERS  FACE TO FACE ENCOUNTER    Patient Name: Marisela Bunn  YOB: 1950    Thoracic Surgery: Dr. Primo Soni  Address: [address] [place] [address]  Phone Number: [phone]  Fax: [phone]    PCP: Scott Trevizo MD   PCP Address: [address] [place] [address] [address] [facility]*  PCP Phone Number: [phone]  PCP Fax: [phone]    Encounter Date: 04/23/2020    Admit to Home Health    Diagnoses:  Active Hospital Problems    Diagnosis  POA    Open wound of left chest wall [S21.102A]  Yes    Surgical wound, non healing [T81.89XA]  Yes      Resolved Hospital Problems   No resolved problems to display.       No future appointments.      I have seen and examined this patient face to face today. My clinical findings that support the need for the home health skilled services and home bound status are the following:  Medical restrictions requiring assistance of another human to leave home due to  Dyspnea on exertion (SOB), Post surgery monitoring and Wound care needs.    Allergies:  Review of patient's allergies indicates:   Allergen Reactions    Adhesive Blisters    Erythromycin      Stomach pains    Rosuvastatin      Hives, muscle/joint pains    Zolpidem      Confusion        Diet: regular diet    Activities: activity as tolerated    Nursing:   SN to complete comprehensive assessment including routine vital signs. Instruct on disease process and s/s of complications to report to MD. Review/verify medication list sent home with the patient at time of discharge  and instruct patient/caregiver as needed. Frequency may be adjusted depending on start of care date.    Notify MD if SBP > 160 or < 90; DBP > 90 or < 50; HR > 120 or < 50; Temp > 101; Other:         CONSULTS:    Physical Therapy to evaluate and treat. Evaluate for home safety and equipment needs; Establish/upgrade home exercise program. Perform / instruct on  therapeutic exercises, gait training, transfer training, and Range of Motion.  Occupational Therapy to evaluate and treat. Evaluate home environment for safety and equipment needs. Perform/Instruct on transfers, ADL training, ROM, and therapeutic exercises.  Aide to provide assistance with personal care, ADLs, and vital signs.    WOUND CARE ORDERS  Yes:  Surgical Wound:  Location: Left chest-   4/23/20- Wound measured 20cm (anterior to posterior, undermining posteriorly; 15cm at skin), 4.5cm superior to inferior and 5cm deep. Black sponge and wound VAC replaced.    Please clean and change wound VAC every M-W-F      Medications: Review discharge medications with patient and family and provide education.      Current Discharge Medication List      CONTINUE these medications which have NOT CHANGED    Details   acetaminophen (TYLENOL) 325 MG tablet Take 2 tablets (650 mg total) by mouth every 8 (eight) hours as needed.  Refills: 0      celecoxib (CELEBREX) 200 MG capsule Take 1 capsule (200 mg total) by mouth once daily.      oxyCODONE-acetaminophen (PERCOCET) 5-325 mg per tablet Take 1 tablet by mouth every 4 (four) hours as needed for Pain.  Qty: 28 tablet, Refills: 0    Comments: Bedside Delivery Room 1045 Quantity prescribed more than 7 day supply? No      pantoprazole (PROTONIX) 40 MG tablet every evening.      pregabalin (LYRICA) 75 MG capsule Take 1 capsule (75 mg total) by mouth every evening.  Qty: 30 capsule, Refills: 3    Comments: Bedside Delivery Room 1045      levoFLOXacin (LEVAQUIN) 750 MG tablet Take 1 tablet (750 mg total) by mouth once daily.  Qty: 7 tablet, Refills: 0    Comments: Bedside Delivery- Room 1045 Call Susannah with problems- [phone]             I certify that this patient is confined to her home and needs intermittent skilled nursing care, physical therapy and occupational therapy.

## 2020-04-23 NOTE — H&P
[facility]  Cardiothoracic Surgery  History & Physical    Patient Name: Marisela Bunn  MRN: [id number]  Admission Date: 4/23/2020  Attending Physician: Primo Soni MD   Primary Care Provider: Scott Trevizo MD    Patient information was obtained from patient and past medical records.     Subjective:     Chief Complaint/Reason for Admission: L chest wound    History of Present Illness:  Marisela Bunn is a 70 y.o. female with open diaphragmatic repair with mesh with recurrence and reoperation (both in 1/2020) with thoracotomy wound requiring debridement and vac placement in 3/2020. She presents for further debridement and possible vac re-application. She denies fever, chills, cough, chills.    No current facility-administered medications on file prior to encounter.      Current Outpatient Medications on File Prior to Encounter   Medication Sig    acetaminophen (TYLENOL) 325 MG tablet Take 2 tablets (650 mg total) by mouth every 8 (eight) hours as needed.    celecoxib (CELEBREX) 200 MG capsule Take 1 capsule (200 mg total) by mouth once daily.    oxyCODONE-acetaminophen (PERCOCET) 5-325 mg per tablet Take 1 tablet by mouth every 4 (four) hours as needed for Pain.    pregabalin (LYRICA) 75 MG capsule Take 1 capsule (75 mg total) by mouth every evening.    levoFLOXacin (LEVAQUIN) 750 MG tablet Take 1 tablet (750 mg total) by mouth once daily.    pantoprazole (PROTONIX) 40 MG tablet every evening.       Review of patient's allergies indicates:   Allergen Reactions    Adhesive Blisters    Erythromycin      Stomach pains    Rosuvastatin      Hives, muscle/joint pains    Zolpidem      Confusion        Past Medical History:   Diagnosis Date    CAD (coronary artery disease)     Diaphragmatic hernia     GERD (gastroesophageal reflux disease)     Hypertension     TIA (transient ischemic attack)      Past Surgical History:   Procedure Laterality Date    APPLICATION OF WOUND  VACUUM-ASSISTED CLOSURE DEVICE Left 3/9/2020    Procedure: APPLICATION, WOUND VAC;  Surgeon: Primo Soni MD;  Location: [facility] OR [address] [place] [address];  Service: Colon and Rectal;  Laterality: Left;    RECONSTRUCTION OF CHEST WALL Left 1/29/2020    Procedure: RECONSTRUCTION, CHEST WALL;  Surgeon: Primo Soni MD;  [facility];  Service: Thoracic;  Laterality: Left;    RECONSTRUCTION OF DIAPHRAGM Left 1/29/2020    Procedure: RECONSTRUCTION, DIAPHRAGM;  Surgeon: Primo Soni MD;  [facility];  Service: Thoracic;  Laterality: Left;  Hernia reduction and repair    REPAIR OF DIAPHRAGMATIC HERNIA Left 1/21/2020    Procedure: REPAIR, HERNIA, DIAPHRAGMATIC, Laparoscopic;  Surgeon: Lucho Gracia Jr., MD;  Location: [facility] OR [address] [place] [address];  Service: General;  Laterality: Left;    REPAIR OF DIAPHRAGMATIC HERNIA Left 1/23/2020    Procedure: REPAIR, HERNIA, DIAPHRAGMATIC;  Surgeon: Lucho Garcia Jr., MD;  Location: [facility] OR [address] [place] [address];  Service: General;  Laterality: Left;     Family History     None        Tobacco Use    Smoking status: Never Smoker    Smokeless tobacco: Never Used   Substance and Sexual Activity    Alcohol use: Not on file    Drug use: Not on file    Sexual activity: Not on file     Review of Systems   Constitutional: Positive for appetite change. Negative for chills, diaphoresis, fatigue and fever.   HENT: Negative for congestion.    Eyes: Negative for pain.   Respiratory: Negative for apnea, cough, chest tightness and shortness of breath.    Cardiovascular: Positive for chest pain (some discomfort at L chest wound). Negative for palpitations and leg swelling.   Gastrointestinal: Positive for diarrhea and nausea. Negative for abdominal distention and abdominal pain.   Skin: Negative for color change.   Neurological: Negative for dizziness and headaches.   Psychiatric/Behavioral: Negative for agitation and confusion.     Objective:     Vital Signs (Most Recent):     Vital Signs (24h Range):           There is no height or weight on file to calculate BMI.    Physical Exam   Constitutional: She is oriented to person, place, and time. She appears well-developed.   HENT:   Head: Normocephalic and atraumatic.   Eyes: Pupils are equal, round, and reactive to light.   Neck: Normal range of motion.   Cardiovascular: Normal rate and regular rhythm.   Pulmonary/Chest: Effort normal.   Abdominal: Soft.   Musculoskeletal: She exhibits no edema.   Neurological: She is alert and oriented to person, place, and time.   Skin:   L chest wound with dressing in place with ?purulent drainage and some surrounding skin irritation and erythema     Significant Labs:  CBC: No results for input(s): WBC, RBC, HGB, HCT, PLT, MCV, MCH, MCHC in the last 168 hours.  CMP: No results for input(s): GLU, CALCIUM, ALBUMIN, PROT, NA, K, CO2, CL, BUN, CREATININE, ALKPHOS, ALT, AST, BILITOT in the last 168 hours.    Significant Diagnostics:  None new    Assessment/Plan:     Active Diagnoses:    Diagnosis Date Noted POA    Open wound of left chest wall [S21.102A] 04/23/2020 Yes      Problems Resolved During this Admission:     VTE Risk Mitigation (From admission, onward)    None      Marisela Bunn is a 70 y.o. female with L chest wound after open diaphragmatic hernia repair; to OR for debridement and possible vac placement.    Luis Eduardo Soto MD  General Surgery  [facility]

## 2020-04-23 NOTE — OP NOTE
[facility]  Cardiothoracic Surgery Department  Operative Note    SUMMARY     Date of Procedure: 4/23/2020     Procedure: Procedure(s) (LRB):  INCISION AND DRAINAGE, CHEST (Left)  APPLICATION, WOUND VAC (Left)     Surgeon(s) and Role:     * Primo Soni MD - Primary     * Luis Eduardo Soto MD - Fellow    Assisting Surgeon: None    Pre-Operative Diagnosis: Non-healing surgical wound, subsequent encounter [T81.89XD]    Post-Operative Diagnosis: Post-Op Diagnosis Codes:     * Non-healing surgical wound, subsequent encounter [T81.89XD]     Infected permanent mesh    Anesthesia: General    Technical Procedures Used: wound debridement    Description of the Findings of the Procedure: infected and dehisced gore-adalgisa mesh at thoracotomy incision    Indication:  Marisela Bunn is a 70 y.o. female with L diaphragmatic hernia s/p laparascopic repair complicated by recurrence and open thoracotomy repair (biologic mesh for hernia defect, gore-adalgisa for chest wall reconstruction) one week later in 1/2020, complicated by wound infection with exposed gore-adalgisa mesh. She has been undergoing regular wound vac changes at home but has been having increased drainage.    Procedure:  The patient was brought into the OR, place under general endotracheal anesthesia and a timeout was completed. She was placed in a Right lateral decubitus position and prepped and draped in the usual sterile fashion.  Noted purulent drainage, noted on top of and under free, dehisced mesh, was sent for culture. The mesh was then sharply released from surrounding tissue by dividing prolene sutures holding it in placed. Much of the mesh freed easily. The skin and underlying soft tissue was divided posteriorly for a few centimeters to permit better visualization and removed of as much mesh as possible.  3L of NS using a jet lavage was then used to irrigate the entire wound bed. The wound appeared to be adequately hemostatic, measuring 20cm  (anterior to posterior, undermining posteriorly; 15cm at skin), 4.5cm superior to inferior and 5cm deep. Using a black sponge, a wound vac was reapplied. The patient tolerated the procedure well and there were no complications. She was turned supine and extubated.    Significant Surgical Tasks Conducted by the Assistant(s), if Applicable: n/a    Complications: No    Estimated Blood Loss (EBL): 20cc           Implants: none    Specimens:   Infected mesh; culture swab       Condition: Fair    Disposition: PACU - hemodynamically stable.    Attestation: Faculty was present for the procedure    Luis Eduardo Soto MD  Cardiothoracic Surgery Fellow

## 2020-04-23 NOTE — ANESTHESIA PROCEDURE NOTES
Intubation  Performed by: Evangelista Mar MD  Authorized by: Rhonda G Leopold, MD     Intubation:     Induction:  Intravenous    Intubated:  Postinduction    Mask Ventilation:  Easy with oral airway    Attempts:  1    Attempted By:  Resident anesthesiologist    Method of Intubation:  Direct    Blade:  Domingo 2    Laryngeal View Grade: Grade I - full view of chords      Difficult Airway Encountered?: No      Complications:  None    Airway Device:  Oral endotracheal tube    Airway Device Size:  7.5    Style/Cuff Inflation:  Cuffed    Secured at:  The lips    Placement Verified By:  Capnometry    Complicating Factors:  Obesity and short neck    Findings Post-Intubation:  BS equal bilateral

## 2020-04-23 NOTE — TRANSFER OF CARE
"Anesthesia Transfer of Care Note    Patient: Marisela Bunn    Procedure(s) Performed: Procedure(s) (LRB):  INCISION AND DRAINAGE, CHEST (Left)  APPLICATION, WOUND VAC (Left)    Patient location: PACU    Anesthesia Type: general    Transport from OR: Transported from OR on 6-10 L/min O2 by face mask with adequate spontaneous ventilation    Post pain: adequate analgesia    Post assessment: no apparent anesthetic complications and tolerated procedure well    Post vital signs: stable    Level of consciousness: awake, alert and oriented    Nausea/Vomiting: no nausea/vomiting    Complications: none    Transfer of care protocol was followed      Last vitals:   Visit Vitals  BP (!) 120/58 (BP Location: Left arm, Patient Position: Lying)   Pulse 84   Temp 36.7 °C (98 °F) (Temporal)   Resp 16   Ht 5' 1" (1.549 m)   Wt 83.9 kg (185 lb)   LMP  (LMP Unknown)   SpO2 100%   Breastfeeding? No   BMI 34.96 kg/m²     "

## 2020-04-24 VITALS
SYSTOLIC BLOOD PRESSURE: 138 MMHG | HEART RATE: 78 BPM | OXYGEN SATURATION: 94 % | BODY MASS INDEX: 39.54 KG/M2 | DIASTOLIC BLOOD PRESSURE: 60 MMHG | HEIGHT: 61 IN | RESPIRATION RATE: 20 BRPM | TEMPERATURE: 97 F | WEIGHT: 209.44 LBS

## 2020-04-24 LAB
ANION GAP SERPL CALC-SCNC: 8 MMOL/L (ref 8–16)
BUN SERPL-MCNC: 8 MG/DL (ref 8–23)
CALCIUM SERPL-MCNC: 8.5 MG/DL (ref 8.7–10.5)
CHLORIDE SERPL-SCNC: 107 MMOL/L (ref 95–110)
CO2 SERPL-SCNC: 26 MMOL/L (ref 23–29)
CREAT SERPL-MCNC: 0.7 MG/DL (ref 0.5–1.4)
EST. GFR  (AFRICAN AMERICAN): >60 ML/MIN/1.73 M^2
EST. GFR  (NON AFRICAN AMERICAN): >60 ML/MIN/1.73 M^2
GLUCOSE SERPL-MCNC: 111 MG/DL (ref 70–110)
POTASSIUM SERPL-SCNC: 5 MMOL/L (ref 3.5–5.1)
SODIUM SERPL-SCNC: 141 MMOL/L (ref 136–145)

## 2020-04-24 PROCEDURE — 80048 BASIC METABOLIC PNL TOTAL CA: CPT

## 2020-04-24 PROCEDURE — 25000003 PHARM REV CODE 250: Performed by: SURGERY

## 2020-04-24 PROCEDURE — 97165 OT EVAL LOW COMPLEX 30 MIN: CPT

## 2020-04-24 PROCEDURE — 97535 SELF CARE MNGMENT TRAINING: CPT

## 2020-04-24 PROCEDURE — 97161 PT EVAL LOW COMPLEX 20 MIN: CPT

## 2020-04-24 PROCEDURE — 97116 GAIT TRAINING THERAPY: CPT

## 2020-04-24 PROCEDURE — 36415 COLL VENOUS BLD VENIPUNCTURE: CPT

## 2020-04-24 PROCEDURE — 63600175 PHARM REV CODE 636 W HCPCS: Performed by: SURGERY

## 2020-04-24 RX ORDER — DOXYCYCLINE 100 MG/1
100 CAPSULE ORAL EVERY 12 HOURS
Qty: 28 CAPSULE | Refills: 0 | Status: SHIPPED | OUTPATIENT
Start: 2020-04-24 | End: 2020-05-08

## 2020-04-24 RX ORDER — DOXYCYCLINE 100 MG/1
100 CAPSULE ORAL EVERY 12 HOURS
Qty: 28 CAPSULE | Refills: 0 | Status: SHIPPED | OUTPATIENT
Start: 2020-04-24 | End: 2020-04-24 | Stop reason: SDUPTHER

## 2020-04-24 RX ADMIN — OXYCODONE HYDROCHLORIDE 5 MG: 5 TABLET ORAL at 11:04

## 2020-04-24 RX ADMIN — OXYCODONE HYDROCHLORIDE 5 MG: 5 TABLET ORAL at 12:04

## 2020-04-24 RX ADMIN — PANTOPRAZOLE SODIUM 40 MG: 40 TABLET, DELAYED RELEASE ORAL at 08:04

## 2020-04-24 RX ADMIN — MUPIROCIN 1 G: 20 OINTMENT TOPICAL at 08:04

## 2020-04-24 RX ADMIN — VANCOMYCIN HYDROCHLORIDE 1250 MG: 1.25 INJECTION, POWDER, LYOPHILIZED, FOR SOLUTION INTRAVENOUS at 06:04

## 2020-04-24 NOTE — PT/OT/SLP EVAL
Occupational Therapy   Evaluation and Discharge Note    Name: Marisela Bunn  MRN: [id number]  Admitting Diagnosis:  Surgical wound, non healing 1 Day Post-Op   INCISION AND DRAINAGE, CHEST (Left)  APPLICATION, WOUND VAC (Left)     Recommendations:     Discharge Recommendations: home health PT (resume)   Discharge Equipment Recommendations:  none  Barriers to discharge:  None    Assessment:     Marisela Bunn is a 70 y.o. female with a medical diagnosis of Surgical wound, non healing. At this time, patient is functioning at her prior level of function and does not require further acute OT services.     Plan:     During this hospitalization, patient does not require further acute OT services.  Please re-consult if situation changes.    · Plan of Care Reviewed with: patient    Subjective     Chief Complaint: Discomfort at wound vac site  Patient/Family Comments/goals: To return home    Occupational Profile:  Living Environment: Patient lives alone in a trailer with ramp access. Her bathroom has a WIS and a garden tub, but patient reports using the WIS.  Previous level of function: Independent with ADLs, mod I with functional mobility  Roles and Routines: Mother. Patient does not drive or work. She enjoys watching TV and sitting outside on her porch.  Equipment Used at home:  bedside commode, glucometer, grab bar, walker, rolling, wound care supplies, shower chair, rollator  Assistance upon Discharge: Family lives nearby and available to assist    Pain/Comfort:  · Pain Rating 1: other (see comments)(Reported a little pain but did not rate)    Patients cultural, spiritual, Taoism conflicts given the current situation: no    Objective:     Communicated with: RN prior to session.  Patient found HOB elevated with wound vac(hep lock IV) upon OT entry to room.    General Precautions: Standard,     Orthopedic Precautions:N/A   Braces: N/A     Occupational Performance:    Bed Mobility:    · Patient completed Scooting  anteriorly to EOB for foot placement on floor with independence  · Patient completed Supine to Sit to L side EOB with independence  · Patient completed Sit to Supine with independence    Functional Mobility/Transfers:  · Patient completed Sit <> Stand Transfer with independence  with  no assistive device   · Patient completed Toilet Transfer onto the toilet in the restroom via Step Transfer technique with independence with  no AD  · Functional Mobility: Patient completed functional mobility ~250' with SPV and no AD to simulate household distances. Patient managed her wound vac.    Activities of Daily Living:  · Grooming: independence Patient participated in hand hygiene while standing at the sink in the restroom independently.  · Lower Body Dressing: independence Patient demonstrated ability to don/doff non-slip socks while sitting EOB independently.  · Toileting: independence Patient completed toileting on the toilet in the restroom independently.    Cognitive/Visual Perceptual:  Cognitive/Psychosocial Skills:     -       Oriented to: Person, Place, Time and Situation   -       Follows Commands/attention:Follows multistep  commands    Physical Exam:  Upper Extremity Range of Motion:     -       Right Upper Extremity: WFL  -       Left Upper Extremity: WFL  Upper Extremity Strength:    -       Right Upper Extremity: WFL  -       Left Upper Extremity: WFL   Strength:    -       Right Upper Extremity: WFL  -       Left Upper Extremity: WFL  Fine Motor Coordination:    -       Intact  Left hand thumb/finger opposition skills and Right hand thumb/finger opposition skills    AMPAC 6 Click ADL:  AMPAC Total Score: 24    Treatment & Education:  Role of OT and evaluation  Discussed d/c from skilled OT services with patient in agreement - no concerns/questions within OT scope of practice  Call button for assistance  Education:    Patient left HOB elevated with all lines intact, call button in reach and RN  notified    GOALS:   Multidisciplinary Problems     Occupational Therapy Goals     Not on file          Multidisciplinary Problems (Resolved)        Problem: Occupational Therapy Goal    Goal Priority Disciplines Outcome Interventions   Occupational Therapy Goal   (Resolved)     OT, PT/OT Met                    History:     Past Medical History:   Diagnosis Date    CAD (coronary artery disease)     Diaphragmatic hernia     GERD (gastroesophageal reflux disease)     Hypertension     TIA (transient ischemic attack)        Past Surgical History:   Procedure Laterality Date    APPLICATION OF WOUND VACUUM-ASSISTED CLOSURE DEVICE Left 3/9/2020    Procedure: APPLICATION, WOUND VAC;  Surgeon: Primo Soni MD;  Location: [address];  Service: Colon and Rectal;  Laterality: Left;    APPLICATION OF WOUND VACUUM-ASSISTED CLOSURE DEVICE Left 4/23/2020    Procedure: APPLICATION, WOUND VAC;  Surgeon: Primo Soni MD;  Location: [address];  Service: General;  Laterality: Left;    INCISION AND DRAINAGE OF CHEST Left 4/23/2020    Procedure: INCISION AND DRAINAGE, CHEST;  Surgeon: Primo Soni MD;  Location: [address];  Service: General;  Laterality: Left;  Wash out  Trim mesh    RECONSTRUCTION OF CHEST WALL Left 1/29/2020    Procedure: RECONSTRUCTION, CHEST WALL;  Surgeon: Primo Soni MD;  Location: [address];  Service: Thoracic;  Laterality: Left;    RECONSTRUCTION OF DIAPHRAGM Left 1/29/2020    Procedure: RECONSTRUCTION, DIAPHRAGM;  Surgeon: Primo Soni MD;  Location: [address];  Service: Thoracic;  Laterality: Left;  Hernia reduction and repair    REPAIR OF DIAPHRAGMATIC HERNIA Left 1/21/2020    Procedure: REPAIR, HERNIA, DIAPHRAGMATIC, Laparoscopic;  Surgeon: Lucho Garcia Jr., MD;  Location: [address];  Service: General;  Laterality: Left;    REPAIR OF DIAPHRAGMATIC HERNIA Left 1/23/2020    Procedure: REPAIR, HERNIA, DIAPHRAGMATIC;   Surgeon: Lucho Garcia Jr., MD;  Location: [facility] OR [address] [place] [address];  Service: General;  Laterality: Left;       Time Tracking:     OT Date of Treatment: 04/24/20  OT Start Time: 0926  OT Stop Time: 0949  OT Total Time (min): 23 min    Billable Minutes:Evaluation 13 minutes  Self Care/Home Management 10 minutes    Yamile Carpio OT  4/24/2020

## 2020-04-24 NOTE — PLAN OF CARE
Patient discharged home with [facility] and wound vac.    [facility] clinic will follow up with patient's home health nurse.       04/24/20 1504   Final Note   Assessment Type Final Discharge Note   Anticipated Discharge Disposition Home-Health   Right Care Referral Info   Post Acute Recommendation Home-care   Post-Acute Status   Post-Acute Authorization Home Health   Home Health Status Set-up Complete

## 2020-04-24 NOTE — PLAN OF CARE
04/24/20 0952   Post-Acute Status   Post-Acute Authorization Placement;Other   Post-Acute Placement Status Set-up Complete     Patient has been accepted by [facility] post discharge.     Laverne Mar LMSW  [facility]   [id number]

## 2020-04-24 NOTE — PLAN OF CARE
Problem: Occupational Therapy Goal  Goal: Occupational Therapy Goal  Outcome: Met    OT evaluation completed. Patient presents at [facility]. No further OT services needed at this time.  Yamile Carpio OT  4/24/2020

## 2020-04-24 NOTE — NURSING
Patient alert and oriented. Medicated for pain x 1. Discharged to home, dc'd iv. Gave patient script and instructions to  medication at [facility]. Patient instructed to follow up physician. Home health will see patient for wound care.

## 2020-04-24 NOTE — SUBJECTIVE & OBJECTIVE
Interval History: NAEON. Afebrile. Pain well controlled. Tolerating diet.     Medications:  Continuous Infusions:  Scheduled Meds:   mupirocin  1 g Nasal BID    pantoprazole  40 mg Oral Daily    polyethylene glycol  17 g Oral Daily    pregabalin  75 mg Oral QHS    senna-docusate 8.6-50 mg  1 tablet Oral BID    vancomycin (VANCOCIN) IVPB  15 mg/kg Intravenous Q12H     PRN Meds:acetaminophen, bisacodyL, ceFAZolin (ANCEF) IVPB, fentaNYL, metoclopramide HCl, ondansetron, oxyCODONE, oxyCODONE     Review of patient's allergies indicates:   Allergen Reactions    Adhesive Blisters    Erythromycin      Stomach pains    Rosuvastatin      Hives, muscle/joint pains    Zolpidem      Confusion      Objective:     Vital Signs (Most Recent):  Temp: 97.1 °F (36.2 °C) (04/24/20 0507)  Pulse: 74 (04/24/20 0709)  Resp: 16 (04/24/20 0507)  BP: (!) 144/71 (04/24/20 0507)  SpO2: 95 % (04/24/20 0507) Vital Signs (24h Range):  Temp:  [97.1 °F (36.2 °C)-99.2 °F (37.3 °C)] 97.1 °F (36.2 °C)  Pulse:  [65-92] 74  Resp:  [14-19] 16  SpO2:  [92 %-100 %] 95 %  BP: (115-149)/(55-71) 144/71     Intake/Output - Last 3 Shifts       04/22 0700 - 04/23 0659 04/23 0700 - 04/24 0659 04/24 0700 - 04/25 0659    P.O.  480     Total Intake(mL/kg)  480 (5.1)     Urine (mL/kg/hr)  700     Stool  0     Total Output  700     Net  -220            Urine Occurrence  1 x     Stool Occurrence  1 x           SpO2: 95 %  O2 Device (Oxygen Therapy): room air    Physical Exam   Constitutional: She is oriented to person, place, and time. She appears well-developed and well-nourished.   HENT:   Mouth/Throat: Oropharynx is clear and moist.   Cardiovascular: Normal rate, regular rhythm, normal heart sounds and intact distal pulses.   Pulmonary/Chest: Effort normal and breath sounds normal.   Left chest wound VAC intact to 125mmHg   Abdominal: Soft. Bowel sounds are normal.   Musculoskeletal: Normal range of motion. She exhibits no edema.   Lymphadenopathy:     She  has no cervical adenopathy.   Neurological: She is alert and oriented to person, place, and time.   Psychiatric: She has a normal mood and affect.       Significant Labs:  CBC:   Recent Labs   Lab 04/23/20  1049   WBC 13.17*   RBC 4.76   HGB 11.8*   HCT 39.6   *   MCV 83   MCH 24.8*   MCHC 29.8*     CMP:   Recent Labs   Lab 04/23/20  1049 04/24/20  0452   GLU 97 111*   CALCIUM 9.0 8.5*   ALBUMIN 2.6*  --    PROT 6.8  --     141   K 3.1* 5.0   CO2 27 26    107   BUN 5* 8   CREATININE 0.7 0.7   ALKPHOS 130  --    ALT <5*  --    AST 7*  --    BILITOT 0.4  --        Significant Diagnostics:  CXR: I have reviewed all pertinent results/findings within the past 24 hours    VTE Risk Mitigation (From admission, onward)    None

## 2020-04-24 NOTE — PLAN OF CARE
04/24/20 0917   Post-Acute Status   Post-Acute Authorization Placement   Post-Acute Placement Status Referrals Sent     Patient expected to discharge home today. SW sent the referral [facility] via [facility] and will follow up.    Laverne Mar LMSW  [facility]   [id number]

## 2020-04-24 NOTE — HPI
70 y.o. female with open diaphragmatic repair with mesh with recurrence and reoperation (both in 1/2020) with thoracotomy wound requiring debridement and vac placement in 3/2020. She presents for further debridement and possible vac re-application. She denies fever, chills, cough, chills.

## 2020-04-24 NOTE — PT/OT/SLP EVAL
Physical Therapy Evaluation and Discharge Note    Patient Name:  Marisela Bunn   MRN:  [id number]    Recommendations:     Discharge Recommendations:  home health PT   Discharge Equipment Recommendations: none   Barriers to discharge: None    Assessment:     Marisela Bunn is a 70 y.o. female admitted with a medical diagnosis of Surgical wound, non healing.  Patient tolerated PT session well. She ambulated 250ft with supervision and no AD while carrying her own wound vac. She complained of being lightheaded at end of walk but no loss of balance noted. At this time, patient is functioning at their prior level of function and does not require further acute PT services.     Recent Surgery: Procedure(s) (LRB):  INCISION AND DRAINAGE, CHEST (Left)  APPLICATION, WOUND VAC (Left) 1 Day Post-Op    Plan:     During this hospitalization, patient does not require further acute PT services.  Please re-consult if situation changes.      Subjective     Chief Complaint: Ready to go home.   Patient/Family Comments/goals: To get wound to heal.   Pain/Comfort:  · Pain Rating 1: (a little pain to surgical site but did not rate with number )  · Location - Side 1: Left  · Location 1: flank  · Pain Addressed 1: Cessation of Activity    Living Environment:  Patient lives alone in a trailer with a ramp present to enter. Her son lives on one side of her and her granddaughter lives on the other side of her. Prior to admission, patients level of function was independent for ambulation within the trailer but uses her rollator when she goes for long walks outside.  Equipment used at home: bedside commode, grab bar, walker, rolling, shower chair, rollator. Upon discharge, patient will have assistance from 2 sons and granddaughter.    Objective:     Communicated with RN prior to session.  Patient found supine with peripheral IV, wound vac upon PT entry to room.    General Precautions: Standard, fall   Orthopedic Precautions:N/A   Braces: N/A      Exams:  · Cognitive Exam:  Patient is oriented to Person, Place, Time and Situation  · RLE ROM: WFL  · RLE Strength: 5/5  · LLE ROM: WFL  · LLE Strength: 5/5    Functional Mobility:  · Bed Mobility:     · Scooting: independence  · Supine to Sit: independence  · Sit to Supine: independence  · Transfers:     · Sit to Stand:  independence with no AD  · Gait:  Patient ambulated 250ft with supervision and no AD while carrying her own wound vac. She complained of being lightheaded at the end of the walk but no loss of balance noted.      Therapeutic Activities and Exercises:  Patient educated in:  -PT role, POC, and recommendations at discharge  -safety with transfers including hand placement  -OOB activity to maximize recovery       AM-PAC 6 CLICK MOBILITY  Total Score:24     Patient left supine with all lines intact, call button in reach and RN notified.    GOALS:   Multidisciplinary Problems     Physical Therapy Goals     Not on file          Multidisciplinary Problems (Resolved)        Problem: Physical Therapy Goal    Goal Priority Disciplines Outcome Goal Variances Interventions   Physical Therapy Goal   (Resolved)     PT, PT/OT Met                     History:     Past Medical History:   Diagnosis Date    CAD (coronary artery disease)     Diaphragmatic hernia     GERD (gastroesophageal reflux disease)     Hypertension     TIA (transient ischemic attack)        Past Surgical History:   Procedure Laterality Date    APPLICATION OF WOUND VACUUM-ASSISTED CLOSURE DEVICE Left 3/9/2020    Procedure: APPLICATION, WOUND VAC;  Surgeon: Primo Soni MD;  Location: [address];  Service: Colon and Rectal;  Laterality: Left;    APPLICATION OF WOUND VACUUM-ASSISTED CLOSURE DEVICE Left 4/23/2020    Procedure: APPLICATION, WOUND VAC;  Surgeon: Primo Soni MD;  Location: [address];  Service: General;  Laterality: Left;    INCISION AND DRAINAGE OF CHEST Left 4/23/2020    Procedure: INCISION AND  DRAINAGE, CHEST;  Surgeon: Primo Soni MD;  Location: [facility] [address] [place] [address];  Service: General;  Laterality: Left;  Wash out  Trim mesh    RECONSTRUCTION OF CHEST WALL Left 1/29/2020    Procedure: RECONSTRUCTION, CHEST WALL;  Surgeon: Primo Soni MD;  [facility];  Service: Thoracic;  Laterality: Left;    RECONSTRUCTION OF DIAPHRAGM Left 1/29/2020    Procedure: RECONSTRUCTION, DIAPHRAGM;  Surgeon: Primo Soni MD;  Location: [facility] [address] [place] [address];  Service: Thoracic;  Laterality: Left;  Hernia reduction and repair    REPAIR OF DIAPHRAGMATIC HERNIA Left 1/21/2020    Procedure: REPAIR, HERNIA, DIAPHRAGMATIC, Laparoscopic;  Surgeon: Lucho Garcia Jr., MD;  Location: [facility] [address] [place] [address];  Service: General;  Laterality: Left;    REPAIR OF DIAPHRAGMATIC HERNIA Left 1/23/2020    Procedure: REPAIR, HERNIA, DIAPHRAGMATIC;  Surgeon: Lucho Garcia Jr., MD;  Location: [facility] [address] [place] [address];  Service: General;  Laterality: Left;       Time Tracking:     PT Received On: 04/24/20  PT Start Time: 0926     PT Stop Time: 0948  PT Total Time (min): 22 min     Billable Minutes: Evaluation 11 and Gait Training 11      Rowan Angel, PT  04/24/2020

## 2020-04-24 NOTE — PLAN OF CARE
Patient rec'd from PACU via stretcher, AAOx4. No s/s of acute distress. Patient transferred to bed and able to stand without assistance. IV in right hand saline locked. Telemetry showing sinus rhythm on monitor. The patient has a wound vac to left flank area at 125mmhg. Call light within reach, bed locked and in low position.

## 2020-04-24 NOTE — PROGRESS NOTES
[facility]  Thoracic Surgery  Progress Note    Subjective:     History of Present Illness:  70 y.o. female with open diaphragmatic repair with mesh with recurrence and reoperation (both in 1/2020) with thoracotomy wound requiring debridement and vac placement in 3/2020. She presents for further debridement and possible vac re-application. She denies fever, chills, cough, chills.    Post-Op Info:  Procedure(s) (LRB):  INCISION AND DRAINAGE, CHEST (Left)  APPLICATION, WOUND VAC (Left)   1 Day Post-Op     Interval History: NAEON. Afebrile. Pain well controlled. Tolerating diet.     Medications:  Continuous Infusions:  Scheduled Meds:   mupirocin  1 g Nasal BID    pantoprazole  40 mg Oral Daily    polyethylene glycol  17 g Oral Daily    pregabalin  75 mg Oral QHS    senna-docusate 8.6-50 mg  1 tablet Oral BID    vancomycin (VANCOCIN) IVPB  15 mg/kg Intravenous Q12H     PRN Meds:acetaminophen, bisacodyL, ceFAZolin (ANCEF) IVPB, fentaNYL, metoclopramide HCl, ondansetron, oxyCODONE, oxyCODONE     Review of patient's allergies indicates:   Allergen Reactions    Adhesive Blisters    Erythromycin      Stomach pains    Rosuvastatin      Hives, muscle/joint pains    Zolpidem      Confusion      Objective:     Vital Signs (Most Recent):  Temp: 97.1 °F (36.2 °C) (04/24/20 0507)  Pulse: 74 (04/24/20 0709)  Resp: 16 (04/24/20 0507)  BP: (!) 144/71 (04/24/20 0507)  SpO2: 95 % (04/24/20 0507) Vital Signs (24h Range):  Temp:  [97.1 °F (36.2 °C)-99.2 °F (37.3 °C)] 97.1 °F (36.2 °C)  Pulse:  [65-92] 74  Resp:  [14-19] 16  SpO2:  [92 %-100 %] 95 %  BP: (115-149)/(55-71) 144/71     Intake/Output - Last 3 Shifts       04/22 0700 - 04/23 0659 04/23 0700 - 04/24 0659 04/24 0700 - 04/25 0659    P.O.  480     Total Intake(mL/kg)  480 (5.1)     Urine (mL/kg/hr)  700     Stool  0     Total Output  700     Net  -220            Urine Occurrence  1 x     Stool Occurrence  1 x           SpO2: 95 %  O2 Device (Oxygen  Therapy): room air    Physical Exam   Constitutional: She is oriented to person, place, and time. She appears well-developed and well-nourished.   HENT:   Mouth/Throat: Oropharynx is clear and moist.   Cardiovascular: Normal rate, regular rhythm, normal heart sounds and intact distal pulses.   Pulmonary/Chest: Effort normal and breath sounds normal.   Left chest wound VAC intact to 125mmHg   Abdominal: Soft. Bowel sounds are normal.   Musculoskeletal: Normal range of motion. She exhibits no edema.   Lymphadenopathy:     She has no cervical adenopathy.   Neurological: She is alert and oriented to person, place, and time.   Psychiatric: She has a normal mood and affect.       Significant Labs:  CBC:   Recent Labs   Lab 04/23/20  1049   WBC 13.17*   RBC 4.76   HGB 11.8*   HCT 39.6   *   MCV 83   MCH 24.8*   MCHC 29.8*     CMP:   Recent Labs   Lab 04/23/20  1049 04/24/20  0452   GLU 97 111*   CALCIUM 9.0 8.5*   ALBUMIN 2.6*  --    PROT 6.8  --     141   K 3.1* 5.0   CO2 27 26    107   BUN 5* 8   CREATININE 0.7 0.7   ALKPHOS 130  --    ALT <5*  --    AST 7*  --    BILITOT 0.4  --        Significant Diagnostics:  CXR: I have reviewed all pertinent results/findings within the past 24 hours    VTE Risk Mitigation (From admission, onward)    None        Assessment/Plan:     * Surgical wound, non healing  70 y.o. female with L diaphragmatic hernia s/p laparascopic repair complicated by recurrence and open thoracotomy repair (biologic mesh for hernia defect, gore-adalgisa for chest wall reconstruction) one week later in 1/2020, complicated by wound infection with exposed gore-adalgisa mesh.     - Regular diet  - PRN PO pain control  - PO antibiotics  - Ambulate in room    Dispo- Discharge home with home health wound care        ANTONY Haider  Thoracic Surgery  [facility]

## 2020-04-24 NOTE — NURSING TRANSFER
Nursing Transfer Note      4/23/2020     Transfer To: 1062    Transfer via stretcher    Transfer with tele, wound vac    Transported by transport    Medicines sent: no    Chart send with patient: Yes    Notified: texted family

## 2020-04-24 NOTE — PLAN OF CARE
Patient is a 70 year old female admitted from home 4/23/2020 and underwent I&D Right Chest and Wound VAC placement.  Patient is expected to discharge home today with resumption of [facility] and [facility] wound vac.    Patient lives alone in a one story home with her son and granddaughter living on the same property.  Patient stated she was active with [facility] with wound vac changes every M W Fr and will resume same with them at discharge today.  Patient's son will drive her home and is expected to arrive at 1:00pm.  Patient with no prescriptions to take home at discharge.  Will continue to follow for needs.    PCP  Scott Trevizo MD  [address] [place] [address] [address] [facility] / [place] [facility]  [phone]      [id number] - [place], [place] - [address] AT [facility]  [address]  [place] [address]  Phone: [phone] Fax: [phone]      Extended Emergency Contact Information  Primary Emergency Contact: LIBERTAD BUNN  Mobile Phone: [phone]  Relation: Son  Preferred language: English   needed? No  Secondary Emergency Contact: Ambar Bunn  Mobile Phone: [phone]  Relation: Daughter       04/24/20 0858   Discharge Assessment   Assessment Type Discharge Planning Reassessment   Confirmed/corrected address and phone number on facesheet? Yes   Assessment information obtained from? Patient   Expected Length of Stay (days) 2   Communicated expected length of stay with patient/caregiver yes   Prior to hospitilization cognitive status: Alert/Oriented   Prior to hospitalization functional status: Independent   Current cognitive status: Alert/Oriented   Current Functional Status: Independent   Lives With alone   Able to Return to Prior Arrangements yes   Is patient able to care for self after discharge? Yes   Who are your caregiver(s) and their phone number(s)? family   Patient's perception of discharge  disposition home health   Patient currently receives any other outside agency services? Yes   Name and contact number of agency or person providing outside services [facility]   Is it the patient/care giver preference to resume care with the current outside agency? Yes   Equipment Currently Used at Home bedside commode;glucometer;grab bar;walker, rolling;wound care supplies   Do you have any problems affording any of your prescribed medications? No   Is the patient taking medications as prescribed? yes   Does the patient have transportation home? Yes   Transportation Anticipated family or friend will provide   Discharge Plan A Home with family;Home Health   DME Needed Upon Discharge  none   Patient/Family in Agreement with Plan yes

## 2020-04-24 NOTE — ASSESSMENT & PLAN NOTE
70 y.o. female with L diaphragmatic hernia s/p laparascopic repair complicated by recurrence and open thoracotomy repair (biologic mesh for hernia defect, gore-adalgisa for chest wall reconstruction) one week later in 1/2020, complicated by wound infection with exposed gore-adalgisa mesh.     - Regular diet  - PRN PO pain control  - PO antibiotics  - Ambulate in room    Dispo- Discharge home with home health wound care

## 2020-04-24 NOTE — PLAN OF CARE
Patient tolerated PT session well. She ambulated 250ft with supervision and no AD while carrying her own wound vac. She complained of being lightheaded at end of walk but no loss of balance noted. She has no PT needs at discharge.

## 2020-04-24 NOTE — HOSPITAL COURSE
4/23/20- Left chest wound debridement, removal of dehisced gore-adalgisa mesh, irrigation and replacement of wound VAC. The wound appeared to be adequately hemostatic, measuring 20cm (anterior to posterior, undermining posteriorly; 15cm at skin), 4.5cm superior to inferior and 5cm deep. Using a black sponge, a wound vac was reapplied.  4/24/20- Pain well controlled. Started on PO antibiotics. Tolerating diet. Comfortable on room air. Stable for discharge home. She will return to care of home health and outpatient wound care agency in [place].

## 2020-04-24 NOTE — DISCHARGE SUMMARY
[facility]  Cardiothoracic Surgery  Discharge Summary      Patient Name: Marisela Bunn  MRN: [id number]  Admission Date: 4/23/2020  Hospital Length of Stay: 0 days  Discharge Date and Time:  04/24/2020 10:54 AM  Attending Physician: Primo Soni MD   Discharging Provider: Luis Eduardo Soto MD  Primary Care Provider: Scott Trevizo MD     HPI:   Marisela Bunn is a 70 y.o. female with open diaphragmatic repair with mesh with recurrence and reoperation (both in 1/2020) with thoracotomy wound requiring debridement and vac placement in 3/2020. She presents for further debridement and possible vac re-application. She denies fever, chills, cough, chills.  Procedure(s) (LRB):  INCISION AND DRAINAGE, CHEST (Left)  APPLICATION, WOUND VAC (Left)     Hospital Course: She underwent jet lavage and debridement of the L chest wall including removal of the gore-adalgisa mesh used for chest wall reconstruction and the wound vac was reapplied. She tolerated the procedure well and her pain is well controlled. She is appropriate for discharge. Cultures are postive for staph; will d/c with appropriate abx. She can follow-up in 3-4 wks in OR for wound exploration and likely evaluation by plastics for possible flap coverage.    Consults:   Care management    Significant Diagnostic Studies: none    Pending Diagnostic Studies:     Procedure Component Value Units Date/Time    Specimen to Pathology, Surgery Pulmonary and Thoracic [[id number]] Collected:  04/23/20 1521    Order Status:  Sent Lab Status:  In process Updated:  04/23/20 1618        Final Active Diagnoses:    Diagnosis Date Noted POA    PRINCIPAL PROBLEM:  Surgical wound, non healing [T81.89XA] 04/23/2020 Yes    Open wound of left chest wall [S21.102A] 04/23/2020 Yes      Problems Resolved During this Admission:      Discharged Condition: fair    Disposition: home with home health    Follow Up: in 3-4 weeks    Patient Instructions:   Wound vac changes  every M-W-F;  Shower daily; seek medical attention if worsening signs of infection including dyspnea, worsening drainage or redness    Medications:  Reconciled Home Medications:      Medication List      START taking these medications    doxycycline 100 MG Cap  Commonly known as:  VIBRAMYCIN  Take 1 capsule (100 mg total) by mouth every 12 (twelve) hours. for 14 days        CONTINUE taking these medications    acetaminophen 325 MG tablet  Commonly known as:  TYLENOL  Take 2 tablets (650 mg total) by mouth every 8 (eight) hours as needed.     celecoxib 200 MG capsule  Commonly known as:  CeleBREX  Take 1 capsule (200 mg total) by mouth once daily.     oxyCODONE-acetaminophen 5-325 mg per tablet  Commonly known as:  PERCOCET  Take 1 tablet by mouth every 4 (four) hours as needed for Pain.     pantoprazole 40 MG tablet  Commonly known as:  PROTONIX  every evening.     pregabalin 75 MG capsule  Commonly known as:  LYRICA  Take 1 capsule (75 mg total) by mouth every evening.        STOP taking these medications    levoFLOXacin 750 MG tablet  Commonly known as:  JUDY Soto MD  General Surgery  [facility]

## 2020-04-25 LAB — BACTERIA SPEC AEROBE CULT: ABNORMAL

## 2020-04-27 LAB
BACTERIA SPEC ANAEROBE CULT: NORMAL
FINAL PATHOLOGIC DIAGNOSIS: NORMAL
GROSS: NORMAL

## 2020-04-27 NOTE — ANESTHESIA POSTPROCEDURE EVALUATION
Anesthesia Post Evaluation    Patient: aMrisela Bunn    Procedure(s) Performed: Procedure(s) (LRB):  INCISION AND DRAINAGE, CHEST (Left)  APPLICATION, WOUND VAC (Left)    Final Anesthesia Type: general    Patient location during evaluation: PACU  Patient participation: Yes- Able to Participate  Level of consciousness: awake  Post-procedure vital signs: reviewed and stable  Pain management: adequate  Airway patency: patent    PONV status at discharge: No PONV  Anesthetic complications: no      Cardiovascular status: blood pressure returned to baseline and stable  Respiratory status: spontaneous ventilation  Hydration status: euvolemic  Follow-up not needed.          Vitals Value Taken Time   /60 4/24/2020 10:23 AM   Temp 36.3 °C (97.4 °F) 4/24/2020 10:23 AM   Pulse 78 4/24/2020 11:35 AM   Resp 20 4/24/2020 10:23 AM   SpO2 94 % 4/24/2020 10:23 AM         Event Time     Out of Recovery 17:00:00          Pain/Alex Score: No data recorded

## 2020-05-19 LAB — FUNGUS SPEC CULT: NORMAL

## 2020-05-25 ENCOUNTER — TELEPHONE (OUTPATIENT)
Dept: SURGERY | Facility: CLINIC | Age: 70
End: 2020-05-25

## 2020-05-25 NOTE — TELEPHONE ENCOUNTER
----- Message from Werner Garsia sent at 5/25/2020  1:59 PM CDT -----  Contact: Pt    The Pt states that she would like to schedule an appt for the [place] location please.      Phone # [phone]

## 2020-05-25 NOTE — TELEPHONE ENCOUNTER
Spoke to patient. Informed her of her appointment scheduled in [place] at 11am on 6/2/2020. Also instructed her to bring all of her wound vac dressing supplies to the visit. Verbalized understand. Will put letter in the mail.

## 2020-06-02 ENCOUNTER — OFFICE VISIT (OUTPATIENT)
Dept: CARDIOTHORACIC SURGERY | Facility: CLINIC | Age: 70
End: 2020-06-02
Payer: COMMERCIAL

## 2020-06-02 VITALS
HEART RATE: 94 BPM | WEIGHT: 209 LBS | BODY MASS INDEX: 39.46 KG/M2 | TEMPERATURE: 98 F | DIASTOLIC BLOOD PRESSURE: 84 MMHG | SYSTOLIC BLOOD PRESSURE: 154 MMHG | HEIGHT: 61 IN

## 2020-06-02 DIAGNOSIS — S21.102S OPEN CHEST WOUND, LEFT, SEQUELA: Primary | ICD-10-CM

## 2020-06-02 PROCEDURE — 99024 POSTOP FOLLOW-UP VISIT: CPT | Mod: S$GLB,,, | Performed by: THORACIC SURGERY (CARDIOTHORACIC VASCULAR SURGERY)

## 2020-06-02 PROCEDURE — 99999 PR PBB SHADOW E&M-EST. PATIENT-LVL III: ICD-10-PCS | Mod: PBBFAC,,, | Performed by: THORACIC SURGERY (CARDIOTHORACIC VASCULAR SURGERY)

## 2020-06-02 PROCEDURE — 99024 PR POST-OP FOLLOW-UP VISIT: ICD-10-PCS | Mod: S$GLB,,, | Performed by: THORACIC SURGERY (CARDIOTHORACIC VASCULAR SURGERY)

## 2020-06-02 PROCEDURE — 99999 PR PBB SHADOW E&M-EST. PATIENT-LVL III: CPT | Mod: PBBFAC,,, | Performed by: THORACIC SURGERY (CARDIOTHORACIC VASCULAR SURGERY)

## 2020-06-02 RX ORDER — NYSTATIN 100000 [USP'U]/G
POWDER TOPICAL 4 TIMES DAILY
Qty: 60 G | Refills: 3 | Status: SHIPPED | OUTPATIENT
Start: 2020-06-02

## 2020-06-02 NOTE — PROGRESS NOTES
Subjective:       Patient ID: Marisela Bunn is a 70 y.o. female.    Chief Complaint: Follow-up    Diagnosis:  Diaphragmatic Hernia    Pre-operative therapy: 1/21/20- Laparoscopic reduction and repair of diaphragmatic hernia. Defect itself measured approximately 10 x 15 cm requiring a [place]-Adalgisa patch to close the defect.    Procedure(s) and date(s):   1/29/20- Left lateral thoracotomy, mobilization and reduction of herniated intrathoracicand abdominal viscera, patch reconstruction of left hemidiaphragm, chest wall reconstruction with [place]-Adalgisa patch, DREW drain insertion, left chest tube insertion.  3/9/20- Irrigation and debridement of wound 2. Wound vac placement   4/23/20- Jet lavage and debridement of the left chest wall including removal of the gore-adalgisa mesh used for chest wall reconstruction and the wound vac was reapplied.    Pathology:   A. Pleural Effusion- Fibrinopurulent exudate. Negative for malignancy.  B. Hernia Sac- Mesothelial-lined soft tissue consistent with hernia sac, inflamed. No malignancy identified.     Post-operative therapy: Wound debridement and VAC placement     HPI   70 year old female returns for follow up after hospitalization from 1/19/20- 2/11/20 for management of complicated diaphragmatic hernia with extension out to chest wall. Underwent repair laparoscopically and via left thoracotomy. Transferred to SNF on prophylactic Bactrim and topical antifungals for wound erythema. We have taken her back twice for wound irrigation, debridement and VAC placement. Today she reports doing well. Slowly increasing endurance and strength. She ambulates without assistive devices at home. Home health and Wound Care Center in [place] have been monitoring her would VAC. She does report noting an inferior rib protrusion after a sneezing fit last week. Denies pain or ecchymosis last week.     Review of Systems   Constitutional: Negative for activity change and appetite change.   HENT: Positive for  "postnasal drip, rhinorrhea and sinus pressure. Negative for trouble swallowing and voice change.    Eyes: Negative for visual disturbance.   Respiratory: Positive for cough. Negative for shortness of breath and wheezing.    Cardiovascular: Negative for chest pain, palpitations and leg swelling.   Gastrointestinal: Negative for abdominal distention, abdominal pain and vomiting.   Genitourinary: Negative for difficulty urinating.   Musculoskeletal: Negative for arthralgias and myalgias.   Skin: Positive for wound.   Neurological: Negative for dizziness, syncope, weakness and numbness.   Hematological: Negative for adenopathy.   Psychiatric/Behavioral: Negative for confusion.         Objective:       Vitals:    06/02/20 1024   BP: (!) 154/84   Pulse: 94   Temp: 98.2 °F (36.8 °C)   Weight: 94.8 kg (209 lb)   Height: 5' 1" (1.549 m)   PainSc: 0-No pain       Physical Exam   Constitutional: She is oriented to person, place, and time. She appears well-developed and well-nourished.   HENT:   Mouth/Throat: Oropharynx is clear and moist.   Neck: Normal range of motion.   Cardiovascular: Normal rate, regular rhythm, normal heart sounds and intact distal pulses.   Pulmonary/Chest: Effort normal and breath sounds normal. She has no wheezes.   3-4cm of wound open with beefy red granulomatous tissue surrounding wound. Remainder of incision healing well. Candida and adhesive rash surrounding wound.    Abdominal: Soft. Bowel sounds are normal. She exhibits no distension. There is no tenderness.   Lap sites well healed   Musculoskeletal: She exhibits no edema.   Lymphadenopathy:     She has no cervical adenopathy.   Neurological: She is alert and oriented to person, place, and time.   Skin: Rash noted.   Psychiatric: She has a normal mood and affect.         Assessment:       70 year old female returns for follow up after hospitalization from for management of complicated diaphragmatic hernia with extension to chest wall.     Plan: "       Wound is healing appropriately. Changed VAC in clinic today and added pictures to Media Tab. Will defer timing of wound VAC removal to Wound Care provider in [place]. The patient prefers to continue with definitive wound care in [place].  I will contact Plastic Surgeon Dr. Munson in [place] for possible definitve skin closure.

## 2020-07-09 NOTE — PROGRESS NOTES
BMI Counseling: Body mass index is 32 93 kg/m²  The BMI is above normal  Nutrition recommendations include reducing portion sizes, decreasing overall calorie intake, 3-5 servings of fruits/vegetables daily and reducing fast food intake  Exercise recommendations include moderate aerobic physical activity for 150 minutes/week  First attempt to transport pt to [facility] for pre-op, upon entering patient's room on floor, the patient was noted to be in respiratory distress. Pt noted to be on 5L nasal canula at the time of distress. At this point pt's nurse was notified of findings, and took over pt's care.            2nd attempt to patient's floor; RN with PCT, stop sign safety check complete, pt still noted to be in respiratory distress, sating 91% but now on a non re breather. Pt consented for surgery and  Anesthesia at bedside. Pt transported with anesthesia MD and surgical resident from floor to OR.

## 2021-01-07 NOTE — PROGRESS NOTES
Spoke with on call MD for surgery in regards to the patient complaining of indigestion. New order for daily Protonix 40 mg PO was ordered. Will continue to monitor.    95

## 2023-05-02 NOTE — SUBJECTIVE & OBJECTIVE
Interval History: AF overnight. She was extubated yesterday. Has been sating 91-92% on 4L. She feels fatigued this AM.     Medications:  Continuous Infusions:   fentanyl Stopped (02/02/20 1000)    propofol Stopped (02/02/20 1000)    ropivacaine (PF) 2 mg/ml (0.2%) 2 mL/hr (02/03/20 0600)     Scheduled Meds:   acetylcysteine 100 mg/ml (10%)  4 mL Nebulization TID    albuterol-ipratropium  3 mL Nebulization Q4H    benzonatate  100 mg Oral Q8H    celecoxib  200 mg Oral Daily    chlorhexidine  15 mL Mouth/Throat BID    dextromethorphan-guaifenesin  mg/5 ml  5 mL Oral Q6H    enoxaparin  40 mg Subcutaneous BID    furosemide  40 mg Intravenous BID    methocarbamol  750 mg Per NG tube QID    mupirocin  1 g Nasal BID    polyethylene glycol  17 g Per NG tube Daily    pregabalin  75 mg Oral Once    Followed by    pregabalin  75 mg Oral QHS    senna-docusate 8.6-50 mg  1 tablet Per NG tube BID     PRN Meds:sodium chloride, bisacodyL, calcium gluconate IVPB, calcium gluconate IVPB, calcium gluconate IVPB, Dextrose 10% Bolus, glucagon (human recombinant), hydrALAZINE, HYDROmorphone, insulin aspart U-100, labetalol, magnesium sulfate IVPB, magnesium sulfate IVPB, melatonin, metoclopramide HCl, morphine, ondansetron, ondansetron, potassium chloride in water **AND** potassium chloride in water **AND** potassium chloride in water, promethazine (PHENERGAN) IVPB, sodium chloride 0.9%, sodium chloride 0.9%, sodium phosphate IVPB, sodium phosphate IVPB, sodium phosphate IVPB     Review of patient's allergies indicates:   Allergen Reactions    Erythromycin      Stomach pains    Rosuvastatin      Hives, muscle/joint pains    Zolpidem      Confusion      Objective:     Vital Signs (Most Recent):  Temp: 98.2 °F (36.8 °C) (02/03/20 0300)  Pulse: 103 (02/03/20 0600)  Resp: (!) 28 (02/03/20 0600)  BP: (!) 189/78 (02/03/20 0600)  SpO2: (!) 92 % (02/03/20 0600) Vital Signs (24h Range):  Temp:  [98.2 °F (36.8 °C)-98.7 °F  (37.1 °C)] 98.2 °F (36.8 °C)  Pulse:  [] 103  Resp:  [14-31] 28  SpO2:  [89 %-96 %] 92 %  BP: (105-189)/(56-78) 189/78     Weight: 114.5 kg (252 lb 6.8 oz)  Body mass index is 47.7 kg/m².    Intake/Output - Last 3 Shifts       02/01 0700 - 02/02 0659 02/02 0700 - 02/03 0659    I.V. (mL/kg) 1408.8 (11.7) 188.1 (1.6)    Blood 739.3     NG/ 315    IV Piggyback  300    Total Intake(mL/kg) 2843.1 (23.7) 803.1 (7)    Urine (mL/kg/hr) 1160 (0.4) 2425 (0.9)    Drains 130 210    Stool 0 0    Chest Tube 88 110    Total Output 1378 2745    Net +1465.1 -1941.9                Physical Exam   Constitutional: She is oriented to person, place, and time. She appears well-developed and well-nourished. No distress.   HENT:   Head: Normocephalic and atraumatic.   Eyes: Conjunctivae and EOM are normal. Right eye exhibits no discharge. Left eye exhibits no discharge.   Cardiovascular: Normal rate and regular rhythm.   Pulmonary/Chest: Effort normal and breath sounds normal. No respiratory distress.   Abdominal: Soft. She exhibits no distension. There is no tenderness.   Lap incisions are c/d/i   Musculoskeletal: Normal range of motion. She exhibits no deformity.   Neurological: She is alert and oriented to person, place, and time.   Skin: Skin is warm and dry.         Significant Labs:  CBC:   Recent Labs   Lab 02/03/20  0200   WBC 11.56   RBC 3.57*   HGB 9.8*   HCT 32.4*   *   MCV 91   MCH 27.5   MCHC 30.2*     CMP:   Recent Labs   Lab 02/03/20  0200   GLU 80   CALCIUM 8.5*   ALBUMIN 1.7*   PROT 5.7*      K 3.6   CO2 28   CL 98   BUN 24*   CREATININE 0.8   ALKPHOS 92   ALT 20   AST 45*   BILITOT 0.3       Significant Diagnostics:  I have reviewed all pertinent imaging results/findings within the past 24 hours.   [TextBox_4] : pt called\par he had office change phone number ( prior number incorrect- re need for letter)\par no sputum or fevers reported, no CP or SOB\par advised spirometry showed severe COPD\par Start Trelegy daily ( no prostate or glaucoma )\par Call if any SOB, sputum, or wheezing\par prn albuterol\par Has f/u

## 2024-02-26 NOTE — PLAN OF CARE
Problem: SLP Goal  Goal: SLP Goal  Description  Speech Language Pathology Goals  Goals expected to be met by 1/31:  1. Pt will tolerate pureed diet and nectar thick liquids without s/s of aspiration.   2. Pt will participate in ongoing swallowing assessment to determine if safe for diet advancement and/or need for MBSS.    Outcome: Ongoing, Progressing      Lab Results   Component Value Date    HGBA1C 9.8 (H) 01/31/2024     Continue to adjust basal/prandial insulin. BG goal 140-180  Hypoglycemia protocol  Carbohydrate restriction  Continue to adjust

## 2024-10-22 NOTE — ASSESSMENT & PLAN NOTE
68yo female s/p left diaphragmatic hernia repair and reconstruction with biologic mesh, and chest wall hernia repair with [place]-adalgisa mesh presenting with a chronic surgical wound with dehiscence.  Denies fevers, chills, or purulent discharge.  No current signs of infection.       - NPO  - mIV fluids at 125cc/hr  - Sliding scale insulin  - PRN nausea meds  - DVT ppx: Heparin 5k Q8, SCDs  - Ambulate as tolerated  - Need to establish PIV access for BMP redraw post potassium replacement this AM  - CT c/a/p with contrast obtained and reviewed with evidence of two intact repairs  - OR today for wound debridement and washout of wound with wound vac placement   Detail Level: Detailed

## (undated) DEVICE — GOWN SMART IMP BREATHABLE XXLG

## (undated) DEVICE — DRAPE STERI INSTRUMENT 1018

## (undated) DEVICE — DRESSING MEPORE 3.6 X 10

## (undated) DEVICE — DRESSING MEPORE ISLAND 31/2X4

## (undated) DEVICE — SUT 2/0 30IN SILK BLK BRAI

## (undated) DEVICE — SUT SURGIDAC ENDO STITCH2-0

## (undated) DEVICE — DRESSING TRANS 4X4 TEGADERM

## (undated) DEVICE — CLOSURE SKIN STERI STRIP 1/2X4

## (undated) DEVICE — SUT PROLENE 4-0 RB-1 BL MO

## (undated) DEVICE — Device

## (undated) DEVICE — DRAPE INCISE IOBAN 2 23X17IN

## (undated) DEVICE — GLOVE BIOGEL SKINSENSE PI 7.5

## (undated) DEVICE — PACK DRAPE UNIVERSAL CONVERTOR

## (undated) DEVICE — SUT VICRYL 3-0 27 SH

## (undated) DEVICE — APPLICATOR CHLORAPREP ORN 26ML

## (undated) DEVICE — SEE MEDLINE ITEM 152622

## (undated) DEVICE — SUT VICRYL BR 1 GEN 27 CT-1

## (undated) DEVICE — CLIP MED TICALL

## (undated) DEVICE — TRAY MINOR GEN SURG

## (undated) DEVICE — VAC WOUND ULTRA THERAPY

## (undated) DEVICE — GAUZE SPONGE 4X4 12PLY

## (undated) DEVICE — TAPE MEDIPORE 4IN X 2YDS

## (undated) DEVICE — SUT 2 30IN SILK BLK BRAIDE

## (undated) DEVICE — SEE MEDLINE ITEM 157117

## (undated) DEVICE — ELECTRODE BLADE INSULATED 1 IN

## (undated) DEVICE — DRAPE ABDOMINAL TIBURON 14X11

## (undated) DEVICE — SPONGE LAP 18X18 PREWASHED

## (undated) DEVICE — STAPLER SKIN PROXIMATE WIDE

## (undated) DEVICE — GOWN SURGICAL X-LARGE

## (undated) DEVICE — BOWL CEMENT

## (undated) DEVICE — DRAPE THYROID WITH ARMBOARD

## (undated) DEVICE — HEMOSTAT SURGICEL 2X14IN

## (undated) DEVICE — BLADE SURG CARBON STEEL SZ11

## (undated) DEVICE — DRESSING ADH ISLAND 3.6 X 14

## (undated) DEVICE — SUT PROLENE 0 MO6 30IN BLUE

## (undated) DEVICE — SEE MEDLINE ITEM 146347

## (undated) DEVICE — CATH URETHRAL RED RUBBER 18FR

## (undated) DEVICE — BLADE HALL STERILE

## (undated) DEVICE — SUT SILK 2-0 STRANDS 30IN

## (undated) DEVICE — SUT 2-0 12-18IN SILK

## (undated) DEVICE — CONTAINER SPECIMEN STRL 4OZ

## (undated) DEVICE — KIT EVACUATOR FLAT DRAIN 100CC

## (undated) DEVICE — SUT 2 27IN COATED VICRYL V

## (undated) DEVICE — GLOVE BIOGEL PI MICRO SZ 5.5

## (undated) DEVICE — SYR SLIP TIP 5CC

## (undated) DEVICE — BLADE ELECTRO EDGE INSULATED

## (undated) DEVICE — APPLICATOR ARISTA FLEX XL

## (undated) DEVICE — ELECTRODE REM PLYHSV RETURN 9

## (undated) DEVICE — TAPE SILK 3IN

## (undated) DEVICE — NDL 22GA X1 1/2 REG BEVEL

## (undated) DEVICE — TRAY FOLEY 16FR INFECTION CONT

## (undated) DEVICE — SCISSOR 5MMX35CM DIRECT DRIVE

## (undated) DEVICE — COVER LIGHT HANDLE 80/CA

## (undated) DEVICE — RELOAD SURGIDAC ES-9 GRN 9MM

## (undated) DEVICE — SUT MCRYL PLUS 4-0 PS2 27IN

## (undated) DEVICE — SEE MEDLINE ITEM 157144

## (undated) DEVICE — SUT PROLENE 4-0 SH BLU 36IN

## (undated) DEVICE — SEE MEDLINE ITEM 157131

## (undated) DEVICE — CATH THORACIC ANGLE 28F

## (undated) DEVICE — SEE MEDLINE ITEM 152487

## (undated) DEVICE — SPONGE GAUZE 16PLY 4X4

## (undated) DEVICE — SEALER LIGASURE IMPACT 18CM

## (undated) DEVICE — SUT SILK 0 BLK BR FSL 18 IN

## (undated) DEVICE — SUT LIGACLIP SMALL XTRA

## (undated) DEVICE — NDL HYPO REG 25G X 1 1/2

## (undated) DEVICE — GAUZE SPONGE PEANUT STRL

## (undated) DEVICE — NDL BOX COUNTER

## (undated) DEVICE — SEE MEDLINE ITEM 146417

## (undated) DEVICE — RETAINER VISCERA STER DISP LRG

## (undated) DEVICE — SUT V-LOC 180 2-0 GS-22 9IN

## (undated) DEVICE — SYR ONLY LUER LOCK 20CC

## (undated) DEVICE — ENDOSTITCH INSTRUMENT

## (undated) DEVICE — SUT CTD VICRYL CT-1 UND BR